# Patient Record
Sex: FEMALE | Race: WHITE | NOT HISPANIC OR LATINO | Employment: OTHER | URBAN - METROPOLITAN AREA
[De-identification: names, ages, dates, MRNs, and addresses within clinical notes are randomized per-mention and may not be internally consistent; named-entity substitution may affect disease eponyms.]

---

## 2017-03-09 ENCOUNTER — HOSPITAL ENCOUNTER (OUTPATIENT)
Dept: MAMMOGRAPHY | Facility: HOSPITAL | Age: 72
Discharge: HOME | End: 2017-03-09

## 2017-03-09 DIAGNOSIS — Z12.31 VISIT FOR SCREENING MAMMOGRAM: ICD-10-CM

## 2021-05-24 ENCOUNTER — RECORDS - HEALTHEAST (OUTPATIENT)
Dept: ADMINISTRATIVE | Facility: CLINIC | Age: 76
End: 2021-05-24

## 2021-05-25 ENCOUNTER — RECORDS - HEALTHEAST (OUTPATIENT)
Dept: ADMINISTRATIVE | Facility: CLINIC | Age: 76
End: 2021-05-25

## 2021-05-26 ENCOUNTER — RECORDS - HEALTHEAST (OUTPATIENT)
Dept: ADMINISTRATIVE | Facility: CLINIC | Age: 76
End: 2021-05-26

## 2021-05-27 ENCOUNTER — RECORDS - HEALTHEAST (OUTPATIENT)
Dept: ADMINISTRATIVE | Facility: CLINIC | Age: 76
End: 2021-05-27

## 2021-05-28 ENCOUNTER — RECORDS - HEALTHEAST (OUTPATIENT)
Dept: ADMINISTRATIVE | Facility: CLINIC | Age: 76
End: 2021-05-28

## 2021-07-13 ENCOUNTER — RECORDS - HEALTHEAST (OUTPATIENT)
Dept: ADMINISTRATIVE | Facility: CLINIC | Age: 76
End: 2021-07-13

## 2021-07-21 ENCOUNTER — RECORDS - HEALTHEAST (OUTPATIENT)
Dept: ADMINISTRATIVE | Facility: CLINIC | Age: 76
End: 2021-07-21

## 2022-06-24 ENCOUNTER — TRANSCRIBE ORDERS (OUTPATIENT)
Dept: OTHER | Age: 77
End: 2022-06-24

## 2022-06-24 DIAGNOSIS — R41.3 MEMORY LOSS: Primary | ICD-10-CM

## 2022-11-28 ENCOUNTER — OFFICE VISIT (OUTPATIENT)
Dept: NEUROLOGY | Facility: CLINIC | Age: 77
End: 2022-11-28
Attending: FAMILY MEDICINE
Payer: COMMERCIAL

## 2022-11-28 VITALS — DIASTOLIC BLOOD PRESSURE: 64 MMHG | SYSTOLIC BLOOD PRESSURE: 127 MMHG | HEART RATE: 77 BPM

## 2022-11-28 DIAGNOSIS — R41.3 MEMORY LOSS: ICD-10-CM

## 2022-11-28 DIAGNOSIS — F02.80 MAJOR NEUROCOGNITIVE DISORDER DUE TO ALZHEIMER'S DISEASE (H): Primary | ICD-10-CM

## 2022-11-28 DIAGNOSIS — G30.9 MAJOR NEUROCOGNITIVE DISORDER DUE TO ALZHEIMER'S DISEASE (H): Primary | ICD-10-CM

## 2022-11-28 DIAGNOSIS — G20.A1 PARKINSON'S DISEASE (H): ICD-10-CM

## 2022-11-28 PROCEDURE — 99205 OFFICE O/P NEW HI 60 MIN: CPT | Performed by: PSYCHIATRY & NEUROLOGY

## 2022-11-28 RX ORDER — DONEPEZIL HYDROCHLORIDE 10 MG/1
10 TABLET, FILM COATED ORAL AT BEDTIME
Qty: 90 TABLET | Refills: 3 | Status: SHIPPED | OUTPATIENT
Start: 2022-12-28 | End: 2023-01-20

## 2022-11-28 RX ORDER — DONEPEZIL HYDROCHLORIDE 5 MG/1
5 TABLET, FILM COATED ORAL AT BEDTIME
Qty: 30 TABLET | Refills: 0 | Status: SHIPPED | OUTPATIENT
Start: 2022-11-28 | End: 2022-12-28

## 2022-11-28 ASSESSMENT — MONTREAL COGNITIVE ASSESSMENT (MOCA)
9. REPEAT EACH SENTENCE: 2
10. [FLUENCY] NAME WORDS STARTING WITH DESIGNATED LETTER: 0
13. ORIENTATION SUBSCORE: 2
4. NAME EACH OF THE THREE ANIMALS SHOWN: 3
7. [VIGILENCE] TAP WHEN HEARING DESIGNATED LETTER: 1
VISUOSPATIAL/EXECUTIVE SUBSCORE: 1
6. READ LIST OF DIGITS [FORWARD/BACKWARD]: 2
8. SERIAL SUBTRACTION OF 7S: 1
11. FOR EACH PAIR OF WORDS, WHAT CATEGORY DO THEY BELONG TO (OUT OF 2): 0
WHAT LEVEL OF EDUCATION WAS ATTAINED: 1
WHAT IS THE TOTAL SCORE (OUT OF 30): 13
12. MEMORY INDEX SCORE: 0

## 2022-11-28 NOTE — NURSING NOTE
ABEBA COGNITIVE ASSESSMENT (MOCA)  Version 7.1 Original Version  VISUOSPATIAL/EXECUTIVE               COPY CUBE      [   0 ]                                [   0 ] DRAW CLOCK (Ten past eleven)  (3 points)    [ 1   ]                    [   0 ]               [ 01   ]       Contour            Numbers     Hands POINTS                   / 5   NAMING    [  1 ]                                                                        [  1  ]                                             [   1 ]  Limartha Koroma                                Camel                 3    / 3   MEMORY Read list of words, subject must repeat them. Do 2 trials, even if 1st trial is successful. Do a recall after 5 minutes  FACE VELVET Gnosticism ELIZABETH RED No Points    1st          2nd         ATTENTION Read list of digits (1 digit/sec) Subject has to repeat in the forward order       [  1  ]   2  1  8  5  4                                [ 1   ] 7 4 2                     2     /2   Read list of letters. The subject must tap with his hand at each letter A. No points if > 2 errors.  [  1  ] F B A C M N A A J K L B A F A K D E A A A J A M O F A A B           1   /1   Serial 7 subtraction starting at 100          [ 1   ] 93         [ 0   ] 86          [  0  ] 79          [   0 ] 72         [0    ] 65   4 or 5 correct subtractions: 3 points,  2 or 3 correct: 2 points,  1correct: 1 point,   0 correct: 0 points         1   /3   LANGUAGE Repeat: I only know that Jace is the one to help today. [ 1    ]                                      The cat always hid under the couch when dogs were in the room. 1  ]           2    /2   Fluency: Name maximum number of words in one minute that begin with the letter F                                                                                                                    [ 0   ] __9_ (N > 11 words)          0     /1   ABSTRACTION Similarity  between e.g. banana-orange=fruit                                                                   [ 1   ] train-bicycle                      [   1 ] watch-ruler           2   /2   DELAYED  RECALL Has to recall words  WITH NO CUE FACE  [    ] VELVET  [    ] Orthodoxy  [    ]  ELIZABETH  [    ] RED  [    ] Points for UNCUED recall only            /5           OPTIONAL Category cue           Multiple choice cue          ORIENTATION  [  0  ] Date     [   0 ] Month       [ 0   ] Year      [ 00   ] Day      [   1 ] Place        [   1] City        2 /6   TOTAL  Normal > 26/30 1Add 1 point if < 12 years education   13 /30

## 2022-11-28 NOTE — LETTER
2022         RE: Sharon De La Vega  [address] [place] [address]        Dear Colleague,    Thank you for referring your patient, Sharon De La Vega, to the [facility] [place]. Please see a copy of my visit note below.    NEUROLOGY CONSULTATION NOTE       [facility] [place]  [address] [place] [address]  Tel: [phone]  Fax: [phone]  [email or web address]     Sharon De La Vega,  1945, MRN [id number]  PCP: No primary care provider on file.  Date: 2022     ASSESSMENT & PLAN     Visit Diagnosis  1. Major neurocognitive disorder due to Alzheimer's disease (H)  2. Tremor     Major neurocognitive disorder due to Alzheimer's disease  77 years old female with HTN, HLD, prediabetes, anxiety, hypothyroidism with 3 to 4 years history of progressive cognitive decline.  She scored 13/30 on MoCA and likely has major neurocognitive disorder due to Alzheimer's disease.  Family does not recall any work-up for this condition in the past and I have recommended:    1.  MRI brain with and without contrast  2.  Check folate, MMA, RPR, TSH, vitamin B1 and B12  3.  Start Aricept 5 mg daily, after 1 month increase it to 10 mg daily  4.  She had hepatic profile checked in  and I have recommended repeat hepatic profile after 3 months  5.  Follow-up after 3 months    R/O Parkinson disease  Family has noticed left-sided resting tremor and on exam she has minimal left-sided cogwheel rigidity.  Although not convincing the soft findings do raise the possibility of Parkinson disease.  I have recommended a DaTscan.  Follow-up after above tests    Thank you again for this referral, please feel free to contact me if you have any questions.    Manuel Osuna MD  [facility]  (Formerly, [facility]     REASON FOR CONSULTATION Memory Loss        HISTORY OF PRESENT ILLNESS     We have  been requested by Dr. Young to evaluate Sharon De La Vega who is a 77 year old  female for dementia & tremor    Patient is a 77 years old female with history of HTN, HLD, prediabetes, anxiety, hypothyroidism and neurocognitive disorder due to Alzheimer's dementia who was referred for evaluation and management of cognitive decline and tremors. According to family member about 3 to 4 years ago they started noticing patient was struggling with short-term memory.  She would repeat herself and ask questions repeatedly.  She had no difficulty with long-term memory.  There is no history of any bladder incontinence, hallucinations or any myoclonic twitches.  She is living with her son who is managing the finances.  She has not done anything that puts her or other people at risk.  Son or patient does not recall if any work-up was done and she is also not on any medication.    In the recent past they have also noticed tremors more so on the left side that is more pronounced when she is resting.  She does have balance issues and uses a cane but do not think there has been change in her gait or any speech difficulty.  She does report some issues with constipation but denies any excessive drooling, hypophonia or any change in her penmanship     PROBLEM LIST   Patient Active Problem List   Diagnosis Code     Hypothyroidism E03.9     Essential hypertension I10     Hypercholesterolemia E78.00     DJD (degenerative joint disease)- right knee M19.90     Arthritis with psoriasis (H) L40.50     S/P knee replacement- right Z96.659     Intertrigo L30.4     Anxiety F41.9     Major neurocognitive disorder due to Alzheimer's disease (H) G30.9, F02.80     Major depressive disorder, single episode, moderate (H) F32.1         PAST MEDICAL & SURGICAL HISTORY     Past Medical History:   Patient  has no past medical history on file.    Surgical History:  She  has no past surgical history on file.     SOCIAL HISTORY     Reviewed, and she        FAMILY HISTORY     Reviewed, and family history includes Lung Cancer in her father; Multiple myeloma in her brother.     ALLERGIES     Allergies   Allergen Reactions     Celecoxib      Codeine Camsylate      Estrogens, Conjugated Synthetic A      Nsaids      Sulfa Drugs          REVIEW OF SYSTEMS     A 12 point review of system was performed and was negative except as outlined in the history of present illness.     HOME MEDICATIONS     Current Outpatient Rx   Medication Sig Dispense Refill     calcium carbonate-vitamin D 600-200 MG-UNIT TABS Take 1 tablet by mouth 2 times daily.       citalopram (CELEXA) 40 MG tablet Take 40 mg by mouth daily.       [START ON 12/28/2022] donepezil (ARICEPT) 10 MG tablet Take 1 tablet (10 mg) by mouth At Bedtime 90 tablet 3     donepezil (ARICEPT) 5 MG tablet Take 1 tablet (5 mg) by mouth At Bedtime for 30 days 30 tablet 0     folic acid (FOLVITE) 1 MG tablet Take 1 mg by mouth daily.       levothyroxine (SYNTHROID/LEVOTHROID) 125 MCG tablet Take 125 mcg by mouth daily.       lisinopril (PRINIVIL,ZESTRIL) 20 MG tablet Take 20 mg by mouth daily.       Magnesium Hydroxide (MILK OF MAGNESIA PO) Take 30 mLs by mouth every 6 hours as needed.       methotrexate 2.5 MG tablet Take 25 mg by mouth once a week. On Thursday        oxyCODONE-acetaminophen (PERCOCET) 5-325 MG tablet Take 2 tablets by mouth 2 times daily. And 1 tab po q6 hr prnGive scheduled dose 30 min before therapy .        senna-docusate (SENOKOT-S/PERICOLACE) 8.6-50 MG tablet Take 1 tablet by mouth 2 times daily.       VITAMIN A 8,000 Units daily.       warfarin ANTICOAGULANT (COUMADIN) 1 MG tablet Take  by mouth daily. Take as directed             PHYSICAL EXAM     Vital signs  /64   Pulse 77     Weight:   0 lbs 0 oz    Patient is alert and oriented vital signs were reviewed and are documented in electronic medical record.  Neck supple.  Neurologically speech normal.  Cranial nerves are intact.  She has minimal  cogwheel rigidity on the left side with resting tremor.  She also has bilateral intention tremor.  Strength is 5/5 reflexes 1+ toes equivocal she has a wide-based gait and uses a cane     PERTINENT DIAGNOSTIC STUDIES     Following studies were reviewed:     No recent imaging studies to review     PERTINENT LABS  Following labs were reviewed:  No visits with results within 3 Month(s) from this visit.   Latest known visit with results is:   No results found for any previous visit.        Total time spent for face to face visit, reviewing labs/imaging studies, counseling and coordination of care was: 1 Hour spent on the date of the encounter doing chart review, review of outside records, review of test results, interpretation of tests, patient visit and documentation       This note was dictated using voice recognition software.  Any grammatical or context distortions are unintentional and inherent to the software.    Orders Placed This Encounter   Procedures     MR Brain w/o & w Contrast     NM Brain Imaging Tomographic (Spect) Datscan     Folate     Methylmalonic Acid     Treponema Abs w Reflex to RPR and Titer     TSH with free T4 reflex     Vitamin B1 whole blood     Vitamin B12     Hepatic function panel      New Prescriptions    DONEPEZIL (ARICEPT) 10 MG TABLET    Take 1 tablet (10 mg) by mouth At Bedtime    DONEPEZIL (ARICEPT) 5 MG TABLET    Take 1 tablet (5 mg) by mouth At Bedtime for 30 days      Modified Medications    No medications on file              Again, thank you for allowing me to participate in the care of your patient.        Sincerely,        Manuel Osuna MD

## 2022-11-28 NOTE — PROGRESS NOTES
NEUROLOGY CONSULTATION NOTE       [facility]UROLOG[facility] [place]  [address] [place] [address]  Tel: [phone]  Fax: [phone]  [email or web address]     Sharon De La Vega,  1945, MRN [id number]  PCP: No primary care provider on file.  Date: 2022     ASSESSMENT & PLAN     Visit Diagnosis  1. Major neurocognitive disorder due to Alzheimer's disease (H)  2. Tremor     Major neurocognitive disorder due to Alzheimer's disease  77 years old female with HTN, HLD, prediabetes, anxiety, hypothyroidism with 3 to 4 years history of progressive cognitive decline.  She scored 13/30 on MoCA and likely has major neurocognitive disorder due to Alzheimer's disease.  Family does not recall any work-up for this condition in the past and I have recommended:    1.  MRI brain with and without contrast  2.  Check folate, MMA, RPR, TSH, vitamin B1 and B12  3.  Start Aricept 5 mg daily, after 1 month increase it to 10 mg daily  4.  She had hepatic profile checked in  and I have recommended repeat hepatic profile after 3 months  5.  Follow-up after 3 months    R/O Parkinson disease  Family has noticed left-sided resting tremor and on exam she has minimal left-sided cogwheel rigidity.  Although not convincing the soft findings do raise the possibility of Parkinson disease.  I have recommended a DaTscan.  Follow-up after above tests    Thank you again for this referral, please feel free to contact me if you have any questions.    Manuel Osuna MD  [facility]  (Former[facility], [facility]     REASON FOR CONSULTATION Memory Loss        HISTORY OF PRESENT ILLNESS     We have been requested by Dr. Young to evaluate Sharon De La Vega who is a 77 year old  female for dementia & tremor    Patient is a 77 years old female with history of HTN, HLD, prediabetes, anxiety, hypothyroidism and neurocognitive disorder due to Alzheimer's dementia who  was referred for evaluation and management of cognitive decline and tremors. According to family member about 3 to 4 years ago they started noticing patient was struggling with short-term memory.  She would repeat herself and ask questions repeatedly.  She had no difficulty with long-term memory.  There is no history of any bladder incontinence, hallucinations or any myoclonic twitches.  She is living with her son who is managing the finances.  She has not done anything that puts her or other people at risk.  Son or patient does not recall if any work-up was done and she is also not on any medication.    In the recent past they have also noticed tremors more so on the left side that is more pronounced when she is resting.  She does have balance issues and uses a cane but do not think there has been change in her gait or any speech difficulty.  She does report some issues with constipation but denies any excessive drooling, hypophonia or any change in her penmanship     PROBLEM LIST   Patient Active Problem List   Diagnosis Code     Hypothyroidism E03.9     Essential hypertension I10     Hypercholesterolemia E78.00     DJD (degenerative joint disease)- right knee M19.90     Arthritis with psoriasis (H) L40.50     S/P knee replacement- right Z96.659     Intertrigo L30.4     Anxiety F41.9     Major neurocognitive disorder due to Alzheimer's disease (H) G30.9, F02.80     Major depressive disorder, single episode, moderate (H) F32.1         PAST MEDICAL & SURGICAL HISTORY     Past Medical History:   Patient  has no past medical history on file.    Surgical History:  She  has no past surgical history on file.     SOCIAL HISTORY     Reviewed, and she       FAMILY HISTORY     Reviewed, and family history includes Lung Cancer in her father; Multiple myeloma in her brother.     ALLERGIES     Allergies   Allergen Reactions     Celecoxib      Codeine Camsylate      Estrogens, Conjugated Synthetic A      Nsaids      Sulfa Drugs           REVIEW OF SYSTEMS     A 12 point review of system was performed and was negative except as outlined in the history of present illness.     HOME MEDICATIONS     Current Outpatient Rx   Medication Sig Dispense Refill     calcium carbonate-vitamin D 600-200 MG-UNIT TABS Take 1 tablet by mouth 2 times daily.       citalopram (CELEXA) 40 MG tablet Take 40 mg by mouth daily.       [START ON 12/28/2022] donepezil (ARICEPT) 10 MG tablet Take 1 tablet (10 mg) by mouth At Bedtime 90 tablet 3     donepezil (ARICEPT) 5 MG tablet Take 1 tablet (5 mg) by mouth At Bedtime for 30 days 30 tablet 0     folic acid (FOLVITE) 1 MG tablet Take 1 mg by mouth daily.       levothyroxine (SYNTHROID/LEVOTHROID) 125 MCG tablet Take 125 mcg by mouth daily.       lisinopril (PRINIVIL,ZESTRIL) 20 MG tablet Take 20 mg by mouth daily.       Magnesium Hydroxide (MILK OF MAGNESIA PO) Take 30 mLs by mouth every 6 hours as needed.       methotrexate 2.5 MG tablet Take 25 mg by mouth once a week. On Thursday        oxyCODONE-acetaminophen (PERCOCET) 5-325 MG tablet Take 2 tablets by mouth 2 times daily. And 1 tab po q6 hr prnGive scheduled dose 30 min before therapy .        senna-docusate (SENOKOT-S/PERICOLACE) 8.6-50 MG tablet Take 1 tablet by mouth 2 times daily.       VITAMIN A 8,000 Units daily.       warfarin ANTICOAGULANT (COUMADIN) 1 MG tablet Take  by mouth daily. Take as directed             PHYSICAL EXAM     Vital signs  /64   Pulse 77     Weight:   0 lbs 0 oz    Patient is alert and oriented vital signs were reviewed and are documented in electronic medical record.  Neck supple.  Neurologically speech normal.  Cranial nerves are intact.  She has minimal cogwheel rigidity on the left side with resting tremor.  She also has bilateral intention tremor.  Strength is 5/5 reflexes 1+ toes equivocal she has a wide-based gait and uses a cane     PERTINENT DIAGNOSTIC STUDIES     Following studies were reviewed:     No recent imaging  studies to review     PERTINENT LABS  Following labs were reviewed:  No visits with results within 3 Month(s) from this visit.   Latest known visit with results is:   No results found for any previous visit.        Total time spent for face to face visit, reviewing labs/imaging studies, counseling and coordination of care was: 1 Hour spent on the date of the encounter doing chart review, review of outside records, review of test results, interpretation of tests, patient visit and documentation       This note was dictated using voice recognition software.  Any grammatical or context distortions are unintentional and inherent to the software.    Orders Placed This Encounter   Procedures     MR Brain w/o & w Contrast     NM Brain Imaging Tomographic (Spect) Datscan     Folate     Methylmalonic Acid     Treponema Abs w Reflex to RPR and Titer     TSH with free T4 reflex     Vitamin B1 whole blood     Vitamin B12     Hepatic function panel      New Prescriptions    DONEPEZIL (ARICEPT) 10 MG TABLET    Take 1 tablet (10 mg) by mouth At Bedtime    DONEPEZIL (ARICEPT) 5 MG TABLET    Take 1 tablet (5 mg) by mouth At Bedtime for 30 days      Modified Medications    No medications on file

## 2022-12-12 ENCOUNTER — HOSPITAL ENCOUNTER (OUTPATIENT)
Dept: MRI IMAGING | Facility: HOSPITAL | Age: 77
Discharge: HOME | End: 2022-12-12
Attending: PSYCHIATRY & NEUROLOGY | Admitting: PSYCHIATRY & NEUROLOGY
Payer: COMMERCIAL

## 2022-12-12 DIAGNOSIS — F02.80 MAJOR NEUROCOGNITIVE DISORDER DUE TO ALZHEIMER'S DISEASE (H): ICD-10-CM

## 2022-12-12 DIAGNOSIS — G30.9 MAJOR NEUROCOGNITIVE DISORDER DUE TO ALZHEIMER'S DISEASE (H): ICD-10-CM

## 2022-12-12 PROCEDURE — 255N000002 HC RX 255 OP 636: Performed by: PSYCHIATRY & NEUROLOGY

## 2022-12-12 PROCEDURE — 70553 MRI BRAIN STEM W/O & W/DYE: CPT

## 2022-12-12 PROCEDURE — A9585 GADOBUTROL INJECTION: HCPCS | Performed by: PSYCHIATRY & NEUROLOGY

## 2022-12-12 RX ORDER — GADOBUTROL 604.72 MG/ML
0.1 INJECTION INTRAVENOUS ONCE
Status: COMPLETED | OUTPATIENT
Start: 2022-12-12 | End: 2022-12-12

## 2022-12-12 RX ADMIN — GADOBUTROL 11 ML: 604.72 INJECTION INTRAVENOUS at 17:55

## 2022-12-13 NOTE — RESULT ENCOUNTER NOTE
Dacia Herrera,   MRI brain shows atrophy that is more pronounced on the right temporal head region and can be seen in patients with Alzheimer's dementia.  Continue current medication.  Follow-up as scheduled.    Manuel Osuna MD

## 2022-12-25 ENCOUNTER — HEALTH MAINTENANCE LETTER (OUTPATIENT)
Age: 77
End: 2022-12-25

## 2023-01-10 ENCOUNTER — HOSPITAL ENCOUNTER (EMERGENCY)
Facility: HOSPITAL | Age: 78
Discharge: HOME | End: 2023-01-10
Attending: EMERGENCY MEDICINE | Admitting: EMERGENCY MEDICINE
Payer: COMMERCIAL

## 2023-01-10 ENCOUNTER — APPOINTMENT (OUTPATIENT)
Dept: RADIOLOGY | Facility: HOSPITAL | Age: 78
End: 2023-01-10
Attending: EMERGENCY MEDICINE
Payer: COMMERCIAL

## 2023-01-10 VITALS
RESPIRATION RATE: 20 BRPM | WEIGHT: 230 LBS | SYSTOLIC BLOOD PRESSURE: 142 MMHG | OXYGEN SATURATION: 97 % | HEART RATE: 82 BPM | DIASTOLIC BLOOD PRESSURE: 65 MMHG | TEMPERATURE: 97.9 F

## 2023-01-10 DIAGNOSIS — M25.552 HIP PAIN, LEFT: ICD-10-CM

## 2023-01-10 LAB
ANION GAP SERPL CALCULATED.3IONS-SCNC: 7 MMOL/L (ref 7–15)
BASOPHILS # BLD AUTO: 0.1 10E3/UL (ref 0–0.2)
BASOPHILS NFR BLD AUTO: 1 %
BUN SERPL-MCNC: 25.6 MG/DL (ref 8–23)
CALCIUM SERPL-MCNC: 9.7 MG/DL (ref 8.8–10.2)
CHLORIDE SERPL-SCNC: 103 MMOL/L (ref 98–107)
CREAT SERPL-MCNC: 1.04 MG/DL (ref 0.51–0.95)
DEPRECATED HCO3 PLAS-SCNC: 30 MMOL/L (ref 22–29)
EOSINOPHIL # BLD AUTO: 0.2 10E3/UL (ref 0–0.7)
EOSINOPHIL NFR BLD AUTO: 2 %
ERYTHROCYTE [DISTWIDTH] IN BLOOD BY AUTOMATED COUNT: 14.9 % (ref 10–15)
GFR SERPL CREATININE-BSD FRML MDRD: 55 ML/MIN/1.73M2
GLUCOSE SERPL-MCNC: 137 MG/DL (ref 70–99)
HCT VFR BLD AUTO: 37.3 % (ref 35–47)
HGB BLD-MCNC: 11.8 G/DL (ref 11.7–15.7)
IMM GRANULOCYTES # BLD: 0 10E3/UL
IMM GRANULOCYTES NFR BLD: 0 %
INR PPP: 1.15 (ref 0.85–1.15)
LYMPHOCYTES # BLD AUTO: 1 10E3/UL (ref 0.8–5.3)
LYMPHOCYTES NFR BLD AUTO: 12 %
MAGNESIUM SERPL-MCNC: 2.4 MG/DL (ref 1.7–2.3)
MCH RBC QN AUTO: 32.3 PG (ref 26.5–33)
MCHC RBC AUTO-ENTMCNC: 31.6 G/DL (ref 31.5–36.5)
MCV RBC AUTO: 102 FL (ref 78–100)
MONOCYTES # BLD AUTO: 0.7 10E3/UL (ref 0–1.3)
MONOCYTES NFR BLD AUTO: 9 %
NEUTROPHILS # BLD AUTO: 6.5 10E3/UL (ref 1.6–8.3)
NEUTROPHILS NFR BLD AUTO: 76 %
NRBC # BLD AUTO: 0 10E3/UL
NRBC BLD AUTO-RTO: 0 /100
PLATELET # BLD AUTO: 205 10E3/UL (ref 150–450)
POTASSIUM SERPL-SCNC: 5.3 MMOL/L (ref 3.4–5.3)
RBC # BLD AUTO: 3.65 10E6/UL (ref 3.8–5.2)
SODIUM SERPL-SCNC: 140 MMOL/L (ref 136–145)
WBC # BLD AUTO: 8.5 10E3/UL (ref 4–11)

## 2023-01-10 PROCEDURE — 80048 BASIC METABOLIC PNL TOTAL CA: CPT | Performed by: EMERGENCY MEDICINE

## 2023-01-10 PROCEDURE — 73502 X-RAY EXAM HIP UNI 2-3 VIEWS: CPT

## 2023-01-10 PROCEDURE — 250N000013 HC RX MED GY IP 250 OP 250 PS 637: Performed by: EMERGENCY MEDICINE

## 2023-01-10 PROCEDURE — 83735 ASSAY OF MAGNESIUM: CPT | Performed by: EMERGENCY MEDICINE

## 2023-01-10 PROCEDURE — 85610 PROTHROMBIN TIME: CPT | Performed by: EMERGENCY MEDICINE

## 2023-01-10 PROCEDURE — 99284 EMERGENCY DEPT VISIT MOD MDM: CPT

## 2023-01-10 PROCEDURE — 36415 COLL VENOUS BLD VENIPUNCTURE: CPT | Performed by: EMERGENCY MEDICINE

## 2023-01-10 PROCEDURE — 85004 AUTOMATED DIFF WBC COUNT: CPT | Performed by: EMERGENCY MEDICINE

## 2023-01-10 RX ORDER — LIDOCAINE 4 G/G
1 PATCH TOPICAL ONCE
Status: COMPLETED | OUTPATIENT
Start: 2023-01-10 | End: 2023-01-11

## 2023-01-10 RX ORDER — ACETAMINOPHEN 325 MG/1
975 TABLET ORAL ONCE
Status: COMPLETED | OUTPATIENT
Start: 2023-01-10 | End: 2023-01-10

## 2023-01-10 RX ADMIN — LIDOCAINE PATCH 4% 1 PATCH: 40 PATCH TOPICAL at 12:56

## 2023-01-10 RX ADMIN — ACETAMINOPHEN 325 MG: 325 TABLET ORAL at 12:55

## 2023-01-10 ASSESSMENT — ACTIVITIES OF DAILY LIVING (ADL)
ADLS_ACUITY_SCORE: 35

## 2023-01-10 NOTE — ED TRIAGE NOTES
Arrives via [facility] from home. Left hip pain for two days. No recent falls or trauma. Assist of 2 from stretcher to wheelchair. Walks with cane at home. No pain while sitting; pain with trying to stand.

## 2023-01-10 NOTE — ED PROVIDER NOTES
EMERGENCY DEPARTMENT ENCOUNTER      NAME: Sharon De La Vega  AGE: 77 year old female  YOB: 1945  MRN: [id number]  EVALUATION DATE & TIME: No admission date for patient encounter.    PCP: Jaylen Young    ED PROVIDER: Anitra Montes M.D.      Chief Complaint   Patient presents with     Hip Pain         FINAL IMPRESSION:  1. Hip pain, left          ED COURSE & MEDICAL DECISION MAKING:    ED Course as of 01/10/23 1427   Tue Andres 10, 2023   1243 Pt with 4ish days Left hip discomfort no longer present per patient and worse last night, slightly achy today and now improved with aleve, she took 2x aleve 11am and can ambulate but family called EMS for ED evaluation. Exam reassuring, no signs of trauma, no left hip pain with axial loading to suggest infection and no fever or redness/swelling, neurovascularly intact. Patient and family amenable to screening labs, XR and reassessment.   1403 CBC without elevated WBC at 8.5 and chemistry WNL. Patient in XR now.   1424 XR without fracture, patient feeling improved and ambulatory, family and patient amenable with possible arthritis [facility]. Patient discharged after being provided with extensive anticipatory guidance and given return precautions, importance of PMD follow-up emphasized.      12:35 PM Met with patient for initial interview and exam. Discussed initial plan for care for their stay in the emergency department.  2:27 PM  Rechecked patient. Updated them on results. Discussed plans for discharge and return precautions. Patient is in agreement.     Pertinent Labs & Imaging studies reviewed. (See chart for details)    N95 worn  A face shield was worn also  COVID PPE    Medical Decision Making    History:    Supplemental history from: Other: Daughter-in-law, son    External Record(s) reviewed: Documented in HPI, if applicable.    Work Up:    Chart documentation includes differential considered and any EKGs or imaging  independently interpreted by provider.    In additional to work up documented, I considered the following work up: See chart documentation, if applicable.    External consultation:    Discussion of management with another provider: See chart documentation, if applicable    Complicating factors:    Care impacted by chronic illness: Dementia, Hypertension and Mental Health    Care affected by social determinants of health: N/A    Disposition considerations: Discharge. No recommendations on prescription strength medication(s). I considered admission, but discharged the patient after share decision making conversation.        At the conclusion of the encounter I discussed the results of all of the tests and the disposition. The questions were answered. The patient or family acknowledged understanding and was agreeable with the care plan.     MEDICATIONS GIVEN IN THE EMERGENCY:  Medications   Lidocaine (LIDOCARE) 4 % Patch 1 patch (1 patch Transdermal Patch/Med Applied 1/10/23 1256)   lidocaine patch in PLACE (has no administration in time range)   acetaminophen (TYLENOL) tablet 975 mg (325 mg Oral Given 1/10/23 1255)       NEW PRESCRIPTIONS STARTED AT TODAY'S ER VISIT  New Prescriptions    No medications on file          =================================================================    HPI      Sharon De La Vega is a 77 year old female with PMHx of Alzheimer's disease, anxiety, degenerative joint disease, HTN, and HLD, who presents to the ED today via EMS with left hip pain.     Per patient she presents endorsing left hip pain that has been acutely worse for the past 4-5 days. She reports chronically having left hip pain but notes it is normally not this severe. She states the pain is aching in nature and worse with walking. She reports tylenol helps with the pain and last had alleve at 11:00 AM. She notes this helped slightly with the pain and notes it is currently not very severe. Patient's son and daughter in law  note the patient has difficulty moving do to the pain. Patient denies any recent falls, twisting, heavy lifting or injuries. She denies any fever, abdominal pain, nausea, vomiting, new knee or leg pain as well as any other complaints at the time.     REVIEW OF SYSTEMS   All other systems reviewed and are negative except as noted above in HPI.    PAST MEDICAL HISTORY:  No past medical history on file.    PAST SURGICAL HISTORY:  No past surgical history on file.    CURRENT MEDICATIONS:    calcium carbonate-vitamin D 600-200 MG-UNIT TABS  citalopram (CELEXA) 40 MG tablet  donepezil (ARICEPT) 10 MG tablet  folic acid (FOLVITE) 1 MG tablet  levothyroxine (SYNTHROID/LEVOTHROID) 125 MCG tablet  lisinopril (PRINIVIL,ZESTRIL) 20 MG tablet  Magnesium Hydroxide (MILK OF MAGNESIA PO)  methotrexate 2.5 MG tablet  oxyCODONE-acetaminophen (PERCOCET) 5-325 MG tablet  senna-docusate (SENOKOT-S/PERICOLACE) 8.6-50 MG tablet  VITAMIN A  warfarin ANTICOAGULANT (COUMADIN) 1 MG tablet        ALLERGIES:  Allergies   Allergen Reactions     Celecoxib      Codeine Camsylate      Estrogens, Conjugated Synthetic A      Nsaids      Sulfa Drugs        FAMILY HISTORY:  Family History   Problem Relation Age of Onset     Lung Cancer Father      Multiple myeloma Brother        SOCIAL HISTORY:   Social History     Socioeconomic History     Marital status:        VITALS:  Patient Vitals for the past 24 hrs:   BP Temp Temp src Pulse Resp SpO2 Weight   01/10/23 1220 (!) 167/59 97.9  F (36.6  C) Temporal 89 18 98 % 104.3 kg (230 lb)       PHYSICAL EXAM    GENERAL: Awake, alert.  In no acute distress.   HEENT: Normocephalic, atraumatic.  Pupils equal, round and reactive.  Conjunctiva normal.  EOMI.  NECK: No stridor or apparent deformity.  PULMONARY: Symmetrical breath sounds without distress.  Lungs clear to auscultation bilaterally without wheezes, rhonchi or rales.  CARDIO: Regular rate and rhythm.  No significant murmur, rub or gallop.  Radial  pulses strong and symmetrical.  ABDOMINAL: Abdomen soft, non-distended and non-tender to palpation.  No CVAT, no palpable hepatosplenomegaly.  EXTREMITIES: No lower extremity swelling or edema.  Left hip tenderness not reproduced on exam.   NEURO: Alert and oriented to person, place and time.  Cranial nerves grossly intact.  No focal motor deficit.  PSYCH: Normal mood and affect  SKIN: No rashes      LAB:  All pertinent labs reviewed and interpreted.  Results for orders placed or performed during the hospital encounter of 01/10/23   XR Pelvis and Hip Left 2 Views    Impression    IMPRESSION: Left hip joint space is maintained, without joint space narrowing. Heterotopic bone formation adjacent to the greater trochanter is chronic in appearance. No acute fracture or dislocation.   Basic metabolic panel   Result Value Ref Range    Sodium 140 136 - 145 mmol/L    Potassium 5.3 3.4 - 5.3 mmol/L    Chloride 103 98 - 107 mmol/L    Carbon Dioxide (CO2) 30 (H) 22 - 29 mmol/L    Anion Gap 7 7 - 15 mmol/L    Urea Nitrogen 25.6 (H) 8.0 - 23.0 mg/dL    Creatinine 1.04 (H) 0.51 - 0.95 mg/dL    Calcium 9.7 8.8 - 10.2 mg/dL    Glucose 137 (H) 70 - 99 mg/dL    GFR Estimate 55 (L) >60 mL/min/1.73m2   Result Value Ref Range    INR 1.15 0.85 - 1.15   Result Value Ref Range    Magnesium 2.4 (H) 1.7 - 2.3 mg/dL   CBC with platelets and differential   Result Value Ref Range    WBC Count 8.5 4.0 - 11.0 10e3/uL    RBC Count 3.65 (L) 3.80 - 5.20 10e6/uL    Hemoglobin 11.8 11.7 - 15.7 g/dL    Hematocrit 37.3 35.0 - 47.0 %     (H) 78 - 100 fL    MCH 32.3 26.5 - 33.0 pg    MCHC 31.6 31.5 - 36.5 g/dL    RDW 14.9 10.0 - 15.0 %    Platelet Count 205 150 - 450 10e3/uL    % Neutrophils 76 %    % Lymphocytes 12 %    % Monocytes 9 %    % Eosinophils 2 %    % Basophils 1 %    % Immature Granulocytes 0 %    NRBCs per 100 WBC 0 <1 /100    Absolute Neutrophils 6.5 1.6 - 8.3 10e3/uL    Absolute Lymphocytes 1.0 0.8 - 5.3 10e3/uL    Absolute Monocytes  0.7 0.0 - 1.3 10e3/uL    Absolute Eosinophils 0.2 0.0 - 0.7 10e3/uL    Absolute Basophils 0.1 0.0 - 0.2 10e3/uL    Absolute Immature Granulocytes 0.0 <=0.4 10e3/uL    Absolute NRBCs 0.0 10e3/uL       RADIOLOGY:  Reviewed all pertinent imaging. Please see official radiology report.  XR Pelvis and Hip Left 2 Views   Preliminary Result   IMPRESSION: Left hip joint space is maintained, without joint space narrowing. Heterotopic bone formation adjacent to the greater trochanter is chronic in appearance. No acute fracture or dislocation.          I, Blanca Swan, am serving as a scribe to document services personally performed by Dr. Anitra Montes based on my observation and the provider's statements to me. I, Anitra Montes MD attest that Blanca Swan is acting in a scribe capacity, has observed my performance of the services and has documented them in accordance with my direction.     Anitra Montes MD  01/10/23 3164

## 2023-01-10 NOTE — ED NOTES
ED Provider In Triage Note  [facility]  Encounter Date: Andres 10, 2023    Chief Complaint   Patient presents with     Hip Pain       Brief HPI:   Sharon De La Vega is a 77 year old female presenting to the Emergency Department with a chief complaint of 2 day of left hip pain.  No trauma. Pt is able to walk, but difficulty getting up initially.  Pt unable to see outpatient primary, so they called EMS to be seen in ED.  Pt does have hx of dementia.    Brief Physical Exam:  BP (!) 167/59   Pulse 89   Temp 97.9  F (36.6  C) (Temporal)   Resp 18   Wt 104.3 kg (230 lb)   SpO2 98%   General: Non-toxic appearing  HEENT: Atraumatic  Resp: No respiratory distress  Abdomen: Non-peritoneal  Neuro: Alert, oriented, answers questions appropriately  Psych: Behavior appropriate  MSK: 2+ pulses, nontender to hip/leg    Plan Initiated in Triage:  Labs, xrays to start.      PIT Dispo:   Return to lobby while awaiting workup and ED bed availability    Conner Dumont MD on 1/10/2023 at 12:17 PM    Patient was evaluated by the Physician in Triage due to a limitation of available rooms in the Emergency Department. A plan of care was discussed based on the information obtained on the initial evaluation and patient was consuled to return back to the Emergency Department lobby after this initial evalutaiton until results were obtained or a room became available in the Emergency Department. Patient was counseled not to leave prior to receiving the results of their workup.     Conner Dumont MD  [facility] EMERGENCY DEPARTMENT  [address] [place] [id number]  [phone]       Conner Dumont MD  01/10/23 1228

## 2023-01-17 ENCOUNTER — TELEPHONE (OUTPATIENT)
Dept: CARDIOLOGY | Facility: CLINIC | Age: 78
End: 2023-01-17
Payer: COMMERCIAL

## 2023-01-17 NOTE — TELEPHONE ENCOUNTER
VM left on mobile number asking for a call back regarding Pt's medication hold for [employer] on 1/31/23 Pt on Bupropion that needs to be held 8 days starting on 1/23/23--ppm

## 2023-01-19 ENCOUNTER — TELEPHONE (OUTPATIENT)
Dept: CARDIOLOGY | Facility: CLINIC | Age: 78
End: 2023-01-19
Payer: COMMERCIAL

## 2023-01-31 ENCOUNTER — ANCILLARY PROCEDURE (OUTPATIENT)
Dept: NUCLEAR MEDICINE | Facility: CLINIC | Age: 78
End: 2023-01-31
Attending: PSYCHIATRY & NEUROLOGY
Payer: COMMERCIAL

## 2023-01-31 ENCOUNTER — LAB (OUTPATIENT)
Dept: LAB | Facility: CLINIC | Age: 78
End: 2023-01-31
Payer: COMMERCIAL

## 2023-01-31 DIAGNOSIS — G20.A1 PARKINSON'S DISEASE (H): ICD-10-CM

## 2023-01-31 DIAGNOSIS — F02.80 MAJOR NEUROCOGNITIVE DISORDER DUE TO ALZHEIMER'S DISEASE (H): ICD-10-CM

## 2023-01-31 DIAGNOSIS — G30.9 MAJOR NEUROCOGNITIVE DISORDER DUE TO ALZHEIMER'S DISEASE (H): ICD-10-CM

## 2023-01-31 LAB
FOLATE SERPL-MCNC: 15.2 NG/ML (ref 4.6–34.8)
T4 FREE SERPL-MCNC: 1.02 NG/DL (ref 0.76–1.46)
TSH SERPL DL<=0.005 MIU/L-ACNC: 8.88 MU/L (ref 0.4–4)
VIT B12 SERPL-MCNC: 1357 PG/ML (ref 232–1245)

## 2023-01-31 PROCEDURE — 86780 TREPONEMA PALLIDUM: CPT

## 2023-01-31 PROCEDURE — 82746 ASSAY OF FOLIC ACID SERUM: CPT

## 2023-01-31 PROCEDURE — 78803 RP LOCLZJ TUM SPECT 1 AREA: CPT | Performed by: RADIOLOGY

## 2023-01-31 PROCEDURE — 84439 ASSAY OF FREE THYROXINE: CPT

## 2023-01-31 PROCEDURE — 99000 SPECIMEN HANDLING OFFICE-LAB: CPT

## 2023-01-31 PROCEDURE — 36415 COLL VENOUS BLD VENIPUNCTURE: CPT

## 2023-01-31 PROCEDURE — 84425 ASSAY OF VITAMIN B-1: CPT | Mod: 90

## 2023-01-31 PROCEDURE — 82607 VITAMIN B-12: CPT

## 2023-01-31 PROCEDURE — 83921 ORGANIC ACID SINGLE QUANT: CPT

## 2023-01-31 PROCEDURE — A9584 IODINE I-123 IOFLUPANE: HCPCS | Performed by: RADIOLOGY

## 2023-01-31 PROCEDURE — 84443 ASSAY THYROID STIM HORMONE: CPT

## 2023-01-31 RX ORDER — IOFLUPANE I-123 2 MCI/ML
4-6 INJECTION, SOLUTION INTRAVENOUS ONCE
Status: DISCONTINUED | OUTPATIENT
Start: 2023-01-31 | End: 2023-01-31

## 2023-01-31 RX ORDER — IOFLUPANE I-123 2 MCI/ML
4-6 INJECTION, SOLUTION INTRAVENOUS ONCE
Status: COMPLETED | OUTPATIENT
Start: 2023-01-31 | End: 2023-01-31

## 2023-01-31 RX ADMIN — IOFLUPANE I-123 4.77 MCI.: 2 INJECTION, SOLUTION INTRAVENOUS at 12:49

## 2023-02-01 LAB — T PALLIDUM AB SER QL: NONREACTIVE

## 2023-02-02 LAB — METHYLMALONATE SERPL-SCNC: 0.18 UMOL/L (ref 0–0.4)

## 2023-02-05 LAB — VIT B1 PYROPHOSHATE BLD-SCNC: 153 NMOL/L

## 2023-02-14 ENCOUNTER — HOSPITAL ENCOUNTER (EMERGENCY)
Facility: HOSPITAL | Age: 78
Discharge: HOME | End: 2023-02-14
Attending: STUDENT IN AN ORGANIZED HEALTH CARE EDUCATION/TRAINING PROGRAM | Admitting: STUDENT IN AN ORGANIZED HEALTH CARE EDUCATION/TRAINING PROGRAM
Payer: COMMERCIAL

## 2023-02-14 VITALS
BODY MASS INDEX: 39.99 KG/M2 | DIASTOLIC BLOOD PRESSURE: 62 MMHG | RESPIRATION RATE: 18 BRPM | TEMPERATURE: 98.4 F | WEIGHT: 240 LBS | HEIGHT: 65 IN | HEART RATE: 81 BPM | OXYGEN SATURATION: 100 % | SYSTOLIC BLOOD PRESSURE: 142 MMHG

## 2023-02-14 DIAGNOSIS — R60.0 LOWER LEG EDEMA: ICD-10-CM

## 2023-02-14 LAB
ANION GAP SERPL CALCULATED.3IONS-SCNC: 8 MMOL/L (ref 7–15)
BUN SERPL-MCNC: 21.1 MG/DL (ref 8–23)
CALCIUM SERPL-MCNC: 9.1 MG/DL (ref 8.8–10.2)
CHLORIDE SERPL-SCNC: 105 MMOL/L (ref 98–107)
CREAT SERPL-MCNC: 0.94 MG/DL (ref 0.51–0.95)
DEPRECATED HCO3 PLAS-SCNC: 28 MMOL/L (ref 22–29)
ERYTHROCYTE [DISTWIDTH] IN BLOOD BY AUTOMATED COUNT: 15.9 % (ref 10–15)
GFR SERPL CREATININE-BSD FRML MDRD: 63 ML/MIN/1.73M2
GLUCOSE SERPL-MCNC: 140 MG/DL (ref 70–99)
HCT VFR BLD AUTO: 37.6 % (ref 35–47)
HGB BLD-MCNC: 11.5 G/DL (ref 11.7–15.7)
HOLD SPECIMEN: NORMAL
HOLD SPECIMEN: NORMAL
MCH RBC QN AUTO: 32.4 PG (ref 26.5–33)
MCHC RBC AUTO-ENTMCNC: 30.6 G/DL (ref 31.5–36.5)
MCV RBC AUTO: 106 FL (ref 78–100)
NT-PROBNP SERPL-MCNC: 171 PG/ML (ref 0–1800)
PLATELET # BLD AUTO: 184 10E3/UL (ref 150–450)
POTASSIUM SERPL-SCNC: 4.7 MMOL/L (ref 3.4–5.3)
RBC # BLD AUTO: 3.55 10E6/UL (ref 3.8–5.2)
SODIUM SERPL-SCNC: 141 MMOL/L (ref 136–145)
WBC # BLD AUTO: 8 10E3/UL (ref 4–11)

## 2023-02-14 PROCEDURE — 83880 ASSAY OF NATRIURETIC PEPTIDE: CPT | Performed by: PHYSICIAN ASSISTANT

## 2023-02-14 PROCEDURE — 80048 BASIC METABOLIC PNL TOTAL CA: CPT | Performed by: EMERGENCY MEDICINE

## 2023-02-14 PROCEDURE — 36415 COLL VENOUS BLD VENIPUNCTURE: CPT | Performed by: EMERGENCY MEDICINE

## 2023-02-14 PROCEDURE — 99283 EMERGENCY DEPT VISIT LOW MDM: CPT

## 2023-02-14 PROCEDURE — 85014 HEMATOCRIT: CPT | Performed by: EMERGENCY MEDICINE

## 2023-02-14 ASSESSMENT — ACTIVITIES OF DAILY LIVING (ADL): ADLS_ACUITY_SCORE: 35

## 2023-02-14 NOTE — ED TRIAGE NOTES
Patient comes to ED for evaluation of bilateral lower leg swelling. Also stated has weeping to posterior right leg. Started one month ago.      Triage Assessment     Row Name 02/14/23 9968       Triage Assessment (Adult)    Airway WDL WDL       Respiratory WDL    Respiratory WDL WDL       Skin Circulation/Temperature WDL    Skin Circulation/Temperature WDL X  bilateral leg swelling & weeping on posterior right leg       Cardiac WDL    Cardiac WDL WDL       Peripheral/Neurovascular WDL    Peripheral Neurovascular WDL WDL       Cognitive/Neuro/Behavioral WDL    Cognitive/Neuro/Behavioral WDL WDL

## 2023-02-14 NOTE — ED PROVIDER NOTES
Emergency Department Encounter         FINAL IMPRESSION:    Leg edema      ED COURSE AND MEDICAL DECISION MAKING       ED Course as of 02/14/23 1926 Tue Feb 14, 2023 1924 Patient is a morbidly obese 77-year-old here with bilateral leg swelling with family.  States that the leg swelling has been around for a long time.  Notes that today the reason with a cane was at the right leg started having oozing clear drainage.  No fevers, chills, nausea vomiting.  No increasing pain.  No difficulty with walking.  No shortness of breath or chest pain.  No orthopnea.  No dyspnea on exertion.  No urination or bowel issues.  On arrival here she looks well.  Vitals are stable.  Heart and lungs normal.  Bilateral legs with large amount of crusting and dry skin.  The right distal medial area does have a small area of oozing with no active infection, redness erythema or purulence.  No pain or proportion.  Legs are otherwise well-perfused.  Pitting edema that is symmetrical.  Soundly patient does not do a good job at home keeping her legs elevated.  Plan for conservative care measures at this point including elevation of the legs, Ace wrap's versus compression stockings, local wound care for the oozing, moisturizer creams, and outpatient follow-up.   1925 With no asymmetry, unlikely PE/DVT.  No signs of cellulitis.  Normal pulses otherwise.  Soft compartments                          Medical Decision Making    History:    Supplemental history from: Documented in chart, if applicable    External Record(s) reviewed: Documented in chart, if applicable.    Work Up:    Chart documentation includes differential considered and any EKGs or imaging independently interpreted by provider, where specified.    In additional to work up documented, I considered the following work up: Documented in chart, if applicable.    External consultation:    Discussion of management with another provider: Documented in chart, if applicable    Complicating  "factors:    Care impacted by chronic illness: N/A    Care affected by social determinants of health: N/A    Disposition considerations: Discharge. No recommendations on prescription strength medication(s). See documentation for any additional details.                  EKG      At the conclusion of the encounter I discussed the results of all the tests and the disposition. The questions were answered. The patient or family acknowledged understanding and was agreeable with the care plan.                  MEDICATIONS GIVEN IN THE EMERGENCY DEPARTMENT:  Medications - No data to display    NEW PRESCRIPTIONS STARTED AT TODAY'S ED VISIT:  New Prescriptions    No medications on file       HPI     Patient information obtained from: Patient    Use of Interpretor: N/A    Sharon De La Vega is a 77 year old female with a pertinent history of hypertension, Alzheimer's disease, degenerative joint disease (right knee), s/p right knee replacement, anxiety who presents to this ED via private car for evaluation of leg swelling.  States leg swelling has been around for \"a while.\"  Reason why they came today is that patient started having right lower distal medial area of oozing.  No fevers, chills, nausea vomiting.  No LOZANO.  No orthopnea.  No chest pain.        REVIEW OF SYSTEMS:  Review of Systems   Constitutional: Negative for fever, malaise  HEENT: Negative runny nose, sore throat, ear pain, neck pain  Respiratory: Negative for shortness of breath, cough, congestion  Cardiovascular: Negative for chest pain, leg edema  Gastrointestinal: Negative for abdominal distention, abdominal pain, constipation, vomiting, nausea, diarrhea  Genitourinary: Negative for dysuria and hematuria.   Integument: Negative for rash, skin breakdown  Neurological: Negative for paresthesias, weakness, headache.  Musculoskeletal: Negative for joint pain, joint swelling      All other systems reviewed and are negative.          MEDICAL HISTORY     No past " "medical history on file.    No past surgical history on file.         calcium carbonate-vitamin D 600-200 MG-UNIT TABS  citalopram (CELEXA) 40 MG tablet  folic acid (FOLVITE) 1 MG tablet  levothyroxine (SYNTHROID/LEVOTHROID) 125 MCG tablet  lisinopril (PRINIVIL,ZESTRIL) 20 MG tablet  Magnesium Hydroxide (MILK OF MAGNESIA PO)  methotrexate 2.5 MG tablet  oxyCODONE-acetaminophen (PERCOCET) 5-325 MG tablet  senna-docusate (SENOKOT-S/PERICOLACE) 8.6-50 MG tablet  VITAMIN A  warfarin ANTICOAGULANT (COUMADIN) 1 MG tablet            PHYSICAL EXAM     BP (!) 172/73   Pulse 85   Temp 98.4  F (36.9  C) (Temporal)   Resp 18   Ht 1.651 m (5' 5\")   Wt 108.9 kg (240 lb)   SpO2 99%   BMI 39.94 kg/m        PHYSICAL EXAM:     General: Patient appears well, nontoxic, comfortable  HEENT: Moist mucous membranes,  No head trauma.  No midline neck pain.  Cardiovascular: Normal rate, normal rhythm, no extremity edema.  No appreciable murmur.  Respiratory: No signs of respiratory distress, lungs are clear to auscultation bilaterally with no wheezes rhonchi or rales.  Abdominal: Soft, nontender, nondistended, no palpable masses, no guarding, no rebound  Musculoskeletal: Bilateral lower extremity edema that symmetrical.  Scaly dry skin.  Right distal medial aspect shows some small amount of weeping.  No cellulitis.  No purulence.  No pain or proportion.  Warm and well-perfused legs bilaterally otherwise.  Neurological: Alert and oriented, grossly neurologically intact.  Psychological: Normal affect and mood.  Integument: No rashes appreciated          RESULTS       Labs Ordered and Resulted from Time of ED Arrival to Time of ED Departure   CBC WITH PLATELETS - Abnormal       Result Value    WBC Count 8.0      RBC Count 3.55 (*)     Hemoglobin 11.5 (*)     Hematocrit 37.6       (*)     MCH 32.4      MCHC 30.6 (*)     RDW 15.9 (*)     Platelet Count 184     BASIC METABOLIC PANEL   NT PROBNP INPATIENT       No orders to display "                     PROCEDURES:  Procedures:  Procedures       I, Dalia Montgomery am serving as a scribe to document services personally performed by Abhijit Dave DO, based on my observations and the provider's statements to me.  I, Abhijit Dave DO, attest that Dalia Montgomery is acting in a scribe capacity, has observed my performance of the services and has documented them in accordance with my direction.    Abhijit Dave DO  Emergency Medicine  [facility] EMERGENCY DEPARTMENT     Abhijit Dave DO  02/14/23 1940

## 2023-02-15 NOTE — DISCHARGE INSTRUCTIONS
Please keep your legs elevated is much as you can throughout the day.    I would recommend subtle compression as well in your legs including Ace wrap's versus compression stockings.    Please keep the area that is losing clean and dry.  You will have to change bandages consistently to prevent infection.  Return for any fevers, increasing redness or pain.    Please call your primary care doctor for follow-up.    I would also recommend a heavy moisturizing cream such as Aquaphor or Eucerin cream   [de-identified] : 5/17/22 sinus tachycardia

## 2023-02-21 ENCOUNTER — OFFICE VISIT (OUTPATIENT)
Dept: NEUROLOGY | Facility: CLINIC | Age: 78
End: 2023-02-21
Payer: COMMERCIAL

## 2023-02-21 VITALS
WEIGHT: 240 LBS | DIASTOLIC BLOOD PRESSURE: 57 MMHG | HEIGHT: 65 IN | SYSTOLIC BLOOD PRESSURE: 121 MMHG | HEART RATE: 83 BPM | BODY MASS INDEX: 39.99 KG/M2

## 2023-02-21 DIAGNOSIS — G30.9 MAJOR NEUROCOGNITIVE DISORDER DUE TO ALZHEIMER'S DISEASE (H): Primary | ICD-10-CM

## 2023-02-21 DIAGNOSIS — F02.80 MAJOR NEUROCOGNITIVE DISORDER DUE TO ALZHEIMER'S DISEASE (H): Primary | ICD-10-CM

## 2023-02-21 PROCEDURE — 99214 OFFICE O/P EST MOD 30 MIN: CPT | Performed by: PSYCHIATRY & NEUROLOGY

## 2023-02-21 RX ORDER — DONEPEZIL HYDROCHLORIDE 5 MG/1
5 TABLET, FILM COATED ORAL AT BEDTIME
Qty: 30 TABLET | Refills: 0 | Status: SHIPPED | OUTPATIENT
Start: 2023-02-21 | End: 2023-03-23

## 2023-02-21 RX ORDER — SIMVASTATIN 20 MG
20 TABLET ORAL EVERY EVENING
COMMUNITY
Start: 2023-01-14

## 2023-02-21 RX ORDER — AMLODIPINE AND BENAZEPRIL HYDROCHLORIDE 5; 10 MG/1; MG/1
1 CAPSULE ORAL DAILY
COMMUNITY
Start: 2023-01-02 | End: 2023-09-06

## 2023-02-21 RX ORDER — DONEPEZIL HYDROCHLORIDE 10 MG/1
10 TABLET, FILM COATED ORAL AT BEDTIME
Qty: 90 TABLET | Refills: 3 | Status: SHIPPED | OUTPATIENT
Start: 2023-03-24 | End: 2024-02-12

## 2023-02-21 RX ORDER — CITALOPRAM HYDROBROMIDE 20 MG/1
1 TABLET ORAL EVERY MORNING
COMMUNITY
Start: 2022-12-15

## 2023-02-21 RX ORDER — LANOLIN ALCOHOL/MO/W.PET/CERES
1000 CREAM (GRAM) TOPICAL EVERY MORNING
COMMUNITY
Start: 2022-12-26

## 2023-02-21 RX ORDER — LEFLUNOMIDE 10 MG/1
1 TABLET ORAL EVERY MORNING
COMMUNITY
Start: 2023-02-08

## 2023-02-21 RX ORDER — BUPROPION HYDROCHLORIDE 150 MG/1
1 TABLET ORAL
COMMUNITY
Start: 2023-01-14 | End: 2023-09-06

## 2023-02-21 NOTE — PROGRESS NOTES
NEUROLOGY FOLLOW UP VISIT  NOTE       [facility] NEUROLOGY [place]  [address] [place] [address]  Tel: [phone]  Fax: [phone]  [email or web address]     Sharon De La Vega,  1945, MRN [id number]  PCP: Jaylen Young  Date: 2023      ASSESSMENT & PLAN     Visit Diagnosis  1. Major neurocognitive disorder due to Alzheimer's disease (H)     Major neurocognitive disorder due to Alzheimer's dementia  77 years old female with history of HTN, HLD, prediabetes, anxiety, hypothyroidism and Alzheimer's dementia who returns for follow-up.  Previously work-up included MRI that showed atrophy more pronounced on the right temporal head region confirming the diagnosis of Alzheimer's dementia.  Lab work for common causes of cognitive decline were normal except for an elevated TSH but T4 was normal.  She had left-sided resting tremor and a DaTscan was done that was negative.  I started her on Aricept that was discontinued as there was concern about interaction with Celexa but I feel it should be safe.  I have recommended:    1.  Start Aricept 5 mg daily and after 1 month increase it to 10 mg daily  2.  Check hepatic profile after 3 months  3.  Patient was reassured she does not have Parkinson disease as her DaTscan was normal  4.  Follow-up will be in 1 year    Thank you again for this referral, please feel free to contact me if you have any questions.    Manuel Osuna MD  [facility]  (Former[facility], [facility]     HISTORY OF PRESENT ILLNESS     Patient is a 77 years old female with history of HTN, HLD, prediabetes, anxiety, hypothyroidism and Alzheimer's dementia last seen on 2022 who returns for follow-up.  During that visit MRI brain was done that showed atrophy that was more pronounced in the right temporal head region reinforcing the diagnosis of Alzheimer's dementia.  Lab work included normal , folate,  methylmalonic acid level, RPR, vitamin B1 and B12.  Although TSH was elevated at 8.8 Free T4 was normal.  Patient also had left-sided resting tremor with minimal cogwheel rigidity.  A DaTscan was also done that was unremarkable.  I had started her on normal Aricept and had told her to increase the dose after 1 month, however this was discontinued as there was a concern about interaction with Celexa    Briefly patient is a female with history of HTN, HLD, prediabetes, anxiety, hypothyroidism who is been followed in our clinic for Alzheimer's dementia.  Her symptoms started in 2019 when family noticed that patient was struggling with short-term memory. She would repeat herself and ask questions repeatedly.  She had no difficulty with long-term memory.  There is no history of any bladder incontinence, hallucinations or any myoclonic twitches.  Patient lives with her son who manages the finances.  She also had left-sided tremors more pronounced when she was resting.  Although it raise the possibility of Parkinson disease a DaTscan was normal     PROBLEM LIST   Patient Active Problem List   Diagnosis Code     Hypothyroidism E03.9     Essential hypertension I10     Hypercholesterolemia E78.00     DJD (degenerative joint disease)- right knee M19.90     Arthritis with psoriasis (H) L40.50     S/P knee replacement- right Z96.659     Intertrigo L30.4     Anxiety F41.9     Major neurocognitive disorder due to Alzheimer's disease (H) G30.9, F02.80     Major depressive disorder, single episode, moderate (H) F32.1         PAST MEDICAL & SURGICAL HISTORY     Past Medical History:   Patient  has no past medical history on file.    Surgical History:  She  has no past surgical history on file.     SOCIAL HISTORY     Reviewed, and she  reports that she quit smoking about 36 years ago. Her smoking use included cigarettes. She has never used smokeless tobacco. She reports that she does not currently use alcohol.     FAMILY HISTORY  "    Reviewed, and family history includes Lung Cancer in her father; Multiple myeloma in her brother.     ALLERGIES     Allergies   Allergen Reactions     Acetaminophen-Codeine      Other reaction(s): GI Upset  Intolerant - indigestion.     Celecoxib      Codeine Camsylate      Conj Estrog-Medroxyprogest Ace Other (See Comments)     Intolerant - bleeding     Estrogens, Conjugated Synthetic A      Nsaids      Sulfa Drugs          REVIEW OF SYSTEMS     A 12 point review of system was performed and was negative except as outlined in the history of present illness.     HOME MEDICATIONS     Current Outpatient Rx   Medication Sig Dispense Refill     amLODIPine-benazepril (LOTREL) 5-10 MG capsule Take 1 capsule by mouth daily       buPROPion (WELLBUTRIN XL) 150 MG 24 hr tablet Take 1 tablet by mouth daily at 2 pm       calcium carbonate-vitamin D 600-200 MG-UNIT TABS Take 1 tablet by mouth 2 times daily.       citalopram (CELEXA) 20 MG tablet Take 1 tablet by mouth daily at 2 pm       cyanocobalamin (VITAMIN B-12) 1000 MCG tablet TAKE 1 TABLET (1,000 MCG) BY MOUTH ONCE DAILY.       [START ON 3/24/2023] donepezil (ARICEPT) 10 MG tablet Take 1 tablet (10 mg) by mouth At Bedtime 90 tablet 3     donepezil (ARICEPT) 5 MG tablet Take 1 tablet (5 mg) by mouth At Bedtime for 30 days 30 tablet 0     folic acid (FOLVITE) 1 MG tablet Take 1 mg by mouth daily.       leflunomide (ARAVA) 10 MG tablet Take 1 tablet by mouth daily at 2 pm       levothyroxine (TIROSINT) 100 MCG capsule Take 100 mcg by mouth daily       methotrexate 2.5 MG tablet Take 25 mg by mouth once a week. On Thursday        simvastatin (ZOCOR) 20 MG tablet TAKE 1 TABLET (20 MG) BY MOUTH ONCE DAILY WITH EVENING MEAL.       VITAMIN A 8,000 Units daily.           PHYSICAL EXAM     Vital signs  /57 (BP Location: Right arm, Patient Position: Sitting)   Pulse 83   Ht 1.651 m (5' 5\")   Wt 108.9 kg (240 lb)   BMI 39.94 kg/m      Weight:   240 lbs 0 oz    Patient is " alert and oriented vital signs were reviewed and are documented in electronic medical record.  Neck supple.  Neurologically speech normal.  Cranial nerves are intact.  She has minimal cogwheel rigidity on the left side with resting tremor.  She also has bilateral intention tremor.  Strength is 5/5 reflexes 1+ toes equivocal she has a wide-based gait and uses a cane     PERTINENT DIAGNOSTIC STUDIES     Following studies were reviewed:     MRI BRAIN 12/12/2022  1.  Brain atrophy and presumed chronic ischemic and microvascular ischemic changes as detailed above. Mild disproportionate volume loss affecting the right mesial temporal lobe structures and anterior hippocampal complex, which is nonspecific, though can   be seen in the setting of neurocognitive disorder such as Alzheimer's dementia. Clinical correlation recommended.  2.  No superimposed acute intracranial abnormality.    DATSCAN 1/31/2023  1. A presynaptic dopaminergic deficit is not present.     PERTINENT LABS  Following labs were reviewed:  Admission on 02/14/2023, Discharged on 02/14/2023   Component Date Value     Hold Specimen 02/14/2023 [facility]      WBC Count 02/14/2023 8.0      RBC Count 02/14/2023 3.55 (L)      Hemoglobin 02/14/2023 11.5 (L)      Hematocrit 02/14/2023 37.6      MCV 02/14/2023 106 (H)      MCH 02/14/2023 32.4      MCHC 02/14/2023 30.6 (L)      RDW 02/14/2023 15.9 (H)      Platelet Count 02/14/2023 184      Sodium 02/14/2023 141      Potassium 02/14/2023 4.7      Chloride 02/14/2023 105      Carbon Dioxide (CO2) 02/14/2023 28      Anion Gap 02/14/2023 8      Urea Nitrogen 02/14/2023 21.1      Creatinine 02/14/2023 0.94      Calcium 02/14/2023 9.1      Glucose 02/14/2023 140 (H)      GFR Estimate 02/14/2023 63      N terminal Pro BNP Inpat* 02/14/2023 171      Hold Specimen 02/14/2023 [facility]    Lab on 01/31/2023   Component Date Value     Vitamin B12 01/31/2023 1,357 (H)      Vitamin B1 Whole Blood L* 01/31/2023 153      TSH 01/31/2023 8.88 (H)       Treponema Antibody Total 01/31/2023 Nonreactive      Methylmalonic Acid 01/31/2023 0.18      Folic Acid 01/31/2023 15.2      Free T4 01/31/2023 1.02    Admission on 01/10/2023, Discharged on 01/10/2023   Component Date Value     Sodium 01/10/2023 140      Potassium 01/10/2023 5.3      Chloride 01/10/2023 103      Carbon Dioxide (CO2) 01/10/2023 30 (H)      Anion Gap 01/10/2023 7      Urea Nitrogen 01/10/2023 25.6 (H)      Creatinine 01/10/2023 1.04 (H)      Calcium 01/10/2023 9.7      Glucose 01/10/2023 137 (H)      GFR Estimate 01/10/2023 55 (L)      INR 01/10/2023 1.15      Magnesium 01/10/2023 2.4 (H)      WBC Count 01/10/2023 8.5      RBC Count 01/10/2023 3.65 (L)      Hemoglobin 01/10/2023 11.8      Hematocrit 01/10/2023 37.3      MCV 01/10/2023 102 (H)      MCH 01/10/2023 32.3      MCHC 01/10/2023 31.6      RDW 01/10/2023 14.9      Platelet Count 01/10/2023 205      % Neutrophils 01/10/2023 76      % Lymphocytes 01/10/2023 12      % Monocytes 01/10/2023 9      % Eosinophils 01/10/2023 2      % Basophils 01/10/2023 1      % Immature Granulocytes 01/10/2023 0      NRBCs per 100 WBC 01/10/2023 0      Absolute Neutrophils 01/10/2023 6.5      Absolute Lymphocytes 01/10/2023 1.0      Absolute Monocytes 01/10/2023 0.7      Absolute Eosinophils 01/10/2023 0.2      Absolute Basophils 01/10/2023 0.1      Absolute Immature Granul* 01/10/2023 0.0      Absolute NRBCs 01/10/2023 0.0          Total time spent for face to face visit, reviewing labs/imaging studies, counseling and coordination of care was: 30 Minutes spent on the date of the encounter doing chart review, review of outside records, review of test results, interpretation of tests, patient visit and documentation       This note was dictated using voice recognition software.  Any grammatical or context distortions are unintentional and inherent to the software.    Orders Placed This Encounter   Procedures     Hepatic function panel      New Prescriptions     DONEPEZIL (ARICEPT) 10 MG TABLET    Take 1 tablet (10 mg) by mouth At Bedtime    DONEPEZIL (ARICEPT) 5 MG TABLET    Take 1 tablet (5 mg) by mouth At Bedtime for 30 days     Modified Medications    No medications on file

## 2023-02-21 NOTE — NURSING NOTE
Chief Complaint   Patient presents with     Major neurocognitive disorder due to Alzheimer's disease     3 month follow up- Started on Aricept 5mg but did not get 10mg due to an interaction with another medication (Celexa)     Skye Christopher CMA on 2/21/2023 at 1:58 PM

## 2023-02-21 NOTE — LETTER
2023         RE: Sharon De La Vega  [address] [place] [address]        Dear Colleague,    Thank you for referring your patient, Sharon De La Vega, to the [facility]UROLOG[facility] CL[facility] [place]. Please see a copy of my visit note below.    NEUROLOGY FOLLOW UP VISIT  NOTE       [facility] NEUROLOGY [place]  [address] [place] [address]  Tel: [phone]  Fax: [phone]  [email or web address]     Sharon De La Vega,  1945, MRN [id number]  PCP: Jaylen Young  Date: 2023      ASSESSMENT & PLAN     Visit Diagnosis  1. Major neurocognitive disorder due to Alzheimer's disease (H)     Major neurocognitive disorder due to Alzheimer's dementia  77 years old female with history of HTN, HLD, prediabetes, anxiety, hypothyroidism and Alzheimer's dementia who returns for follow-up.  Previously work-up included MRI that showed atrophy more pronounced on the right temporal head region confirming the diagnosis of Alzheimer's dementia.  Lab work for common causes of cognitive decline were normal except for an elevated TSH but T4 was normal.  She had left-sided resting tremor and a DaTscan was done that was negative.  I started her on Aricept that was discontinued as there was concern about interaction with Celexa but I feel it should be safe.  I have recommended:    1.  Start Aricept 5 mg daily and after 1 month increase it to 10 mg daily  2.  Check hepatic profile after 3 months  3.  Patient was reassured she does not have Parkinson disease as her DaTscan was normal  4.  Follow-up will be in 1 year    Thank you again for this referral, please feel free to contact me if you have any questions.    Manuel Osuna MD  [facility]  (Former[facility], [facility]     HISTORY OF PRESENT ILLNESS     Patient is a 77 years old female with history of HTN, HLD, prediabetes, anxiety, hypothyroidism and Alzheimer's  dementia last seen on 11/28/2022 who returns for follow-up.  During that visit MRI brain was done that showed atrophy that was more pronounced in the right temporal head region reinforcing the diagnosis of Alzheimer's dementia.  Lab work included normal , folate, methylmalonic acid level, RPR, vitamin B1 and B12.  Although TSH was elevated at 8.8 Free T4 was normal.  Patient also had left-sided resting tremor with minimal cogwheel rigidity.  A DaTscan was also done that was unremarkable.  I had started her on normal Aricept and had told her to increase the dose after 1 month, however this was discontinued as there was a concern about interaction with Celexa    Briefly patient is a female with history of HTN, HLD, prediabetes, anxiety, hypothyroidism who is been followed in our clinic for Alzheimer's dementia.  Her symptoms started in 2019 when family noticed that patient was struggling with short-term memory. She would repeat herself and ask questions repeatedly.  She had no difficulty with long-term memory.  There is no history of any bladder incontinence, hallucinations or any myoclonic twitches.  Patient lives with her son who manages the finances.  She also had left-sided tremors more pronounced when she was resting.  Although it raise the possibility of Parkinson disease a DaTscan was normal     PROBLEM LIST   Patient Active Problem List   Diagnosis Code     Hypothyroidism E03.9     Essential hypertension I10     Hypercholesterolemia E78.00     DJD (degenerative joint disease)- right knee M19.90     Arthritis with psoriasis (H) L40.50     S/P knee replacement- right Z96.659     Intertrigo L30.4     Anxiety F41.9     Major neurocognitive disorder due to Alzheimer's disease (H) G30.9, F02.80     Major depressive disorder, single episode, moderate (H) F32.1         PAST MEDICAL & SURGICAL HISTORY     Past Medical History:   Patient  has no past medical history on file.    Surgical History:  She  has no past  surgical history on file.     SOCIAL HISTORY     Reviewed, and she  reports that she quit smoking about 36 years ago. Her smoking use included cigarettes. She has never used smokeless tobacco. She reports that she does not currently use alcohol.     FAMILY HISTORY     Reviewed, and family history includes Lung Cancer in her father; Multiple myeloma in her brother.     ALLERGIES     Allergies   Allergen Reactions     Acetaminophen-Codeine      Other reaction(s): GI Upset  Intolerant - indigestion.     Celecoxib      Codeine Camsylate      Conj Estrog-Medroxyprogest Ace Other (See Comments)     Intolerant - bleeding     Estrogens, Conjugated Synthetic A      Nsaids      Sulfa Drugs          REVIEW OF SYSTEMS     A 12 point review of system was performed and was negative except as outlined in the history of present illness.     HOME MEDICATIONS     Current Outpatient Rx   Medication Sig Dispense Refill     amLODIPine-benazepril (LOTREL) 5-10 MG capsule Take 1 capsule by mouth daily       buPROPion (WELLBUTRIN XL) 150 MG 24 hr tablet Take 1 tablet by mouth daily at 2 pm       calcium carbonate-vitamin D 600-200 MG-UNIT TABS Take 1 tablet by mouth 2 times daily.       citalopram (CELEXA) 20 MG tablet Take 1 tablet by mouth daily at 2 pm       cyanocobalamin (VITAMIN B-12) 1000 MCG tablet TAKE 1 TABLET (1,000 MCG) BY MOUTH ONCE DAILY.       [START ON 3/24/2023] donepezil (ARICEPT) 10 MG tablet Take 1 tablet (10 mg) by mouth At Bedtime 90 tablet 3     donepezil (ARICEPT) 5 MG tablet Take 1 tablet (5 mg) by mouth At Bedtime for 30 days 30 tablet 0     folic acid (FOLVITE) 1 MG tablet Take 1 mg by mouth daily.       leflunomide (ARAVA) 10 MG tablet Take 1 tablet by mouth daily at 2 pm       levothyroxine (TIROSINT) 100 MCG capsule Take 100 mcg by mouth daily       methotrexate 2.5 MG tablet Take 25 mg by mouth once a week. On Thursday        simvastatin (ZOCOR) 20 MG tablet TAKE 1 TABLET (20 MG) BY MOUTH ONCE DAILY WITH  "EVENING MEAL.       VITAMIN A 8,000 Units daily.           PHYSICAL EXAM     Vital signs  /57 (BP Location: Right arm, Patient Position: Sitting)   Pulse 83   Ht 1.651 m (5' 5\")   Wt 108.9 kg (240 lb)   BMI 39.94 kg/m      Weight:   240 lbs 0 oz    Patient is alert and oriented vital signs were reviewed and are documented in electronic medical record.  Neck supple.  Neurologically speech normal.  Cranial nerves are intact.  She has minimal cogwheel rigidity on the left side with resting tremor.  She also has bilateral intention tremor.  Strength is 5/5 reflexes 1+ toes equivocal she has a wide-based gait and uses a cane     PERTINENT DIAGNOSTIC STUDIES     Following studies were reviewed:     MRI BRAIN 12/12/2022  1.  Brain atrophy and presumed chronic ischemic and microvascular ischemic changes as detailed above. Mild disproportionate volume loss affecting the right mesial temporal lobe structures and anterior hippocampal complex, which is nonspecific, though can   be seen in the setting of neurocognitive disorder such as Alzheimer's dementia. Clinical correlation recommended.  2.  No superimposed acute intracranial abnormality.    DATSCAN 1/31/2023  1. A presynaptic dopaminergic deficit is not present.     PERTINENT LABS  Following labs were reviewed:  Admission on 02/14/2023, Discharged on 02/14/2023   Component Date Value     Hold Specimen 02/14/2023 [facility]      WBC Count 02/14/2023 8.0      RBC Count 02/14/2023 3.55 (L)      Hemoglobin 02/14/2023 11.5 (L)      Hematocrit 02/14/2023 37.6      MCV 02/14/2023 106 (H)      MCH 02/14/2023 32.4      MCHC 02/14/2023 30.6 (L)      RDW 02/14/2023 15.9 (H)      Platelet Count 02/14/2023 184      Sodium 02/14/2023 141      Potassium 02/14/2023 4.7      Chloride 02/14/2023 105      Carbon Dioxide (CO2) 02/14/2023 28      Anion Gap 02/14/2023 8      Urea Nitrogen 02/14/2023 21.1      Creatinine 02/14/2023 0.94      Calcium 02/14/2023 9.1      Glucose 02/14/2023 140 " (H)      GFR Estimate 02/14/2023 63      N terminal Pro BNP Inpat* 02/14/2023 171      Hold Specimen 02/14/2023 [facility]    Lab on 01/31/2023   Component Date Value     Vitamin B12 01/31/2023 1,357 (H)      Vitamin B1 Whole Blood L* 01/31/2023 153      TSH 01/31/2023 8.88 (H)      Treponema Antibody Total 01/31/2023 Nonreactive      Methylmalonic Acid 01/31/2023 0.18      Folic Acid 01/31/2023 15.2      Free T4 01/31/2023 1.02    Admission on 01/10/2023, Discharged on 01/10/2023   Component Date Value     Sodium 01/10/2023 140      Potassium 01/10/2023 5.3      Chloride 01/10/2023 103      Carbon Dioxide (CO2) 01/10/2023 30 (H)      Anion Gap 01/10/2023 7      Urea Nitrogen 01/10/2023 25.6 (H)      Creatinine 01/10/2023 1.04 (H)      Calcium 01/10/2023 9.7      Glucose 01/10/2023 137 (H)      GFR Estimate 01/10/2023 55 (L)      INR 01/10/2023 1.15      Magnesium 01/10/2023 2.4 (H)      WBC Count 01/10/2023 8.5      RBC Count 01/10/2023 3.65 (L)      Hemoglobin 01/10/2023 11.8      Hematocrit 01/10/2023 37.3      MCV 01/10/2023 102 (H)      MCH 01/10/2023 32.3      MCHC 01/10/2023 31.6      RDW 01/10/2023 14.9      Platelet Count 01/10/2023 205      % Neutrophils 01/10/2023 76      % Lymphocytes 01/10/2023 12      % Monocytes 01/10/2023 9      % Eosinophils 01/10/2023 2      % Basophils 01/10/2023 1      % Immature Granulocytes 01/10/2023 0      NRBCs per 100 WBC 01/10/2023 0      Absolute Neutrophils 01/10/2023 6.5      Absolute Lymphocytes 01/10/2023 1.0      Absolute Monocytes 01/10/2023 0.7      Absolute Eosinophils 01/10/2023 0.2      Absolute Basophils 01/10/2023 0.1      Absolute Immature Granul* 01/10/2023 0.0      Absolute NRBCs 01/10/2023 0.0          Total time spent for face to face visit, reviewing labs/imaging studies, counseling and coordination of care was: 30 Minutes spent on the date of the encounter doing chart review, review of outside records, review of test results, interpretation of tests, patient  visit and documentation       This note was dictated using voice recognition software.  Any grammatical or context distortions are unintentional and inherent to the software.    Orders Placed This Encounter   Procedures     Hepatic function panel      New Prescriptions    DONEPEZIL (ARICEPT) 10 MG TABLET    Take 1 tablet (10 mg) by mouth At Bedtime    DONEPEZIL (ARICEPT) 5 MG TABLET    Take 1 tablet (5 mg) by mouth At Bedtime for 30 days     Modified Medications    No medications on file                     Again, thank you for allowing me to participate in the care of your patient.        Sincerely,        Manuel Osuna MD

## 2023-09-06 ENCOUNTER — APPOINTMENT (OUTPATIENT)
Dept: CT IMAGING | Facility: HOSPITAL | Age: 78
DRG: 177 | End: 2023-09-06
Attending: FAMILY MEDICINE
Payer: COMMERCIAL

## 2023-09-06 ENCOUNTER — APPOINTMENT (OUTPATIENT)
Dept: RADIOLOGY | Facility: HOSPITAL | Age: 78
DRG: 177 | End: 2023-09-06
Attending: FAMILY MEDICINE
Payer: COMMERCIAL

## 2023-09-06 ENCOUNTER — HOSPITAL ENCOUNTER (INPATIENT)
Facility: HOSPITAL | Age: 78
LOS: 7 days | Discharge: NURSING, REHAB OR LONG-TERM CARE | DRG: 177 | End: 2023-09-14
Attending: FAMILY MEDICINE | Admitting: FAMILY MEDICINE
Payer: COMMERCIAL

## 2023-09-06 DIAGNOSIS — R79.89 ELEVATED TROPONIN: ICD-10-CM

## 2023-09-06 DIAGNOSIS — U07.1 COVID-19: ICD-10-CM

## 2023-09-06 DIAGNOSIS — E78.00 HYPERCHOLESTEROLEMIA: Primary | ICD-10-CM

## 2023-09-06 LAB
ALBUMIN SERPL BCG-MCNC: 3.9 G/DL (ref 3.5–5.2)
ALBUMIN UR-MCNC: 10 MG/DL
ALP SERPL-CCNC: 80 U/L (ref 35–104)
ALT SERPL W P-5'-P-CCNC: 10 U/L (ref 0–50)
AMMONIA PLAS-SCNC: 13 UMOL/L (ref 11–51)
ANION GAP SERPL CALCULATED.3IONS-SCNC: 11 MMOL/L (ref 7–15)
APPEARANCE UR: CLEAR
AST SERPL W P-5'-P-CCNC: 25 U/L (ref 0–45)
BASOPHILS # BLD AUTO: 0.1 10E3/UL (ref 0–0.2)
BASOPHILS NFR BLD AUTO: 1 %
BILIRUB DIRECT SERPL-MCNC: <0.2 MG/DL (ref 0–0.3)
BILIRUB SERPL-MCNC: 0.7 MG/DL
BILIRUB UR QL STRIP: NEGATIVE
BUN SERPL-MCNC: 19.3 MG/DL (ref 8–23)
CALCIUM SERPL-MCNC: 9.4 MG/DL (ref 8.8–10.2)
CHLORIDE SERPL-SCNC: 100 MMOL/L (ref 98–107)
COLOR UR AUTO: ABNORMAL
CREAT SERPL-MCNC: 0.94 MG/DL (ref 0.51–0.95)
DEPRECATED HCO3 PLAS-SCNC: 27 MMOL/L (ref 22–29)
EGFRCR SERPLBLD CKD-EPI 2021: 63 ML/MIN/1.73M2
EOSINOPHIL # BLD AUTO: 0.1 10E3/UL (ref 0–0.7)
EOSINOPHIL NFR BLD AUTO: 1 %
ERYTHROCYTE [DISTWIDTH] IN BLOOD BY AUTOMATED COUNT: 14.8 % (ref 10–15)
FLUAV RNA SPEC QL NAA+PROBE: NEGATIVE
FLUBV RNA RESP QL NAA+PROBE: NEGATIVE
GLUCOSE SERPL-MCNC: 114 MG/DL (ref 70–99)
GLUCOSE UR STRIP-MCNC: NEGATIVE MG/DL
HCT VFR BLD AUTO: 41.7 % (ref 35–47)
HGB BLD-MCNC: 13.4 G/DL (ref 11.7–15.7)
HGB UR QL STRIP: NEGATIVE
IMM GRANULOCYTES # BLD: 0 10E3/UL
IMM GRANULOCYTES NFR BLD: 0 %
KETONES UR STRIP-MCNC: NEGATIVE MG/DL
LACTATE SERPL-SCNC: 1.9 MMOL/L (ref 0.7–2)
LEUKOCYTE ESTERASE UR QL STRIP: NEGATIVE
LYMPHOCYTES # BLD AUTO: 0.4 10E3/UL (ref 0.8–5.3)
LYMPHOCYTES NFR BLD AUTO: 5 %
MAGNESIUM SERPL-MCNC: 2 MG/DL (ref 1.7–2.3)
MCH RBC QN AUTO: 32.3 PG (ref 26.5–33)
MCHC RBC AUTO-ENTMCNC: 32.1 G/DL (ref 31.5–36.5)
MCV RBC AUTO: 101 FL (ref 78–100)
MONOCYTES # BLD AUTO: 0.6 10E3/UL (ref 0–1.3)
MONOCYTES NFR BLD AUTO: 7 %
MUCOUS THREADS #/AREA URNS LPF: PRESENT /LPF
NEUTROPHILS # BLD AUTO: 7.1 10E3/UL (ref 1.6–8.3)
NEUTROPHILS NFR BLD AUTO: 86 %
NITRATE UR QL: NEGATIVE
NRBC # BLD AUTO: 0 10E3/UL
NRBC BLD AUTO-RTO: 0 /100
NT-PROBNP SERPL-MCNC: 2868 PG/ML (ref 0–1800)
PH UR STRIP: 8 [PH] (ref 5–7)
PLATELET # BLD AUTO: 142 10E3/UL (ref 150–450)
POTASSIUM SERPL-SCNC: 4 MMOL/L (ref 3.4–5.3)
PROCALCITONIN SERPL IA-MCNC: 0.05 NG/ML
PROT SERPL-MCNC: 7 G/DL (ref 6.4–8.3)
RBC # BLD AUTO: 4.15 10E6/UL (ref 3.8–5.2)
RBC URINE: 2 /HPF
RSV RNA SPEC NAA+PROBE: NEGATIVE
SARS-COV-2 RNA RESP QL NAA+PROBE: POSITIVE
SODIUM SERPL-SCNC: 138 MMOL/L (ref 136–145)
SP GR UR STRIP: 1.02 (ref 1–1.03)
TROPONIN T SERPL HS-MCNC: 116 NG/L
TROPONIN T SERPL HS-MCNC: 55 NG/L
TROPONIN T SERPL HS-MCNC: 91 NG/L
UROBILINOGEN UR STRIP-MCNC: <2 MG/DL
WBC # BLD AUTO: 8.3 10E3/UL (ref 4–11)
WBC URINE: <1 /HPF

## 2023-09-06 PROCEDURE — 84145 PROCALCITONIN (PCT): CPT | Performed by: FAMILY MEDICINE

## 2023-09-06 PROCEDURE — 70450 CT HEAD/BRAIN W/O DYE: CPT

## 2023-09-06 PROCEDURE — 250N000011 HC RX IP 250 OP 636: Mod: JZ | Performed by: FAMILY MEDICINE

## 2023-09-06 PROCEDURE — C9803 HOPD COVID-19 SPEC COLLECT: HCPCS

## 2023-09-06 PROCEDURE — 99285 EMERGENCY DEPT VISIT HI MDM: CPT | Mod: 25

## 2023-09-06 PROCEDURE — 87040 BLOOD CULTURE FOR BACTERIA: CPT | Performed by: FAMILY MEDICINE

## 2023-09-06 PROCEDURE — 250N000013 HC RX MED GY IP 250 OP 250 PS 637: Performed by: FAMILY MEDICINE

## 2023-09-06 PROCEDURE — 82140 ASSAY OF AMMONIA: CPT | Performed by: FAMILY MEDICINE

## 2023-09-06 PROCEDURE — 82248 BILIRUBIN DIRECT: CPT | Performed by: FAMILY MEDICINE

## 2023-09-06 PROCEDURE — 83605 ASSAY OF LACTIC ACID: CPT | Performed by: FAMILY MEDICINE

## 2023-09-06 PROCEDURE — 80053 COMPREHEN METABOLIC PANEL: CPT | Performed by: FAMILY MEDICINE

## 2023-09-06 PROCEDURE — G0378 HOSPITAL OBSERVATION PER HR: HCPCS

## 2023-09-06 PROCEDURE — 84484 ASSAY OF TROPONIN QUANT: CPT | Performed by: FAMILY MEDICINE

## 2023-09-06 PROCEDURE — 83735 ASSAY OF MAGNESIUM: CPT | Performed by: FAMILY MEDICINE

## 2023-09-06 PROCEDURE — 84484 ASSAY OF TROPONIN QUANT: CPT

## 2023-09-06 PROCEDURE — 81001 URINALYSIS AUTO W/SCOPE: CPT | Performed by: FAMILY MEDICINE

## 2023-09-06 PROCEDURE — 36415 COLL VENOUS BLD VENIPUNCTURE: CPT

## 2023-09-06 PROCEDURE — 93005 ELECTROCARDIOGRAM TRACING: CPT | Mod: 76

## 2023-09-06 PROCEDURE — 71046 X-RAY EXAM CHEST 2 VIEWS: CPT

## 2023-09-06 PROCEDURE — 87637 SARSCOV2&INF A&B&RSV AMP PRB: CPT | Performed by: FAMILY MEDICINE

## 2023-09-06 PROCEDURE — 83880 ASSAY OF NATRIURETIC PEPTIDE: CPT

## 2023-09-06 PROCEDURE — 93005 ELECTROCARDIOGRAM TRACING: CPT | Performed by: FAMILY MEDICINE

## 2023-09-06 PROCEDURE — 71275 CT ANGIOGRAPHY CHEST: CPT

## 2023-09-06 PROCEDURE — 36415 COLL VENOUS BLD VENIPUNCTURE: CPT | Performed by: FAMILY MEDICINE

## 2023-09-06 PROCEDURE — 85025 COMPLETE CBC W/AUTO DIFF WBC: CPT | Performed by: FAMILY MEDICINE

## 2023-09-06 RX ORDER — SIMVASTATIN 10 MG
20 TABLET ORAL AT BEDTIME
Status: DISCONTINUED | OUTPATIENT
Start: 2023-09-07 | End: 2023-09-14 | Stop reason: HOSPADM

## 2023-09-06 RX ORDER — AMOXICILLIN 250 MG
1 CAPSULE ORAL 2 TIMES DAILY PRN
Status: DISCONTINUED | OUTPATIENT
Start: 2023-09-06 | End: 2023-09-14 | Stop reason: HOSPADM

## 2023-09-06 RX ORDER — LEFLUNOMIDE 10 MG/1
10 TABLET ORAL DAILY
Status: DISCONTINUED | OUTPATIENT
Start: 2023-09-07 | End: 2023-09-14 | Stop reason: HOSPADM

## 2023-09-06 RX ORDER — PROCHLORPERAZINE MALEATE 5 MG
5 TABLET ORAL EVERY 6 HOURS PRN
Status: DISCONTINUED | OUTPATIENT
Start: 2023-09-06 | End: 2023-09-14 | Stop reason: HOSPADM

## 2023-09-06 RX ORDER — ACETAMINOPHEN 650 MG/1
650 SUPPOSITORY RECTAL EVERY 6 HOURS PRN
Status: DISCONTINUED | OUTPATIENT
Start: 2023-09-06 | End: 2023-09-14 | Stop reason: HOSPADM

## 2023-09-06 RX ORDER — ACETAMINOPHEN 325 MG/1
975 TABLET ORAL ONCE
Status: COMPLETED | OUTPATIENT
Start: 2023-09-06 | End: 2023-09-06

## 2023-09-06 RX ORDER — LEVOTHYROXINE SODIUM 100 UG/1
100 TABLET ORAL DAILY
Status: DISCONTINUED | OUTPATIENT
Start: 2023-09-07 | End: 2023-09-14 | Stop reason: HOSPADM

## 2023-09-06 RX ORDER — ACETAMINOPHEN 325 MG/1
650 TABLET ORAL EVERY 6 HOURS PRN
Status: DISCONTINUED | OUTPATIENT
Start: 2023-09-06 | End: 2023-09-14 | Stop reason: HOSPADM

## 2023-09-06 RX ORDER — AMOXICILLIN 250 MG
2 CAPSULE ORAL 2 TIMES DAILY PRN
Status: DISCONTINUED | OUTPATIENT
Start: 2023-09-06 | End: 2023-09-14 | Stop reason: HOSPADM

## 2023-09-06 RX ORDER — ONDANSETRON 2 MG/ML
4 INJECTION INTRAMUSCULAR; INTRAVENOUS EVERY 6 HOURS PRN
Status: DISCONTINUED | OUTPATIENT
Start: 2023-09-06 | End: 2023-09-14 | Stop reason: HOSPADM

## 2023-09-06 RX ORDER — BENAZEPRIL HYDROCHLORIDE 10 MG/1
10 TABLET ORAL EVERY MORNING
COMMUNITY
Start: 2023-03-07 | End: 2024-09-24

## 2023-09-06 RX ORDER — DONEPEZIL HYDROCHLORIDE 5 MG/1
10 TABLET, FILM COATED ORAL AT BEDTIME
Status: DISCONTINUED | OUTPATIENT
Start: 2023-09-07 | End: 2023-09-14 | Stop reason: HOSPADM

## 2023-09-06 RX ORDER — IOPAMIDOL 755 MG/ML
75 INJECTION, SOLUTION INTRAVASCULAR ONCE
Status: COMPLETED | OUTPATIENT
Start: 2023-09-06 | End: 2023-09-06

## 2023-09-06 RX ORDER — LISINOPRIL 5 MG/1
10 TABLET ORAL DAILY
Status: DISCONTINUED | OUTPATIENT
Start: 2023-09-07 | End: 2023-09-09

## 2023-09-06 RX ORDER — NYSTATIN 100000 [USP'U]/G
POWDER TOPICAL 2 TIMES DAILY PRN
COMMUNITY
Start: 2023-06-15

## 2023-09-06 RX ORDER — PROCHLORPERAZINE 25 MG
12.5 SUPPOSITORY, RECTAL RECTAL EVERY 12 HOURS PRN
Status: DISCONTINUED | OUTPATIENT
Start: 2023-09-06 | End: 2023-09-14 | Stop reason: HOSPADM

## 2023-09-06 RX ORDER — ONDANSETRON 4 MG/1
4 TABLET, ORALLY DISINTEGRATING ORAL EVERY 6 HOURS PRN
Status: DISCONTINUED | OUTPATIENT
Start: 2023-09-06 | End: 2023-09-14 | Stop reason: HOSPADM

## 2023-09-06 RX ORDER — CITALOPRAM HYDROBROMIDE 20 MG/1
20 TABLET ORAL DAILY
Status: DISCONTINUED | OUTPATIENT
Start: 2023-09-07 | End: 2023-09-14 | Stop reason: HOSPADM

## 2023-09-06 RX ADMIN — IOPAMIDOL 75 ML: 755 INJECTION, SOLUTION INTRAVENOUS at 18:21

## 2023-09-06 RX ADMIN — ACETAMINOPHEN 975 MG: 325 TABLET, FILM COATED ORAL at 15:41

## 2023-09-06 ASSESSMENT — ENCOUNTER SYMPTOMS
SHORTNESS OF BREATH: 0
CONFUSION: 1
WEAKNESS: 1

## 2023-09-06 ASSESSMENT — ACTIVITIES OF DAILY LIVING (ADL)
ADLS_ACUITY_SCORE: 35
ADLS_ACUITY_SCORE: 35
ADLS_ACUITY_SCORE: 37
ADLS_ACUITY_SCORE: 35
ADLS_ACUITY_SCORE: 35

## 2023-09-06 NOTE — MEDICATION SCRIBE - ADMISSION MEDICATION HISTORY
Medication Scribe Admission Medication History    Admission medication history is complete. The information provided in this note is only as accurate as the sources available at the time of the update.    Medication reconciliation/reorder completed by provider prior to medication history? No    Information Source(s): Patient, Family member, and Caregiver via in-person    Pertinent Information: Patient started giving the med rec report, started to misinform scribe about Lotrel drug, her son then corrected and took over the interview.  Reported they also have a nurse visit on Monday's and Thursday's.    Changes made to PTA medication list:  Added: Benazepril, Nystatin Powder  Deleted: Vit A, Lotrel, Wellbutrin XL  Changed: None    Medication Affordability:  Not including over the counter (OTC) medications, was there a time in the past 3 months when you did not take your medications as prescribed because of cost?: No    Allergies reviewed with patient and updates made in EHR: yes    Medication History Completed By: Yarelis Hargrove 9/6/2023 4:45 PM    Prior to Admission medications    Medication Sig Last Dose Taking? Auth Provider Long Term End Date   benazepril (LOTENSIN) 10 MG tablet Take 10 mg by mouth daily 9/6/2023 at am Yes Reported, Patient Yes    calcium carbonate-vitamin D 600-200 MG-UNIT TABS Take 1 tablet by mouth 2 times daily. 9/6/2023 at am Yes Reported, Patient     citalopram (CELEXA) 20 MG tablet Take 1 tablet by mouth daily at 2 pm 9/6/2023 at am Yes Reported, Patient     cyanocobalamin (VITAMIN B-12) 1000 MCG tablet TAKE 1 TABLET (1,000 MCG) BY MOUTH ONCE DAILY. 9/6/2023 at am Yes Reported, Patient     donepezil (ARICEPT) 10 MG tablet Take 1 tablet (10 mg) by mouth At Bedtime 9/6/2023 at am Yes Manuel Osuna MD     folic acid (FOLVITE) 1 MG tablet Take 1 mg by mouth daily. 9/6/2023 at am Yes Reported, Patient     leflunomide (ARAVA) 10 MG tablet Take 1 tablet by mouth daily at 2 pm 9/6/2023 at am Yes  Reported, Patient Yes    levothyroxine (TIROSINT) 100 MCG capsule Take 100 mcg by mouth daily 9/6/2023 at am Yes Reported, Patient     methotrexate 2.5 MG tablet Take 25 mg by mouth once a week. On Thursday  Past Week at thurs Yes Reported, Patient     nystatin (MYCOSTATIN) 013100 UNIT/GM external powder Apply topically as needed for dry skin Past Week at prn Yes Reported, Patient     simvastatin (ZOCOR) 20 MG tablet TAKE 1 TABLET (20 MG) BY MOUTH ONCE DAILY WITH EVENING MEAL. 9/5/2023 at pm Yes Reported, Patient Yes

## 2023-09-06 NOTE — ED NOTES
"Pt states she wants to go home, that she is not short of breath and feels \"fine.\" Informed pt that she needs a CT scan first. Dr. Goodson informed.   "

## 2023-09-06 NOTE — ED TRIAGE NOTES
Son told medics pt has been not feeling well today. He notyiced her to be weaker and more confused than normal.  Pt became very weak at home and son helped patient to ground.  After pt had one bout of emesis. Son states he was feeling poorly earlier this week.      Triage Assessment       Row Name 09/06/23 8167       Triage Assessment (Adult)    Airway WDL WDL       Respiratory WDL    Respiratory WDL WDL       Skin Circulation/Temperature WDL    Skin Circulation/Temperature WDL X;temperature    Skin Temperature warm       Cardiac WDL    Cardiac WDL WDL       Peripheral/Neurovascular WDL    Peripheral Neurovascular WDL WDL       Cognitive/Neuro/Behavioral WDL    Cognitive/Neuro/Behavioral WDL X;orientation    Level of Consciousness confused    Orientation disoriented to;time;place       Pupils (CN II)    Pupil PERRLA yes    Pupil Size Left 3 mm    Pupil Size Right 3 mm       Saline Coma Scale    Best Eye Response 4-->(E4) spontaneous    Best Motor Response 6-->(M6) obeys commands    Best Verbal Response 5-->(V5) oriented    Mikayla Coma Scale Score 15

## 2023-09-06 NOTE — ED PROVIDER NOTES
EMERGENCY DEPARTMENT ENCOUNTER      NAME: Sharon De La Vega  AGE: 77 year old female  YOB: 1945  MRN: [id number]  EVALUATION DATE & TIME: 9/6/2023  1:26 PM    PCP: Jaylen Young    ED PROVIDER: Colton Goodson M.D.    Chief Complaint   Patient presents with    Generalized Weakness     Weakness and confusion that increased today.  Pt became weak and was assisted to floor by son.       FINAL IMPRESSION:  1. COVID-19    2. Elevated troponin        ED COURSE & MEDICAL DECISION MAKING:    Pertinent Labs & Imaging studies independently interpreted by me. (See chart for details)  1:30 PM Patient seen and examined, reviewed most recent clinic visit on August 9 which was with primary care for annual Medicare visit, depression memory issues as well as mobility issues were addressed and physical therapy was ordered.  Differential diagnosis includes but not limited to pneumonia, sepsis, urinary tract infection, intra-abdominal infection, medication reaction, electrolyte disturbance, anemia, dysrhythmia, myocardial infarction.  Patient presents with generalized weakness and increased confusion today.  She has no complaints and localizing findings on exam, she does have some bilateral lower extremity edema but no redness or warmth to suggest cellulitis.  Denies chest pain or cough.  She did not fall or hit her head.  Temperature 100, patient does have some tremors which could be essential tremor but may be related to rigors.  Concern for possible infectious etiology, labs ordered.  Head CT is ordered along with chest x-ray.  3:10 PM recheck temperature one 1.4.  Tylenol was ordered.  3:12 PM head CT independently interpreted by me with some motion artifact but no other acute findings.  Labs independently interpreted by me with normal basic panel, normal hepatic panel, lactate 1.9, procalcitonin 0.05, normal white blood cell count, no evidence for sepsis.  Troponin 55, repeat troponin will be ordered  although EKG is reassuring and patient has no chest pain.  Urinalysis and chest x-ray are pending along with COVID test.  If no source of infection is found, consider CT scan chest, abdomen, and pelvis for further evaluation.  Blood cultures have been ordered.  4:49 PM repeat troponin has elevated from 55 to 91 likely selecting secondary strain and not primary acute coronary syndrome.  COVID test is positive which likely is the source of patient's weakness and fever today.  Given rising troponin, CT PE study is ordered and anticipate admission, no beds at [facility] and so will be placed in transfer queue.  5:03 PM I spoke to Dr. Lockwood with cardiology who agrees this likely represents myocardial damage from COVID and comorbidities and not acute coronary syndrome.  6:25 PM CT PE study independently interpreted by me does not demonstrate acute pulmonary embolism, no evidence for acute infiltrate, effusion, hemothorax or pneumothorax, no pleural effusion.  6:51 PM I rechecked on the patient and updated them on lab results and the plan.  7:56 PM  Waiting for callback for transfer bed availability.  8:39 PM No beds available for transfer.  Patient will be admitted at [facility].  9:15 PM care discussed with [facility] service for admission.    At the conclusion of the encounter I discussed the results of all of the tests and the disposition. The questions were answered. The patient or family acknowledged understanding and was agreeable with the care plan.     Medical Decision Making    History:  Supplemental history from: Documented in chart, if applicable and EMS  External Record(s) reviewed: Documented in chart, if applicable.    Work Up:  Chart documentation includes differential considered and any EKGs or imaging independently interpreted by provider, where specified.  In additional to work up documented, I considered the following work up: Documented in chart, if applicable.    External consultation:  Discussion of  management with another provider: Documented in chart, if applicable    Complicating factors:  Care impacted by chronic illness: Hypertension and Mental Health  Care affected by social determinants of health: N/A    Disposition considerations: Admit.      EKG:    Performed at: 1:33 PM  Impression: Nonspecific ST changes, no acute ischemic changes  Rate: 87  Rhythm: Sinus  Axis: Normal  NJ Interval: 156  QRS Interval: 72  QTc Interval: 418  ST Changes: No acute ischemic changes  Comparison: April 2006, no changes    I have independently reviewed and interpreted the EKG(s) documented above.    PROCEDURES:       MEDICATIONS GIVEN IN THE EMERGENCY:  Medications   acetaminophen (TYLENOL) tablet 975 mg (975 mg Oral $Given 9/6/23 1541)   iopamidol (ISOVUE-370) solution 75 mL (75 mLs Intravenous $Given 9/6/23 1821)       NEW PRESCRIPTIONS STARTED AT TODAY'S ER VISIT  New Prescriptions    No medications on file       =================================================================    HPI    Patient information was obtained from: patient, EMS      Sharon De La Vega is a 77 year old female with a pertinent history of hypertension, Alzheimer's disease, degenerative joint disease (right knee), s/p right knee replacement, and anxiety who presents to this ED via ambulance for evaluation of fall. The patient's son told EMS patient has been weaker and more confused. She eventually became very weak and he helped her to the ground. She did not hit her head. The patient had some emesis after. The patient's son was sick earlier this week and the patient's temperature is 100.0. Denies chest pain, shortness of breath, or any other complaints at this time.      REVIEW OF SYSTEMS   Review of Systems   Respiratory:  Negative for shortness of breath.    Cardiovascular:  Negative for chest pain.   Neurological:  Positive for weakness.   Psychiatric/Behavioral:  Positive for confusion.       All other systems reviewed and negative    PAST  MEDICAL HISTORY:  No past medical history on file.    PAST SURGICAL HISTORY:  No past surgical history on file.    CURRENT MEDICATIONS:    No current facility-administered medications for this encounter.     Current Outpatient Medications   Medication    benazepril (LOTENSIN) 10 MG tablet    calcium carbonate-vitamin D 600-200 MG-UNIT TABS    citalopram (CELEXA) 20 MG tablet    cyanocobalamin (VITAMIN B-12) 1000 MCG tablet    donepezil (ARICEPT) 10 MG tablet    folic acid (FOLVITE) 1 MG tablet    leflunomide (ARAVA) 10 MG tablet    levothyroxine (TIROSINT) 100 MCG capsule    methotrexate 2.5 MG tablet    nystatin (MYCOSTATIN) 729655 UNIT/GM external powder    simvastatin (ZOCOR) 20 MG tablet       ALLERGIES:  Allergies   Allergen Reactions    Acetaminophen-Codeine      Other reaction(s): GI Upset  Intolerant - indigestion.    Celecoxib     Codeine Camsylate     Conj Estrog-Medroxyprogest Ace Other (See Comments)     Intolerant - bleeding    Estrogens, Conjugated Synthetic A     Nsaids     Sulfa Antibiotics        FAMILY HISTORY:  Family History   Problem Relation Age of Onset    Lung Cancer Father     Multiple myeloma Brother        SOCIAL HISTORY:   Social History     Socioeconomic History    Marital status:    Tobacco Use    Smoking status: Former     Types: Cigarettes     Quit date:      Years since quittin.7    Smokeless tobacco: Never   Substance and Sexual Activity    Alcohol use: Not Currently       VITALS:  /60   Pulse 80   Temp 100  F (37.8  C) (Oral)   Resp 18   SpO2 92%     PHYSICAL EXAM:  Physical Exam  Vitals and nursing note reviewed.   Constitutional:       Appearance: Normal appearance.   HENT:      Head: Normocephalic and atraumatic.      Right Ear: External ear normal.      Left Ear: External ear normal.      Nose: Nose normal.      Mouth/Throat:      Mouth: Mucous membranes are moist.   Eyes:      Extraocular Movements: Extraocular movements intact.       Conjunctiva/sclera: Conjunctivae normal.      Pupils: Pupils are equal, round, and reactive to light.   Cardiovascular:      Rate and Rhythm: Normal rate and regular rhythm.      Heart sounds: Murmur heard.      Comments: Systolic murmur with occasional extrasystole. Trace bilateral LE edema.  Pulmonary:      Effort: Pulmonary effort is normal.      Breath sounds: Normal breath sounds. No wheezing or rales.   Abdominal:      General: Abdomen is flat. There is no distension.      Palpations: Abdomen is soft.      Tenderness: There is no abdominal tenderness. There is no guarding.   Musculoskeletal:         General: Normal range of motion.      Cervical back: Normal range of motion and neck supple.      Right lower leg: Edema present.      Left lower leg: Edema present.   Lymphadenopathy:      Cervical: No cervical adenopathy.   Skin:     General: Skin is warm and dry.   Neurological:      General: No focal deficit present.      Mental Status: She is alert and oriented to person, place, and time. Mental status is at baseline.      Comments: No gross focal neurologic deficits   Psychiatric:         Mood and Affect: Mood normal.         Behavior: Behavior normal.         Thought Content: Thought content normal.          LAB:  All pertinent labs reviewed and interpreted.  Results for orders placed or performed during the hospital encounter of 09/06/23   Head CT w/o contrast    Impression    IMPRESSION:  1.  No CT evidence for acute intracranial process.  2.  Brain atrophy and presumed chronic microvascular ischemic changes as above.   XR Chest 2 Views    Impression    IMPRESSION:     No focal airspace disease. No pleural effusion or pneumothorax.    The cardiomediastinal silhouette is unremarkable. Aortic calcifications.    Multilevel degenerative changes of the spine.   CT Chest Pulmonary Embolism w Contrast    Impression    IMPRESSION:  1.  No abnormalities are noted to explain symptoms.  2.  Specifically, no evidence  of pulmonary emboli.  3.  No evidence of pneumonia.  4.  There is a small hiatal hernia.  5.  Other noncritical findings as noted above.   Basic metabolic panel   Result Value Ref Range    Sodium 138 136 - 145 mmol/L    Potassium 4.0 3.4 - 5.3 mmol/L    Chloride 100 98 - 107 mmol/L    Carbon Dioxide (CO2) 27 22 - 29 mmol/L    Anion Gap 11 7 - 15 mmol/L    Urea Nitrogen 19.3 8.0 - 23.0 mg/dL    Creatinine 0.94 0.51 - 0.95 mg/dL    Calcium 9.4 8.8 - 10.2 mg/dL    Glucose 114 (H) 70 - 99 mg/dL    GFR Estimate 63 >60 mL/min/1.73m2   Lactic acid whole blood   Result Value Ref Range    Lactic Acid 1.9 0.7 - 2.0 mmol/L   Result Value Ref Range    Procalcitonin 0.05 (H) <0.05 ng/mL   Result Value Ref Range    Magnesium 2.0 1.7 - 2.3 mg/dL   UA with Microscopic reflex to Culture    Specimen: Urine, Catheter   Result Value Ref Range    Color Urine Light Yellow Colorless, Straw, Light Yellow, Yellow    Appearance Urine Clear Clear    Glucose Urine Negative Negative mg/dL    Bilirubin Urine Negative Negative    Ketones Urine Negative Negative mg/dL    Specific Gravity Urine 1.017 1.001 - 1.030    Blood Urine Negative Negative    pH Urine 8.0 (H) 5.0 - 7.0    Protein Albumin Urine 10 (A) Negative mg/dL    Urobilinogen Urine <2.0 <2.0 mg/dL    Nitrite Urine Negative Negative    Leukocyte Esterase Urine Negative Negative    Mucus Urine Present (A) None Seen /LPF    RBC Urine 2 <=2 /HPF    WBC Urine <1 <=5 /HPF   Hepatic function panel   Result Value Ref Range    Protein Total 7.0 6.4 - 8.3 g/dL    Albumin 3.9 3.5 - 5.2 g/dL    Bilirubin Total 0.7 <=1.2 mg/dL    Alkaline Phosphatase 80 35 - 104 U/L    AST 25 0 - 45 U/L    ALT 10 0 - 50 U/L    Bilirubin Direct <0.20 0.00 - 0.30 mg/dL   Result Value Ref Range    Ammonia 13 11 - 51 umol/L   Result Value Ref Range    Troponin T, High Sensitivity 55 (H) <=14 ng/L   CBC with platelets and differential   Result Value Ref Range    WBC Count 8.3 4.0 - 11.0 10e3/uL    RBC Count 4.15 3.80 -  5.20 10e6/uL    Hemoglobin 13.4 11.7 - 15.7 g/dL    Hematocrit 41.7 35.0 - 47.0 %     (H) 78 - 100 fL    MCH 32.3 26.5 - 33.0 pg    MCHC 32.1 31.5 - 36.5 g/dL    RDW 14.8 10.0 - 15.0 %    Platelet Count 142 (L) 150 - 450 10e3/uL    % Neutrophils 86 %    % Lymphocytes 5 %    % Monocytes 7 %    % Eosinophils 1 %    % Basophils 1 %    % Immature Granulocytes 0 %    NRBCs per 100 WBC 0 <1 /100    Absolute Neutrophils 7.1 1.6 - 8.3 10e3/uL    Absolute Lymphocytes 0.4 (L) 0.8 - 5.3 10e3/uL    Absolute Monocytes 0.6 0.0 - 1.3 10e3/uL    Absolute Eosinophils 0.1 0.0 - 0.7 10e3/uL    Absolute Basophils 0.1 0.0 - 0.2 10e3/uL    Absolute Immature Granulocytes 0.0 <=0.4 10e3/uL    Absolute NRBCs 0.0 10e3/uL   Symptomatic Influenza A/B, RSV, & SARS-CoV2 PCR (COVID-19) Nose    Specimen: Nose; Swab   Result Value Ref Range    Influenza A PCR Negative Negative    Influenza B PCR Negative Negative    RSV PCR Negative Negative    SARS CoV2 PCR Positive (A) Negative   Result Value Ref Range    Troponin T, High Sensitivity 91 (H) <=14 ng/L       RADIOLOGY:  Reviewed all pertinent imaging. Please see official radiology report.  CT Chest Pulmonary Embolism w Contrast   Final Result   IMPRESSION:   1.  No abnormalities are noted to explain symptoms.   2.  Specifically, no evidence of pulmonary emboli.   3.  No evidence of pneumonia.   4.  There is a small hiatal hernia.   5.  Other noncritical findings as noted above.      XR Chest 2 Views   Final Result   IMPRESSION:       No focal airspace disease. No pleural effusion or pneumothorax.      The cardiomediastinal silhouette is unremarkable. Aortic calcifications.      Multilevel degenerative changes of the spine.      Head CT w/o contrast   Final Result   IMPRESSION:   1.  No CT evidence for acute intracranial process.   2.  Brain atrophy and presumed chronic microvascular ischemic changes as above.          Lavon DEL TORO, am serving as a scribe to document services personally  performed by Dr. Goodson based on my observation and the provider's statements to me. I, Colton Goodson MD attest that Lavon Marlow is acting in a scribe capacity, has observed my performance of the services and has documented them in accordance with my direction.    Colton Goodson M.D.  Emergency Medicine  [facility] EMERGENCY DEPARTMENT  [address] [place] [address] [id number] [phone]  Dept: [phone]       Colton Goodson MD  09/06/23 3250

## 2023-09-07 ENCOUNTER — APPOINTMENT (OUTPATIENT)
Dept: CARDIOLOGY | Facility: HOSPITAL | Age: 78
DRG: 177 | End: 2023-09-07
Payer: COMMERCIAL

## 2023-09-07 ENCOUNTER — APPOINTMENT (OUTPATIENT)
Dept: CT IMAGING | Facility: HOSPITAL | Age: 78
DRG: 177 | End: 2023-09-07
Payer: COMMERCIAL

## 2023-09-07 LAB
ANION GAP SERPL CALCULATED.3IONS-SCNC: 8 MMOL/L (ref 7–15)
ATRIAL RATE - MUSE: 267 BPM
ATRIAL RATE - MUSE: 68 BPM
ATRIAL RATE - MUSE: 72 BPM
BUN SERPL-MCNC: 15.3 MG/DL (ref 8–23)
CALCIUM SERPL-MCNC: 8.8 MG/DL (ref 8.8–10.2)
CHLORIDE SERPL-SCNC: 97 MMOL/L (ref 98–107)
CREAT SERPL-MCNC: 0.85 MG/DL (ref 0.51–0.95)
CREAT SERPL-MCNC: 0.85 MG/DL (ref 0.51–0.95)
DEPRECATED HCO3 PLAS-SCNC: 27 MMOL/L (ref 22–29)
DIASTOLIC BLOOD PRESSURE - MUSE: 69 MMHG
DIASTOLIC BLOOD PRESSURE - MUSE: 74 MMHG
DIASTOLIC BLOOD PRESSURE - MUSE: NORMAL MMHG
EGFRCR SERPLBLD CKD-EPI 2021: 71 ML/MIN/1.73M2
EGFRCR SERPLBLD CKD-EPI 2021: 71 ML/MIN/1.73M2
ERYTHROCYTE [DISTWIDTH] IN BLOOD BY AUTOMATED COUNT: 14.8 % (ref 10–15)
GLUCOSE BLDC GLUCOMTR-MCNC: 121 MG/DL (ref 70–99)
GLUCOSE SERPL-MCNC: 100 MG/DL (ref 70–99)
HCT VFR BLD AUTO: 39.4 % (ref 35–47)
HGB BLD-MCNC: 13 G/DL (ref 11.7–15.7)
INTERPRETATION ECG - MUSE: NORMAL
LVEF ECHO: NORMAL
MAGNESIUM SERPL-MCNC: 2 MG/DL (ref 1.7–2.3)
MCH RBC QN AUTO: 32.9 PG (ref 26.5–33)
MCHC RBC AUTO-ENTMCNC: 33 G/DL (ref 31.5–36.5)
MCV RBC AUTO: 100 FL (ref 78–100)
P AXIS - MUSE: 71 DEGREES
P AXIS - MUSE: 76 DEGREES
P AXIS - MUSE: NORMAL DEGREES
PLATELET # BLD AUTO: 127 10E3/UL (ref 150–450)
POTASSIUM SERPL-SCNC: 3.6 MMOL/L (ref 3.4–5.3)
POTASSIUM SERPL-SCNC: 4.2 MMOL/L (ref 3.4–5.3)
PR INTERVAL - MUSE: 144 MS
PR INTERVAL - MUSE: 156 MS
PR INTERVAL - MUSE: NORMAL MS
QRS DURATION - MUSE: 74 MS
QRS DURATION - MUSE: 74 MS
QRS DURATION - MUSE: 76 MS
QT - MUSE: 442 MS
QT - MUSE: 444 MS
QT - MUSE: 454 MS
QTC - MUSE: 482 MS
QTC - MUSE: 486 MS
QTC - MUSE: 497 MS
R AXIS - MUSE: 14 DEGREES
R AXIS - MUSE: 19 DEGREES
R AXIS - MUSE: 29 DEGREES
RBC # BLD AUTO: 3.95 10E6/UL (ref 3.8–5.2)
SODIUM SERPL-SCNC: 132 MMOL/L (ref 136–145)
SYSTOLIC BLOOD PRESSURE - MUSE: 139 MMHG
SYSTOLIC BLOOD PRESSURE - MUSE: 179 MMHG
SYSTOLIC BLOOD PRESSURE - MUSE: NORMAL MMHG
T AXIS - MUSE: 40 DEGREES
T AXIS - MUSE: 59 DEGREES
T AXIS - MUSE: 69 DEGREES
TROPONIN T SERPL HS-MCNC: 131 NG/L
VENTRICULAR RATE- MUSE: 68 BPM
VENTRICULAR RATE- MUSE: 72 BPM
VENTRICULAR RATE- MUSE: 76 BPM
WBC # BLD AUTO: 6.3 10E3/UL (ref 4–11)

## 2023-09-07 PROCEDURE — 250N000011 HC RX IP 250 OP 636: Mod: JZ

## 2023-09-07 PROCEDURE — 93005 ELECTROCARDIOGRAM TRACING: CPT | Mod: 76

## 2023-09-07 PROCEDURE — 82962 GLUCOSE BLOOD TEST: CPT

## 2023-09-07 PROCEDURE — 99222 1ST HOSP IP/OBS MODERATE 55: CPT | Mod: GC | Performed by: FAMILY MEDICINE

## 2023-09-07 PROCEDURE — 36415 COLL VENOUS BLD VENIPUNCTURE: CPT

## 2023-09-07 PROCEDURE — 93306 TTE W/DOPPLER COMPLETE: CPT | Mod: 26 | Performed by: INTERNAL MEDICINE

## 2023-09-07 PROCEDURE — 96372 THER/PROPH/DIAG INJ SC/IM: CPT

## 2023-09-07 PROCEDURE — 99222 1ST HOSP IP/OBS MODERATE 55: CPT | Mod: 25 | Performed by: INTERNAL MEDICINE

## 2023-09-07 PROCEDURE — 93005 ELECTROCARDIOGRAM TRACING: CPT

## 2023-09-07 PROCEDURE — XW033E5 INTRODUCTION OF REMDESIVIR ANTI-INFECTIVE INTO PERIPHERAL VEIN, PERCUTANEOUS APPROACH, NEW TECHNOLOGY GROUP 5: ICD-10-PCS | Performed by: FAMILY MEDICINE

## 2023-09-07 PROCEDURE — 250N000013 HC RX MED GY IP 250 OP 250 PS 637: Performed by: INTERNAL MEDICINE

## 2023-09-07 PROCEDURE — 84132 ASSAY OF SERUM POTASSIUM: CPT

## 2023-09-07 PROCEDURE — 96365 THER/PROPH/DIAG IV INF INIT: CPT | Mod: XU

## 2023-09-07 PROCEDURE — 85027 COMPLETE CBC AUTOMATED: CPT

## 2023-09-07 PROCEDURE — 250N000013 HC RX MED GY IP 250 OP 250 PS 637

## 2023-09-07 PROCEDURE — 250N000012 HC RX MED GY IP 250 OP 636 PS 637: Performed by: FAMILY MEDICINE

## 2023-09-07 PROCEDURE — 93306 TTE W/DOPPLER COMPLETE: CPT

## 2023-09-07 PROCEDURE — 80048 BASIC METABOLIC PNL TOTAL CA: CPT

## 2023-09-07 PROCEDURE — 120N000001 HC R&B MED SURG/OB

## 2023-09-07 PROCEDURE — G0378 HOSPITAL OBSERVATION PER HR: HCPCS

## 2023-09-07 PROCEDURE — 83735 ASSAY OF MAGNESIUM: CPT

## 2023-09-07 PROCEDURE — 258N000003 HC RX IP 258 OP 636

## 2023-09-07 PROCEDURE — 84484 ASSAY OF TROPONIN QUANT: CPT

## 2023-09-07 PROCEDURE — 70450 CT HEAD/BRAIN W/O DYE: CPT

## 2023-09-07 RX ORDER — ROSUVASTATIN CALCIUM 10 MG/1
20 TABLET, COATED ORAL DAILY
Status: DISCONTINUED | OUTPATIENT
Start: 2023-09-07 | End: 2023-09-08

## 2023-09-07 RX ORDER — ENOXAPARIN SODIUM 100 MG/ML
1 INJECTION SUBCUTANEOUS EVERY 12 HOURS
Status: DISCONTINUED | OUTPATIENT
Start: 2023-09-07 | End: 2023-09-09

## 2023-09-07 RX ORDER — SPIRONOLACTONE 25 MG
12.5 TABLET ORAL DAILY
Status: DISCONTINUED | OUTPATIENT
Start: 2023-09-07 | End: 2023-09-11

## 2023-09-07 RX ORDER — ASPIRIN 81 MG/1
81 TABLET ORAL DAILY
Status: DISCONTINUED | OUTPATIENT
Start: 2023-09-07 | End: 2023-09-14 | Stop reason: HOSPADM

## 2023-09-07 RX ORDER — HYDROCHLOROTHIAZIDE 25 MG/1
25 TABLET ORAL DAILY
Status: DISCONTINUED | OUTPATIENT
Start: 2023-09-07 | End: 2023-09-14 | Stop reason: HOSPADM

## 2023-09-07 RX ORDER — ENOXAPARIN SODIUM 100 MG/ML
40 INJECTION SUBCUTANEOUS EVERY 24 HOURS
Status: DISCONTINUED | OUTPATIENT
Start: 2023-09-07 | End: 2023-09-07

## 2023-09-07 RX ADMIN — CITALOPRAM HYDROBROMIDE 20 MG: 20 TABLET, FILM COATED ORAL at 08:21

## 2023-09-07 RX ADMIN — REMDESIVIR 200 MG: 100 INJECTION, POWDER, LYOPHILIZED, FOR SOLUTION INTRAVENOUS at 10:54

## 2023-09-07 RX ADMIN — LEVOTHYROXINE SODIUM 100 MCG: 100 TABLET ORAL at 06:44

## 2023-09-07 RX ADMIN — HYDROCHLOROTHIAZIDE 25 MG: 25 TABLET ORAL at 11:03

## 2023-09-07 RX ADMIN — METHOTREXATE 10 MG: 2.5 TABLET ORAL at 11:03

## 2023-09-07 RX ADMIN — SODIUM CHLORIDE 50 ML: 9 INJECTION, SOLUTION INTRAVENOUS at 11:49

## 2023-09-07 RX ADMIN — LEFLUNOMIDE 10 MG: 10 TABLET ORAL at 08:21

## 2023-09-07 RX ADMIN — ENOXAPARIN SODIUM 100 MG: 100 INJECTION SUBCUTANEOUS at 21:20

## 2023-09-07 RX ADMIN — ASPIRIN 81 MG: 81 TABLET ORAL at 11:46

## 2023-09-07 RX ADMIN — ENOXAPARIN SODIUM 100 MG: 100 INJECTION SUBCUTANEOUS at 11:46

## 2023-09-07 RX ADMIN — DONEPEZIL HYDROCHLORIDE 10 MG: 5 TABLET, FILM COATED ORAL at 21:16

## 2023-09-07 RX ADMIN — LISINOPRIL 10 MG: 5 TABLET ORAL at 08:21

## 2023-09-07 RX ADMIN — DONEPEZIL HYDROCHLORIDE 10 MG: 5 TABLET, FILM COATED ORAL at 04:03

## 2023-09-07 RX ADMIN — ROSUVASTATIN CALCIUM 20 MG: 10 TABLET, FILM COATED ORAL at 11:03

## 2023-09-07 RX ADMIN — SPIRONOLACTONE 12.5 MG: 25 TABLET ORAL at 11:03

## 2023-09-07 ASSESSMENT — ACTIVITIES OF DAILY LIVING (ADL)
ADLS_ACUITY_SCORE: 49
TOILETING_ISSUES: NO
WALKING_OR_CLIMBING_STAIRS_DIFFICULTY: NO
ADLS_ACUITY_SCORE: 49
ADLS_ACUITY_SCORE: 37
DIFFICULTY_EATING/SWALLOWING: NO
ADLS_ACUITY_SCORE: 37
WEAR_GLASSES_OR_BLIND: YES
NUMBER_OF_TIMES_PATIENT_HAS_FALLEN_WITHIN_LAST_SIX_MONTHS: 3
ADLS_ACUITY_SCORE: 49
EQUIPMENT_CURRENTLY_USED_AT_HOME: CANE, STRAIGHT;WALKER, HEMI
DOING_ERRANDS_INDEPENDENTLY_DIFFICULTY: YES
FALL_HISTORY_WITHIN_LAST_SIX_MONTHS: YES
ADLS_ACUITY_SCORE: 37
ADLS_ACUITY_SCORE: 49
ADLS_ACUITY_SCORE: 49
DEPENDENT_IADLS:: CLEANING;COOKING;LAUNDRY;SHOPPING;MEAL PREPARATION;MEDICATION MANAGEMENT;TRANSPORTATION;MONEY MANAGEMENT
ADLS_ACUITY_SCORE: 49
ADLS_ACUITY_SCORE: 49
ADLS_ACUITY_SCORE: 45
DRESSING/BATHING_DIFFICULTY: NO
ADLS_ACUITY_SCORE: 37
CONCENTRATING,_REMEMBERING_OR_MAKING_DECISIONS_DIFFICULTY: YES
CHANGE_IN_FUNCTIONAL_STATUS_SINCE_ONSET_OF_CURRENT_ILLNESS/INJURY: YES

## 2023-09-07 NOTE — CODE/RAPID RESPONSE
"Pt was found in bed with leg hanging over the side with stool and urine beneath her. Pt stated \"I fell out of bed onto my butt and climbed back into bed.\" Unable to use bed alarm due to no wall connection in ED room. Bunch alarm was on bed, but not going off. Isaias charge RN and Teddy LIND nurse came and assessed patient as well as [facility] hospital resident. MD assessed and placed orders. Head CT planned. Pt was noted to be back to baseline when primary RN assessed right after fall. Telemetry patches were displaced removed by patient, so rhythm was not captured at the time.   "

## 2023-09-07 NOTE — CONSULTS
"Care Management Initial Consult    General Information  Assessment completed with: ChildrenGuy son via phone  Type of CM/SW Visit: Initial Assessment    Primary Care Provider verified and updated as needed: Yes   Readmission within the last 30 days: no previous admission in last 30 days      Reason for Consult: discharge planning  Advance Care Planning: Advance Care Planning Reviewed: no concerns identified          Communication Assessment  Patient's communication style: spoken language (English or Bilingual)            Cognitive  Cognitive/Neuro/Behavioral: per son \"has a dementia diagnosis\".    Living Environment:   People in home: child(ashly), adult   and son Guy and other extended family  Current living Arrangements: house      Able to return to prior arrangements: yes       Family/Social Support:  Care provided by: self, child(ashly)  Provides care for: no one, unable/limited ability to care for self     Children          Description of Support System: Supportive, Involved    Support Assessment: Adequate family and caregiver support, Adequate social supports    Current Resources:   Patient receiving home care services: Yes  Skilled Home Care Services: Home Health Aid ([facility] has an aide that comes twice a week for bathing help)  Community Resources:    Equipment currently used at home: cane, straight, walker, rolling (\"normally uses a cane but also has the walker she can use if needed\".)  Supplies currently used at home: None    Employment/Financial:  Employment Status:          Financial Concerns:     Referral to Financial Worker: No       Does the patient's insurance plan have a 3 day qualifying hospital stay waiver?  No    Lifestyle & Psychosocial Needs:  Social Determinants of Health     Tobacco Use: Medium Risk (2/21/2023)    Patient History     Smoking Tobacco Use: Former     Smokeless Tobacco Use: Never     Passive Exposure: Not on file   Alcohol Use: Not on file   Financial Resource Strain: " "Not on file   Food Insecurity: Not on file   Transportation Needs: Not on file   Physical Activity: Not on file   Stress: Not on file   Social Connections: Not on file   Intimate Partner Violence: Not on file   Depression: Not on file   Housing Stability: Not on file       Functional Status:  Prior to admission patient needed assistance:   Dependent ADLs:: Ambulation-cane, Bathing  Dependent IADLs:: Cleaning, Cooking, Laundry, Shopping, Meal Preparation, Medication Management, Transportation, Money Management  Assesssment of Functional Status: Not at baseline with ADL Functioning, Not at baseline with mobility, Not at  functional baseline (son states, \"ROSHNI took a lot out of her. She is normally pretty independent and just needs reminders because of her dementia\".)    Mental Health Status:          Chemical Dependency Status:                Values/Beliefs:  Spiritual, Cultural Beliefs, Mormonism Practices, Values that affect care:                 Additional Information:   lives in a house with her son Guy and other extended family. Per son, \"COVID took a lot out of her. She is normally pretty independent and just needs reminders because of her dementia\". Family helps with IADLs. She gets bathing assistance from [employer] who has an aide that comes twice a week for bathing help.    She uses a cane for mobility, but also has a walker that she can use if needed.    Unknown discharge needs at this time. Per son, \"I am willing to have her come home, but she will need to be strong enough. I know that she wants to come home.\"    Son to transport.    CM to follow for medical progression of care, discharge recommendations, and final discharge plan.      Katelyn Gee RN    "

## 2023-09-07 NOTE — CONSULTS
"  Thank you,  No ref. provider found, for asking the [facility] team to see Ms. Sharon De La Vega to evaluate       Assessment/Recommendations   Assessment/Plan:  Dyspnea in setting of COVID with mod to severe aortic stenosis on exam, with elevated BNP and troponin, no sig changes on ECG going back to 2006, possible myocarditis from COVID combined with valve disease, possible CAD but less likely (noted last  8/23), lung min rales bases, await echo results, will start spironolactone/hydrochlorothiazide, cont lisinopril, add asp and rosuvastatin, and would raise lovenox to 1mg/kg bid dosing  Aortic stenosis - at least mod to severe - await echo, will have to discuss with family re TAVR and survival if severe  Contacted her son, Guy, [phone] no answer and left a message.     Addendum: EF normal with no change in Aortic stenosis iwht mean gradient 29mmHg, after recovery from covid consider stress imaging.  Abd trop could be related to myocarditis given no WMA   Clinically Significant Risk Factors Present on Admission                # Thrombocytopenia: Lowest platelets = 127 in last 2 days, will monitor for bleeding   # Hypertension: Noted on problem list   # Dementia: noted on problem list    # Obesity: Estimated body mass index is 39.22 kg/m  as calculated from the following:    Height as of this encounter: 1.626 m (5' 4.02\").    Weight as of this encounter: 103.7 kg (228 lb 9.9 oz).                 History of Present Illness/Subjective    Ms. Sharon De La Vega is a 77 year old female with alzheimers dementia, mod aortic stneosis (echo 3/23 [facility] with mean gradient 30 and nl EF), adm with ccovid with abn trop, elevated BNP, Cr 0.85, ECG NSR (second with artifact) Twave inv 3 only, CT PE neg no calcification noted cors/aorta, trop 131 this AM, Hgb 13.4, plat 140.  She denies chest pain/pressure, dyspnea, edema, denies DM as a child, noted  with Aortic valve problems.      " "    Physical Examination Review of Systems   /89 (BP Location: Right arm)   Pulse 78   Temp 98.9  F (37.2  C) (Oral)   Resp 18   Ht 1.626 m (5' 4.02\")   Wt 103.7 kg (228 lb 9.9 oz)   SpO2 97%   BMI 39.22 kg/m    Body mass index is 39.22 kg/m .  Wt Readings from Last 3 Encounters:   23 103.7 kg (228 lb 9.9 oz)   23 108.9 kg (240 lb)   23 108.9 kg (240 lb)     No intake or output data in the 24 hours ending 23 0906  General Appearance:   no distress, normal body habitus   ENT/Mouth: membranes moist, no oral lesions or bleeding gums.      EYES:  no scleral icterus, normal conjunctivae   Neck: no carotid bruits or thyromegaly   Chest/Lungs:   lungs are clear to auscultation, no rales or wheezing,  sternal scar, equal chest wall expansion    Cardiovascular:   Regular. Normal first and second heart sounds with no murmurs, rubs, or gallops; the carotid, radial and posterior tibial pulses are intact, Jugular venous pressure , edema bilaterally    Abdomen:  no organomegaly, masses, bruits, or tenderness; bowel sounds are present   Extremities: no cyanosis or clubbing   Skin: no xanthelasma, warm.    Neurologic: normal  bilateral, no tremors     Psychiatric: alert and oriented x3, calm     Review of Systems - 12 points nega other than above      Medical History  Surgical History Family History Social History   No past medical history on file. No past surgical history on file. Family History   Problem Relation Age of Onset    Lung Cancer Father     Multiple myeloma Brother     Social History     Socioeconomic History    Marital status:      Spouse name: Not on file    Number of children: Not on file    Years of education: Not on file    Highest education level: Not on file   Occupational History    Not on file   Tobacco Use    Smoking status: Former     Types: Cigarettes     Quit date:      Years since quittin.7    Smokeless tobacco: Never   Substance and Sexual Activity "    Alcohol use: Not Currently    Drug use: Not on file    Sexual activity: Not on file   Other Topics Concern    Not on file   Social History Narrative    Not on file     Social Determinants of Health     Financial Resource Strain: Not on file   Food Insecurity: Not on file   Transportation Needs: Not on file   Physical Activity: Not on file   Stress: Not on file   Social Connections: Not on file   Intimate Partner Violence: Not on file   Housing Stability: Not on file          Medications  Allergies   Scheduled Meds:   citalopram  20 mg Oral Daily    donepezil  10 mg Oral At Bedtime    enoxaparin ANTICOAGULANT  40 mg Subcutaneous Q24H    leflunomide  10 mg Oral Daily    levothyroxine  100 mcg Oral Daily    lisinopril  10 mg Oral Daily    [Held by provider] methotrexate  25 mg Oral Weekly    nirmatrelvir and ritonavir  2 tablet Oral Q12H    [Held by provider] simvastatin  20 mg Oral At Bedtime     Continuous Infusions:  PRN Meds:.acetaminophen **OR** acetaminophen, melatonin, ondansetron **OR** ondansetron, prochlorperazine **OR** prochlorperazine **OR** prochlorperazine, senna-docusate **OR** senna-docusate Allergies   Allergen Reactions    Acetaminophen-Codeine      Other reaction(s): GI Upset  Intolerant - indigestion.    Celecoxib     Codeine Camsylate     Conj Estrog-Medroxyprogest Ace Other (See Comments)     Intolerant - bleeding    Estrogens, Conjugated Synthetic A     Nsaids     Sulfa Antibiotics          Lab Results    Chemistry/lipid CBC Cardiac Enzymes/BNP/TSH/INR   Lab Results   Component Value Date    BUN 15.3 09/07/2023     (L) 09/07/2023    CO2 27 09/07/2023    Lab Results   Component Value Date    WBC 6.3 09/07/2023    HGB 13.0 09/07/2023    HCT 39.4 09/07/2023     09/07/2023     (L) 09/07/2023    Lab Results   Component Value Date    TSH 8.88 (H) 01/31/2023    INR 1.15 01/10/2023              Andrade Walker MD  Interventional Cardiology  [facility]  Care

## 2023-09-07 NOTE — UTILIZATION REVIEW
Admission Status; Secondary Review Determination   Under the authority of the Utilization Management Committee, the utilization review process indicated a secondary review on Sharon De La Vega. The review outcome is based on review of the medical records, discussions with staff, and applying clinical experience noted on the date of the review.   (x) Inpatient Status Appropriate - This patient's medical care is consistent with medical management for inpatient care and reasonable inpatient medical practice.     RATIONALE FOR DETERMINATION   Sharon De La Vega is a 77 yr old female with AORTIC STENOSIS, PVCs. Dementia, depression, CKD, HTN, RA and morbid obesity who presented with weakness and syncope.  Ongoing syncope event today in ED again.  Has COVID19 infection and being treated with Remdesivir and high risk decompensation wth underlying chronic disease.  Is on methotrexate and arava for RA in addition.  Also with troponin mild elevated on presentation but has continued to climb during stay.  Cardiology consultation and echo pending.  Concern that the AORTIC STENOSIS may also be contributing and possible myocarditis in setting of COVID.  Not medically stable for discharge with ongoing evaluation in progress.    At the time of admission with the information available to the attending physician more than 2 nights Hospital complex care was anticipated, based on patient risk of adverse outcome if treated as outpatient and complex care required. Inpatient admission is appropriate based on the Medicare guidelines.   The information on this document is developed by the utilization review team in order for the business office to ensure compliance. This only denotes the appropriateness of proper admission status and does not reflect the quality of care rendered.   The definitions of Inpatient Status and Observation Status used in making the determination above are those provided in the CMS Coverage Manual, Chapter 1 and  Chapter 6, section 70.4.   Sincerely,   Nichol Angel MD  Utilization Review  Physician Advisor  [facility]

## 2023-09-07 NOTE — SIGNIFICANT EVENT
Significant Event Note    Time of event: 1000 this am    Description of event:  When rounding, was attempting to help patient back to bed off commode with assist of ED tech. Significant generalized weakness and was having trouble ambulating so decided to have patient take a seat. She sat down and commode and eyes rolled in back of head and patient become unresponsive to voice and sternal rub. Faint pulse felt. I called rapid response team who arrived immediately and we lifted patient to bed. She then awoke more and became more responsive. Vital signs normal at that time. She denied chest pain or shortness of breath. Likely vasovagal after have BM vs arrhythmia. EKG normal after event. Came in with syncope and generalized weakness in setting of covid.    Plan:  -continue tele  -echo pending  -continue therapeutic lovenox in setting of covid and elevated trop per cards recs    Discussed with: bedside nurse and supervising physician, Dr. Dequan Mancia MD

## 2023-09-07 NOTE — PLAN OF CARE
Goal Outcome Evaluation:         Pt oriented to self and place, off to time. She has hand tremors at baseline which affect her telemetry readings. SR on monitor when measured but at times looks like a flutter and at times v tach. MD updated and stated that it is too difficult to differentiate due to her tremors. Had a syncopal episode today when getting up from commode, returned to baseline shortly afterward. See previous notes for details. She also had an unwitnessed fall this afternoon, which is also detailed in a note. Currently maintaining SpO2 on room air. Had echo in AM. Cards following. CT planned for this evening.

## 2023-09-07 NOTE — SIGNIFICANT EVENT
"Significant Event Note    Time of event: 4:15 PM September 7, 2023    Description of event:  Rapid response called unwitnessed fall, nurse reports patient was found on the her bed with leg hanging from the side of the bed. She was unable to get back into bed.    Patient states \"she tried to get up from bed and felt her right leg slipped and she fell on the floor\" she states she didn't hit her head. On exam she is alert, vitally stable, no notable bruising or tenderness on palpation of her scalp. no tenderness on hips or shoulder. She has hx of dementia at baseline. She denies chest pain, sob, or dizziness. Denies LOC, but hard to rely on history given poor memory issues at baseline.      Briefly patient is  admitted for syncope and generalized weakness and found to have covid-19 infection as well as elevated troponin in the setting of aortic stenosis, recent troponin trending down. Cardiology with plan to discuss TAVR with family in AM.    Plan:  -Unwitnessed fall, hx of dementia, recently started on therapeutic Lovenox. - CT head    -Hx of aortic stenosis, recent echo showed moderate to severe aortic stenosis. Earlier today a syncopal episode (thought to be likely vasovagal) Will get a repeat EKG, recent Mag, K normal two hours ago.    Will follow results    Discussed with: bedside nurse    Nguyễn Velazquez MD    "

## 2023-09-07 NOTE — H&P
[facility]    History and Physical - Hospitalist Service       Date of Admission:  9/6/2023    Assessment & Plan      Sharon De La Vega is a 77 year old female admitted on 9/6/2023. She has a history of aortic stenosis, PVCs, Alzheimer's dementia, depression, HTN, HLD, CKD, hypothyroidism, RA, and morbid obesity and is admitted for workup of syncope and generalized weakness in setting of COVID-19 as well as elevated troponins.    Syncope  Generalized weakness  Confirmed COVID-19 infection    Patient admitted with weakness after a syncopal fall. Unclear cause of syncope, though patient noted to be around sick contacts (son) who has been sick for 2-3 days. Developed cough night before admission, aggravated on morning of fall. Found to be COVID-19 positive. Mildly elevated temperatures, WBC wnl. Procal 0.05, CMP otherwise unremarkable. Found to be COVID-19 positive. On room air, no respiratory distress. Will start Paxlovid x 5 days, low threshold to start remdesivir if decompensating from O2 standpoint.    Symptom Onset 9/5/2023   Date of 1st Positive Test 9/6/2023   Vaccination Status Fully Vaccinated, l.ast vaccine 6/20/2023       - COVID-19 special precautions  - continuous pulse-ox, titrate to keep SpO2 between 90-96%  - COVID-Focused Medications: Paxlovid x 5 days  - PCD, hold off on Lovenox for now  - PT/OT  - AM BMP, CBC    NSTEMI  Hx aortic stenosis  Known history of aortic stenosis. Follows with [facility] in [place], Dr. Kev Gutiérrez. Last echo on 3/30/2023 showed normal EF 55-60% though technically difficult study noted. Found to have aortic stenosis, was scheduled for coronary calcification score and stress test but does not appear to have scheduled this. Upon admission, trops elevated to 55 -> 91 -> 116 with nt-pro BNP at 2868. EKG showed NSR x 2, though second EKG with questionable Afib (has baseline tremor that makes it hard to determine). Question  elevation in setting of known COVID-19 and myocarditis. Cardiology consult in AM for further evaluation.  - cardiology consult, appreciate recs  - troponin trend  - echo in AM  - tele  - AM BMP, CBC    Hypothyroidism  - PTA synthroid, scheduled for 4 hours prior to Paxlovid    HTN  - lisinopril 10mg to replace PTA benazepril 10mg daily    Alzheimer's dementia  - PTA donepezil    Rheumatoid arthritis  - PTA Arava  - hold PTA weekly methotrexate secondary to Paxlovid use and active infection    MDD/mood disorder  - PTA wellbutrin, celexa    HLD  - hold PTA simvastatin secondary to Paxlovid use       Diet: Combination Diet Low Saturated Fat Na <2400mg Diet, No Caffeine Diet    DVT Prophylaxis: Pneumatic Compression Devices  Escobar Catheter: Not present  Fluids: PO  Lines: None     Cardiac Monitoring: ACTIVE order. Indication: Syncope- low cardiac risk (24 hours)  Code Status: Full Code      Clinically Significant Risk Factors Present on Admission               # Hypertension: Noted on problem list   # Dementia: noted on problem list               Disposition Plan  observation, likely to inpatient pending workup     Expected Discharge Date: 09/07/2023                  This patient will be formally staffed at AM rounds.      Serena Kwok MD  Hospitalist Service  [facility]  Securely message with [employer] (more info)  Text page via [facility] Paging/Directory   ______________________________________________________________________    Chief Complaint   Fall, generalized weakness    History is obtained from the patient and patient's son    History of Present Illness   Sharon De La Vega is a 77 year old female who has a history of aortic stenosis, PVCs, Alzheimer's dementia, depression, HTN, HLD, CKD, hypothyroidism, RA, and morbid obesity presenting after a fall with subsequent generalized weakness. Patient lives at home with her son and daughter-in-law and is able to mostly get around and take care of most  of her ADLs at baseline. Per son, he has been feeling unwell for the last 2-3 days, only recently tested positive today. However, the patient herself only started having a mild cough the night before admission. On morning of admission, patient reportedly had a worsening cough and shortness of breath. Patient walked over to the door of the bathroom when she suddenly felt weak and collapsed. Son was able to catch her before she hit the ground, no reported head trauma. Did have one episode of NBNB vomiting, at which time the son called EMS. Patient required assist of two people to get up into the EMS truck. Has been afebrile and without chest pain or respiratory symptoms so far, does not complain about chills, abdominal pain, or muscle aches. No dysuria or problems with stools. No palpitations, no chest heaviness that patient can remember, though she does have limited memory at baseline.    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   Diverticulosis K57.30   Psoriatic arthritis (HC) L40.50   Vitamin D deficiency E55.9   Gastric ulcer 2009 H pylori neg K25.9   Family history of coronary arteriosclerosis Z82.49   Family history of diabetes mellitus Z83.3   Hematuria 10-12 R31.9   Anemia, blood loss (post op 10-12) D50.0   ACP (advance care planning) Z71.89   Anxiety F41.9   Fatigue 2013 R53.83   Allergic rhinitis + conjunctivitis (spring 2014) J30.9   Chronic renal insufficiency, stage III (moderate) (HC) N18.30    Macrocytosis 2014 D75.89   Memory deficit 2013-- R41.3   Breast mass right 3-16 N63.0   DJD left knee (onset 8-17) M25.562   Dementia in Alzheimer's disease (HC) G30.9, F02.80   Obesity, morbid (HC) E66.01   Aortic valve stenosis I35.0     Past Surgical History   No past surgical history on file.    Prior to Admission Medications   Prior to Admission Medications   Prescriptions Last Dose Informant Patient Reported? Taking?   benazepril (LOTENSIN) 10 MG tablet 9/6/2023 at am Self Yes Yes   Sig: Take 10 mg by mouth daily   calcium carbonate-vitamin D 600-200 MG-UNIT TABS 9/6/2023 at am Self Yes Yes   Sig: Take 1 tablet by mouth 2 times daily.   citalopram (CELEXA) 20 MG tablet 9/6/2023 at am Self Yes Yes   Sig: Take 1 tablet by mouth daily at 2 pm   cyanocobalamin (VITAMIN B-12) 1000 MCG tablet 9/6/2023 at am Self Yes Yes   Sig: TAKE 1 TABLET (1,000 MCG) BY MOUTH ONCE DAILY.   donepezil (ARICEPT) 10 MG tablet 9/6/2023 at am Self No Yes   Sig: Take 1 tablet (10 mg) by mouth At Bedtime   folic acid (FOLVITE) 1 MG tablet 9/6/2023 at am Self Yes Yes   Sig: Take 1 mg by mouth daily.   leflunomide (ARAVA) 10 MG tablet 9/6/2023 at am Self Yes Yes   Sig: Take 1 tablet by mouth daily at 2 pm   levothyroxine (TIROSINT) 100 MCG capsule 9/6/2023 at am Self Yes Yes   Sig: Take 100 mcg by mouth daily   methotrexate 2.5 MG tablet Past Week at thurs Self Yes Yes   Sig: Take 25 mg by mouth once a week. On Thursday    nystatin (MYCOSTATIN) 724569 UNIT/GM external powder Past Week at prn Self Yes Yes   Sig: Apply topically as needed for dry skin   simvastatin (ZOCOR) 20 MG tablet 9/5/2023 at pm Self Yes Yes   Sig: TAKE 1 TABLET (20 MG) BY MOUTH ONCE DAILY WITH EVENING MEAL.      Facility-Administered Medications: None        Review of Systems    The 10 point Review of Systems is negative other than noted in the HPI or here.      Physical Exam   Vital Signs: Temp: 100  F (37.8  C) Temp src: Oral BP: 126/60 Pulse: 80   Resp: 18 SpO2: 92  % O2 Device: None (Room air)    Weight: 0 lbs 0 oz    General: Alert and oriented x 1, pleasantly demented, no acute distress. Mildly anxious.  Skin: clean, dry, and intact  Head: normocephalic, atraumatic  Eyes: PERRL, EOMI, no conjunctival injection or icterus  Ears: TM appear full, canals mildly erythematous without exudate, no discharge  Nose: midline, nares with erythematous nasal turbinates  Throat: moist mucous membranes, erythematous posterior columns. Mild anterior LAD nontender to palpation, no notable masses.  Neck: no masses or lymphadenopathy  Resp: clear to ausculation bilaterally, no rales or wheezes  Cardio: RRR, S1 and S2 present, systolic murmur present at right upper sternal border with radiation to carotids  Abdomen: soft, nontender, nondistended, bowel sounds present x 4, no masses, no hepatosplenomegaly  Extremities: no clubbing or cyanosis. Venous stasis edema present bilateral lower extremities, pulses 2+ bilaterally.  MSK: muscle strength 5/5 all extremities, sensation intact  Neuro: CN II-XII grossly intact, no notable deficits      Medical Decision Making   Please see A&P for additional details of medical decision making.      Data   ------------------------- PAST 24 HR DATA REVIEWED -----------------------------------------------    I have personally reviewed the following data over the past 24 hrs:    8.3  \   13.4   / 142 (L)     138 100 19.3 /  114 (H)   4.0 27 0.94 \     ALT: 10 AST: 25 AP: 80 TBILI: 0.7   ALB: 3.9 TOT PROTEIN: 7.0 LIPASE: N/A     Trop: 116 (HH) BNP: 2,868 (H)     Procal: 0.05 (H) CRP: N/A Lactic Acid: 1.9         Imaging results reviewed over the past 24 hrs:   Recent Results (from the past 24 hour(s))   Head CT w/o contrast    Narrative    EXAM: CT HEAD W/O CONTRAST  LOCATION: [facility]  DATE: 9/6/2023    INDICATION: Weaknes, confusion  COMPARISON: 12/12/2022  TECHNIQUE: Routine CT Head without IV contrast. Multiplanar reformats. Dose  reduction techniques were used.    FINDINGS:  INTRACRANIAL CONTENTS: No intracranial hemorrhage, extraaxial collection, or mass effect.  No CT evidence of acute infarct. 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 Mild presumed chronic small vessel ischemic changes. Mild generalized   volume loss. No hydrocephalus.     VISUALIZED ORBITS/SINUSES/MASTOIDS: No intraorbital abnormality. No paranasal sinus mucosal disease. No middle ear or mastoid effusion.    BONES/SOFT TISSUES: No scalp hematoma. No skull fracture.      Impression    IMPRESSION:  1.  No CT evidence for acute intracranial process.  2.  Brain atrophy and presumed chronic microvascular ischemic changes as above.   XR Chest 2 Views    Narrative    EXAM: XR CHEST 2 VIEWS  LOCATION: [facility]  DATE: 9/6/2023    INDICATION: Weakness and fever.  COMPARISON: None.      Impression    IMPRESSION:     No focal airspace disease. No pleural effusion or pneumothorax.    The cardiomediastinal silhouette is unremarkable. Aortic calcifications.    Multilevel degenerative changes of the spine.   CT Chest Pulmonary Embolism w Contrast    Narrative    EXAM: CT CHEST PULMONARY EMBOLISM W CONTRAST  LOCATION: [facility]  DATE: 9/6/2023    INDICATION: COVID, elevated troponin, hypoxia  COMPARISON: Chest x-ray earlier today.  TECHNIQUE: CT chest pulmonary angiogram during arterial phase injection of IV contrast. Multiplanar reformats and MIP reconstructions were performed. Dose reduction techniques were used.   CONTRAST: 75ml isovue 370    FINDINGS:  ANGIOGRAM CHEST: Excellent opacification of pulmonary arteries and branches. No evidence of pulmonary emboli. Aorta and branches are unremarkable.      LUNGS AND PLEURA: Minimal dependent density. No acute parenchymal disease.    MEDIASTINUM/AXILLAE: No adenopathy. There is a very small hiatal hernia.    CORONARY ARTERY CALCIFICATION: Cannot evaluate.    UPPER ABDOMEN:  Aorta and branches are heavily calcified. No aneurysm. Visualized vessels patent. Few colonic diverticuli.Postcholecystectomy distention common bile duct. No intraductal stones.    MUSCULOSKELETAL: No suspicious bone lesions. No fractures.      Impression    IMPRESSION:  1.  No abnormalities are noted to explain symptoms.  2.  Specifically, no evidence of pulmonary emboli.  3.  No evidence of pneumonia.  4.  There is a small hiatal hernia.  5.  Other noncritical findings as noted above.

## 2023-09-07 NOTE — SIGNIFICANT EVENT
RRT called for pt after she became unresponsive while getting up from commode. See provider note for details. Isaias, charge nurse and DIOGO Hartmann nurse responded. Provider was at bedside as well as primary RN.

## 2023-09-07 NOTE — PROGRESS NOTES
[facility]    Medicine Progress Note - Hospitalist Service    Date of Admission:  9/6/2023    Assessment & Plan      Sharon De La Vega is a 77 year old female admitted on 9/6/2023. She has a history of aortic stenosis, PVCs, Alzheimer's dementia, depression, HTN, HLD, CKD, hypothyroidism, RA, and morbid obesity and is admitted for workup of syncope and generalized weakness in setting of COVID-19 as well as elevated troponins.    Syncope  Generalized weakness  Confirmed COVID-19 infection    Patient admitted with weakness after a syncopal fall. Unclear cause of syncope, though patient noted to be around sick contacts (son) who has been sick for 2-3 days. Developed cough night before admission, aggravated on morning of fall. Found to be COVID-19 positive. Mildly elevated temperatures, WBC wnl. Procal 0.05, CMP otherwise unremarkable. Found to be COVID-19 positive. On room air, no respiratory distress. Start remdesivir for 3 days. Had another episode of syncope in ED boarding. Doing well now.   Symptom Onset 9/5/2023   Date of 1st Positive Test 9/6/2023   Vaccination Status Fully Vaccinated, l.ast vaccine 6/20/2023       - COVID-19 special precautions  - continuous pulse-ox, titrate to keep SpO2 between 90-96%  - COVID-Focused Medications: Remdesivir  x 3days  - PCD, hold off on Lovenox for now  - PT/OT  - AM BMP, CBC    NSTEMI  Hx aortic stenosis  Known history of aortic stenosis. Follows with [facility] in [place], Dr. Kev Gutiérrez. Last echo on 3/30/2023 showed normal EF 55-60% though technically difficult study noted. Found to have aortic stenosis, was scheduled for coronary calcification score and stress test but does not appear to have scheduled this. Upon admission, trops elevated to 55 -> 91 -> 116 with nt-pro BNP at 2868. EKG showed NSR x 2, though second EKG with questionable Afib (has baseline tremor that makes it hard to determine). Question elevation in  "setting of known COVID-19 and myocarditis. Cardiology consult in AM for further evaluation.  - cardiology consult, appreciate recs  - troponin trend  - echo pending  - tele  - AM BMP, CBC    Hyponatremia  Na of 132 this morning. Possible secondary to decreased PO intake from illness.  -BMP in am    Hypothyroidism  - PTA synthroid,  HTN  - lisinopril 10mg to replace PTA benazepril 10mg daily    Alzheimer's dementia  - PTA donepezil    Rheumatoid arthritis  - PTA Arava  - continue PTA weekly methotrexate     MDD/mood disorder  - PTA wellbutrin, celexa    HLD  - hold PTA simvastatin secondary to Paxlovid use       Diet: NPO for Medical/Clinical Reasons Except for: Meds    DVT Prophylaxis: Enoxaparin (Lovenox) SQ  Escobar Catheter: Not present  Lines: None     Cardiac Monitoring: ACTIVE order. Indication: Syncope- low cardiac risk (24 hours)  Code Status: Full Code      Clinically Significant Risk Factors Present on Admission                # Thrombocytopenia: Lowest platelets = 127 in last 2 days, will monitor for bleeding   # Hypertension: Noted on problem list   # Dementia: noted on problem list    # Obesity: Estimated body mass index is 39.22 kg/m  as calculated from the following:    Height as of this encounter: 1.626 m (5' 4.02\").    Weight as of this encounter: 103.7 kg (228 lb 9.9 oz).              Disposition Plan      Expected Discharge Date: 09/08/2023    Discharge Delays: PT Disposition recs needed  OT Disposition recs needed            The patient's care was discussed with the Attending Physician, Dr. Baires .    Radha Mancia MD  Hospitalist Service  [facility]  Securely message with [employer] (more info)  Text page via [facility] Paging/Directory   ______________________________________________________________________    Interval History   Patient was seen while trying to ambulate to the bed from bedpan. Ended up vasovagally once placed back on bed pan. She came to quickly and was feeling " well. Denies chest pain, shortness of breath, or headache.     Physical Exam   Vital Signs: Temp: 98.9  F (37.2  C) Temp src: Oral BP: 132/89 Pulse: 78   Resp: 18 SpO2: 97 % O2 Device: None (Room air)    Weight: 228 lbs 9.87 oz  GENERAL: Healthy, alert and no distress  RESP:   Lungs clear throughout. No wheeze or crackles.   CV: Heart RRR. No murmur  Abdomen: Soft, nontender, nondistended.  MSK: No gross deformity. Normal tone.  SKIN: Visible skin clear. No significant rash, abnormal pigmentation or lesions.  NEURO: Cranial nerves grossly intact.  Mentation and speech appropriate for age.  PSYCH: Mentation appears normal        Data     I have personally reviewed the following data over the past 24 hrs:    6.3  \   13.0   / 127 (L)     132 (L) 97 (L) 15.3 /  121 (H)   3.6 27 0.85; 0.85 \     ALT: 10 AST: 25 AP: 80 TBILI: 0.7   ALB: 3.9 TOT PROTEIN: 7.0 LIPASE: N/A     Trop: 131 (HH) BNP: 2,868 (H)     Procal: 0.05 (H) CRP: N/A Lactic Acid: 1.9         Imaging results reviewed over the past 24 hrs:   Recent Results (from the past 24 hour(s))   Head CT w/o contrast    Narrative    EXAM: CT HEAD W/O CONTRAST  LOCATION: [facility]  DATE: 9/6/2023    INDICATION: Weaknes, confusion  COMPARISON: 12/12/2022  TECHNIQUE: Routine CT Head without IV contrast. Multiplanar reformats. Dose reduction techniques were used.    FINDINGS:  INTRACRANIAL CONTENTS: No intracranial hemorrhage, extraaxial collection, or mass effect.  No CT evidence of acute infarct. 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 Mild presumed chronic small vessel ischemic changes. Mild generalized   volume loss. No hydrocephalus.     VISUALIZED ORBITS/SINUSES/MASTOIDS: No intraorbital abnormality. No paranasal sinus mucosal disease. No middle ear or mastoid effusion.    BONES/SOFT TISSUES: No scalp hematoma. No skull fracture.      Impression    IMPRESSION:  1.  No CT evidence for acute intracranial process.  2.  Brain  atrophy and presumed chronic microvascular ischemic changes as above.   XR Chest 2 Views    Narrative    EXAM: XR CHEST 2 VIEWS  LOCATION: [facility]  DATE: 9/6/2023    INDICATION: Weakness and fever.  COMPARISON: None.      Impression    IMPRESSION:     No focal airspace disease. No pleural effusion or pneumothorax.    The cardiomediastinal silhouette is unremarkable. Aortic calcifications.    Multilevel degenerative changes of the spine.   CT Chest Pulmonary Embolism w Contrast    Narrative    EXAM: CT CHEST PULMONARY EMBOLISM W CONTRAST  LOCATION: [facility]  DATE: 9/6/2023    INDICATION: COVID, elevated troponin, hypoxia  COMPARISON: Chest x-ray earlier today.  TECHNIQUE: CT chest pulmonary angiogram during arterial phase injection of IV contrast. Multiplanar reformats and MIP reconstructions were performed. Dose reduction techniques were used.   CONTRAST: 75ml isovue 370    FINDINGS:  ANGIOGRAM CHEST: Excellent opacification of pulmonary arteries and branches. No evidence of pulmonary emboli. Aorta and branches are unremarkable.      LUNGS AND PLEURA: Minimal dependent density. No acute parenchymal disease.    MEDIASTINUM/AXILLAE: No adenopathy. There is a very small hiatal hernia.    CORONARY ARTERY CALCIFICATION: Cannot evaluate.    UPPER ABDOMEN: Aorta and branches are heavily calcified. No aneurysm. Visualized vessels patent. Few colonic diverticuli.Postcholecystectomy distention common bile duct. No intraductal stones.    MUSCULOSKELETAL: No suspicious bone lesions. No fractures.      Impression    IMPRESSION:  1.  No abnormalities are noted to explain symptoms.  2.  Specifically, no evidence of pulmonary emboli.  3.  No evidence of pneumonia.  4.  There is a small hiatal hernia.  5.  Other noncritical findings as noted above.   Echocardiogram Complete   Result Value    LVEF  60-65%    Narrative    [id number]  [id number]  [id number]  [id number]^KAY^[place] [address]     Name: ROCCO DEGROOT  MRN: [id number]  : 1945  Study Date: 2023 10:59 AM  Age: 77 yrs  Gender: Female  Patient Location: [facility]  Reason For Study: Aortic Valve Disorder  Ordering Physician: SHERI VILLANUEVA  Performed By: WR     BSA: 2.1 m2  Height: 64 in  Weight: 228 lb  HR: 73  BP: 138/61 mmHg  ______________________________________________________________________________  Procedure  Complete Portable Echo Adult.  ______________________________________________________________________________  Interpretation Summary     Left ventricular size, wall motion and function are normal. The ejection  fraction is 60-65%.  Normal right ventricle size and systolic function.  Moderate to severe valvular aortic stenosis.  There is mild (1+) aortic regurgitation.  The left atrium is mildly dilated.  ______________________________________________________________________________  Left Ventricle  Left ventricular size, wall motion and function are normal. The ejection  fraction is 60-65%. There is normal left ventricular wall thickness. Left  ventricular diastolic function is abnormal. No regional wall motion  abnormalities noted.     Right Ventricle  Normal right ventricle size and systolic function.     Atria  The left atrium is mildly dilated. Right atrial size is normal. There is no  color Doppler evidence of an atrial shunt.     Mitral Valve  There is mild to moderate mitral annular calcification. There is mild (1+)  mitral regurgitation.     Tricuspid Valve  Right ventricle systolic pressure estimate normal.     Aortic Valve  Moderate aortic valve calcification is present. There is mild (1+) aortic  regurgitation. Moderate to severe valvular aortic stenosis.     Pulmonic Valve  The pulmonic valve is not well seen, but is grossly normal. This degree of  valvular regurgitation is within normal limits.     Vessels  The aorta root is normal. Normal size  ascending aorta. IVC diameter and  respiratory changes fall into an intermediate range suggesting an RA pressure  of 8 mmHg.     Pericardium  There is no pericardial effusion.     ______________________________________________________________________________  MMode/2D Measurements & Calculations  RVDd: 3.1 cm  IVSd: 0.83 cm  LVIDd: 5.0 cm  LVIDs: 3.5 cm  LVPWd: 0.90 cm  FS: 30.3 %     LV mass(C)d: 150.0 grams  LV mass(C)dI: 72.6 grams/m2  Ao root diam: 3.3 cm  LA dimension: 4.2 cm  asc Aorta Diam: 3.0 cm  LA/Ao: 1.3  LVOT diam: 2.2 cm  LVOT area: 3.7 cm2  Ao root diam Index (cm/m2): 1.6  asc Aorta Diam Index (cm/m2): 1.5  LA Volume (BP): 58.6 ml  LA Volume Index (BP): 28.3 ml/m2     LA Volume Indexed (AL/bp): 29.6 ml/m2  RWT: 0.36  TAPSE: 2.9 cm     Time Measurements  Aortic HR: 71.0 BPM  MM HR: 73.0 BPM     Doppler Measurements & Calculations  MV E max deonte: 96.6 cm/sec  MV A max deonte: 128.0 cm/sec  MV E/A: 0.75  MV dec slope: 345.0 cm/sec2  MV dec time: 0.28 sec     Ao V2 max: 348.3 cm/sec  Ao max P.0 mmHg  Ao V2 mean: 257.0 cm/sec  Ao mean P.0 mmHg  Ao V2 VTI: 91.7 cm  BETH(I,D): 0.99 cm2  BETH(V,D): 1.1 cm2  LV V1 max P.2 mmHg  LV V1 max: 102.0 cm/sec  LV V1 VTI: 24.8 cm  CO(LVOT): 6.4 l/min  CI(LVOT): 3.1 l/min/m2  SV(LVOT): 90.5 ml  SI(LVOT): 43.8 ml/m2  PA acc time: 0.13 sec  AV Deonte Ratio (DI): 0.29  BETH Index (cm2/m2): 0.48  E/E': 13.6  E/E' av.8  Lateral E/e': 13.5  Medial E/e': 14.0  Peak E' Deonte: 7.1 cm/sec  RV S Deonte: 15.7 cm/sec     ______________________________________________________________________________  Report approved by: Beba Dumont 2023 12:54 PM

## 2023-09-07 NOTE — ED NOTES
Spoke with son, he does not feel safe taking care of pt as she is very weak and may fall on him so he wants her to stay. Dr. Goodson informed.

## 2023-09-08 ENCOUNTER — APPOINTMENT (OUTPATIENT)
Dept: OCCUPATIONAL THERAPY | Facility: HOSPITAL | Age: 78
DRG: 177 | End: 2023-09-08
Payer: COMMERCIAL

## 2023-09-08 ENCOUNTER — APPOINTMENT (OUTPATIENT)
Dept: PHYSICAL THERAPY | Facility: HOSPITAL | Age: 78
DRG: 177 | End: 2023-09-08
Payer: COMMERCIAL

## 2023-09-08 LAB
ANION GAP SERPL CALCULATED.3IONS-SCNC: 11 MMOL/L (ref 7–15)
BUN SERPL-MCNC: 16.8 MG/DL (ref 8–23)
CALCIUM SERPL-MCNC: 8.7 MG/DL (ref 8.8–10.2)
CHLORIDE SERPL-SCNC: 100 MMOL/L (ref 98–107)
CREAT SERPL-MCNC: 1.01 MG/DL (ref 0.51–0.95)
DEPRECATED HCO3 PLAS-SCNC: 27 MMOL/L (ref 22–29)
EGFRCR SERPLBLD CKD-EPI 2021: 57 ML/MIN/1.73M2
ERYTHROCYTE [DISTWIDTH] IN BLOOD BY AUTOMATED COUNT: 14.7 % (ref 10–15)
GLUCOSE SERPL-MCNC: 85 MG/DL (ref 70–99)
HCT VFR BLD AUTO: 41.1 % (ref 35–47)
HGB BLD-MCNC: 13.1 G/DL (ref 11.7–15.7)
HOLD SPECIMEN: NORMAL
MCH RBC QN AUTO: 32 PG (ref 26.5–33)
MCHC RBC AUTO-ENTMCNC: 31.9 G/DL (ref 31.5–36.5)
MCV RBC AUTO: 100 FL (ref 78–100)
PLATELET # BLD AUTO: 138 10E3/UL (ref 150–450)
POTASSIUM SERPL-SCNC: 3.3 MMOL/L (ref 3.4–5.3)
POTASSIUM SERPL-SCNC: 3.9 MMOL/L (ref 3.4–5.3)
RBC # BLD AUTO: 4.1 10E6/UL (ref 3.8–5.2)
SODIUM SERPL-SCNC: 138 MMOL/L (ref 136–145)
WBC # BLD AUTO: 4.9 10E3/UL (ref 4–11)

## 2023-09-08 PROCEDURE — 36415 COLL VENOUS BLD VENIPUNCTURE: CPT | Performed by: STUDENT IN AN ORGANIZED HEALTH CARE EDUCATION/TRAINING PROGRAM

## 2023-09-08 PROCEDURE — 97162 PT EVAL MOD COMPLEX 30 MIN: CPT | Mod: GP

## 2023-09-08 PROCEDURE — 250N000013 HC RX MED GY IP 250 OP 250 PS 637

## 2023-09-08 PROCEDURE — 97535 SELF CARE MNGMENT TRAINING: CPT | Mod: GO

## 2023-09-08 PROCEDURE — 80048 BASIC METABOLIC PNL TOTAL CA: CPT

## 2023-09-08 PROCEDURE — 99232 SBSQ HOSP IP/OBS MODERATE 35: CPT | Mod: GC | Performed by: STUDENT IN AN ORGANIZED HEALTH CARE EDUCATION/TRAINING PROGRAM

## 2023-09-08 PROCEDURE — 97530 THERAPEUTIC ACTIVITIES: CPT | Mod: GP

## 2023-09-08 PROCEDURE — 84132 ASSAY OF SERUM POTASSIUM: CPT | Performed by: STUDENT IN AN ORGANIZED HEALTH CARE EDUCATION/TRAINING PROGRAM

## 2023-09-08 PROCEDURE — 250N000011 HC RX IP 250 OP 636

## 2023-09-08 PROCEDURE — 36415 COLL VENOUS BLD VENIPUNCTURE: CPT

## 2023-09-08 PROCEDURE — 258N000003 HC RX IP 258 OP 636

## 2023-09-08 PROCEDURE — 97166 OT EVAL MOD COMPLEX 45 MIN: CPT | Mod: GO

## 2023-09-08 PROCEDURE — 97110 THERAPEUTIC EXERCISES: CPT | Mod: GP

## 2023-09-08 PROCEDURE — 120N000001 HC R&B MED SURG/OB

## 2023-09-08 PROCEDURE — 250N000013 HC RX MED GY IP 250 OP 250 PS 637: Performed by: STUDENT IN AN ORGANIZED HEALTH CARE EDUCATION/TRAINING PROGRAM

## 2023-09-08 PROCEDURE — 85027 COMPLETE CBC AUTOMATED: CPT

## 2023-09-08 PROCEDURE — 250N000013 HC RX MED GY IP 250 OP 250 PS 637: Performed by: INTERNAL MEDICINE

## 2023-09-08 RX ORDER — POTASSIUM CHLORIDE 1500 MG/1
40 TABLET, EXTENDED RELEASE ORAL ONCE
Status: COMPLETED | OUTPATIENT
Start: 2023-09-08 | End: 2023-09-08

## 2023-09-08 RX ADMIN — ENOXAPARIN SODIUM 100 MG: 100 INJECTION SUBCUTANEOUS at 10:20

## 2023-09-08 RX ADMIN — DONEPEZIL HYDROCHLORIDE 10 MG: 5 TABLET, FILM COATED ORAL at 22:00

## 2023-09-08 RX ADMIN — LEFLUNOMIDE 10 MG: 10 TABLET ORAL at 10:20

## 2023-09-08 RX ADMIN — POTASSIUM CHLORIDE 40 MEQ: 1500 TABLET, EXTENDED RELEASE ORAL at 10:19

## 2023-09-08 RX ADMIN — REMDESIVIR 100 MG: 100 INJECTION, POWDER, LYOPHILIZED, FOR SOLUTION INTRAVENOUS at 16:59

## 2023-09-08 RX ADMIN — ACETAMINOPHEN 650 MG: 325 TABLET ORAL at 17:15

## 2023-09-08 RX ADMIN — LISINOPRIL 10 MG: 5 TABLET ORAL at 10:19

## 2023-09-08 RX ADMIN — ROSUVASTATIN CALCIUM 20 MG: 10 TABLET, FILM COATED ORAL at 10:19

## 2023-09-08 RX ADMIN — ENOXAPARIN SODIUM 100 MG: 100 INJECTION SUBCUTANEOUS at 22:01

## 2023-09-08 RX ADMIN — LEVOTHYROXINE SODIUM 100 MCG: 100 TABLET ORAL at 06:55

## 2023-09-08 RX ADMIN — HYDROCHLOROTHIAZIDE 25 MG: 25 TABLET ORAL at 10:19

## 2023-09-08 RX ADMIN — ASPIRIN 81 MG: 81 TABLET ORAL at 10:20

## 2023-09-08 RX ADMIN — CITALOPRAM HYDROBROMIDE 20 MG: 20 TABLET, FILM COATED ORAL at 10:20

## 2023-09-08 RX ADMIN — SPIRONOLACTONE 12.5 MG: 25 TABLET ORAL at 10:20

## 2023-09-08 RX ADMIN — SODIUM CHLORIDE 50 ML: 9 INJECTION, SOLUTION INTRAVENOUS at 17:06

## 2023-09-08 RX ADMIN — SIMVASTATIN 20 MG: 10 TABLET, FILM COATED ORAL at 22:00

## 2023-09-08 RX ADMIN — ACETAMINOPHEN 650 MG: 325 TABLET ORAL at 10:21

## 2023-09-08 ASSESSMENT — ACTIVITIES OF DAILY LIVING (ADL)
ADLS_ACUITY_SCORE: 37
ADLS_ACUITY_SCORE: 35
ADLS_ACUITY_SCORE: 37
ADLS_ACUITY_SCORE: 35
ADLS_ACUITY_SCORE: 37
ADLS_ACUITY_SCORE: 37

## 2023-09-08 NOTE — PROGRESS NOTES
[facility]    Medicine Progress Note - Hospitalist Service    Date of Admission:  9/6/2023    Assessment & Plan      Sharon De La Vega is a 77 year old female admitted on 9/6/2023. She has a history of aortic stenosis, PVCs, Alzheimer's dementia, depression, HTN, HLD, CKD, hypothyroidism, RA, and morbid obesity and is admitted for workup of syncope and generalized weakness in setting of COVID-19 and elevated troponin.    Syncope  Generalized weakness  Patient presented with progressive weakness and syncope on admission. Work-up notable for history of moderate-severe aortic stenosis, elevated troponin, elevated BNP, COVID positive. Syncope ultimately likely multifactorial: COVID infection and related deconditioning, cardiac related (known moderate to severe AORTIC STENOSIS). She did have two rapids called thus far during admission for syncope and fall. The first rapid on 9/7 appeared to be orthostatic vs. vasovagal vs. cardiac. Second fall seems to be related to her confusion and weakness. She does have an EKG w/ atrial flutter which could also explain presentation if she has paroxysmal arrhythmia.  - PT/OT  - fall risk precautions  - 1:1  - treat covid as below  - cards consult as below    Confirmed COVID-19 infection    Patient admitted with weakness after a syncopal fall. Patient noted to be around sick contacts (son) who has been sick for 2-3 days. Developed cough night before admission, aggravated on morning of fall. Found to be COVID-19 positive. Mildly elevated temperatures, WBC wnl. Procal 0.05, CMP otherwise unremarkable. Found to be COVID-19 positive. On room air, no respiratory distress. Start remdesivir for 3 days.   Symptom Onset 9/5/2023   Date of 1st Positive Test 9/6/2023   Vaccination Status Fully Vaccinated, l.ast vaccine 6/20/2023       - COVID-19 special precautions  - continuous pulse-ox, titrate to keep SpO2 between 90-96%  - COVID-Focused Medications: Remdesivir  x 3  days, 9/7-9/9  - 1mg/kg lovenox BID  - PT/OT  - AM BMP, CBC    NSTEMI  Hx aortic stenosis  Known history of aortic stenosis. Follows with [facility] in [place], Dr. Kev Gutiérrez. Last echo on 3/30/2023 showed normal EF 55-60% though technically difficult study noted. Found to have aortic stenosis, was scheduled for coronary calcification score and stress test but does not appear to have scheduled this. Upon admission, trops elevated to 55 -> 91 -> 116 with nt-pro BNP at 2868. EKG showed NSR x 2, though second EKG with questionable Afib (has baseline tremor that makes it hard to determine). ECHO this admission with normal EF and no change in aortic stenosis. Question elevation in setting of known COVID-19 and myocarditis. Cardiology consulted with differential of myocarditis, valve disease, possible CAD but less likely.  - cardiology consult   - start spironolactone/hydrochlorothiazide   - continue lisinopril    - add ASA   - restart PTA simvastatin  - telemetry  - daily BMP, CBC    Hyponatremia, resolved  Na of 132 9/8. Possible secondary to decreased PO intake from illness.  - BMP in am    Thrombocytopenia  Mild, 120-140's. Stable.   - CBC daily    Hypothyroidism  Recent TSH wnl.  - PTA synthroid    HTN  - lisinopril 10mg to replace PTA benazepril 10mg daily    Alzheimer's dementia  - PTA donepezil    Rheumatoid arthritis  - PTA Arava  - continue PTA weekly methotrexate     MDD/mood disorder  - PTA wellbutrin, celexa    HLD  - hold PTA simvastatin secondary to Paxlovid use       Diet: Combination Diet Regular Diet; Low Saturated Fat Na <2400mg Diet    DVT Prophylaxis: Enoxaparin (Lovenox) SQ  Escobar Catheter: Not present  Lines: None     Cardiac Monitoring: ACTIVE order. Indication: Syncope- low cardiac risk (24 hours)  Code Status: Full Code      Clinically Significant Risk Factors Present on Admission        # Hypokalemia: Lowest K = 3.3 mmol/L in last 2 days, will replace as needed        "    # Thrombocytopenia: Lowest platelets = 127 in last 2 days, will monitor for bleeding     # Hypertension: Noted on problem list   # Dementia: noted on problem list      # Obesity: Estimated body mass index is 39.22 kg/m  as calculated from the following:    Height as of this encounter: 1.626 m (5' 4.02\").    Weight as of this encounter: 103.7 kg (228 lb 9.9 oz).                Disposition Plan      Expected Discharge Date: 09/09/2023    Discharge Delays: PT Disposition recs needed  OT Disposition recs needed            The patient's care was discussed with the Attending Physician, Dr. Rice .    Ifrah Hernandez MD  Hospitalist Service  [facility]  Securely message with [employer] (more info)  Text page via [facility] Paging/Directory   ______________________________________________________________________    Interval History   Patient states she wants to leave to be home with family. Discussed the reasons she is here and she seemed to calm down after. Lying in bed comfortable. Asked questions after repeating answers a few times. Denies shortness of breath or chest pain.    Physical Exam   Vital Signs: Temp: 99.4  F (37.4  C) Temp src: Oral BP: 136/74 Pulse: 72   Resp: 18 SpO2: 95 % O2 Device: None (Room air)    Weight: 228 lbs 9.87 oz  GENERAL: alert and no distress  RESP:   Lungs clear throughout. No wheeze or crackles.   CV: Heart RRR. No murmur  Abdomen: Soft, nontender, nondistended.  MSK: No gross deformity. Normal tone.  SKIN: Visible skin clear. No significant rash, abnormal pigmentation or lesions.  NEURO: Cranial nerves grossly intact.  Mentation and speech appropriate for age. Does repeat questions after answering. Does not seem to understand why she is in the hospital.  PSYCH: Mentation appears normal        Data     I have personally reviewed the following data over the past 24 hrs:    4.9  \   13.1   / 138 (L)     138 100 16.8 /  85   3.3 (L) 27 1.01 (H) \       Imaging results " reviewed over the past 24 hrs:   Recent Results (from the past 24 hour(s))   Echocardiogram Complete   Result Value    LVEF  60-65%    Narrative    [id number]  [id number]  [id number]  [id number]^KAY^[place] [address]     Name: ROCCO DEGROOT  MRN: [id number]  : 1945  Study Date: 2023 10:59 AM  Age: 77 yrs  Gender: Female  Patient Location: [facility]  Reason For Study: Aortic Valve Disorder  Ordering Physician: SHERI VILLANUEVA  Performed By: WR     BSA: 2.1 m2  Height: 64 in  Weight: 228 lb  HR: 73  BP: 138/61 mmHg  ______________________________________________________________________________  Procedure  Complete Portable Echo Adult.  ______________________________________________________________________________  Interpretation Summary     Left ventricular size, wall motion and function are normal. The ejection  fraction is 60-65%.  Normal right ventricle size and systolic function.  Moderate to severe valvular aortic stenosis.  There is mild (1+) aortic regurgitation.  The left atrium is mildly dilated.  ______________________________________________________________________________  Left Ventricle  Left ventricular size, wall motion and function are normal. The ejection  fraction is 60-65%. There is normal left ventricular wall thickness. Left  ventricular diastolic function is abnormal. No regional wall motion  abnormalities noted.     Right Ventricle  Normal right ventricle size and systolic function.     Atria  The left atrium is mildly dilated. Right atrial size is normal. There is no  color Doppler evidence of an atrial shunt.     Mitral Valve  There is mild to moderate mitral annular calcification. There is mild (1+)  mitral regurgitation.     Tricuspid Valve  Right ventricle systolic pressure estimate normal.     Aortic Valve  Moderate aortic valve calcification is present. There is mild (1+) aortic  regurgitation. Moderate to severe valvular aortic  stenosis.     Pulmonic Valve  The pulmonic valve is not well seen, but is grossly normal. This degree of  valvular regurgitation is within normal limits.     Vessels  The aorta root is normal. Normal size ascending aorta. IVC diameter and  respiratory changes fall into an intermediate range suggesting an RA pressure  of 8 mmHg.     Pericardium  There is no pericardial effusion.     ______________________________________________________________________________  MMode/2D Measurements & Calculations  RVDd: 3.1 cm  IVSd: 0.83 cm  LVIDd: 5.0 cm  LVIDs: 3.5 cm  LVPWd: 0.90 cm  FS: 30.3 %     LV mass(C)d: 150.0 grams  LV mass(C)dI: 72.6 grams/m2  Ao root diam: 3.3 cm  LA dimension: 4.2 cm  asc Aorta Diam: 3.0 cm  LA/Ao: 1.3  LVOT diam: 2.2 cm  LVOT area: 3.7 cm2  Ao root diam Index (cm/m2): 1.6  asc Aorta Diam Index (cm/m2): 1.5  LA Volume (BP): 58.6 ml  LA Volume Index (BP): 28.3 ml/m2     LA Volume Indexed (AL/bp): 29.6 ml/m2  RWT: 0.36  TAPSE: 2.9 cm     Time Measurements  Aortic HR: 71.0 BPM  MM HR: 73.0 BPM     Doppler Measurements & Calculations  MV E max deonte: 96.6 cm/sec  MV A max deonte: 128.0 cm/sec  MV E/A: 0.75  MV dec slope: 345.0 cm/sec2  MV dec time: 0.28 sec     Ao V2 max: 348.3 cm/sec  Ao max P.0 mmHg  Ao V2 mean: 257.0 cm/sec  Ao mean P.0 mmHg  Ao V2 VTI: 91.7 cm  BETH(I,D): 0.99 cm2  BETH(V,D): 1.1 cm2  LV V1 max P.2 mmHg  LV V1 max: 102.0 cm/sec  LV V1 VTI: 24.8 cm  CO(LVOT): 6.4 l/min  CI(LVOT): 3.1 l/min/m2  SV(LVOT): 90.5 ml  SI(LVOT): 43.8 ml/m2  PA acc time: 0.13 sec  AV Deonte Ratio (DI): 0.29  BETH Index (cm2/m2): 0.48  E/E': 13.6  E/E' av.8  Lateral E/e': 13.5  Medial E/e': 14.0  Peak E' Deonte: 7.1 cm/sec  RV S Deonte: 15.7 cm/sec     ______________________________________________________________________________  Report approved by: Beba Dumont 2023 12:54 PM         CT Head w/o Contrast    Narrative    EXAM: CT HEAD W/O CONTRAST  LOCATION: [facility]  DATE: 9/7/2023    INDICATION: Head injury, unwitnessed fall, hx of dementia recently started on therapeutic lovenox  COMPARISON: 09/06/2023  TECHNIQUE: Routine CT Head without IV contrast. Multiplanar reformats. Dose reduction techniques were used.    FINDINGS:  INTRACRANIAL CONTENTS: No intracranial hemorrhage, extraaxial collection, or mass effect.  No CT evidence of acute infarct. Moderate presumed chronic small vessel ischemic changes. Moderate generalized volume loss. No hydrocephalus. Atrophy is slightly   more prominent in the region of right hippocampus. This is a nonspecific finding; similar finding could be observed in dementia of Alzheimer's type.     VISUALIZED ORBITS/SINUSES/MASTOIDS: No intraorbital abnormality. No paranasal sinus mucosal disease. No middle ear or mastoid effusion.    BONES/SOFT TISSUES: No acute abnormality.      Impression    IMPRESSION:  1.  No acute intracranial process.

## 2023-09-08 NOTE — PROGRESS NOTES
09/08/23 1015   Appointment Info   Signing Clinician's Name / Credentials (PT) Katie Baires, PT   Living Environment   People in Home child(ashly), adult  (Son and daughter in law)   Current Living Arrangements house  (2 level home and the pt lives on the upper level and her son and daughter in law lives in the lower level.)   Home Accessibility stairs to enter home   Number of Stairs, Main Entrance   (Some steps per the pt but she is not sure how many.)   Stair Railings, Main Entrance railings safe and in good condition   Transportation Anticipated family or friend will provide   Self-Care   Current Activity Tolerance fair   Equipment Currently Used at Home walker, rolling;cane, straight   Fall history within last six months yes   Activity/Exercise/Self-Care Comment Pt is indep with mobility and uses a cane when reminded to use it.  Pt is indep with transfers and bed mobility..  Pt said she is indep with dressing and does get assist with bathing.   General Information   Onset of Illness/Injury or Date of Surgery 09/06/23   Referring Physician Gavino   Patient/Family Therapy Goals Statement (PT) none stated.   Pertinent History of Current Problem (include personal factors and/or comorbidities that impact the POC) Per the chart, Patient admitted with weakness after a syncopal fall. Unclear cause of syncope, though patient noted to be around sick contacts (son) who has been sick for 2-3 days. Developed cough night before admission, aggravated on morning of fall. Found to be COVID-19 positive. Mildly elevated temperatures, WBC wnl. Procal 0.05, CMP otherwise unremarkable. Found to be COVID-19 positive. On room air, no respiratory distress.   Existing Precautions/Restrictions   (COVID and the pt is a1:1)   Weight-Bearing Status - LLE weight-bearing as tolerated   Weight-Bearing Status - RLE weight-bearing as tolerated   Cognition   Orientation Status (Cognition) person   Follows Commands (Cognition) follows two-step  commands;75-90% accuracy   Pain Assessment   Patient Currently in Pain No   Range of Motion (ROM)   Range of Motion ROM is WNL   ROM Comment Bilat LEs   Strength (Manual Muscle Testing)   Strength (Manual Muscle Testing) strength is WNL;No deficits observed during functional mobility   Strength Comments bilat LEs WFL   Bed Mobility   Comment, (Bed Mobility) Supine>sit with min A x 1   Transfers   Comment, (Transfers) Sit<>stand x 2 reps with FWW   Gait/Stairs (Locomotion)   [place]ovell Level (Gait) minimum assist (75% patient effort)   Assistive Device (Gait) walker, front-wheeled   Distance in Feet 3' x 4'   Pattern (Gait) swing-through   Deviations/Abnormal Patterns (Gait) joann decreased   Balance   Balance Comments Min A x 1 with FWW   Sensory Examination   Sensory Perception WNL   Sensory Perception Comments Bilat LEs   Clinical Impression   Criteria for Skilled Therapeutic Intervention Yes, treatment indicated   PT Diagnosis (PT) impaired functioal mobility.   Influenced by the following impairments dec bal, weakness, dec strength, Pt is not at her PLOF.   Functional limitations due to impairments bed mobility, transfers, gait   Clinical Presentation (PT Evaluation Complexity) Stable/Uncomplicated   Clinical Presentation Rationale Pt presents medically diagnosed.   Clinical Decision Making (Complexity) moderate complexity   Planned Therapy Interventions (PT) balance training;gait training;home exercise program;stair training;strengthening;transfer training   Anticipated Equipment Needs at Discharge (PT) walker, rolling  (FWW)   Risk & Benefits of therapy have been explained evaluation/treatment results reviewed;care plan/treatment goals reviewed;risks/benefits reviewed;patient;participants voiced agreement with care plan   PT Total Evaluation Time   PT Eval, Moderate Complexity Minutes (95534) 15   Physical Therapy Goals   PT Frequency Daily   PT Predicted Duration/Target Date for Goal Attainment 09/15/23    PT Goals Bed Mobility;Transfers;Gait;Stairs;PT Goal 1   PT: Bed Mobility Supervision/stand-by assist;Supine to/from sit;Rolling   PT: Transfers Supervision/stand-by assist;Sit to/from stand;Bed to/from chair;Assistive device   PT: Gait Minimal assist;Rolling walker;100 feet   PT: Stairs 3 stairs;Rail on both sides;Minimal assist   PT: Goal 1 Pt to lincoln bilat LE ex x 20 reps to increase strength for mobility.   Interventions   Interventions Quick Adds Gait Training;Therapeutic Activity;Therapeutic Procedure   Therapeutic Procedure/Exercise   Ther. Procedure: strength, endurance, ROM, flexibillity Minutes (17104) 8   Treatment Detail/Skilled Intervention Seated bilat LE ex x 10 reps with cues for technique. and better quality.   Therapeutic Activity   Therapeutic Activities: dynamic activities to improve functional performance Minutes (74173) 15   Treatment Detail/Skilled Intervention Supine>sit with mod A x1 with cues for technique.  Pt did use the rail. Sit<>stand with FWW with min A x 1.  Bed >commode>chair>commode>chair with min A x 1 and assist with cares.   PT Discharge Planning   PT Plan Bed mobility, transfers with fWW, gait  and LE ex. steps when able.   PT Discharge Recommendation (DC Rec) Transitional Care Facility   PT Rationale for DC Rec Pt does need assist with transfers and bed mobility. TCU is recommended.  Pt used the FWW with mobility and she did not lincoln steps yet.   PT Brief overview of current status PT eval, bed mobility with min A x 1 and commode rransfers x 2 reps and LE ex.   Total Session Time   Timed Code Treatment Minutes 23   Total Session Time (sum of timed and untimed services) 38

## 2023-09-08 NOTE — PROGRESS NOTES
Care Management Follow Up    Length of Stay (days): 1    Expected Discharge Date: 09/09/2023     Concerns to be Addressed:     discharge planning  Patient plan of care discussed at interdisciplinary rounds: Yes    Anticipated Discharge Disposition:  TCU vs home with home care   Anticipated Discharge Services:  n/a  Anticipated Discharge DME:  n/a    Patient/family educated on Medicare website which has current facility and service quality ratings:  yes  Education Provided on the Discharge Plan: yes    Patient/Family in Agreement with the Plan:  yes    Referrals Placed by CM/SW:  yes - TCU   Private pay costs discussed: Not applicable    Additional Information:  SW attempted to call into room due to being COVID+; no answer. SW placed call and spoke with son who pt lives with to inform of TCU recommendations. Son agreeable and stated that pt ahs been to one in the past but knows that pt would prefer to return home with home care. Son agreeable to referrals being sent and prefers [facility]; referrals sent and pending at this time. Care management following for safe discharge plan. Transport tbd.  2:25 PM    EDDIE Richardson  9/8/2023

## 2023-09-08 NOTE — PROGRESS NOTES
09/08/23 1440   Appointment Info   Signing Clinician's Name / Credentials (OT) Krysten Remy/L   Living Environment   People in Home child(ashly), adult   Current Living Arrangements house   Home Accessibility stairs to enter home   Transportation Anticipated family or friend will provide   Living Environment Comments Pt is limited historian   Self-Care   Current Activity Tolerance fair   Equipment Currently Used at Home cane, straight;walker, rolling;grab bar, tub/shower   Fall history within last six months yes   Activity/Exercise/Self-Care Comment Pt is independent with mobility, dressing, toileting, and bathing   General Information   Onset of Illness/Injury or Date of Surgery 09/06/23   Referring Physician Gavino   Patient/Family Therapy Goal Statement (OT) home with son   Additional Occupational Profile Info/Pertinent History of Current Problem Pt admitted on 9/6/2023 with confusion, tested positive for COVID-19.  Pt has history of dementia and RA.   Existing Precautions/Restrictions fall;other (see comments)  (Special)   Cognitive Status Examination   Orientation Status place;person   Cognitive Status Comments Pt does not know year.  Pt is limited histoian.  Pt repeats things over and over during session.  Pt is able to follow most simple directions.   Visual Perception   Visual Impairment/Limitations corrective lenses for reading   Sensory   Sensory Quick Adds sensation intact   Range of Motion Comprehensive   General Range of Motion no range of motion deficits identified   Strength Comprehensive (MMT)   Comment, General Manual Muscle Testing (MMT) Assessment Generalized weakness B UE's   Bed Mobility   Comment (Bed Mobility) NT   Transfers   Transfer Comments Min A   Balance   Balance Comments CGA/Min A   Activities of Daily Living   BADL Assessment/Intervention   (Max A with emil care)   Clinical Impression   Criteria for Skilled Therapeutic Interventions Met (OT) Yes, treatment indicated    OT Diagnosis Impaired ADL independence   OT Problem List-Impairments impacting ADL activity tolerance impaired;balance;cognition;strength;mobility   Assessment of Occupational Performance 3-5 Performance Deficits   Planned Therapy Interventions (OT) ADL retraining;balance training;bed mobility training;cognition;strengthening;transfer training   Clinical Decision Making Complexity (OT) moderate complexity   Risk & Benefits of therapy have been explained evaluation/treatment results reviewed;participants included;patient   Clinical Impression Comments Pt seen bedside for OT eval and treatment.  Pt demonstrates decreased independence with mobility and ADLs as well as impaired cognition.  OT to continue to address.  Recommend TCU at discharge as pt is below baseline.   OT Total Evaluation Time   OT Eval, Moderate Complexity Minutes (26116) 10   OT Goals   Therapy Frequency (OT) 5 times/wk   OT Predicted Duration/Target Date for Goal Attainment 09/14/23   OT Goals Lower Body Dressing;Hygiene/Grooming;Transfers;Toilet Transfer/Toileting   OT: Hygiene/Grooming supervision/stand-by assist   OT: Lower Body Dressing Supervision/stand-by assist   OT: Transfer Modified independent   OT: Toilet Transfer/Toileting Modified independent   OT Discharge Planning   OT Plan close transfers, dressing, toileting   OT Discharge Recommendation (DC Rec) Transitional Care Facility   OT Rationale for DC Rec Recommend TCU as pt is below baseline for ADLs and mobility   OT Brief overview of current status CGA/Min A with STS, Max A with toileting.

## 2023-09-08 NOTE — PLAN OF CARE
Problem: Plan of Care - These are the overarching goals to be used throughout the patient stay.    Goal: Readiness for Transition of Care  Outcome: Progressing  Intervention: Mutually Develop Transition Plan  Recent Flowsheet Documentation  Taken 9/7/2023 2059 by Demarco Rg RN  Equipment Currently Used at Home:   cane, straight   walker, jeanie   Pt comfortable, no pain reported.   Confused to time, place, situation.   Fine crackles in bilateral lower lungs .  Bedrest for syncope precautions.   Confused and trying to climb out of bed. 1:1 in place.

## 2023-09-09 DIAGNOSIS — R06.09 DYSPNEA ON EXERTION: Primary | ICD-10-CM

## 2023-09-09 LAB
ANION GAP SERPL CALCULATED.3IONS-SCNC: 9 MMOL/L (ref 7–15)
BUN SERPL-MCNC: 38.3 MG/DL (ref 8–23)
CALCIUM SERPL-MCNC: 8 MG/DL (ref 8.8–10.2)
CHLORIDE SERPL-SCNC: 102 MMOL/L (ref 98–107)
CREAT SERPL-MCNC: 1.59 MG/DL (ref 0.51–0.95)
DEPRECATED HCO3 PLAS-SCNC: 28 MMOL/L (ref 22–29)
EGFRCR SERPLBLD CKD-EPI 2021: 33 ML/MIN/1.73M2
ERYTHROCYTE [DISTWIDTH] IN BLOOD BY AUTOMATED COUNT: 15.2 % (ref 10–15)
GLUCOSE SERPL-MCNC: 89 MG/DL (ref 70–99)
HCT VFR BLD AUTO: 40.4 % (ref 35–47)
HGB BLD-MCNC: 12.9 G/DL (ref 11.7–15.7)
MCH RBC QN AUTO: 32 PG (ref 26.5–33)
MCHC RBC AUTO-ENTMCNC: 31.9 G/DL (ref 31.5–36.5)
MCV RBC AUTO: 100 FL (ref 78–100)
PLATELET # BLD AUTO: 158 10E3/UL (ref 150–450)
POTASSIUM SERPL-SCNC: 3.6 MMOL/L (ref 3.4–5.3)
RBC # BLD AUTO: 4.03 10E6/UL (ref 3.8–5.2)
SODIUM SERPL-SCNC: 139 MMOL/L (ref 136–145)
WBC # BLD AUTO: 6 10E3/UL (ref 4–11)

## 2023-09-09 PROCEDURE — 99232 SBSQ HOSP IP/OBS MODERATE 35: CPT | Mod: GC | Performed by: STUDENT IN AN ORGANIZED HEALTH CARE EDUCATION/TRAINING PROGRAM

## 2023-09-09 PROCEDURE — 99232 SBSQ HOSP IP/OBS MODERATE 35: CPT | Mod: 25 | Performed by: INTERNAL MEDICINE

## 2023-09-09 PROCEDURE — 85027 COMPLETE CBC AUTOMATED: CPT

## 2023-09-09 PROCEDURE — 250N000013 HC RX MED GY IP 250 OP 250 PS 637

## 2023-09-09 PROCEDURE — 120N000001 HC R&B MED SURG/OB

## 2023-09-09 PROCEDURE — 258N000003 HC RX IP 258 OP 636: Performed by: STUDENT IN AN ORGANIZED HEALTH CARE EDUCATION/TRAINING PROGRAM

## 2023-09-09 PROCEDURE — 250N000013 HC RX MED GY IP 250 OP 250 PS 637: Performed by: INTERNAL MEDICINE

## 2023-09-09 PROCEDURE — 36415 COLL VENOUS BLD VENIPUNCTURE: CPT

## 2023-09-09 PROCEDURE — 82310 ASSAY OF CALCIUM: CPT

## 2023-09-09 PROCEDURE — 250N000011 HC RX IP 250 OP 636

## 2023-09-09 RX ORDER — ENOXAPARIN SODIUM 100 MG/ML
40 INJECTION SUBCUTANEOUS DAILY
Status: DISCONTINUED | OUTPATIENT
Start: 2023-09-10 | End: 2023-09-14 | Stop reason: HOSPADM

## 2023-09-09 RX ADMIN — SODIUM CHLORIDE 500 ML: 9 INJECTION, SOLUTION INTRAVENOUS at 14:36

## 2023-09-09 RX ADMIN — ASPIRIN 81 MG: 81 TABLET ORAL at 09:36

## 2023-09-09 RX ADMIN — CITALOPRAM HYDROBROMIDE 20 MG: 20 TABLET, FILM COATED ORAL at 09:35

## 2023-09-09 RX ADMIN — ENOXAPARIN SODIUM 100 MG: 100 INJECTION SUBCUTANEOUS at 09:36

## 2023-09-09 RX ADMIN — DONEPEZIL HYDROCHLORIDE 10 MG: 5 TABLET, FILM COATED ORAL at 21:23

## 2023-09-09 RX ADMIN — LEFLUNOMIDE 10 MG: 10 TABLET ORAL at 09:36

## 2023-09-09 RX ADMIN — HYDROCHLOROTHIAZIDE 25 MG: 25 TABLET ORAL at 09:36

## 2023-09-09 RX ADMIN — SPIRONOLACTONE 12.5 MG: 25 TABLET ORAL at 09:36

## 2023-09-09 RX ADMIN — LEVOTHYROXINE SODIUM 100 MCG: 100 TABLET ORAL at 06:09

## 2023-09-09 RX ADMIN — LISINOPRIL 10 MG: 5 TABLET ORAL at 09:36

## 2023-09-09 RX ADMIN — SIMVASTATIN 20 MG: 10 TABLET, FILM COATED ORAL at 21:23

## 2023-09-09 ASSESSMENT — ACTIVITIES OF DAILY LIVING (ADL)
ADLS_ACUITY_SCORE: 35
ADLS_ACUITY_SCORE: 35
ADLS_ACUITY_SCORE: 36
ADLS_ACUITY_SCORE: 35
ADLS_ACUITY_SCORE: 35
ADLS_ACUITY_SCORE: 36
ADLS_ACUITY_SCORE: 35
ADLS_ACUITY_SCORE: 36
ADLS_ACUITY_SCORE: 36
ADLS_ACUITY_SCORE: 35
ADLS_ACUITY_SCORE: 36
ADLS_ACUITY_SCORE: 35

## 2023-09-09 NOTE — PLAN OF CARE
Goal Outcome Evaluation:      Pt is A&Ox3, more forgetful today, answers questions appropriately. Pt is assistx1 with walker for ambulation, up in chair most of the day. Pt denied pain, SOB and nausea. Encouraged pt to drink fluids today. Used commode x2, no urine output but small, loose, brown stool. Pt only drank 120ml OJ today, bladder scanned for 588. Escobar placed at 230pm with clear straw urine out put of 600ml. Fluid bolus per MD. VSS, tele: NSR  Problem: Plan of Care - These are the overarching goals to be used throughout the patient stay.    Goal: Optimal Comfort and Wellbeing  Outcome: Progressing     Problem: Risk for Delirium  Goal: Improved Attention and Thought Clarity  Outcome: Progressing     Problem: Plan of Care - These are the overarching goals to be used throughout the patient stay.    Goal: Absence of Hospital-Acquired Illness or Injury  Intervention: Identify and Manage Fall Risk  Recent Flowsheet Documentation  Taken 9/9/2023 7305 by Felicita Trinidad, RN  Safety Promotion/Fall Prevention:   activity supervised   assistive device/personal items within reach   nonskid shoes/slippers when out of bed   patient and family education   room near nurse's station   supervised activity

## 2023-09-09 NOTE — PLAN OF CARE
"  Problem: Plan of Care - These are the overarching goals to be used throughout the patient stay.    Goal: Plan of Care Review  Description: The Plan of Care Review/Shift note should be completed every shift.  The Outcome Evaluation is a brief statement about your assessment that the patient is improving, declining, or no change.  This information will be displayed automatically on your shift note.  Outcome: Progressing  Goal: Patient-Specific Goal (Individualized)  Description: You can add care plan individualizations to a care plan. Examples of Individualization might be:  \"Parent requests to be called daily at 9am for status\", \"I have a hard time hearing out of my right ear\", or \"Do not touch me to wake me up as it startles me\".  Outcome: Progressing  Goal: Absence of Hospital-Acquired Illness or Injury  Outcome: Progressing  Goal: Optimal Comfort and Wellbeing  Outcome: Progressing  Intervention: Monitor Pain and Promote Comfort  Recent Flowsheet Documentation  Taken 9/8/2023 1646 by Sabrina Simon, RN  Pain Management Interventions:   medication (see MAR)   ambulation/increased activity   breathing exercises   distraction   emotional support   repositioned   relaxation techniques promoted  Goal: Readiness for Transition of Care  Outcome: Progressing   Goal Outcome Evaluation:       Pt is alert oriented with some confusion, still on isolation precaution for Covid-19, on room air at 95%, no SOB noted, coughing minimally. Pt had 75% of dinner,  was up in chair for a while and later help to bed. K 3.9, AM recheck.                 "

## 2023-09-09 NOTE — PLAN OF CARE
Problem: Plan of Care - These are the overarching goals to be used throughout the patient stay.    Goal: Optimal Comfort and Wellbeing  Outcome: Progressing     Problem: Plan of Care - These are the overarching goals to be used throughout the patient stay.    Goal: Optimal Comfort and Wellbeing  Outcome: Progressing   Goal Outcome Evaluation:       Patient is alert/oriented. Mentation varies. Denied any shortness of breath or difficulty breathing. Slept well between cares.Makes her needs known. Call light within reach.

## 2023-09-09 NOTE — PROGRESS NOTES
"Covid relate myocariditis - recovering well, can lower LMWH to prophylactic doses for DVT, advise stress nuclear imaging when covid clears   Aortic stenosis - mod to severe, suggest follow up echo in 6 months    Will sign off - I scheduled follow up stress nuclear and clinic visit in Cardiology    Current Facility-Administered Medications   Medication    remdesivir 100 mg in sodium chloride 0.9 % 250 mL intermittent infusion    And    0.9% sodium chloride BOLUS    acetaminophen (TYLENOL) tablet 650 mg    Or    acetaminophen (TYLENOL) Suppository 650 mg    aspirin EC tablet 81 mg    citalopram (celeXA) tablet 20 mg    donepezil (ARICEPT) tablet 10 mg    enoxaparin ANTICOAGULANT (LOVENOX) injection 100 mg    hydrochlorothiazide (HYDRODIURIL) tablet 25 mg    leflunomide (ARAVA) tablet 10 mg    levothyroxine (SYNTHROID/LEVOTHROID) tablet 100 mcg    lisinopril (ZESTRIL) tablet 10 mg    melatonin tablet 1 mg    methotrexate tablet 10 mg    ondansetron (ZOFRAN ODT) ODT tab 4 mg    Or    ondansetron (ZOFRAN) injection 4 mg    prochlorperazine (COMPAZINE) injection 5 mg    Or    prochlorperazine (COMPAZINE) tablet 5 mg    Or    prochlorperazine (COMPAZINE) suppository 12.5 mg    senna-docusate (SENOKOT-S/PERICOLACE) 8.6-50 MG per tablet 1 tablet    Or    senna-docusate (SENOKOT-S/PERICOLACE) 8.6-50 MG per tablet 2 tablet    simvastatin (ZOCOR) tablet 20 mg    spironolactone (ALDACTONE) half-tab 12.5 mg     No past medical history on file.     Review of Systems: 12 points negative other than above    /53 (BP Location: Right arm)   Pulse 76   Temp 98  F (36.7  C) (Oral)   Resp 18   Ht 1.626 m (5' 4.02\")   Wt 103.7 kg (228 lb 9.9 oz)   SpO2 95%   BMI 39.22 kg/m    JVP<7cm, carotids normal  Lungs clear  Cor RRR no c,r,m  Abs soft +BS, no mass  Ext no c,c,e    Lab Results   Component Value Date    HGB 12.9 09/09/2023     Lab Results   Component Value Date     09/09/2023     No results found for: CREATININE  No " components found for: NIGHAT Walker MD  Interventional Cardiology   [facility]  [phone]

## 2023-09-10 ENCOUNTER — APPOINTMENT (OUTPATIENT)
Dept: PHYSICAL THERAPY | Facility: HOSPITAL | Age: 78
DRG: 177 | End: 2023-09-10
Payer: COMMERCIAL

## 2023-09-10 LAB
ANION GAP SERPL CALCULATED.3IONS-SCNC: 10 MMOL/L (ref 7–15)
ATRIAL RATE - MUSE: 87 BPM
BUN SERPL-MCNC: 44.7 MG/DL (ref 8–23)
CALCIUM SERPL-MCNC: 8.2 MG/DL (ref 8.8–10.2)
CHLORIDE SERPL-SCNC: 104 MMOL/L (ref 98–107)
CREAT SERPL-MCNC: 1.36 MG/DL (ref 0.51–0.95)
DEPRECATED HCO3 PLAS-SCNC: 27 MMOL/L (ref 22–29)
DIASTOLIC BLOOD PRESSURE - MUSE: 72 MMHG
EGFRCR SERPLBLD CKD-EPI 2021: 40 ML/MIN/1.73M2
ERYTHROCYTE [DISTWIDTH] IN BLOOD BY AUTOMATED COUNT: 15.1 % (ref 10–15)
GLUCOSE SERPL-MCNC: 92 MG/DL (ref 70–99)
HCT VFR BLD AUTO: 38.2 % (ref 35–47)
HGB BLD-MCNC: 12.2 G/DL (ref 11.7–15.7)
INTERPRETATION ECG - MUSE: NORMAL
MCH RBC QN AUTO: 32 PG (ref 26.5–33)
MCHC RBC AUTO-ENTMCNC: 31.9 G/DL (ref 31.5–36.5)
MCV RBC AUTO: 100 FL (ref 78–100)
P AXIS - MUSE: 71 DEGREES
PLATELET # BLD AUTO: 143 10E3/UL (ref 150–450)
POTASSIUM SERPL-SCNC: 3.3 MMOL/L (ref 3.4–5.3)
POTASSIUM SERPL-SCNC: 4.4 MMOL/L (ref 3.4–5.3)
PR INTERVAL - MUSE: 156 MS
QRS DURATION - MUSE: 72 MS
QT - MUSE: 348 MS
QTC - MUSE: 418 MS
R AXIS - MUSE: 25 DEGREES
RBC # BLD AUTO: 3.81 10E6/UL (ref 3.8–5.2)
SODIUM SERPL-SCNC: 141 MMOL/L (ref 136–145)
SYSTOLIC BLOOD PRESSURE - MUSE: 167 MMHG
T AXIS - MUSE: 66 DEGREES
VENTRICULAR RATE- MUSE: 87 BPM
WBC # BLD AUTO: 5 10E3/UL (ref 4–11)

## 2023-09-10 PROCEDURE — 258N000003 HC RX IP 258 OP 636: Performed by: STUDENT IN AN ORGANIZED HEALTH CARE EDUCATION/TRAINING PROGRAM

## 2023-09-10 PROCEDURE — 82310 ASSAY OF CALCIUM: CPT | Performed by: STUDENT IN AN ORGANIZED HEALTH CARE EDUCATION/TRAINING PROGRAM

## 2023-09-10 PROCEDURE — 36415 COLL VENOUS BLD VENIPUNCTURE: CPT | Performed by: STUDENT IN AN ORGANIZED HEALTH CARE EDUCATION/TRAINING PROGRAM

## 2023-09-10 PROCEDURE — 250N000013 HC RX MED GY IP 250 OP 250 PS 637: Performed by: STUDENT IN AN ORGANIZED HEALTH CARE EDUCATION/TRAINING PROGRAM

## 2023-09-10 PROCEDURE — 97110 THERAPEUTIC EXERCISES: CPT | Mod: GP

## 2023-09-10 PROCEDURE — 250N000013 HC RX MED GY IP 250 OP 250 PS 637

## 2023-09-10 PROCEDURE — 120N000001 HC R&B MED SURG/OB

## 2023-09-10 PROCEDURE — 99232 SBSQ HOSP IP/OBS MODERATE 35: CPT | Mod: GC | Performed by: STUDENT IN AN ORGANIZED HEALTH CARE EDUCATION/TRAINING PROGRAM

## 2023-09-10 PROCEDURE — 84132 ASSAY OF SERUM POTASSIUM: CPT | Performed by: STUDENT IN AN ORGANIZED HEALTH CARE EDUCATION/TRAINING PROGRAM

## 2023-09-10 PROCEDURE — 250N000013 HC RX MED GY IP 250 OP 250 PS 637: Performed by: INTERNAL MEDICINE

## 2023-09-10 PROCEDURE — 36415 COLL VENOUS BLD VENIPUNCTURE: CPT

## 2023-09-10 PROCEDURE — 97116 GAIT TRAINING THERAPY: CPT | Mod: GP

## 2023-09-10 PROCEDURE — 250N000011 HC RX IP 250 OP 636: Mod: JZ | Performed by: INTERNAL MEDICINE

## 2023-09-10 PROCEDURE — 85027 COMPLETE CBC AUTOMATED: CPT

## 2023-09-10 RX ORDER — SODIUM CHLORIDE 9 MG/ML
INJECTION, SOLUTION INTRAVENOUS CONTINUOUS
Status: DISCONTINUED | OUTPATIENT
Start: 2023-09-10 | End: 2023-09-11

## 2023-09-10 RX ORDER — POTASSIUM CHLORIDE 1500 MG/1
40 TABLET, EXTENDED RELEASE ORAL ONCE
Status: COMPLETED | OUTPATIENT
Start: 2023-09-10 | End: 2023-09-10

## 2023-09-10 RX ADMIN — DONEPEZIL HYDROCHLORIDE 10 MG: 5 TABLET, FILM COATED ORAL at 20:18

## 2023-09-10 RX ADMIN — ASPIRIN 81 MG: 81 TABLET ORAL at 10:34

## 2023-09-10 RX ADMIN — LEVOTHYROXINE SODIUM 100 MCG: 100 TABLET ORAL at 06:20

## 2023-09-10 RX ADMIN — ENOXAPARIN SODIUM 40 MG: 40 INJECTION SUBCUTANEOUS at 10:34

## 2023-09-10 RX ADMIN — LEFLUNOMIDE 10 MG: 10 TABLET ORAL at 10:34

## 2023-09-10 RX ADMIN — SIMVASTATIN 20 MG: 10 TABLET, FILM COATED ORAL at 20:18

## 2023-09-10 RX ADMIN — SODIUM CHLORIDE: 9 INJECTION, SOLUTION INTRAVENOUS at 10:34

## 2023-09-10 RX ADMIN — HYDROCHLOROTHIAZIDE 25 MG: 25 TABLET ORAL at 10:34

## 2023-09-10 RX ADMIN — POTASSIUM CHLORIDE 40 MEQ: 1500 TABLET, EXTENDED RELEASE ORAL at 10:35

## 2023-09-10 RX ADMIN — CITALOPRAM HYDROBROMIDE 20 MG: 20 TABLET, FILM COATED ORAL at 10:35

## 2023-09-10 ASSESSMENT — ACTIVITIES OF DAILY LIVING (ADL)
ADLS_ACUITY_SCORE: 32
ADLS_ACUITY_SCORE: 36
ADLS_ACUITY_SCORE: 32
ADLS_ACUITY_SCORE: 36
ADLS_ACUITY_SCORE: 32
ADLS_ACUITY_SCORE: 32
ADLS_ACUITY_SCORE: 36
ADLS_ACUITY_SCORE: 32

## 2023-09-10 NOTE — PLAN OF CARE
Problem: Plan of Care - These are the overarching goals to be used throughout the patient stay.    Goal: Optimal Comfort and Wellbeing  Intervention: Monitor Pain and Promote Comfort  Recent Flowsheet Documentation  Taken 9/9/2023 1800 by Sabrina Simon, RN  Pain Management Interventions:   medication (see MAR)   ambulation/increased activity   breathing exercises   distraction   emotional support   repositioned   relaxation techniques promoted     Problem: Risk for Delirium  Goal: Improved Attention and Thought Clarity  Intervention: Maximize Cognitive Function  Recent Flowsheet Documentation  Taken 9/9/2023 1800 by Sabrina Simon RN Sensory Stimulation Regulation: television on  Reorientation Measures: clock in view   Goal Outcome Evaluation:       Pt is alert and oriented, good oral intake, no S/S of covid-19 noted, Pt still on isolation precaution. K 3.6 AM draw. Escobar catheter in place with adequate output.

## 2023-09-10 NOTE — PROGRESS NOTES
[facility]    Medicine Progress Note - Hospitalist Service    Date of Admission:  9/6/2023    Assessment & Plan   Sharon De La Vega is a 77 year old female admitted on 9/6/2023. She has a history of aortic stenosis, PVCs, Alzheimer's dementia, depression, HTN, HLD, CKD, hypothyroidism, RA, and morbid obesity and is admitted for workup of syncope and generalized weakness in setting of COVID-19 and elevated troponin. Recovering nicely, plan for TCU placement.     Syncope  Generalized weakness  Patient presented with progressive weakness and syncope on admission. Work-up notable for history of moderate-severe aortic stenosis, elevated troponin, elevated BNP, COVID positive. Syncope ultimately likely multifactorial: COVID infection and related deconditioning, cardiac related (known moderate to severe AORTIC STENOSIS). She did have two rapids called thus far during admission for syncope and fall. The first rapid on 9/7 appeared to be orthostatic vs. vasovagal vs. cardiac. Second fall seems to be related to her confusion and weakness. She does have an EKG w/ atrial flutter which could also explain presentation if she has paroxysmal arrhythmia. Has been stable when assisted, plan to continue PT/OT while finding TCU placement.  - PT/OT  - fall risk precautions  - 1:1  - treat covid as below  - cards consult as below     Confirmed COVID-19 infection    Patient admitted with weakness after a syncopal fall. Patient noted to be around sick contacts (son) who has been sick for 2-3 days. Developed cough night before admission, aggravated on morning of fall. Found to be COVID-19 positive. Mildly elevated temperatures, WBC wnl. Procal 0.05, CMP otherwise unremarkable. Found to be COVID-19 positive. On room air, no respiratory distress. Completed 3 days of remdesivir, will hold now due to HUMA below.  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 Follows with [facility] in [place], Dr. eKv Gutiérrez. Last echo on 3/30/2023 showed normal EF 55-60% though technically difficult study noted. Found to have aortic stenosis, was scheduled for coronary calcification score and stress test but does not appear to have scheduled this. Upon admission, trops elevated to 55 -> 91 -> 116 with nt-pro BNP at 2868. EKG showed NSR x 2, though second EKG with questionable Afib (has baseline tremor that makes it hard to determine). She does have an EKG w/ atrial flutter which could also explain presentation if she has paroxysmal arrhythmia. ECHO this admission with normal EF and no change in aortic stenosis. Question elevation in setting of known COVID-19 and myocarditis. Cardiology consulted with differential of myocarditis, valve disease, possible CAD but less likely.  - cardiology consult   - nuc med stress test in outpatient setting (cannot currently due to elevated trop 2/2 COVID myocarditis)              - start spironolactone/hydrochlorothiazide, will hold due to HUMA today              - continue lisinopril, will hold due to HUMA today              - add ASA              - restart PTA simvastatin  - telemetry  - daily BMP, CBC     HUMA on CKD3  Known CKD3, though renal function has been within normal limits since admission. Creat today elevated to 1.50, likely secondary to multiple nephrotoxic medications (remdesivir, spironolactone/hydrochlorothiazide, lovenox, lisinopril). Has also had poor PO intake since admission, likely contributing. Will encourage PO intake, low threshold to  "bolus if needed. Escobar placed to monitor output given bladder scan for 588ml earlier today.  - 500ml NS bolus  - follow I/O  - hold nephrotoxic agents  - AM BMP    Hyponatremia, resolved  Na of 132 9/8. Possible secondary to decreased PO intake from illness.  - BMP in am     Thrombocytopenia  Mild, 120-140's. Stable.   - CBC daily     Hypothyroidism  Recent TSH wnl.  - PTA synthroid     HTN  - lisinopril 10mg to replace PTA benazepril 10mg daily, will hold due to HUMA today     Alzheimer's dementia  - PTA donepezil     Rheumatoid arthritis  - PTA Arava  - continue PTA weekly methotrexate, will hold due to HUMA today     MDD/mood disorder  - PTA wellbutrin, celexa     HLD  - restarted PTA simvastatin       Diet: Combination Diet Regular Diet; Low Saturated Fat Na <2400mg Diet    DVT Prophylaxis: Enoxaparin (Lovenox) SQ  Escobar Catheter: PRESENT, indication: Retention  Lines: None     Cardiac Monitoring: None  Code Status: Full Code      Clinically Significant Risk Factors        # Hypokalemia: Lowest K = 3.3 mmol/L in last 2 days, will replace as needed           # Hypertension: Noted on problem list     # Dementia: noted on problem list    # Obesity: Estimated body mass index is 39.22 kg/m  as calculated from the following:    Height as of this encounter: 1.626 m (5' 4.02\").    Weight as of this encounter: 103.7 kg (228 lb 9.9 oz)., PRESENT ON ADMISSION            Disposition Plan      Expected Discharge Date: 09/11/2023        Discharge Comments: TCU        The patient's care was discussed with the Attending Physician, Dr. Rice .    Eric Hammond MD  Hospitalist Service  [facility]  Securely message with [employer] (more info)  Text page via [facility] Paging/Directory   ______________________________________________________________________    Interval History   No acute events overnight. Pt breathing comfortably on RA this AM. No questions or concerns.    Physical Exam   Vital Signs: Temp: 98.5 "  F (36.9  C) Temp src: Oral BP: 139/64 Pulse: 67   Resp: 18 SpO2: 97 % O2 Device: None (Room air)    Weight: 228 lbs 9.87 oz    General: Alert, no acute distress, sitting in bed comfortably watching TV.  Skin: clean, dry, and intact  HEENT: normocephalic, atraumatic, PERRL, EOMI, no conjunctival injection or icterus, ears and nose normal, no LAD or masses  Resp: clear to ausculation bilaterally, no rales or wheezes  Cardio: RRR, S1 and S2 present today  Abdomen: soft, nontender, nondistended  Extremities: no erythema. Warm and well perfused. Moving all extremities.  Psych: Alert and oriented x1. Pleasantly demented. Affect appropriate.     Data     I have personally reviewed the following data over the past 24 hrs:    5.0  \   12.2   / 143 (L)     141 104 44.7 (H) /  92   3.3 (L) 27 1.36 (H) \       Imaging results reviewed over the past 24 hrs:   No results found for this or any previous visit (from the past 24 hour(s)).

## 2023-09-10 NOTE — PLAN OF CARE
"  Problem: Plan of Care - These are the overarching goals to be used throughout the patient stay.    Goal: Plan of Care Review  Description: The Plan of Care Review/Shift note should be completed every shift.  The Outcome Evaluation is a brief statement about your assessment that the patient is improving, declining, or no change.  This information will be displayed automatically on your shift note.  Outcome: Progressing  Goal: Patient-Specific Goal (Individualized)  Description: You can add care plan individualizations to a care plan. Examples of Individualization might be:  \"Parent requests to be called daily at 9am for status\", \"I have a hard time hearing out of my right ear\", or \"Do not touch me to wake me up as it startles me\".  Outcome: Progressing  Goal: Absence of Hospital-Acquired Illness or Injury  Outcome: Progressing  Intervention: Identify and Manage Fall Risk  Recent Flowsheet Documentation  Taken 9/10/2023 0430 by Radha Guerrier RN  Safety Promotion/Fall Prevention:   activity supervised   assistive device/personal items within reach   nonskid shoes/slippers when out of bed   patient and family education   room near nurse's station   supervised activity  Taken 9/10/2023 0000 by Radha Guerrier, RN  Safety Promotion/Fall Prevention:   activity supervised   assistive device/personal items within reach   nonskid shoes/slippers when out of bed   patient and family education   room near nurse's station   supervised activity  Intervention: Prevent Skin Injury  Recent Flowsheet Documentation  Taken 9/10/2023 0430 by Radha Guerrier, RN  Body Position:   position changed independently   turned  Taken 9/10/2023 0000 by Radha Guerrier RN  Body Position:   position changed independently   turned  Goal: Optimal Comfort and Wellbeing  Outcome: Progressing  Goal: Readiness for Transition of Care  Outcome: Progressing     Problem: Risk for Delirium  Goal: Optimal Coping  Outcome: Progressing  Goal: Improved Behavioral " Control  Outcome: Progressing  Goal: Improved Attention and Thought Clarity  Outcome: Progressing  Intervention: Maximize Cognitive Function  Recent Flowsheet Documentation  Taken 9/10/2023 0430 by Radha Guerrier RN  Sensory Stimulation Regulation: television on  Reorientation Measures: clock in view  Taken 9/10/2023 0000 by Radha Guerrier, RN  Sensory Stimulation Regulation: television on  Reorientation Measures: clock in view  Goal: Improved Sleep  Outcome: Progressing   Goal Outcome Evaluation:  Pt has been sleeping well overnight.   Pt denied pain. Escobar catheter remains in place with adequate output.

## 2023-09-10 NOTE — PROGRESS NOTES
[facility]    Progress Note - Hospitalist Service       Date of Admission:  9/6/2023    Assessment & Plan   Sharon De La Vega is a 77 year old female admitted on 9/6/2023. She has a history of aortic stenosis, PVCs, Alzheimer's dementia, depression, HTN, HLD, CKD, hypothyroidism, RA, and morbid obesity and is admitted for workup of syncope and generalized weakness in setting of COVID-19 and elevated troponin. Recovering nicely, plan for TCU placement.     Syncope  Generalized weakness  Patient presented with progressive weakness and syncope on admission. Work-up notable for history of moderate-severe aortic stenosis, elevated troponin, elevated BNP, COVID positive. Syncope ultimately likely multifactorial: COVID infection and related deconditioning, cardiac related (known moderate to severe AORTIC STENOSIS). She did have two rapids called thus far during admission for syncope and fall. The first rapid on 9/7 appeared to be orthostatic vs. vasovagal vs. cardiac. Second fall seems to be related to her confusion and weakness. She does have an EKG w/ atrial flutter which could also explain presentation if she has paroxysmal arrhythmia. Has been stable when assisted, plan to continue PT/OT while finding TCU placement.  - PT/OT  - fall risk precautions  - 1:1  - treat covid as below  - cards consult as below     Confirmed COVID-19 infection    Patient admitted with weakness after a syncopal fall. Patient noted to be around sick contacts (son) who has been sick for 2-3 days. Developed cough night before admission, aggravated on morning of fall. Found to be COVID-19 positive. Mildly elevated temperatures, WBC wnl. Procal 0.05, CMP otherwise unremarkable. Found to be COVID-19 positive. On room air, no respiratory distress. Completed 3 days of remdesivir, will hold now due to HUMA below.  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 Follows with [facility] in [place], Dr. Kev Gutiérrez. Last echo on 3/30/2023 showed normal EF 55-60% though technically difficult study noted. Found to have aortic stenosis, was scheduled for coronary calcification score and stress test but does not appear to have scheduled this. Upon admission, trops elevated to 55 -> 91 -> 116 with nt-pro BNP at 2868. EKG showed NSR x 2, though second EKG with questionable Afib (has baseline tremor that makes it hard to determine). She does have an EKG w/ atrial flutter which could also explain presentation if she has paroxysmal arrhythmia. ECHO this admission with normal EF and no change in aortic stenosis. Question elevation in setting of known COVID-19 and myocarditis. Cardiology consulted with differential of myocarditis, valve disease, possible CAD but less likely.  - cardiology consult   - nuc med stress test in outpatient setting (cannot currently due to elevated trop 2/2 COVID myocarditis)              - start spironolactone/hydrochlorothiazide, will hold due to HUMA today              - continue lisinopril, will hold due to HUMA today              - add ASA              - restart PTA simvastatin  - telemetry  - daily BMP, CBC     HUMA on CKD3  Known CKD3, though renal function has been within normal limits since admission. Creat today elevated to 1.50, likely secondary to multiple nephrotoxic medications (remdesivir, spironolactone/hydrochlorothiazide, lovenox, lisinopril). Has also had poor PO intake since admission, likely contributing. Will encourage PO intake, low threshold to  "bolus if needed. Escobar placed to monitor output given bladder scan for 588ml earlier today.  - 500ml NS bolus  - follow I/O  - hold nephrotoxic agents  - AM BMP    Hyponatremia, resolved  Na of 132 9/8. Possible secondary to decreased PO intake from illness.  - BMP in am     Thrombocytopenia  Mild, 120-140's. Stable.   - CBC daily     Hypothyroidism  Recent TSH wnl.  - PTA synthroid     HTN  - lisinopril 10mg to replace PTA benazepril 10mg daily, will hold due to HUMA today     Alzheimer's dementia  - PTA donepezil     Rheumatoid arthritis  - PTA Arava  - continue PTA weekly methotrexate, will hold due to HUMA today     MDD/mood disorder  - PTA wellbutrin, celexa     HLD  - restarted PTA simvastatin     Diet: Combination Diet Regular Diet; Low Saturated Fat Na <2400mg Diet    DVT Prophylaxis: Enoxaparin (Lovenox) SQ  Escobar Catheter: PRESENT, indication:    Fluids: PO  Lines: None     Cardiac Monitoring: ACTIVE order. Indication: Tachyarrhythmias, acute (48 hours)  Code Status: Full Code      Clinically Significant Risk Factors     # Hypokalemia: Lowest K = 3.3 mmol/L in last 2 days, will replace as needed          # Acute Kidney Injury, unspecified: based on a >150% or 0.3 mg/dL increase in last creatinine compared to past 90 day average, will monitor renal function  # Hypertension: Noted on problem list     # Dementia: noted on problem list    # Obesity: Estimated body mass index is 39.22 kg/m  as calculated from the following:    Height as of this encounter: 1.626 m (5' 4.02\").    Weight as of this encounter: 103.7 kg (228 lb 9.9 oz)., PRESENT ON ADMISSION            Disposition Plan  TCU     Expected Discharge Date: 09/10/2023                The patient's care was discussed with the Attending Physician, Dr. Rice .    Serena Kwok MD  Hospitalist Service  [facility]  Securely message with [facility] (more info)  Text page via [employer] Paging/Directory "   ______________________________________________________________________    Interval History   No acute events overnight. Noted elevation in creatinine, likely secondary to multiple nephrotoxic medications and poor PO intake. Patient with low volume urinations per nursing, but stable from vitals standpoint. Patient alert and pleasant, no syncopal symptoms today. Wants to go home, but ok with TCU after explaining need to strengthen prior to going home.    Physical Exam   Vital Signs: Temp: 97.7  F (36.5  C) Temp src: Oral BP: 129/61 Pulse: 72   Resp: 20 SpO2: 97 % O2 Device: None (Room air)    Weight: 228 lbs 9.87 oz    General: Alert, no acute distress  Skin: clean, dry, and intact  HEENT: normocephalic, atraumatic, PERRL, EOMI, no conjunctival injection or icterus, ears and nose normal, no LAD or masses  Resp: clear to ausculation bilaterally, no rales or wheezes  Cardio: RRR, S1 and S2 present today  Abdomen: soft, nontender, nondistended  Extremities: no erythema. 1+ to 2+ pitting edema noted.  Psych: demented, but calm.      Medical Decision Making   Please see A&P for additional details of medical decision making.      Data   ------------------------- PAST 24 HR DATA REVIEWED -----------------------------------------------    I have personally reviewed the following data over the past 24 hrs:    6.0  \   12.9   / 158     139 102 38.3 (H) /  89   3.6 28 1.59 (H) \       Imaging results reviewed over the past 24 hrs:   No results found for this or any previous visit (from the past 24 hour(s)).

## 2023-09-11 ENCOUNTER — APPOINTMENT (OUTPATIENT)
Dept: PHYSICAL THERAPY | Facility: HOSPITAL | Age: 78
DRG: 177 | End: 2023-09-11
Payer: COMMERCIAL

## 2023-09-11 ENCOUNTER — APPOINTMENT (OUTPATIENT)
Dept: OCCUPATIONAL THERAPY | Facility: HOSPITAL | Age: 78
DRG: 177 | End: 2023-09-11
Payer: COMMERCIAL

## 2023-09-11 LAB
ANION GAP SERPL CALCULATED.3IONS-SCNC: 11 MMOL/L (ref 7–15)
BACTERIA BLD CULT: NO GROWTH
BACTERIA BLD CULT: NO GROWTH
BUN SERPL-MCNC: 27.5 MG/DL (ref 8–23)
CALCIUM SERPL-MCNC: 8.2 MG/DL (ref 8.8–10.2)
CHLORIDE SERPL-SCNC: 108 MMOL/L (ref 98–107)
CREAT SERPL-MCNC: 0.93 MG/DL (ref 0.51–0.95)
DEPRECATED HCO3 PLAS-SCNC: 19 MMOL/L (ref 22–29)
EGFRCR SERPLBLD CKD-EPI 2021: 63 ML/MIN/1.73M2
ERYTHROCYTE [DISTWIDTH] IN BLOOD BY AUTOMATED COUNT: 15 % (ref 10–15)
GLUCOSE SERPL-MCNC: 84 MG/DL (ref 70–99)
HCT VFR BLD AUTO: 41.4 % (ref 35–47)
HGB BLD-MCNC: 12.6 G/DL (ref 11.7–15.7)
MCH RBC QN AUTO: 31.9 PG (ref 26.5–33)
MCHC RBC AUTO-ENTMCNC: 30.4 G/DL (ref 31.5–36.5)
MCV RBC AUTO: 105 FL (ref 78–100)
PLATELET # BLD AUTO: 135 10E3/UL (ref 150–450)
POTASSIUM SERPL-SCNC: 4 MMOL/L (ref 3.4–5.3)
RBC # BLD AUTO: 3.95 10E6/UL (ref 3.8–5.2)
SODIUM SERPL-SCNC: 138 MMOL/L (ref 136–145)
WBC # BLD AUTO: 6.9 10E3/UL (ref 4–11)

## 2023-09-11 PROCEDURE — 99232 SBSQ HOSP IP/OBS MODERATE 35: CPT | Mod: GC

## 2023-09-11 PROCEDURE — 80048 BASIC METABOLIC PNL TOTAL CA: CPT | Performed by: STUDENT IN AN ORGANIZED HEALTH CARE EDUCATION/TRAINING PROGRAM

## 2023-09-11 PROCEDURE — 97535 SELF CARE MNGMENT TRAINING: CPT | Mod: GO

## 2023-09-11 PROCEDURE — 85014 HEMATOCRIT: CPT

## 2023-09-11 PROCEDURE — 97110 THERAPEUTIC EXERCISES: CPT | Mod: GP

## 2023-09-11 PROCEDURE — 250N000013 HC RX MED GY IP 250 OP 250 PS 637

## 2023-09-11 PROCEDURE — 120N000001 HC R&B MED SURG/OB

## 2023-09-11 PROCEDURE — 258N000003 HC RX IP 258 OP 636: Performed by: STUDENT IN AN ORGANIZED HEALTH CARE EDUCATION/TRAINING PROGRAM

## 2023-09-11 PROCEDURE — 36415 COLL VENOUS BLD VENIPUNCTURE: CPT | Performed by: STUDENT IN AN ORGANIZED HEALTH CARE EDUCATION/TRAINING PROGRAM

## 2023-09-11 PROCEDURE — 97116 GAIT TRAINING THERAPY: CPT | Mod: GP

## 2023-09-11 PROCEDURE — 250N000013 HC RX MED GY IP 250 OP 250 PS 637: Performed by: INTERNAL MEDICINE

## 2023-09-11 PROCEDURE — 250N000011 HC RX IP 250 OP 636: Mod: JZ | Performed by: INTERNAL MEDICINE

## 2023-09-11 RX ADMIN — DONEPEZIL HYDROCHLORIDE 10 MG: 5 TABLET, FILM COATED ORAL at 20:45

## 2023-09-11 RX ADMIN — HYDROCHLOROTHIAZIDE 25 MG: 25 TABLET ORAL at 10:02

## 2023-09-11 RX ADMIN — ENOXAPARIN SODIUM 40 MG: 40 INJECTION SUBCUTANEOUS at 10:03

## 2023-09-11 RX ADMIN — SIMVASTATIN 20 MG: 10 TABLET, FILM COATED ORAL at 20:45

## 2023-09-11 RX ADMIN — CITALOPRAM HYDROBROMIDE 20 MG: 20 TABLET, FILM COATED ORAL at 10:02

## 2023-09-11 RX ADMIN — LEVOTHYROXINE SODIUM 100 MCG: 100 TABLET ORAL at 06:28

## 2023-09-11 RX ADMIN — ASPIRIN 81 MG: 81 TABLET ORAL at 10:02

## 2023-09-11 RX ADMIN — LEFLUNOMIDE 10 MG: 10 TABLET ORAL at 10:01

## 2023-09-11 RX ADMIN — SODIUM CHLORIDE: 9 INJECTION, SOLUTION INTRAVENOUS at 06:28

## 2023-09-11 ASSESSMENT — ACTIVITIES OF DAILY LIVING (ADL)
ADLS_ACUITY_SCORE: 32

## 2023-09-11 NOTE — PROGRESS NOTES
Care Management Follow Up    Length of Stay (days): 4    Expected Discharge Date: 09/12/2023     Concerns to be Addressed: discharge planning      Patient plan of care discussed at interdisciplinary rounds: Yes    Anticipated Discharge Disposition: transitional care      Anticipated Discharge Services: TCU   Anticipated Discharge DME: per team     Patient/family educated on Medicare website which has current facility and service quality ratings: yes   Education Provided on the Discharge Plan: TBD   Patient/Family in Agreement with the Plan: TBD     Referrals Placed by CM/YOGESH: post acute facilities   Private pay costs discussed: Not applicable    Additional Information:  3:55 PM  SW sent TCU referral to the [facility]. [facility] liaison Karol reported she will review for TCUs near the [facility]. CM to follow up.    LUDWIN [place]

## 2023-09-11 NOTE — PLAN OF CARE
"  Problem: Plan of Care - These are the overarching goals to be used throughout the patient stay.    Goal: Plan of Care Review  Description: The Plan of Care Review/Shift note should be completed every shift.  The Outcome Evaluation is a brief statement about your assessment that the patient is improving, declining, or no change.  This information will be displayed automatically on your shift note.  Outcome: Progressing  Goal: Patient-Specific Goal (Individualized)  Description: You can add care plan individualizations to a care plan. Examples of Individualization might be:  \"Parent requests to be called daily at 9am for status\", \"I have a hard time hearing out of my right ear\", or \"Do not touch me to wake me up as it startles me\".  Outcome: Progressing  Goal: Absence of Hospital-Acquired Illness or Injury  Outcome: Progressing  Intervention: Identify and Manage Fall Risk  Recent Flowsheet Documentation  Taken 9/11/2023 0129 by Radha Guerrier RN  Safety Promotion/Fall Prevention:   activity supervised   assistive device/personal items within reach   patient and family education   nonskid shoes/slippers when out of bed  Intervention: Prevent Skin Injury  Recent Flowsheet Documentation  Taken 9/11/2023 0129 by Radha Guerrier RN  Body Position: position changed independently  Goal: Optimal Comfort and Wellbeing  Outcome: Progressing  Goal: Readiness for Transition of Care  Outcome: Progressing     Problem: Risk for Delirium  Goal: Optimal Coping  Outcome: Progressing  Goal: Improved Behavioral Control  Outcome: Progressing  Intervention: Minimize Safety Risk  Recent Flowsheet Documentation  Taken 9/11/2023 0129 by Radha Guerrier RN  Enhanced Safety Measures:   room near unit station    at bedside  Goal: Improved Attention and Thought Clarity  Outcome: Progressing  Intervention: Maximize Cognitive Function  Recent Flowsheet Documentation  Taken 9/11/2023 0129 by Radha Guerrier RN  Sensory Stimulation " Regulation: television on  Reorientation Measures: clock in view  Goal: Improved Sleep  Outcome: Progressing   Goal Outcome Evaluation:  Pt slept well this shift, awake with cares. Pt denied pain.

## 2023-09-11 NOTE — PROGRESS NOTES
[facility]    Progress Note - Hospitalist Service       Date of Admission:  9/6/2023    Assessment & Plan   Sharon De La Vega is a 77 year old female admitted on 9/6/2023. She has a history of aortic stenosis, PVCs, Alzheimer's dementia, depression, HTN, HLD, CKD, hypothyroidism, RA, and morbid obesity and is admitted for workup of syncope and generalized weakness in setting of COVID-19 and elevated troponin. Medically ready for discharge, awaiting TCU placement.     Syncope  Generalized weakness  Patient presented with progressive weakness and syncope on admission. Work-up notable for history of moderate-severe aortic stenosis, elevated troponin, elevated BNP, COVID positive. Syncope ultimately likely multifactorial: COVID infection and related deconditioning, cardiac related (known moderate to severe AORTIC STENOSIS). She did have two rapids called thus far during admission for syncope and fall. The first rapid on 9/7 appeared to be orthostatic vs. vasovagal vs. cardiac. Second fall seems to be related to her confusion and weakness. She does have an EKG w/ atrial flutter which could also explain presentation if she has paroxysmal arrhythmia. Has been stable when assisted, plan to continue PT/OT while finding TCU placement.  - PT/OT  - fall risk precautions  - treat covid as below  - cards consult as below     Confirmed COVID-19 infection    Patient admitted with weakness after a syncopal fall. Patient noted to be around sick contacts (son) who has been sick for 2-3 days. Developed cough night before admission, aggravated on morning of fall. Found to be COVID-19 positive. Mildly elevated temperatures, WBC wnl. Procal 0.05, CMP otherwise unremarkable. Found to be COVID-19 positive. On room air, no respiratory distress. Completed 3 days of remdesivir, will hold now due to HUMA below.  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 Follows with [facility] in [place], Dr. Kev Gutiérrez. Last echo on 3/30/2023 showed normal EF 55-60% though technically difficult study noted. Found to have aortic stenosis, was scheduled for coronary calcification score and stress test but does not appear to have scheduled this. Upon admission, trops elevated to 55 -> 91 -> 116 with nt-pro BNP at 2868. EKG showed NSR x 2, other with artifact. ECHO this admission with normal EF and no change in aortic stenosis. Question elevation in setting of known COVID-19 and myocarditis. Cardiology consulted with differential of myocarditis, valve disease, possible CAD but less likely.  - cardiology consult   - nuc med stress test in outpatient setting (cannot currently due to elevated trop 2/2 COVID myocarditis)              - continue lisinopril, restart 9/11              - add ASA              - restart PTA simvastatin  - discontinue hydrochlorothiazide/spironolactone  - telemetry  - daily BMP, CBC     CKD3  Known CKD3, though renal function has been within normal limits since admission. Creatinine up on 9/10, in setting of starting spironolactone/hydrochlorothiazide and poor po intake. Will encourage PO intake. Restart home lisinopril 9/11.  - follow I/O  - hold nephrotoxic agents  - daily BMP    Hyponatremia, resolved  Na of 132 9/8. Possible secondary to decreased PO intake from illness.  - BMP in am     Thrombocytopenia  Mild, 120-140's. Stable.   - CBC daily     Hypothyroidism  Recent TSH wnl.  - PTA synthroid     HTN  - lisinopril 10mg to replace PTA benazepril 10mg daily     Alzheimer's dementia  -  "PTA donepezil     Rheumatoid arthritis  - PTA Arava  - continue PTA weekly methotrexate     MDD/mood disorder  - PTA wellbutrin, celexa     HLD  - restarted PTA simvastatin     Diet: Combination Diet Regular Diet; Low Saturated Fat Na <2400mg Diet    DVT Prophylaxis: Enoxaparin (Lovenox) SQ  Escobar Catheter: PRESENT, indication: Retention  Fluids: PO  Lines: None     Cardiac Monitoring: None  Code Status: Full Code      Clinically Significant Risk Factors     # Hypokalemia: Lowest K = 3.3 mmol/L in last 2 days, will replace as needed               # Hypertension: Noted on problem list     # Dementia: noted on problem list      # Obesity: Estimated body mass index is 39.22 kg/m  as calculated from the following:    Height as of this encounter: 1.626 m (5' 4.02\").    Weight as of this encounter: 103.7 kg (228 lb 9.9 oz).  , PRESENT ON ADMISSION            Disposition Plan  TCU     Expected Discharge Date: 09/11/2023    Discharge Delays: Placement - TCU    Discharge Comments: TCU        The patient's care was discussed with the Attending Physician, Dr. Qiu .    Ifrah Hernandez MD  Hospitalist Service  [facility]  Securely message with [employer] (more info)  Text page via [facility] Paging/Directory   ______________________________________________________________________    Interval History   No acute events overnight. Patient pleasant this morning. Explained plans for TCU for which patient understood. No specific concerns- denies chest pain or shortness of breath. Appetite normal.     Physical Exam   Vital Signs: Temp: (P) 97.8  F (36.6  C) Temp src: (P) Oral BP: (!) (P) 145/63 Pulse: (P) 74   Resp: (P) 18 SpO2: (P) 96 % O2 Device: (P) None (Room air)    Weight: 228 lbs 9.87 oz    General: Alert, no acute distress  Skin: clean, dry, and intact  HEENT: normocephalic, atraumatic, EOMI  Resp: clear to ausculation bilaterally, no rales or wheezes  Cardio: RRR, S1 and S2 present today  Abdomen: soft, " nontender, nondistended  Extremities: no erythema. 1+ pitting edema noted.  Psych: demented, but calm.      Medical Decision Making   Please see A&P for additional details of medical decision making.      Data   ------------------------- PAST 24 HR DATA REVIEWED -----------------------------------------------    I have personally reviewed the following data over the past 24 hrs:    6.9  \   12.6   / 135 (L)     138 108 (H) 27.5 (H) /  84   4.0 19 (L) 0.93 \       Imaging results reviewed over the past 24 hrs:   No results found for this or any previous visit (from the past 24 hour(s)).

## 2023-09-11 NOTE — PLAN OF CARE
Goal Outcome Evaluation:     Pt up in chair for dinner until about 8pm. Eating well, encouraged fluids this shift. Escboar intact with good urine output, no bm today. Continues on IV fluids. Denied pain, SOB and cough. Sats WNL, LS clear diminished  Problem: Plan of Care - These are the overarching goals to be used throughout the patient stay.    Goal: Optimal Comfort and Wellbeing  9/10/2023 2156 by Felicita Trinidad, RN  Outcome: Progressing  9/10/2023 1500 by Felicita Trinidad RN  Outcome: Progressing     Problem: Risk for Delirium  Goal: Improved Behavioral Control  Outcome: Progressing  Goal: Improved Attention and Thought Clarity  Outcome: Progressing     Problem: Plan of Care - These are the overarching goals to be used throughout the patient stay.    Goal: Absence of Hospital-Acquired Illness or Injury  Intervention: Identify and Manage Fall Risk  Recent Flowsheet Documentation  Taken 9/10/2023 1556 by Felicita Trinidad, RN  Safety Promotion/Fall Prevention:   activity supervised   assistive device/personal items within reach   patient and family education   nonskid shoes/slippers when out of bed  Taken 9/10/2023 1034 by Felicita Trinidad, RN  Safety Promotion/Fall Prevention:   activity supervised   assistive device/personal items within reach   patient and family education   nonskid shoes/slippers when out of bed

## 2023-09-11 NOTE — PLAN OF CARE
Problem: Plan of Care - These are the overarching goals to be used throughout the patient stay.    Goal: Optimal Comfort and Wellbeing  Outcome: Progressing   Goal Outcome Evaluation:         Denies pain. Appears comfortable.      Problem: Plan of Care - These are the overarching goals to be used throughout the patient stay.    Goal: Readiness for Transition of Care  Outcome: Progressing         Pt awaiting TCU placement. Ax1 to transfer with gait belt/walker. Writer updated pts son Guy on pts status.

## 2023-09-12 ENCOUNTER — APPOINTMENT (OUTPATIENT)
Dept: OCCUPATIONAL THERAPY | Facility: HOSPITAL | Age: 78
DRG: 177 | End: 2023-09-12
Payer: COMMERCIAL

## 2023-09-12 LAB
ANION GAP SERPL CALCULATED.3IONS-SCNC: 9 MMOL/L (ref 7–15)
BUN SERPL-MCNC: 21.7 MG/DL (ref 8–23)
CALCIUM SERPL-MCNC: 9.1 MG/DL (ref 8.8–10.2)
CHLORIDE SERPL-SCNC: 102 MMOL/L (ref 98–107)
CREAT SERPL-MCNC: 0.94 MG/DL (ref 0.51–0.95)
DEPRECATED HCO3 PLAS-SCNC: 30 MMOL/L (ref 22–29)
EGFRCR SERPLBLD CKD-EPI 2021: 63 ML/MIN/1.73M2
ERYTHROCYTE [DISTWIDTH] IN BLOOD BY AUTOMATED COUNT: 14.7 % (ref 10–15)
GLUCOSE SERPL-MCNC: 99 MG/DL (ref 70–99)
HCT VFR BLD AUTO: 39.6 % (ref 35–47)
HGB BLD-MCNC: 12.8 G/DL (ref 11.7–15.7)
MCH RBC QN AUTO: 32 PG (ref 26.5–33)
MCHC RBC AUTO-ENTMCNC: 32.3 G/DL (ref 31.5–36.5)
MCV RBC AUTO: 99 FL (ref 78–100)
PLATELET # BLD AUTO: 151 10E3/UL (ref 150–450)
POTASSIUM SERPL-SCNC: 4.4 MMOL/L (ref 3.4–5.3)
RBC # BLD AUTO: 4 10E6/UL (ref 3.8–5.2)
SODIUM SERPL-SCNC: 141 MMOL/L (ref 136–145)
WBC # BLD AUTO: 5.8 10E3/UL (ref 4–11)

## 2023-09-12 PROCEDURE — 99232 SBSQ HOSP IP/OBS MODERATE 35: CPT | Mod: GC

## 2023-09-12 PROCEDURE — 80048 BASIC METABOLIC PNL TOTAL CA: CPT | Performed by: STUDENT IN AN ORGANIZED HEALTH CARE EDUCATION/TRAINING PROGRAM

## 2023-09-12 PROCEDURE — 36415 COLL VENOUS BLD VENIPUNCTURE: CPT | Performed by: STUDENT IN AN ORGANIZED HEALTH CARE EDUCATION/TRAINING PROGRAM

## 2023-09-12 PROCEDURE — 85027 COMPLETE CBC AUTOMATED: CPT

## 2023-09-12 PROCEDURE — 250N000011 HC RX IP 250 OP 636: Mod: JZ | Performed by: INTERNAL MEDICINE

## 2023-09-12 PROCEDURE — 250N000013 HC RX MED GY IP 250 OP 250 PS 637

## 2023-09-12 PROCEDURE — 250N000013 HC RX MED GY IP 250 OP 250 PS 637: Performed by: INTERNAL MEDICINE

## 2023-09-12 PROCEDURE — 120N000001 HC R&B MED SURG/OB

## 2023-09-12 PROCEDURE — 97535 SELF CARE MNGMENT TRAINING: CPT | Mod: GO

## 2023-09-12 RX ORDER — FOLIC ACID 1 MG/1
1 TABLET ORAL DAILY
Status: DISCONTINUED | OUTPATIENT
Start: 2023-09-12 | End: 2023-09-14 | Stop reason: HOSPADM

## 2023-09-12 RX ADMIN — FOLIC ACID 1 MG: 1 TABLET ORAL at 12:27

## 2023-09-12 RX ADMIN — ASPIRIN 81 MG: 81 TABLET ORAL at 09:13

## 2023-09-12 RX ADMIN — LEFLUNOMIDE 10 MG: 10 TABLET ORAL at 09:13

## 2023-09-12 RX ADMIN — ENOXAPARIN SODIUM 40 MG: 40 INJECTION SUBCUTANEOUS at 09:13

## 2023-09-12 RX ADMIN — SIMVASTATIN 20 MG: 10 TABLET, FILM COATED ORAL at 20:55

## 2023-09-12 RX ADMIN — HYDROCHLOROTHIAZIDE 25 MG: 25 TABLET ORAL at 09:13

## 2023-09-12 RX ADMIN — CITALOPRAM HYDROBROMIDE 20 MG: 20 TABLET, FILM COATED ORAL at 09:13

## 2023-09-12 RX ADMIN — DONEPEZIL HYDROCHLORIDE 10 MG: 5 TABLET, FILM COATED ORAL at 20:55

## 2023-09-12 RX ADMIN — LEVOTHYROXINE SODIUM 100 MCG: 100 TABLET ORAL at 06:41

## 2023-09-12 ASSESSMENT — ACTIVITIES OF DAILY LIVING (ADL)
ADLS_ACUITY_SCORE: 32

## 2023-09-12 NOTE — PLAN OF CARE
"  Problem: Risk for Delirium  Goal: Improved Behavioral Control  Outcome: Progressing  Intervention: Minimize Safety Risk  Recent Flowsheet Documentation  Taken 9/12/2023 0100 by Michelle Ribeiro RN  Enhanced Safety Measures: room near unit station     Problem: Risk for Delirium  Goal: Improved Sleep  Outcome: Progressing     Problem: Gas Exchange Impaired  Goal: Optimal Gas Exchange  Outcome: Progressing  Intervention: Optimize Oxygenation and Ventilation  Recent Flowsheet Documentation  Taken 9/12/2023 0100 by Michelle Ribeiro RN  Head of Bed (HOB) Positioning: HOB lowered   Goal Outcome Evaluation:  Pt has had an uneventful shift.  Pt slept off/on with covers over her head.  Irritable this am.  Remains disoriented to place and date.  Escobar with large urine output.  Denies pain.  Noted nonproductive cough that pt says is pretty much \"just a tickle.\"                      "

## 2023-09-12 NOTE — PLAN OF CARE
Problem: Plan of Care - These are the overarching goals to be used throughout the patient stay.    Goal: Absence of Hospital-Acquired Illness or Injury  Intervention: Identify and Manage Fall Risk  Recent Flowsheet Documentation  Taken 9/12/2023 0915 by Nan Johnston, RN  Safety Promotion/Fall Prevention:   activity supervised   assistive device/personal items within reach  Intervention: Prevent Skin Injury  Recent Flowsheet Documentation  Taken 9/12/2023 0915 by Nan Johnston, RN  Body Position: position changed independently-plan to go to transitional care  Problem: Plan of Care - These are the overarching goals to be used throughout the patient stay.    Goal: Optimal Comfort and Wellbeing  Outcome: Progressing     Problem: Risk for Delirium  Goal: Optimal Coping  Outcome: Progressing-patient calm and able to let needs known  Problem: Gas Exchange Impaired  Goal: Optimal Gas Exchange  Outcome: Progressing  Intervention: Optimize Oxygenation and Ventilation  Recent Flowsheet Documentation  Taken 9/12/2023 0915 by Nan Johnston, RN  Head of Bed (HOB) Positioning: HOB lowered-on room air and continue to monitor  Patient alert to self - can make needs known -          Goal Outcome Evaluation:

## 2023-09-12 NOTE — PROGRESS NOTES
Care Management Follow Up    Length of Stay (days): 5    Expected Discharge Date: 09/14/2023     Concerns to be Addressed: discharge planning      Patient plan of care discussed at interdisciplinary rounds: Yes     Anticipated Discharge Disposition: transitional care      Anticipated Discharge Services: TCU   Anticipated Discharge DME: per team      Patient/family educated on Medicare website which has current facility and service quality ratings: yes   Education Provided on the Discharge Plan: TBD   Patient/Family in Agreement with the Plan: TBD      Referrals Placed by CM/YOGESH: post acute facilities   Private pay costs discussed: Not applicable    Additional Information:  YOGESH spoke to Karol Holly who reported she is still working on finding a facility with an open private room for Pt. CM to follow up again tomorrow.    LUDWIN Lilly

## 2023-09-12 NOTE — PROGRESS NOTES
[facility]    Progress Note - Hospitalist Service       Date of Admission:  9/6/2023    Assessment & Plan   Sharon De La Vega is a 77 year old female admitted on 9/6/2023. She has a history of aortic stenosis, PVCs, Alzheimer's dementia, depression, HTN, HLD, CKD, hypothyroidism, RA, and morbid obesity and is admitted for workup of syncope and generalized weakness in setting of COVID-19 and elevated troponin. Medically ready for discharge, awaiting TCU placement.     Syncope  Generalized weakness  Patient presented with progressive weakness and syncope on admission. Work-up notable for history of moderate-severe aortic stenosis, elevated troponin, elevated BNP, COVID positive. Syncope ultimately likely multifactorial: COVID infection and related deconditioning, cardiac related (known moderate to severe aortic stenosis). She did have two rapids called earlier in admission for syncope and fall on 9/7. The first rapid on 9/7 appeared to be orthostatic vs. vasovagal vs. cardiac. Second fall seems to be related to her confusion and weakness. Has been stable when assisted, plan to continue PT/OT while finding TCU placement.  - PT/OT  - fall risk precautions  - treat covid as below  - cards consult as below     Confirmed COVID-19 infection    Patient admitted with weakness after a syncopal fall. Patient noted to be around sick contacts (son) who has been sick for 2-3 days. Developed cough night before admission, aggravated on morning of fall. Found to be COVID-19 positive. Mildly elevated temperatures, WBC wnl. Procal 0.05, CMP otherwise unremarkable. Found to be COVID-19 positive. On room air, no respiratory distress. Completed 3 days of remdesivir, will hold now due to HUMA below.  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 Follows with [facility] in [place], Dr. Kev Gutiérrez. Last echo on 3/30/2023 showed normal EF 55-60% though technically difficult study noted. Found to have aortic stenosis, was scheduled for coronary calcification score and stress test but does not appear to have scheduled this. Upon admission, trops elevated to 55 -> 91 -> 116 with nt-pro BNP at 2868. EKG showed NSR x 2, other with artifact. ECHO this admission with normal EF and no change in aortic stenosis. Question elevation in setting of known COVID-19 and myocarditis. Cardiology consulted with differential of myocarditis, valve disease, possible CAD but less likely.  - cardiology consult   - nuc med stress test in outpatient setting (cannot currently due to elevated trop 2/2 COVID myocarditis)              - continue lisinopril, restart 9/11              - add ASA              - restart PTA simvastatin  - discontinue hydrochlorothiazide/spironolactone  - telemetry     CKD3  Known CKD3, though renal function has been within normal limits since admission. Creatinine up on 9/10, in setting of starting spironolactone/hydrochlorothiazide and poor po intake. Will encourage PO intake. Restart home lisinopril 9/11.  - follow I/O  - daily BMP    Hyponatremia, resolved  Na of 132 9/8. Possible secondary to decreased PO intake from illness.     Thrombocytopenia, stable  Mild, 120-140's. Stable.      Hypothyroidism  Recent TSH wnl.  - PTA synthroid     HTN  - lisinopril 10mg to replace PTA benazepril 10mg daily     Alzheimer's dementia  - PTA donepezil     Rheumatoid arthritis  - PTA Arava  - continue PTA weekly methotrexate, daily folate     MDD/mood disorder  - PTA wellbutrin, celexa     HLD  - restarted PTA simvastatin     Diet: Combination  "Diet Regular Diet; Low Saturated Fat Na <2400mg Diet    DVT Prophylaxis: Enoxaparin (Lovenox) SQ  Escobar Catheter: PRESENT, indication: Retention  Fluids: PO  Lines: None     Cardiac Monitoring: None  Code Status: Full Code      Clinically Significant Risk Factors                   # Hypertension: Noted on problem list     # Dementia: noted on problem list      # Obesity: Estimated body mass index is 39.22 kg/m  as calculated from the following:    Height as of this encounter: 1.626 m (5' 4.02\").    Weight as of this encounter: 103.7 kg (228 lb 9.9 oz).               Disposition Plan  TCU     Expected Discharge Date: 09/12/2023    Discharge Delays: Placement - TCU  *Medically Ready for Discharge    Discharge Comments: TCU        The patient's care was discussed with the Attending Physician, Dr. Qiu .    Ifrah Hernandez MD  Hospitalist Service  [facility]  Securely message with [employer] (more info)  Text page via [employer] Paging/Directory   ______________________________________________________________________    Interval History   No acute events overnight. Patient sleeping in bed, when awake engages in conversation. States she has no concerns. Denies chest pain, shortness of breath, abdominal pain. Going to the bathroom normally. No concerns.    Physical Exam   Vital Signs: Temp: 98.3  F (36.8  C) Temp src: Oral BP: (!) 154/68 Pulse: 73   Resp: 19 SpO2: 95 % O2 Device: None (Room air)    Weight: 228 lbs 9.87 oz    General: Alert, no acute distress  Skin: clean, dry, and intact  HEENT: normocephalic, atraumatic, EOMI  Resp: clear to ausculation bilaterally, no rales or wheezes  Cardio: RRR, S1 and S2 present today  Abdomen: soft, nontender, nondistended  Extremities: no erythema. Trace edema in b/l LE's.  Psych: Calm, intermittently confused      Medical Decision Making   Please see A&P for additional details of medical decision making.      Data   ------------------------- PAST 24 HR DATA " REVIEWED -----------------------------------------------    I have personally reviewed the following data over the past 24 hrs:    5.8  \   12.8   / 151     141 102 21.7 /  99   4.4 30 (H) 0.94 \       Imaging results reviewed over the past 24 hrs:   No results found for this or any previous visit (from the past 24 hour(s)).

## 2023-09-13 ENCOUNTER — APPOINTMENT (OUTPATIENT)
Dept: OCCUPATIONAL THERAPY | Facility: HOSPITAL | Age: 78
DRG: 177 | End: 2023-09-13
Payer: COMMERCIAL

## 2023-09-13 LAB
ANION GAP SERPL CALCULATED.3IONS-SCNC: 11 MMOL/L (ref 7–15)
BUN SERPL-MCNC: 22.5 MG/DL (ref 8–23)
CALCIUM SERPL-MCNC: 9 MG/DL (ref 8.8–10.2)
CHLORIDE SERPL-SCNC: 98 MMOL/L (ref 98–107)
CREAT SERPL-MCNC: 0.94 MG/DL (ref 0.51–0.95)
DEPRECATED HCO3 PLAS-SCNC: 28 MMOL/L (ref 22–29)
EGFRCR SERPLBLD CKD-EPI 2021: 63 ML/MIN/1.73M2
GLUCOSE BLDC GLUCOMTR-MCNC: 94 MG/DL (ref 70–99)
GLUCOSE SERPL-MCNC: 109 MG/DL (ref 70–99)
PLATELET # BLD AUTO: 145 10E3/UL (ref 150–450)
POTASSIUM SERPL-SCNC: 4.1 MMOL/L (ref 3.4–5.3)
SODIUM SERPL-SCNC: 137 MMOL/L (ref 136–145)

## 2023-09-13 PROCEDURE — 250N000013 HC RX MED GY IP 250 OP 250 PS 637: Performed by: INTERNAL MEDICINE

## 2023-09-13 PROCEDURE — 36415 COLL VENOUS BLD VENIPUNCTURE: CPT | Performed by: STUDENT IN AN ORGANIZED HEALTH CARE EDUCATION/TRAINING PROGRAM

## 2023-09-13 PROCEDURE — 120N000001 HC R&B MED SURG/OB

## 2023-09-13 PROCEDURE — 250N000013 HC RX MED GY IP 250 OP 250 PS 637

## 2023-09-13 PROCEDURE — 80048 BASIC METABOLIC PNL TOTAL CA: CPT | Performed by: STUDENT IN AN ORGANIZED HEALTH CARE EDUCATION/TRAINING PROGRAM

## 2023-09-13 PROCEDURE — 250N000011 HC RX IP 250 OP 636: Mod: JZ | Performed by: INTERNAL MEDICINE

## 2023-09-13 PROCEDURE — 99232 SBSQ HOSP IP/OBS MODERATE 35: CPT | Mod: GC

## 2023-09-13 PROCEDURE — 85049 AUTOMATED PLATELET COUNT: CPT

## 2023-09-13 PROCEDURE — 97535 SELF CARE MNGMENT TRAINING: CPT | Mod: GO

## 2023-09-13 RX ADMIN — CITALOPRAM HYDROBROMIDE 20 MG: 20 TABLET, FILM COATED ORAL at 10:05

## 2023-09-13 RX ADMIN — ASPIRIN 81 MG: 81 TABLET ORAL at 10:05

## 2023-09-13 RX ADMIN — SIMVASTATIN 20 MG: 10 TABLET, FILM COATED ORAL at 19:38

## 2023-09-13 RX ADMIN — HYDROCHLOROTHIAZIDE 25 MG: 25 TABLET ORAL at 10:05

## 2023-09-13 RX ADMIN — DONEPEZIL HYDROCHLORIDE 10 MG: 5 TABLET, FILM COATED ORAL at 19:36

## 2023-09-13 RX ADMIN — FOLIC ACID 1 MG: 1 TABLET ORAL at 10:05

## 2023-09-13 RX ADMIN — LEVOTHYROXINE SODIUM 100 MCG: 100 TABLET ORAL at 05:47

## 2023-09-13 RX ADMIN — ENOXAPARIN SODIUM 40 MG: 40 INJECTION SUBCUTANEOUS at 10:05

## 2023-09-13 RX ADMIN — LEFLUNOMIDE 10 MG: 10 TABLET ORAL at 10:05

## 2023-09-13 ASSESSMENT — ACTIVITIES OF DAILY LIVING (ADL)
ADLS_ACUITY_SCORE: 37
ADLS_ACUITY_SCORE: 38
ADLS_ACUITY_SCORE: 32
ADLS_ACUITY_SCORE: 32
ADLS_ACUITY_SCORE: 37
ADLS_ACUITY_SCORE: 38
ADLS_ACUITY_SCORE: 38
ADLS_ACUITY_SCORE: 33
ADLS_ACUITY_SCORE: 32

## 2023-09-13 NOTE — PLAN OF CARE
"  Problem: Plan of Care - These are the overarching goals to be used throughout the patient stay.    Goal: Optimal Comfort and Wellbeing  Outcome: Progressing     Problem: Risk for Delirium  Goal: Improved Sleep  Outcome: Progressing   Goal Outcome Evaluation:  Pt more conversive this shift.  Pleasant and cooperative.  Reports \"I have gotten good care here but I really want to go home.\"  Shares that her son also has Covid.  Pt incontinent of stool.  Denied pain.                        "

## 2023-09-13 NOTE — PLAN OF CARE
"  Problem: Plan of Care - These are the overarching goals to be used throughout the patient stay.    Goal: Plan of Care Review  Description: The Plan of Care Review/Shift note should be completed every shift.  The Outcome Evaluation is a brief statement about your assessment that the patient is improving, declining, or no change.  This information will be displayed automatically on your shift note.  Outcome: Progressing  Goal: Patient-Specific Goal (Individualized)  Description: You can add care plan individualizations to a care plan. Examples of Individualization might be:  \"Parent requests to be called daily at 9am for status\", \"I have a hard time hearing out of my right ear\", or \"Do not touch me to wake me up as it startles me\".  Outcome: Progressing  Goal: Absence of Hospital-Acquired Illness or Injury  Outcome: Progressing  Intervention: Identify and Manage Fall Risk  Recent Flowsheet Documentation  Taken 9/12/2023 2000 by Amber Gonzales RN  Safety Promotion/Fall Prevention: activity supervised  Goal: Optimal Comfort and Wellbeing  Outcome: Progressing  Goal: Readiness for Transition of Care  Outcome: Progressing     Problem: Risk for Delirium  Goal: Optimal Coping  Outcome: Progressing  Goal: Improved Behavioral Control  Outcome: Progressing  Intervention: Minimize Safety Risk  Recent Flowsheet Documentation  Taken 9/12/2023 2000 by Amber Gonzales RN  Enhanced Safety Measures: room near unit station  Goal: Improved Attention and Thought Clarity  Outcome: Progressing  Intervention: Maximize Cognitive Function  Recent Flowsheet Documentation  Taken 9/12/2023 2000 by Amber Gonzales RN  Sensory Stimulation Regulation: care clustered  Reorientation Measures: calendar in view  Taken 9/12/2023 1700 by Amber Gonzales RN  Sensory Stimulation Regulation: care clustered  Reorientation Measures: calendar in view  Goal: Improved Sleep  Outcome: Progressing     Problem: Gas Exchange Impaired  Goal: " Optimal Gas Exchange  Outcome: Progressing   Goal Outcome Evaluation:       Pt is alert and oriented x2, and forgetful. Pt vitals are stable and denies pain. Waiting for TCU placement for pt.

## 2023-09-13 NOTE — PROGRESS NOTES
Care Management Follow Up    Length of Stay (days): 6    Expected Discharge Date: 09/14/2023     Concerns to be Addressed: discharge planning      Patient plan of care discussed at interdisciplinary rounds: Yes    Anticipated Discharge Disposition: transitional care      Anticipated Discharge Services: TCU   Anticipated Discharge DME: none     Patient/family educated on Medicare website which has current facility and service quality ratings: yes   Education Provided on the Discharge Plan: yes   Patient/Family in Agreement with the Plan: yes     Referrals Placed by CM/SW: transitional care   Private pay costs discussed: transportation costs    Additional Information:  8:49 AM  CM called and left VM with [facility] and [facility] to follow up on referral.     [facility], and [facility] do not accept Covid positive patients.     10:04 AM  YOGESH called and spoke to Daily at [facility] who reported that she would review Pt's referral. Referral sent.    12:29 PM  SW received a voicemail from Daily who reported that she would be able to accept Pt to TCU. YOGESH called Daily back and left a voicemail with a request to call back to discuss discharge planning. Pt will need [employer] authorization before discharging. CM will attempt to plan for discharge for tomorrow.     YOGESH called Pt's son Guy with updates regarding plans. Guy reported understanding and agreement with Pt's discharge plans and potential out-of-pocket costs for hospital transport.     3:25 PM   Health stretcher transport scheduled to  Pt tomorrow afternoon between [id number]. PCS needed. YOGESH called and updated Guy with Pt's discharge timing.    [employer] authorization submitted. Request ID: [id number].    CM colleague assisting with completing PAS.       LUDWIN Lilly

## 2023-09-13 NOTE — PROGRESS NOTES
[facility]    Progress Note - Hospitalist Service       Date of Admission:  9/6/2023    Assessment & Plan   Sharon De La Vega is a 77 year old female admitted on 9/6/2023. She has a history of aortic stenosis, PVCs, Alzheimer's dementia, depression, HTN, HLD, CKD, hypothyroidism, RA, and morbid obesity and is admitted for workup of syncope and generalized weakness in setting of COVID-19 and elevated troponin. Medically ready for discharge, awaiting TCU placement.     Syncope  Generalized weakness  Patient presented with progressive weakness and syncope on admission. Work-up notable for history of moderate-severe aortic stenosis, elevated troponin, elevated BNP, COVID positive. Syncope ultimately likely multifactorial: COVID infection and related deconditioning, cardiac related (known moderate to severe aortic stenosis). She did have two rapids called earlier in admission for syncope and fall on 9/7. The first rapid on 9/7 appeared to be orthostatic vs. vasovagal vs. cardiac. Second fall seems to be related to her confusion and weakness. Has been stable when assisted, plan to continue PT/OT while finding TCU placement.  - PT/OT  - fall risk precautions  - treat covid as below  - cards consult as below     Confirmed COVID-19 infection    Patient admitted with weakness after a syncopal fall. Patient noted to be around sick contacts (son) who has been sick for 2-3 days. Developed cough night before admission, aggravated on morning of fall. Found to be COVID-19 positive. Mildly elevated temperatures, WBC wnl. Procal 0.05, CMP otherwise unremarkable. Found to be COVID-19 positive. On room air, no respiratory distress. Completed 3 days of remdesivir.  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 Follows with [facility] in [place], Dr. Kev Gutiérrez. Last echo on 3/30/2023 showed normal EF 55-60% though technically difficult study noted. Found to have aortic stenosis, was scheduled for coronary calcification score and stress test but does not appear to have scheduled this. Upon admission, trops elevated to 55 -> 91 -> 116 with nt-pro BNP at 2868. EKG showed NSR x 2, other with artifact. ECHO this admission with normal EF and no change in aortic stenosis. Question elevation in setting of known COVID-19 and myocarditis. Cardiology consulted with differential of myocarditis, valve disease, possible CAD but less likely.  - cardiology consult   - nuc med stress test in outpatient setting (cannot currently due to elevated trop 2/2 COVID myocarditis)              - continue lisinopril, restart 9/11              - add ASA              - restart PTA simvastatin  - discontinue hydrochlorothiazide/spironolactone  - telemetry     CKD3  Known CKD3, though renal function has been within normal limits since admission. Creatinine up on 9/10, in setting of starting spironolactone/hydrochlorothiazide and poor po intake. Will encourage PO intake. Restart home lisinopril 9/11.  - follow I/O  - daily BMP    Hyponatremia, resolved  Na of 132 9/8. Possible secondary to decreased PO intake from illness.     Thrombocytopenia, stable  Mild, 120-140's. Stable.      Hypothyroidism  Recent TSH wnl.  - PTA synthroid     HTN  - lisinopril 10mg to replace PTA benazepril 10mg daily     Alzheimer's dementia  - PTA donepezil     Rheumatoid arthritis  - PTA Arava  - continue PTA weekly methotrexate, daily folate     MDD/mood disorder  - PTA wellbutrin, celexa     HLD  - restarted PTA simvastatin     Diet: Combination Diet Regular Diet; Low  "Saturated Fat Na <2400mg Diet    DVT Prophylaxis: Enoxaparin (Lovenox) SQ  Escobar Catheter: PRESENT, indication: Retention  Fluids: PO  Lines: None     Cardiac Monitoring: None  Code Status: Full Code      Clinically Significant Risk Factors                   # Hypertension: Noted on problem list     # Dementia: noted on problem list      # Obesity: Estimated body mass index is 39.22 kg/m  as calculated from the following:    Height as of this encounter: 1.626 m (5' 4.02\").    Weight as of this encounter: 103.7 kg (228 lb 9.9 oz).               Disposition Plan  TCU     Expected Discharge Date: 09/13/2023    Discharge Delays: Placement - TCU  *Medically Ready for Discharge    Discharge Comments: TCU        The patient's care was discussed with the Attending Physician, Dr. Qiu .    Ifrah Hernandez MD  Hospitalist Service  [facility]  Securely message with [employer] (more info)  Text page via [employer] Paging/Directory   ______________________________________________________________________    Interval History   No acute events overnight. Patient sitting in bed, calm. States she is doing well. No specific concerns or questions. Denies pain. Reminded of TCU placement, patient shows understanding.    Physical Exam   Vital Signs: Temp: 98  F (36.7  C) Temp src: Oral BP: (!) 149/65 Pulse: 73   Resp: 20 SpO2: 98 % O2 Device: None (Room air)    Weight: 228 lbs 9.87 oz    General: Alert, no acute distress, calm  Skin: clean, dry, and intact  HEENT: normocephalic, atraumatic, EOMI  Resp: clear to ausculation bilaterally, no rales or wheezes  Cardio: RRR, S1 and S2 present today  Abdomen: soft, nontender, nondistended  Extremities: no erythema.   Psych: Calm, intermittently confused      Medical Decision Making   Please see A&P for additional details of medical decision making.      Data   ------------------------- PAST 24 HR DATA REVIEWED -----------------------------------------------    I have " personally reviewed the following data over the past 24 hrs:    N/A  \   N/A   / 145 (L)     137 98 22.5 /  94   4.1 28 0.94 \       Imaging results reviewed over the past 24 hrs:   No results found for this or any previous visit (from the past 24 hour(s)).

## 2023-09-13 NOTE — PLAN OF CARE
Problem: Plan of Care - These are the overarching goals to be used throughout the patient stay.    Goal: Optimal Comfort and Wellbeing  Outcome: Progressing   Goal Outcome Evaluation:         Denies pain.      Problem: Gas Exchange Impaired  Goal: Optimal Gas Exchange  Outcome: Progressing       0 s/s resp distress noted. Remains on RA. Dry intermittent cough, LS Diminished bilat. Remains on special precautions for covid dx. Spoke with son Guy & gave pt status update. Assisted pt with calling son from room. Escobar intact & patent with adequate output. Appetite good, pt eating well.

## 2023-09-14 VITALS
HEIGHT: 64 IN | TEMPERATURE: 98.7 F | OXYGEN SATURATION: 94 % | SYSTOLIC BLOOD PRESSURE: 149 MMHG | RESPIRATION RATE: 18 BRPM | BODY MASS INDEX: 39.03 KG/M2 | WEIGHT: 228.62 LBS | HEART RATE: 73 BPM | DIASTOLIC BLOOD PRESSURE: 70 MMHG

## 2023-09-14 PROBLEM — I35.0 AORTIC VALVE STENOSIS: Status: ACTIVE | Noted: 2023-04-07

## 2023-09-14 LAB
ANION GAP SERPL CALCULATED.3IONS-SCNC: 11 MMOL/L (ref 7–15)
BUN SERPL-MCNC: 25.4 MG/DL (ref 8–23)
CALCIUM SERPL-MCNC: 9 MG/DL (ref 8.8–10.2)
CHLORIDE SERPL-SCNC: 97 MMOL/L (ref 98–107)
CREAT SERPL-MCNC: 0.98 MG/DL (ref 0.51–0.95)
DEPRECATED HCO3 PLAS-SCNC: 28 MMOL/L (ref 22–29)
EGFRCR SERPLBLD CKD-EPI 2021: 60 ML/MIN/1.73M2
GLUCOSE SERPL-MCNC: 102 MG/DL (ref 70–99)
POTASSIUM SERPL-SCNC: 3.8 MMOL/L (ref 3.4–5.3)
SODIUM SERPL-SCNC: 136 MMOL/L (ref 136–145)

## 2023-09-14 PROCEDURE — 250N000013 HC RX MED GY IP 250 OP 250 PS 637

## 2023-09-14 PROCEDURE — 250N000013 HC RX MED GY IP 250 OP 250 PS 637: Performed by: INTERNAL MEDICINE

## 2023-09-14 PROCEDURE — 82310 ASSAY OF CALCIUM: CPT | Performed by: STUDENT IN AN ORGANIZED HEALTH CARE EDUCATION/TRAINING PROGRAM

## 2023-09-14 PROCEDURE — 250N000012 HC RX MED GY IP 250 OP 636 PS 637: Performed by: FAMILY MEDICINE

## 2023-09-14 PROCEDURE — 99238 HOSP IP/OBS DSCHRG MGMT 30/<: CPT | Mod: GC

## 2023-09-14 PROCEDURE — 36415 COLL VENOUS BLD VENIPUNCTURE: CPT | Performed by: STUDENT IN AN ORGANIZED HEALTH CARE EDUCATION/TRAINING PROGRAM

## 2023-09-14 PROCEDURE — 250N000011 HC RX IP 250 OP 636: Mod: JZ | Performed by: INTERNAL MEDICINE

## 2023-09-14 RX ORDER — ASPIRIN 81 MG/1
81 TABLET ORAL DAILY
Qty: 30 TABLET | Refills: 0 | Status: SHIPPED | OUTPATIENT
Start: 2023-09-14 | End: 2023-09-14

## 2023-09-14 RX ADMIN — LEVOTHYROXINE SODIUM 100 MCG: 100 TABLET ORAL at 07:04

## 2023-09-14 RX ADMIN — FOLIC ACID 1 MG: 1 TABLET ORAL at 12:30

## 2023-09-14 RX ADMIN — HYDROCHLOROTHIAZIDE 25 MG: 25 TABLET ORAL at 12:30

## 2023-09-14 RX ADMIN — CITALOPRAM HYDROBROMIDE 20 MG: 20 TABLET, FILM COATED ORAL at 12:30

## 2023-09-14 RX ADMIN — METHOTREXATE 10 MG: 2.5 TABLET ORAL at 12:25

## 2023-09-14 ASSESSMENT — ACTIVITIES OF DAILY LIVING (ADL)
ADLS_ACUITY_SCORE: 37
ADLS_ACUITY_SCORE: 33

## 2023-09-14 NOTE — PLAN OF CARE
"  Problem: Plan of Care - These are the overarching goals to be used throughout the patient stay.    Goal: Patient-Specific Goal (Individualized)  Description: You can add care plan individualizations to a care plan. Examples of Individualization might be:  \"Parent requests to be called daily at 9am for status\", \"I have a hard time hearing out of my right ear\", or \"Do not touch me to wake me up as it startles me\".  Outcome: Progressing     Problem: Gas Exchange Impaired  Goal: Optimal Gas Exchange  Intervention: Optimize Oxygenation and Ventilation  Recent Flowsheet Documentation  Taken 9/14/2023 0100 by Padmaja Johnson, RN  Head of Bed (HOB) Positioning: HOB at 20-30 degrees   Goal Outcome Evaluation:       Pt is A/O, can be forgetful, room air sating 97/98%, frederick in place, lungs are clear and diminished, no complains of pain, vitals are stable, K protocol, will continue to monitor.                  "

## 2023-09-14 NOTE — PROGRESS NOTES
Report given to transport staff. [facility] gave staff paperwork. Pt transferred via stretcher, transport staff assisted pt to stretcher. Personal belongings sent with pt. Writer [facility] & updated Marcia on pts status & frederick pulled at approx. 1235 w/no void thus far. Pt en route to facility.

## 2023-09-14 NOTE — PROGRESS NOTES
Care Management Discharge Note    Discharge Date: 09/14/2023       Discharge Disposition: Transitional Care    Discharge Services: None    Discharge DME: None    Discharge Transportation: family or friend will provide    Private pay costs discussed: transportation costs    Does the patient's insurance plan have a 3 day qualifying hospital stay waiver?  No    PAS Confirmation Code: [id number]  Patient/family educated on Medicare website which has current facility and service quality ratings: yes    Education Provided on the Discharge Plan: Yes  Persons Notified of Discharge Plans: marcelle Tovar  Patient/Family in Agreement with the Plan: yes    Handoff Referral Completed: Yes    Additional Information:    Plan is for Pt to discharge this [facility] for [facility]. Due to Covid-19 precautions, Pt will be required to discharge via stretcher transport. [facility] stretcher transport scheduled to arrive between [id number].     7:43 AM  [facility] additional [facility] documentation for authorization. YOGESH submitted the addition therapy documents through the [employer] portal. Authorization now Pending Clinical Review.    PCS completed, faxed, and placed in Pt's hard chart.     8:28 AM  [employer] Authorization approved.  Authorization Number: [id number]  Date of Admission: 09/14/2023  Stay Approved Through: 09/20/2023  Total Days Approved: 7    YOGESH spoke to Daily at [facility] with confirmation of insurance approval and discharge timing. Care Team updated of final discharge plans for Pt today.     9:41 AM  Discharge orders completed. Send orders to [facility] via [employer] In-Basket.    LUDWIN Lilly

## 2023-09-14 NOTE — PLAN OF CARE
Occupational Therapy Discharge Summary    Reason for therapy discharge:    Discharged to transitional care facility.    Progress towards therapy goal(s). See goals on Care Plan in [facility] electronic health record for goal details.  Goals partially met.  Barriers to achieving goals:   discharge from facility.    Therapy recommendation(s):    Continued therapy is recommended.  Rationale/Recommendations:  to improve ADL independence.

## 2023-09-14 NOTE — DISCHARGE SUMMARY
"[facility]  Discharge Summary - Medicine & Pediatrics       Date of Admission:  9/6/2023  Date of Discharge:  9/14/2023  Discharging Provider: Dr. Hernandez, Dr. Qiu  Discharge Service: Hospitalist Service    Discharge Diagnoses   Generalized weakness  Syncope  COVID-19  Elevated troponin secondary to COVID-19 related myocarditis  Moderate to severe aortic stenosis  Chronic kidney disease III  Hyponatremia, resolved  Thrombocytopenia, stable  Dementia    Clinically Significant Risk Factors     # Obesity: Estimated body mass index is 39.22 kg/m  as calculated from the following:    Height as of this encounter: 1.626 m (5' 4.02\").    Weight as of this encounter: 103.7 kg (228 lb 9.9 oz).       Follow-ups Needed After Discharge   Follow-up Appointments     Follow Up and recommended labs and tests      Follow up with Nursing home physician.  No follow up labs or test are   needed.            Unresulted Labs Ordered in the Past 30 Days of this Admission       No orders found from 8/7/2023 to 9/7/2023.        These results will be followed up by N/A    Discharge Disposition   Discharged to short-term care facility  Condition at discharge: Stable    Hospital Course   Sharon De La Vega is a 77 year old female admitted on 9/6/2023. She has a history of aortic stenosis, PVCs, Alzheimer's dementia, depression, HTN, HLD, CKD, hypothyroidism, RA, and morbid obesity and was admitted for workup of syncope and generalized weakness in setting of COVID-19 and elevated troponin. Ultimately, it was suspected that her presentation was secondary to COVID-19 and known moderate-severe aortic stenosis. Her elevated troponin was thought to be secondary to COVID-19 related myocarditis. See below for more details:     Syncope  Generalized weakness  The patient presented with progressive weakness and syncope on admission. Her work-up was notable for a history of moderate-severe aortic stenosis, elevated troponin, " elevated BNP, and COVID positive. Her syncope was ultimately multifactorial: COVID infection and related deconditioning and cardiac related (known moderate to severe aortic stenosis).      Confirmed COVID-19 infection    Patient noted symptoms 2-3 days prior to admission. She was given three days of Remdesivir. She did not require oxygen support.     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 She follows with [facility] in [place], Dr. Kev Gutiérrez. Her last echo on 3/30/2023 showed normal EF 55-60% though technically difficult study noted. She was found to have aortic stenosis and was recommended for a coronary calcification score and stress test but does not appear to have scheduled this. Upon admission, her troponin was elevated to 55 -> 91 -> 116 with nt-pro BNP at 2868. Her EKG showed sinus rhythm. Her ECHO during this admission revealed a normal EF and no change in aortic stenosis. Cardiology was consulted and noted a differential of myocarditis, valve disease, possible CAD but less likely. They recommended a nuclear stress test as an outpatient, continuing blood pressure medications, statin, and added daily aspirin. Aspirin was not continued on discharge due to a history of GI bleed and interaction with methotrexate.     CKD3  Patient has known CKD3 and her renal function was within normal limits during admission.      Hyponatremia, resolved     Thrombocytopenia, stable    Consultations This Hospital Stay   CARE MANAGEMENT / SOCIAL WORK IP CONSULT  PHYSICAL THERAPY ADULT IP CONSULT  OCCUPATIONAL THERAPY ADULT IP CONSULT  CARDIOLOGY IP CONSULT  PHYSICAL THERAPY ADULT IP CONSULT  OCCUPATIONAL THERAPY ADULT IP CONSULT    Code Status   Full Code       The patient was discussed with Dr. Uyen Hernandez MD  [place] [facility] [address] [place]  [address]  Phone: [phone]  Fax: [phone]  ______________________________________________________________________    Physical Exam   Vital Signs: Temp: 98.7  F (37.1  C) Temp src: Oral BP: (!) 149/70 Pulse: 73   Resp: 18 SpO2: 94 % O2 Device: None (Room air)    Weight: 228 lbs 9.87 oz  General: Alert, no acute distress, calm  Skin: clean, dry, and intact  HEENT: normocephalic, atraumatic, EOMI  Resp: clear to ausculation bilaterally, no rales or wheezes  Cardio: RRR, S1 and S2 present today  Abdomen: soft, nontender, nondistended  Extremities: no erythema.   Psych: Calm, intermittently confused      Primary Care Physician   Jaylen Young    Discharge Orders      General info for SNF    Length of Stay Estimate: Short Term Care: Estimated # of Days <30  Condition at Discharge: Stable  Level of care:skilled   Rehabilitation Potential: Fair  Admission H&P remains valid and up-to-date: Yes  Recent Chemotherapy: N/A  Use Nursing Home Standing Orders: Yes     Mantoux instructions    Give two-step Mantoux (PPD) Per Facility Policy Yes     Follow Up and recommended labs and tests    Follow up with Nursing home physician.  No follow up labs or test are needed.     Reason for your hospital stay    Generalized weakness, falls, passing out.     Activity - Up with nursing assistance     Full Code     Physical Therapy Adult Consult    Evaluate and treat as clinically indicated.    Reason:  Weakness, falls     Occupational Therapy Adult Consult    Evaluate and treat as clinically indicated.    Reason:  weakness, falls     Airborne Isolation     Fall precautions     Diet    Follow this diet upon discharge: Orders Placed This Encounter      Combination Diet Regular Diet; Low Saturated Fat Na <2400mg Diet       Significant Results and Procedures   Results for orders placed or performed during the hospital encounter of 09/06/23   Head CT w/o contrast    Narrative    EXAM: CT HEAD W/O CONTRAST  LOCATION: [facility]  DATE: 9/6/2023    INDICATION: Weaknes, confusion  COMPARISON: 12/12/2022  TECHNIQUE: Routine CT Head without IV contrast. Multiplanar reformats. Dose reduction techniques were used.    FINDINGS:  INTRACRANIAL CONTENTS: No intracranial hemorrhage, extraaxial collection, or mass effect.  No CT evidence of acute infarct. 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 Mild presumed chronic small vessel ischemic changes. Mild generalized   volume loss. No hydrocephalus.     VISUALIZED ORBITS/SINUSES/MASTOIDS: No intraorbital abnormality. No paranasal sinus mucosal disease. No middle ear or mastoid effusion.    BONES/SOFT TISSUES: No scalp hematoma. No skull fracture.      Impression    IMPRESSION:  1.  No CT evidence for acute intracranial process.  2.  Brain atrophy and presumed chronic microvascular ischemic changes as above.   XR Chest 2 Views    Narrative    EXAM: XR CHEST 2 VIEWS  LOCATION: [facility]  DATE: 9/6/2023    INDICATION: Weakness and fever.  COMPARISON: None.      Impression    IMPRESSION:     No focal airspace disease. No pleural effusion or pneumothorax.    The cardiomediastinal silhouette is unremarkable. Aortic calcifications.    Multilevel degenerative changes of the spine.   CT Chest Pulmonary Embolism w Contrast    Narrative    EXAM: CT CHEST PULMONARY EMBOLISM W CONTRAST  LOCATION: [facility]  DATE: 9/6/2023    INDICATION: COVID, elevated troponin, hypoxia  COMPARISON: Chest x-ray earlier today.  TECHNIQUE: CT chest pulmonary angiogram during arterial phase injection of IV contrast. Multiplanar reformats and MIP reconstructions were performed. Dose reduction techniques were used.   CONTRAST: 75ml isovue 370    FINDINGS:  ANGIOGRAM CHEST: Excellent opacification of pulmonary arteries and branches. No evidence of pulmonary emboli. Aorta and branches are unremarkable.      LUNGS AND PLEURA: Minimal dependent density. No  acute parenchymal disease.    MEDIASTINUM/AXILLAE: No adenopathy. There is a very small hiatal hernia.    CORONARY ARTERY CALCIFICATION: Cannot evaluate.    UPPER ABDOMEN: Aorta and branches are heavily calcified. No aneurysm. Visualized vessels patent. Few colonic diverticuli.Postcholecystectomy distention common bile duct. No intraductal stones.    MUSCULOSKELETAL: No suspicious bone lesions. No fractures.      Impression    IMPRESSION:  1.  No abnormalities are noted to explain symptoms.  2.  Specifically, no evidence of pulmonary emboli.  3.  No evidence of pneumonia.  4.  There is a small hiatal hernia.  5.  Other noncritical findings as noted above.   CT Head w/o Contrast    Narrative    EXAM: CT HEAD W/O CONTRAST  LOCATION: [facility]  DATE: 2023    INDICATION: Head injury, unwitnessed fall, hx of dementia recently started on therapeutic lovenox  COMPARISON: 2023  TECHNIQUE: Routine CT Head without IV contrast. Multiplanar reformats. Dose reduction techniques were used.    FINDINGS:  INTRACRANIAL CONTENTS: No intracranial hemorrhage, extraaxial collection, or mass effect.  No CT evidence of acute infarct. Moderate presumed chronic small vessel ischemic changes. Moderate generalized volume loss. No hydrocephalus. Atrophy is slightly   more prominent in the region of right hippocampus. This is a nonspecific finding; similar finding could be observed in dementia of Alzheimer's type.     VISUALIZED ORBITS/SINUSES/MASTOIDS: No intraorbital abnormality. No paranasal sinus mucosal disease. No middle ear or mastoid effusion.    BONES/SOFT TISSUES: No acute abnormality.      Impression    IMPRESSION:  1.  No acute intracranial process.     Echocardiogram Complete     Value    LVEF  60-65%    Narrative    [id number]  [id number]  [id number]  [id number]^[place] [address]     Name: ROCCO DEGROOT  MRN: [id number]  : 1945  Study  Date: 09/07/2023 10:59 AM  Age: 77 yrs  Gender: Female  Patient [facility]: [facility]  Reason For Study: Aortic Valve Disorder  Ordering Physician: SHERI VILLANUEVA  Performed By: WR     BSA: 2.1 m2  Height: 64 in  Weight: 228 lb  HR: 73  BP: 138/61 mmHg  ______________________________________________________________________________  Procedure  Complete Portable Echo Adult.  ______________________________________________________________________________  Interpretation Summary     Left ventricular size, wall motion and function are normal. The ejection  fraction is 60-65%.  Normal right ventricle size and systolic function.  Moderate to severe valvular aortic stenosis.  There is mild (1+) aortic regurgitation.  The left atrium is mildly dilated.  ______________________________________________________________________________  Left Ventricle  Left ventricular size, wall motion and function are normal. The ejection  fraction is 60-65%. There is normal left ventricular wall thickness. Left  ventricular diastolic function is abnormal. No regional wall motion  abnormalities noted.     Right Ventricle  Normal right ventricle size and systolic function.     Atria  The left atrium is mildly dilated. Right atrial size is normal. There is no  color Doppler evidence of an atrial shunt.     Mitral Valve  There is mild to moderate mitral annular calcification. There is mild (1+)  mitral regurgitation.     Tricuspid Valve  Right ventricle systolic pressure estimate normal.     Aortic Valve  Moderate aortic valve calcification is present. There is mild (1+) aortic  regurgitation. Moderate to severe valvular aortic stenosis.     Pulmonic Valve  The pulmonic valve is not well seen, but is grossly normal. This degree of  valvular regurgitation is within normal limits.     Vessels  The aorta root is normal. Normal size ascending aorta. IVC diameter and  respiratory changes fall into an intermediate range suggesting an RA pressure  of 8 mmHg.      Pericardium  There is no pericardial effusion.     ______________________________________________________________________________  MMode/2D Measurements & Calculations  RVDd: 3.1 cm  IVSd: 0.83 cm  LVIDd: 5.0 cm  LVIDs: 3.5 cm  LVPWd: 0.90 cm  FS: 30.3 %     LV mass(C)d: 150.0 grams  LV mass(C)dI: 72.6 grams/m2  Ao root diam: 3.3 cm  LA dimension: 4.2 cm  asc Aorta Diam: 3.0 cm  LA/Ao: 1.3  LVOT diam: 2.2 cm  LVOT area: 3.7 cm2  Ao root diam Index (cm/m2): 1.6  asc Aorta Diam Index (cm/m2): 1.5  LA Volume (BP): 58.6 ml  LA Volume Index (BP): 28.3 ml/m2     LA Volume Indexed (AL/bp): 29.6 ml/m2  RWT: 0.36  TAPSE: 2.9 cm     Time Measurements  Aortic HR: 71.0 BPM  MM HR: 73.0 BPM     Doppler Measurements & Calculations  MV E max deonte: 96.6 cm/sec  MV A max deonte: 128.0 cm/sec  MV E/A: 0.75  MV dec slope: 345.0 cm/sec2  MV dec time: 0.28 sec     Ao V2 max: 348.3 cm/sec  Ao max P.0 mmHg  Ao V2 mean: 257.0 cm/sec  Ao mean P.0 mmHg  Ao V2 VTI: 91.7 cm  BETH(I,D): 0.99 cm2  BETH(V,D): 1.1 cm2  LV V1 max P.2 mmHg  LV V1 max: 102.0 cm/sec  LV V1 VTI: 24.8 cm  CO(LVOT): 6.4 l/min  CI(LVOT): 3.1 l/min/m2  SV(LVOT): 90.5 ml  SI(LVOT): 43.8 ml/m2  PA acc time: 0.13 sec  AV Deonte Ratio (DI): 0.29  BETH Index (cm2/m2): 0.48  E/E': 13.6  E/E' av.8  Lateral E/e': 13.5  Medial E/e': 14.0  Peak E' Deonte: 7.1 cm/sec  RV S Deonte: 15.7 cm/sec     ______________________________________________________________________________  Report approved by: Beba Dumont 2023 12:54 PM             Discharge Medications   Current Discharge Medication List        CONTINUE these medications which have NOT CHANGED    Details   benazepril (LOTENSIN) 10 MG tablet Take 10 mg by mouth daily      calcium carbonate-vitamin D 600-200 MG-UNIT TABS Take 1 tablet by mouth 2 times daily.      citalopram (CELEXA) 20 MG tablet Take 1 tablet by mouth daily at 2 pm      cyanocobalamin (VITAMIN B-12) 1000 MCG tablet TAKE 1 TABLET (1,000 MCG) BY  MOUTH ONCE DAILY.      donepezil (ARICEPT) 10 MG tablet Take 1 tablet (10 mg) by mouth At Bedtime  Qty: 90 tablet, Refills: 3    Associated Diagnoses: Major neurocognitive disorder due to Alzheimer's disease (H)      folic acid (FOLVITE) 1 MG tablet Take 1 mg by mouth daily.      leflunomide (ARAVA) 10 MG tablet Take 1 tablet by mouth daily at 2 pm      levothyroxine (TIROSINT) 100 MCG capsule Take 100 mcg by mouth daily      methotrexate 2.5 MG tablet Take 10 mg by mouth once a week On Thursday      nystatin (MYCOSTATIN) 406510 UNIT/GM external powder Apply topically as needed for dry skin      simvastatin (ZOCOR) 20 MG tablet TAKE 1 TABLET (20 MG) BY MOUTH ONCE DAILY WITH EVENING MEAL.           Allergies   Allergies   Allergen Reactions    Acetaminophen-Codeine      Other reaction(s): GI Upset  Intolerant - indigestion.    Celecoxib     Codeine Camsylate     Conj Estrog-Medroxyprogest Ace Other (See Comments)     Intolerant - bleeding    Estrogens, Conjugated Synthetic A     Nsaids     Sulfa Antibiotics

## 2023-09-14 NOTE — PLAN OF CARE
"  Problem: Plan of Care - These are the overarching goals to be used throughout the patient stay.    Goal: Plan of Care Review  Description: The Plan of Care Review/Shift note should be completed every shift.  The Outcome Evaluation is a brief statement about your assessment that the patient is improving, declining, or no change.  This information will be displayed automatically on your shift note.  Outcome: Progressing  Flowsheets (Taken 9/13/2023 2011)  Plan of Care Reviewed With: patient  Goal: Patient-Specific Goal (Individualized)  Description: You can add care plan individualizations to a care plan. Examples of Individualization might be:  \"Parent requests to be called daily at 9am for status\", \"I have a hard time hearing out of my right ear\", or \"Do not touch me to wake me up as it startles me\".  Outcome: Progressing  Goal: Absence of Hospital-Acquired Illness or Injury  Outcome: Progressing  Intervention: Identify and Manage Fall Risk  Recent Flowsheet Documentation  Taken 9/13/2023 1630 by Shimon Mathis, RN  Safety Promotion/Fall Prevention: clutter free environment maintained  Intervention: Prevent Skin Injury  Recent Flowsheet Documentation  Taken 9/13/2023 1630 by Shimon Mathis, RN  Body Position: supine  Goal: Optimal Comfort and Wellbeing  Outcome: Progressing  Goal: Readiness for Transition of Care  Outcome: Progressing     Problem: Risk for Delirium  Goal: Improved Sleep  Outcome: Progressing   Goal Outcome Evaluation:      Plan of Care Reviewed With: patient                 "

## 2023-09-14 NOTE — PROGRESS NOTES
Order to discontinue frederick. Writer went in to discontinue frederick & noted pink tinged urine in catheter bag with a few blood clots. Urine in catheter tubing appears clear/yellow w/o clots/redness. Insertion site WNL. Held lovenox, ASA & Arava. Writer text paged Dr. Hernandez. MD updated & gave okay to continue to hold meds, okay to remove frederick. Frederick removed at approx. 1235. Pt denies pain. VSS.

## 2023-09-15 NOTE — PROGRESS NOTES
[facility] GERIATRIC SERVICES    Code Status:  FULL CODE   Visit Type:   Chief Complaint   Patient presents with    TCU Admission     [facility] 9/6/2023 - 9/14/2023     Facility:  [facility] ([facility]) [[id number]]         HPI: Sharon De La Vega is a 77 year old female who I am seeing today for admit to the TCU. Pt recently hospitalized on 9/6/23. Pt admitted with generalized weakness and COVID 19 infection. Past medical history includes Moderate to severe aortic valve stenosis, HL, hypothyroidism, CKD stage III, hyponatremia, dementia and thrombocytopenia. Pt presented with syncope and generalized weakness. She was found to have COVID 19 infection. Syncope multi factorial due to COVID infection and cardiac disease with moderate to severe aortic stenosis. Pt noted symptoms 2-3 days prior to admission. She was given 3 days of Remdesivir. She did not require oxygen. CKD stage III with normal limits. BNP 2868 during hospitalization. Pt seen by cardiology. Pt continues on antihypertensives, statin and was started on ASA. However this was discontinued upon discharge due to history of GI bleed and currently on methotrexate. Hyponatremia resolved.     Transitional Care Course:Today pt sitting up in bedside chair. Pt niece present on exam. Pt denies any current symptoms. No SOB or CP. Pt eating well. She reports having regular bowel movements.       Assessment/Plan:     COVID-19  -Pt completed course of Remdesivir.   -Currently asymptomatic.   -Pt now off isolation.   -Follow up CBC and BMP.     Major neurocognitive disorder due to Alzheimer's disease (H)  -Aricept 10 mg every day.   -Ok for ST to eval and treat     Aortic valve stenosis, etiology of cardiac valve disease unspecified  -Echo on 3/30/23 with EF of 55-60%.  -Pro BNP 2868.  -EKG with NSR.  -Recommend follow up with cardiology for nuclear stress test.   -Continue statin.   -No ASA due to hx of GI bleed and being on methotrexate.     Essential  hypertension  -benazepril 10 mg every day.  -Arava 10 mg every day.       Other secondary osteoarthritis of multiple sites  -Methotrexate 2.5 mg weekly on Thursdays.     Subclinical iodine-deficiency hypothyroidism  -levothyroxine 100 mcg every day.     CKD, Stage III  -Follow up BMP.     -Follow up CBC and BMP.   -Ok for PT, OT and ST.     Active Ambulatory Problems     Diagnosis Date Noted    Hypothyroidism 10/29/2012    Essential hypertension 10/29/2012    Hypercholesterolemia 10/29/2012    DJD (degenerative joint disease)- right knee 10/29/2012    Arthritis with psoriasis (H) 10/29/2012    S/P knee replacement- right 10/29/2012    Intertrigo 11/01/2012    Anxiety 02/14/2013    Major neurocognitive disorder due to Alzheimer's disease (H) 06/20/2022    Major depressive disorder, single episode, moderate (H) 11/28/2022    Elevated troponin 09/06/2023    COVID-19 09/06/2023    Aortic valve stenosis 04/07/2023     Resolved Ambulatory Problems     Diagnosis Date Noted    Cellulitis- right leg after TKA 10/29/2012    Acute renal failure (H) 10/29/2012    Constipation 11/01/2012     No Additional Past Medical History     Allergies   Allergen Reactions    Acetaminophen-Codeine GI Disturbance     Indigestion    Celecoxib Unknown    Codeine Camsylate Unknown    Conj Estrog-Medroxyprogest Ace Other (See Comments)     Bleeding    Estrogens, Conjugated Synthetic A Unknown    Nsaids Unknown    Sulfa Antibiotics Unknown       All Meds and Allergies reviewed in the record at the facility and is the most up-to-date.    Post Discharge Medication Reconciliation Status: discharge medications reconciled and changed, per note/orders  Current Outpatient Medications   Medication Sig    benazepril (LOTENSIN) 10 MG tablet Take 10 mg by mouth daily    calcium carbonate-vitamin D 600-200 MG-UNIT TABS Take 1 tablet by mouth 2 times daily.    citalopram (CELEXA) 20 MG tablet Take 1 tablet by mouth daily at 2 pm    cyanocobalamin (VITAMIN  "B-12) 1000 MCG tablet TAKE 1 TABLET (1,000 MCG) BY MOUTH ONCE DAILY.    donepezil (ARICEPT) 10 MG tablet Take 1 tablet (10 mg) by mouth At Bedtime    folic acid (FOLVITE) 1 MG tablet Take 1 mg by mouth daily.    leflunomide (ARAVA) 10 MG tablet Take 1 tablet by mouth daily at 2 pm    levothyroxine (TIROSINT) 100 MCG capsule Take 100 mcg by mouth daily    methotrexate 2.5 MG tablet Take 10 mg by mouth once a week On Thursday    nystatin (MYCOSTATIN) 479022 UNIT/GM external powder Apply topically as needed for dry skin    simvastatin (ZOCOR) 20 MG tablet TAKE 1 TABLET (20 MG) BY MOUTH ONCE DAILY WITH EVENING MEAL.     No current facility-administered medications for this visit.       REVIEW OF SYSTEMS:   10 point review of systems reviewed and pertinent positives in the HPI.     PHYSICAL EXAMINATION:  Physical Exam     Vital signs: /78   Pulse 68   Temp 97.8  F (36.6  C)   Resp 16   Ht 1.626 m (5' 4\")   SpO2 99%   BMI 39.24 kg/m    General: Awake, Alert, oriented x3, appropriately, sitting up in bedside chair, follows simple commands, conversant  HEENT: Pink conjunctiva, moist oral mucosa  NECK: Supple  CVS:  S1  S2, without murmur or gallop.   LUNG: Clear to auscultation, No wheezes, rales or rhonci.  BACK: No kyphosis of the thoracic spine  ABDOMEN: Soft, nontender to palpation, with positive bowel sounds  EXTREMITIES: Good range of motion on both upper and lower extremities, no pedal edema, no calf tenderness  SKIN: Warm and dry, no rashes or erythema noted  NEUROLOGIC: Intact, pulses palpable  PSYCHIATRIC: Cognitive impairment noted.       Labs:  All labs reviewed in the nursing home record and Epic   @  Lab Results   Component Value Date    WBC 5.8 09/12/2023     Lab Results   Component Value Date    RBC 4.00 09/12/2023     Lab Results   Component Value Date    HGB 12.8 09/12/2023     Lab Results   Component Value Date    HCT 39.6 09/12/2023     Lab Results   Component Value Date    MCV 99 09/12/2023 "     Lab Results   Component Value Date    MCH 32.0 09/12/2023     Lab Results   Component Value Date    MCHC 32.3 09/12/2023     Lab Results   Component Value Date    RDW 14.7 09/12/2023     Lab Results   Component Value Date     09/13/2023        @Last Comprehensive Metabolic Panel:  Sodium   Date Value Ref Range Status   09/14/2023 136 136 - 145 mmol/L Final     Potassium   Date Value Ref Range Status   09/14/2023 3.8 3.4 - 5.3 mmol/L Final     Chloride   Date Value Ref Range Status   09/14/2023 97 (L) 98 - 107 mmol/L Final     Carbon Dioxide (CO2)   Date Value Ref Range Status   09/14/2023 28 22 - 29 mmol/L Final     Anion Gap   Date Value Ref Range Status   09/14/2023 11 7 - 15 mmol/L Final     Glucose   Date Value Ref Range Status   09/14/2023 102 (H) 70 - 99 mg/dL Final     GLUCOSE BY METER POCT   Date Value Ref Range Status   09/13/2023 94 70 - 99 mg/dL Final     Urea Nitrogen   Date Value Ref Range Status   09/14/2023 25.4 (H) 8.0 - 23.0 mg/dL Final     Creatinine   Date Value Ref Range Status   09/14/2023 0.98 (H) 0.51 - 0.95 mg/dL Final     GFR Estimate   Date Value Ref Range Status   09/14/2023 60 (L) >60 mL/min/1.73m2 Final     Calcium   Date Value Ref Range Status   09/14/2023 9.0 8.8 - 10.2 mg/dL Final     45 minutes spent for this visit which included reviewing records, medications, labs, cardiac consults as well face to face with pt and her family.      At the conclusion of the encounter I discussed  the results of all of the tests and the disposition with patient.   All questions were answered.  The patient acknowledged understanding and was involved in the decision making regarding the overall care plan.        This note has been dictated using voice recognition software. Any grammatical or context distortions are unintentional and inherent to the software    Electronically signed by: Janett Wiseman CNP

## 2023-09-18 ENCOUNTER — TRANSITIONAL CARE UNIT VISIT (OUTPATIENT)
Dept: GERIATRICS | Facility: CLINIC | Age: 78
End: 2023-09-18
Payer: COMMERCIAL

## 2023-09-18 ENCOUNTER — LAB REQUISITION (OUTPATIENT)
Dept: LAB | Facility: CLINIC | Age: 78
End: 2023-09-18
Payer: COMMERCIAL

## 2023-09-18 VITALS
OXYGEN SATURATION: 99 % | RESPIRATION RATE: 16 BRPM | HEIGHT: 64 IN | TEMPERATURE: 97.8 F | DIASTOLIC BLOOD PRESSURE: 78 MMHG | SYSTOLIC BLOOD PRESSURE: 136 MMHG | HEART RATE: 68 BPM | BODY MASS INDEX: 39.24 KG/M2

## 2023-09-18 DIAGNOSIS — F02.80 MAJOR NEUROCOGNITIVE DISORDER DUE TO ALZHEIMER'S DISEASE (H): ICD-10-CM

## 2023-09-18 DIAGNOSIS — G30.9 MAJOR NEUROCOGNITIVE DISORDER DUE TO ALZHEIMER'S DISEASE (H): ICD-10-CM

## 2023-09-18 DIAGNOSIS — M15.3 OTHER SECONDARY OSTEOARTHRITIS OF MULTIPLE SITES: ICD-10-CM

## 2023-09-18 DIAGNOSIS — E02 SUBCLINICAL IODINE-DEFICIENCY HYPOTHYROIDISM: ICD-10-CM

## 2023-09-18 DIAGNOSIS — U07.1 COVID-19: ICD-10-CM

## 2023-09-18 DIAGNOSIS — I35.0 AORTIC VALVE STENOSIS, ETIOLOGY OF CARDIAC VALVE DISEASE UNSPECIFIED: ICD-10-CM

## 2023-09-18 DIAGNOSIS — U07.1 COVID-19: Primary | ICD-10-CM

## 2023-09-18 DIAGNOSIS — I10 ESSENTIAL HYPERTENSION: ICD-10-CM

## 2023-09-18 PROCEDURE — 99310 SBSQ NF CARE HIGH MDM 45: CPT | Performed by: NURSE PRACTITIONER

## 2023-09-18 NOTE — LETTER
9/18/2023        RE: Sharon De La Vega  [address] [place] [address]        [facility]    Code Status:  FULL CODE   Visit Type:   Chief Complaint   Patient presents with     TCU Admission     [facility] 9/6/2023 - 9/14/2023     Facility:  [facility] ([facility]) [[id number]]         HPI: Sharon De La Vega is a 77 year old female who I am seeing today for admit to the TCU. Pt recently hospitalized on 9/6/23. Pt admitted with generalized weakness and COVID 19 infection. Past medical history includes Moderate to severe aortic valve stenosis, HL, hypothyroidism, CKD stage III, hyponatremia, dementia and thrombocytopenia. Pt presented with syncope and generalized weakness. She was found to have COVID 19 infection. Syncope multi factorial due to COVID infection and cardiac disease with moderate to severe aortic stenosis. Pt noted symptoms 2-3 days prior to admission. She was given 3 days of Remdesivir. She did not require oxygen. CKD stage III with normal limits. BNP 2868 during hospitalization. Pt seen by cardiology. Pt continues on antihypertensives, statin and was started on ASA. However this was discontinued upon discharge due to history of GI bleed and currently on methotrexate. Hyponatremia resolved.     Transitional Care Course:Today pt sitting up in bedside chair. Pt niece present on exam. Pt denies any current symptoms. No SOB or CP. Pt eating well. She reports having regular bowel movements.       Assessment/Plan:     COVID-19  -Pt completed course of Remdesivir.   -Currently asymptomatic.   -Pt now off isolation.   -Follow up CBC and BMP.     Major neurocognitive disorder due to Alzheimer's disease (H)  -Aricept 10 mg every day.   -Ok for ST to eval and treat     Aortic valve stenosis, etiology of cardiac valve disease unspecified  -Echo on 3/30/23 with EF of 55-60%.  -Pro BNP 2868.  -EKG with NSR.  -Recommend follow up with cardiology for nuclear stress test.   -Continue  statin.   -No ASA due to hx of GI bleed and being on methotrexate.     Essential hypertension  -benazepril 10 mg every day.  -Arava 10 mg every day.       Other secondary osteoarthritis of multiple sites  -Methotrexate 2.5 mg weekly on Thursdays.     Subclinical iodine-deficiency hypothyroidism  -levothyroxine 100 mcg every day.     CKD, Stage III  -Follow up BMP.     -Follow up CBC and BMP.   -Ok for PT, OT and ST.     Active Ambulatory Problems     Diagnosis Date Noted     Hypothyroidism 10/29/2012     Essential hypertension 10/29/2012     Hypercholesterolemia 10/29/2012     DJD (degenerative joint disease)- right knee 10/29/2012     Arthritis with psoriasis (H) 10/29/2012     S/P knee replacement- right 10/29/2012     Intertrigo 11/01/2012     Anxiety 02/14/2013     Major neurocognitive disorder due to Alzheimer's disease (H) 06/20/2022     Major depressive disorder, single episode, moderate (H) 11/28/2022     Elevated troponin 09/06/2023     COVID-19 09/06/2023     Aortic valve stenosis 04/07/2023     Resolved Ambulatory Problems     Diagnosis Date Noted     Cellulitis- right leg after TKA 10/29/2012     Acute renal failure (H) 10/29/2012     Constipation 11/01/2012     No Additional Past Medical History     Allergies   Allergen Reactions     Acetaminophen-Codeine GI Disturbance     Indigestion     Celecoxib Unknown     Codeine Camsylate Unknown     Conj Estrog-Medroxyprogest Ace Other (See Comments)     Bleeding     Estrogens, Conjugated Synthetic A Unknown     Nsaids Unknown     Sulfa Antibiotics Unknown       All Meds and Allergies reviewed in the record at the facility and is the most up-to-date.    Post Discharge Medication Reconciliation Status: discharge medications reconciled and changed, per note/orders  Current Outpatient Medications   Medication Sig     benazepril (LOTENSIN) 10 MG tablet Take 10 mg by mouth daily     calcium carbonate-vitamin D 600-200 MG-UNIT TABS Take 1 tablet by mouth 2 times  "daily.     citalopram (CELEXA) 20 MG tablet Take 1 tablet by mouth daily at 2 pm     cyanocobalamin (VITAMIN B-12) 1000 MCG tablet TAKE 1 TABLET (1,000 MCG) BY MOUTH ONCE DAILY.     donepezil (ARICEPT) 10 MG tablet Take 1 tablet (10 mg) by mouth At Bedtime     folic acid (FOLVITE) 1 MG tablet Take 1 mg by mouth daily.     leflunomide (ARAVA) 10 MG tablet Take 1 tablet by mouth daily at 2 pm     levothyroxine (TIROSINT) 100 MCG capsule Take 100 mcg by mouth daily     methotrexate 2.5 MG tablet Take 10 mg by mouth once a week On Thursday     nystatin (MYCOSTATIN) 009915 UNIT/GM external powder Apply topically as needed for dry skin     simvastatin (ZOCOR) 20 MG tablet TAKE 1 TABLET (20 MG) BY MOUTH ONCE DAILY WITH EVENING MEAL.     No current facility-administered medications for this visit.       REVIEW OF SYSTEMS:   10 point review of systems reviewed and pertinent positives in the HPI.     PHYSICAL EXAMINATION:  Physical Exam     Vital signs: /78   Pulse 68   Temp 97.8  F (36.6  C)   Resp 16   Ht 1.626 m (5' 4\")   SpO2 99%   BMI 39.24 kg/m    General: Awake, Alert, oriented x3, appropriately, sitting up in bedside chair, follows simple commands, conversant  HEENT: Pink conjunctiva, moist oral mucosa  NECK: Supple  CVS:  S1  S2, without murmur or gallop.   LUNG: Clear to auscultation, No wheezes, rales or rhonci.  BACK: No kyphosis of the thoracic spine  ABDOMEN: Soft, nontender to palpation, with positive bowel sounds  EXTREMITIES: Good range of motion on both upper and lower extremities, no pedal edema, no calf tenderness  SKIN: Warm and dry, no rashes or erythema noted  NEUROLOGIC: Intact, pulses palpable  PSYCHIATRIC: Cognitive impairment noted.       Labs:  All labs reviewed in the nursing home record and [facility]   @  Lab Results   Component Value Date    WBC 5.8 09/12/2023     Lab Results   Component Value Date    RBC 4.00 09/12/2023     Lab Results   Component Value Date    HGB 12.8 09/12/2023 "     Lab Results   Component Value Date    HCT 39.6 09/12/2023     Lab Results   Component Value Date    MCV 99 09/12/2023     Lab Results   Component Value Date    MCH 32.0 09/12/2023     Lab Results   Component Value Date    MCHC 32.3 09/12/2023     Lab Results   Component Value Date    RDW 14.7 09/12/2023     Lab Results   Component Value Date     09/13/2023        @Last Comprehensive Metabolic Panel:  Sodium   Date Value Ref Range Status   09/14/2023 136 136 - 145 mmol/L Final     Potassium   Date Value Ref Range Status   09/14/2023 3.8 3.4 - 5.3 mmol/L Final     Chloride   Date Value Ref Range Status   09/14/2023 97 (L) 98 - 107 mmol/L Final     Carbon Dioxide (CO2)   Date Value Ref Range Status   09/14/2023 28 22 - 29 mmol/L Final     Anion Gap   Date Value Ref Range Status   09/14/2023 11 7 - 15 mmol/L Final     Glucose   Date Value Ref Range Status   09/14/2023 102 (H) 70 - 99 mg/dL Final     GLUCOSE BY METER POCT   Date Value Ref Range Status   09/13/2023 94 70 - 99 mg/dL Final     Urea Nitrogen   Date Value Ref Range Status   09/14/2023 25.4 (H) 8.0 - 23.0 mg/dL Final     Creatinine   Date Value Ref Range Status   09/14/2023 0.98 (H) 0.51 - 0.95 mg/dL Final     GFR Estimate   Date Value Ref Range Status   09/14/2023 60 (L) >60 mL/min/1.73m2 Final     Calcium   Date Value Ref Range Status   09/14/2023 9.0 8.8 - 10.2 mg/dL Final     45 minutes spent for this visit which included reviewing records, medications, labs, cardiac consults as well face to face with pt and her family.      At the conclusion of the encounter I discussed  the results of all of the tests and the disposition with patient.   All questions were answered.  The patient acknowledged understanding and was involved in the decision making regarding the overall care plan.        This note has been dictated using voice recognition software. Any grammatical or context distortions are unintentional and inherent to the  software    Electronically signed by: Janett Wiseman CNP       Sincerely,        Janett Wiseman, NP

## 2023-09-19 LAB
ANION GAP SERPL CALCULATED.3IONS-SCNC: 7 MMOL/L (ref 7–15)
BUN SERPL-MCNC: 27.2 MG/DL (ref 8–23)
CALCIUM SERPL-MCNC: 10.1 MG/DL (ref 8.8–10.2)
CHLORIDE SERPL-SCNC: 103 MMOL/L (ref 98–107)
CREAT SERPL-MCNC: 1.11 MG/DL (ref 0.51–0.95)
DEPRECATED HCO3 PLAS-SCNC: 30 MMOL/L (ref 22–29)
EGFRCR SERPLBLD CKD-EPI 2021: 51 ML/MIN/1.73M2
ERYTHROCYTE [DISTWIDTH] IN BLOOD BY AUTOMATED COUNT: 14.4 % (ref 10–15)
GLUCOSE SERPL-MCNC: 94 MG/DL (ref 70–99)
HCT VFR BLD AUTO: 39.5 % (ref 35–47)
HGB BLD-MCNC: 12.5 G/DL (ref 11.7–15.7)
MCH RBC QN AUTO: 32 PG (ref 26.5–33)
MCHC RBC AUTO-ENTMCNC: 31.6 G/DL (ref 31.5–36.5)
MCV RBC AUTO: 101 FL (ref 78–100)
PLATELET # BLD AUTO: 200 10E3/UL (ref 150–450)
POTASSIUM SERPL-SCNC: 4.9 MMOL/L (ref 3.4–5.3)
RBC # BLD AUTO: 3.91 10E6/UL (ref 3.8–5.2)
SODIUM SERPL-SCNC: 140 MMOL/L (ref 136–145)
WBC # BLD AUTO: 6.2 10E3/UL (ref 4–11)

## 2023-09-19 PROCEDURE — P9604 ONE-WAY ALLOW PRORATED TRIP: HCPCS | Mod: ORL | Performed by: NURSE PRACTITIONER

## 2023-09-19 PROCEDURE — 36415 COLL VENOUS BLD VENIPUNCTURE: CPT | Mod: ORL | Performed by: NURSE PRACTITIONER

## 2023-09-19 PROCEDURE — 80048 BASIC METABOLIC PNL TOTAL CA: CPT | Mod: ORL | Performed by: NURSE PRACTITIONER

## 2023-09-19 PROCEDURE — 85027 COMPLETE CBC AUTOMATED: CPT | Mod: ORL | Performed by: NURSE PRACTITIONER

## 2023-09-20 ENCOUNTER — LAB REQUISITION (OUTPATIENT)
Dept: LAB | Facility: CLINIC | Age: 78
End: 2023-09-20
Payer: COMMERCIAL

## 2023-09-20 ENCOUNTER — TRANSITIONAL CARE UNIT VISIT (OUTPATIENT)
Dept: GERIATRICS | Facility: CLINIC | Age: 78
End: 2023-09-20
Payer: COMMERCIAL

## 2023-09-20 VITALS
OXYGEN SATURATION: 96 % | BODY MASS INDEX: 37.56 KG/M2 | HEIGHT: 64 IN | DIASTOLIC BLOOD PRESSURE: 65 MMHG | SYSTOLIC BLOOD PRESSURE: 115 MMHG | WEIGHT: 220 LBS | RESPIRATION RATE: 20 BRPM | TEMPERATURE: 97.4 F | HEART RATE: 77 BPM

## 2023-09-20 DIAGNOSIS — I35.0 AORTIC VALVE STENOSIS, ETIOLOGY OF CARDIAC VALVE DISEASE UNSPECIFIED: ICD-10-CM

## 2023-09-20 DIAGNOSIS — F02.80 MAJOR NEUROCOGNITIVE DISORDER DUE TO ALZHEIMER'S DISEASE (H): ICD-10-CM

## 2023-09-20 DIAGNOSIS — E86.0 DEHYDRATION: ICD-10-CM

## 2023-09-20 DIAGNOSIS — U07.1 COVID-19: Primary | ICD-10-CM

## 2023-09-20 DIAGNOSIS — G30.9 MAJOR NEUROCOGNITIVE DISORDER DUE TO ALZHEIMER'S DISEASE (H): ICD-10-CM

## 2023-09-20 DIAGNOSIS — I10 ESSENTIAL HYPERTENSION: ICD-10-CM

## 2023-09-20 PROCEDURE — 99309 SBSQ NF CARE MODERATE MDM 30: CPT | Performed by: NURSE PRACTITIONER

## 2023-09-20 NOTE — LETTER
9/20/2023        RE: Sharon De La Vega  [address] [place] [address]        [facility]    Code Status:  FULL CODE   Visit Type:   Chief Complaint   Patient presents with     TCU Follow Up     Facility:  [facility] ([facility]) [[id number]]         HPI: Sharon De La Vega is a 77 year old female who I am seeing today for follow up on  the TCU. Pt recently hospitalized on 9/6/23. Pt admitted with generalized weakness and COVID 19 infection. Past medical history includes Moderate to severe aortic valve stenosis, HL, hypothyroidism, CKD stage III, hyponatremia, dementia and thrombocytopenia. Pt presented with syncope and generalized weakness. She was found to have COVID 19 infection. Syncope multi factorial due to COVID infection and cardiac disease with moderate to severe aortic stenosis. Pt noted symptoms 2-3 days prior to admission. She was given 3 days of Remdesivir. She did not require oxygen. CKD stage III with normal limits. BNP 2868 during hospitalization. Pt seen by cardiology. Pt continues on antihypertensives, statin and was started on ASA. However this was discontinued upon discharge due to history of GI bleed and currently on methotrexate. Hyponatremia resolved.     Transitional Care Course:Today pt sitting up in bedside chair. Underlying cognitive impairment. Recent COVID Infection. Pt denies any SOB or CP. Pt eating well. She reports having regular bowel movements. Pt eager to discharge home.       Assessment/Plan:     COVID-19  -Pt completed course of Remdesivir.   -Currently asymptomatic.   -Pt now off isolation.   -BMP with Creatine of 1.11  -Encourage fluids.   -Repeat BMP in am.     Major neurocognitive disorder due to Alzheimer's disease (H)  -Aricept 10 mg every day.   -ST to eval and treat     Aortic valve stenosis, etiology of cardiac valve disease unspecified  -Echo on 3/30/23 with EF of 55-60%.  -Pro BNP 2868.  -EKG with NSR.  -Recommend follow up with  cardiology for nuclear stress test.   -Continue statin.   -No ASA due to hx of GI bleed and being on methotrexate.     Essential hypertension  -benazepril 10 mg every day.  -Arava 10 mg every day.     Other secondary osteoarthritis of multiple sites  -Methotrexate 2.5 mg weekly on Thursdays.     CKD, Stage III  -Follow up BMP with creatine of 1.11.       Active Ambulatory Problems     Diagnosis Date Noted     Hypothyroidism 10/29/2012     Essential hypertension 10/29/2012     Hypercholesterolemia 10/29/2012     DJD (degenerative joint disease)- right knee 10/29/2012     Arthritis with psoriasis (H) 10/29/2012     S/P knee replacement- right 10/29/2012     Intertrigo 11/01/2012     Anxiety 02/14/2013     Major neurocognitive disorder due to Alzheimer's disease (H) 06/20/2022     Major depressive disorder, single episode, moderate (H) 11/28/2022     Elevated troponin 09/06/2023     COVID-19 09/06/2023     Aortic valve stenosis 04/07/2023     Resolved Ambulatory Problems     Diagnosis Date Noted     Cellulitis- right leg after TKA 10/29/2012     Acute renal failure (H) 10/29/2012     Constipation 11/01/2012     No Additional Past Medical History     Allergies   Allergen Reactions     Acetaminophen-Codeine GI Disturbance     Indigestion     Celecoxib Unknown     Codeine Camsylate Unknown     Conj Estrog-Medroxyprogest Ace Other (See Comments)     Bleeding     Estrogens, Conjugated Synthetic A Unknown     Nsaids Unknown     Sulfa Antibiotics Unknown       All Meds and Allergies reviewed in the record at the facility and is the most up-to-date.    Post Discharge Medication Reconciliation Status: discharge medications reconciled and changed, per note/orders  Current Outpatient Medications   Medication Sig     benazepril (LOTENSIN) 10 MG tablet Take 10 mg by mouth daily     calcium carbonate-vitamin D 600-200 MG-UNIT TABS Take 1 tablet by mouth 2 times daily.     citalopram (CELEXA) 20 MG tablet Take 1 tablet by mouth daily  "at 2 pm     cyanocobalamin (VITAMIN B-12) 1000 MCG tablet TAKE 1 TABLET (1,000 MCG) BY MOUTH ONCE DAILY.     donepezil (ARICEPT) 10 MG tablet Take 1 tablet (10 mg) by mouth At Bedtime     folic acid (FOLVITE) 1 MG tablet Take 1 mg by mouth daily.     leflunomide (ARAVA) 10 MG tablet Take 1 tablet by mouth daily at 2 pm     levothyroxine (TIROSINT) 100 MCG capsule Take 100 mcg by mouth daily     methotrexate 2.5 MG tablet Take 10 mg by mouth once a week On Thursday     nystatin (MYCOSTATIN) 115215 UNIT/GM external powder Apply topically as needed for dry skin     simvastatin (ZOCOR) 20 MG tablet TAKE 1 TABLET (20 MG) BY MOUTH ONCE DAILY WITH EVENING MEAL.     No current facility-administered medications for this visit.       REVIEW OF SYSTEMS:   10 point review of systems reviewed and pertinent positives in the HPI.     PHYSICAL EXAMINATION:  Physical Exam     Vital signs: /65   Pulse 77   Temp 97.4  F (36.3  C)   Resp 20   Ht 1.626 m (5' 4\")   Wt 99.8 kg (220 lb)   SpO2 96%   BMI 37.76 kg/m    General: Awake, Alert, oriented x3, appropriately, sitting up in bedside chair,  conversant  HEENT: Pink conjunctiva, moist oral mucosa  NECK: Supple  CVS:  S1  S2, without murmur or gallop.   LUNG: Clear to auscultation, No wheezes, rales or rhonci.  BACK: No kyphosis of the thoracic spine  ABDOMEN: Soft, with positive bowel sounds  EXTREMITIES: Good range of motion on both upper and lower extremities, no pedal edema, no calf tenderness  SKIN: Warm and dry, no rashes or erythema noted  NEUROLOGIC: Intact, pulses palpable  PSYCHIATRIC: Cognitive impairment noted.       Labs:  All labs reviewed in the nursing home record and Epic   @  Lab Results   Component Value Date    WBC 5.8 09/12/2023     Lab Results   Component Value Date    RBC 4.00 09/12/2023     Lab Results   Component Value Date    HGB 12.8 09/12/2023     Lab Results   Component Value Date    HCT 39.6 09/12/2023     Lab Results   Component Value Date    " MCV 99 09/12/2023     Lab Results   Component Value Date    MCH 32.0 09/12/2023     Lab Results   Component Value Date    MCHC 32.3 09/12/2023     Lab Results   Component Value Date    RDW 14.7 09/12/2023     Lab Results   Component Value Date     09/13/2023        @Last Comprehensive Metabolic Panel:  Sodium   Date Value Ref Range Status   09/19/2023 140 136 - 145 mmol/L Final     Potassium   Date Value Ref Range Status   09/19/2023 4.9 3.4 - 5.3 mmol/L Final     Chloride   Date Value Ref Range Status   09/19/2023 103 98 - 107 mmol/L Final     Carbon Dioxide (CO2)   Date Value Ref Range Status   09/19/2023 30 (H) 22 - 29 mmol/L Final     Anion Gap   Date Value Ref Range Status   09/19/2023 7 7 - 15 mmol/L Final     Glucose   Date Value Ref Range Status   09/19/2023 94 70 - 99 mg/dL Final     GLUCOSE BY METER POCT   Date Value Ref Range Status   09/13/2023 94 70 - 99 mg/dL Final     Urea Nitrogen   Date Value Ref Range Status   09/19/2023 27.2 (H) 8.0 - 23.0 mg/dL Final     Creatinine   Date Value Ref Range Status   09/19/2023 1.11 (H) 0.51 - 0.95 mg/dL Final     GFR Estimate   Date Value Ref Range Status   09/19/2023 51 (L) >60 mL/min/1.73m2 Final     Calcium   Date Value Ref Range Status   09/19/2023 10.1 8.8 - 10.2 mg/dL Final       This note has been dictated using voice recognition software. Any grammatical or context distortions are unintentional and inherent to the software    Electronically signed by: Janett Wiseman, DEREJE       Sincerely,        Janett Wiseman, NP

## 2023-09-21 LAB
ANION GAP SERPL CALCULATED.3IONS-SCNC: 7 MMOL/L (ref 7–15)
BUN SERPL-MCNC: 28 MG/DL (ref 8–23)
CALCIUM SERPL-MCNC: 9.4 MG/DL (ref 8.8–10.2)
CHLORIDE SERPL-SCNC: 103 MMOL/L (ref 98–107)
CREAT SERPL-MCNC: 1.28 MG/DL (ref 0.51–0.95)
DEPRECATED HCO3 PLAS-SCNC: 30 MMOL/L (ref 22–29)
EGFRCR SERPLBLD CKD-EPI 2021: 43 ML/MIN/1.73M2
GLUCOSE SERPL-MCNC: 92 MG/DL (ref 70–99)
POTASSIUM SERPL-SCNC: 4.6 MMOL/L (ref 3.4–5.3)
SODIUM SERPL-SCNC: 140 MMOL/L (ref 136–145)

## 2023-09-21 PROCEDURE — P9604 ONE-WAY ALLOW PRORATED TRIP: HCPCS | Mod: ORL | Performed by: FAMILY MEDICINE

## 2023-09-21 PROCEDURE — 80048 BASIC METABOLIC PNL TOTAL CA: CPT | Mod: ORL | Performed by: FAMILY MEDICINE

## 2023-09-21 PROCEDURE — 36415 COLL VENOUS BLD VENIPUNCTURE: CPT | Mod: ORL | Performed by: FAMILY MEDICINE

## 2023-09-21 NOTE — PROGRESS NOTES
[facility] GERIATRIC SERVICES    Code Status:  FULL CODE   Visit Type:   Chief Complaint   Patient presents with    TCU Follow Up     Facility:  [facility] ([facility]) [[id number]]         HPI: Sharon De La Vega is a 77 year old female who I am seeing today for follow up on  the TCU. Pt recently hospitalized on 9/6/23. Pt admitted with generalized weakness and COVID 19 infection. Past medical history includes Moderate to severe aortic valve stenosis, HL, hypothyroidism, CKD stage III, hyponatremia, dementia and thrombocytopenia. Pt presented with syncope and generalized weakness. She was found to have COVID 19 infection. Syncope multi factorial due to COVID infection and cardiac disease with moderate to severe aortic stenosis. Pt noted symptoms 2-3 days prior to admission. She was given 3 days of Remdesivir. She did not require oxygen. CKD stage III with normal limits. BNP 2868 during hospitalization. Pt seen by cardiology. Pt continues on antihypertensives, statin and was started on ASA. However this was discontinued upon discharge due to history of GI bleed and currently on methotrexate. Hyponatremia resolved.     Transitional Care Course:Today pt sitting up in bedside chair. Underlying cognitive impairment. Recent COVID Infection. Pt denies any SOB or CP. Pt eating well. She reports having regular bowel movements. Pt eager to discharge home.       Assessment/Plan:     COVID-19  -Pt completed course of Remdesivir.   -Currently asymptomatic.   -Pt now off isolation.   -BMP with Creatine of 1.11  -Encourage fluids.   -Repeat BMP in am.     Major neurocognitive disorder due to Alzheimer's disease (H)  -Aricept 10 mg every day.   -ST to eval and treat     Aortic valve stenosis, etiology of cardiac valve disease unspecified  -Echo on 3/30/23 with EF of 55-60%.  -Pro BNP 2868.  -EKG with NSR.  -Recommend follow up with cardiology for nuclear stress test.   -Continue statin.   -No ASA due to hx of GI bleed and being on  methotrexate.     Essential hypertension  -benazepril 10 mg every day.  -Arava 10 mg every day.     Other secondary osteoarthritis of multiple sites  -Methotrexate 2.5 mg weekly on Thursdays.     CKD, Stage III  -Follow up BMP with creatine of 1.11.       Active Ambulatory Problems     Diagnosis Date Noted    Hypothyroidism 10/29/2012    Essential hypertension 10/29/2012    Hypercholesterolemia 10/29/2012    DJD (degenerative joint disease)- right knee 10/29/2012    Arthritis with psoriasis (H) 10/29/2012    S/P knee replacement- right 10/29/2012    Intertrigo 11/01/2012    Anxiety 02/14/2013    Major neurocognitive disorder due to Alzheimer's disease (H) 06/20/2022    Major depressive disorder, single episode, moderate (H) 11/28/2022    Elevated troponin 09/06/2023    COVID-19 09/06/2023    Aortic valve stenosis 04/07/2023     Resolved Ambulatory Problems     Diagnosis Date Noted    Cellulitis- right leg after TKA 10/29/2012    Acute renal failure (H) 10/29/2012    Constipation 11/01/2012     No Additional Past Medical History     Allergies   Allergen Reactions    Acetaminophen-Codeine GI Disturbance     Indigestion    Celecoxib Unknown    Codeine Camsylate Unknown    Conj Estrog-Medroxyprogest Ace Other (See Comments)     Bleeding    Estrogens, Conjugated Synthetic A Unknown    Nsaids Unknown    Sulfa Antibiotics Unknown       All Meds and Allergies reviewed in the record at the facility and is the most up-to-date.    Post Discharge Medication Reconciliation Status: discharge medications reconciled and changed, per note/orders  Current Outpatient Medications   Medication Sig    benazepril (LOTENSIN) 10 MG tablet Take 10 mg by mouth daily    calcium carbonate-vitamin D 600-200 MG-UNIT TABS Take 1 tablet by mouth 2 times daily.    citalopram (CELEXA) 20 MG tablet Take 1 tablet by mouth daily at 2 pm    cyanocobalamin (VITAMIN B-12) 1000 MCG tablet TAKE 1 TABLET (1,000 MCG) BY MOUTH ONCE DAILY.    donepezil (ARICEPT)  "10 MG tablet Take 1 tablet (10 mg) by mouth At Bedtime    folic acid (FOLVITE) 1 MG tablet Take 1 mg by mouth daily.    leflunomide (ARAVA) 10 MG tablet Take 1 tablet by mouth daily at 2 pm    levothyroxine (TIROSINT) 100 MCG capsule Take 100 mcg by mouth daily    methotrexate 2.5 MG tablet Take 10 mg by mouth once a week On Thursday    nystatin (MYCOSTATIN) 616632 UNIT/GM external powder Apply topically as needed for dry skin    simvastatin (ZOCOR) 20 MG tablet TAKE 1 TABLET (20 MG) BY MOUTH ONCE DAILY WITH EVENING MEAL.     No current facility-administered medications for this visit.       REVIEW OF SYSTEMS:   10 point review of systems reviewed and pertinent positives in the HPI.     PHYSICAL EXAMINATION:  Physical Exam     Vital signs: /65   Pulse 77   Temp 97.4  F (36.3  C)   Resp 20   Ht 1.626 m (5' 4\")   Wt 99.8 kg (220 lb)   SpO2 96%   BMI 37.76 kg/m    General: Awake, Alert, oriented x3, appropriately, sitting up in bedside chair,  conversant  HEENT: Pink conjunctiva, moist oral mucosa  NECK: Supple  CVS:  S1  S2, without murmur or gallop.   LUNG: Clear to auscultation, No wheezes, rales or rhonci.  BACK: No kyphosis of the thoracic spine  ABDOMEN: Soft, with positive bowel sounds  EXTREMITIES: Good range of motion on both upper and lower extremities, no pedal edema, no calf tenderness  SKIN: Warm and dry, no rashes or erythema noted  NEUROLOGIC: Intact, pulses palpable  PSYCHIATRIC: Cognitive impairment noted.       Labs:  All labs reviewed in the nursing home record and [facility]   @  Lab Results   Component Value Date    WBC 5.8 09/12/2023     Lab Results   Component Value Date    RBC 4.00 09/12/2023     Lab Results   Component Value Date    HGB 12.8 09/12/2023     Lab Results   Component Value Date    HCT 39.6 09/12/2023     Lab Results   Component Value Date    MCV 99 09/12/2023     Lab Results   Component Value Date    MCH 32.0 09/12/2023     Lab Results   Component Value Date    MCHC 32.3 " 09/12/2023     Lab Results   Component Value Date    RDW 14.7 09/12/2023     Lab Results   Component Value Date     09/13/2023        @Last Comprehensive Metabolic Panel:  Sodium   Date Value Ref Range Status   09/19/2023 140 136 - 145 mmol/L Final     Potassium   Date Value Ref Range Status   09/19/2023 4.9 3.4 - 5.3 mmol/L Final     Chloride   Date Value Ref Range Status   09/19/2023 103 98 - 107 mmol/L Final     Carbon Dioxide (CO2)   Date Value Ref Range Status   09/19/2023 30 (H) 22 - 29 mmol/L Final     Anion Gap   Date Value Ref Range Status   09/19/2023 7 7 - 15 mmol/L Final     Glucose   Date Value Ref Range Status   09/19/2023 94 70 - 99 mg/dL Final     GLUCOSE BY METER POCT   Date Value Ref Range Status   09/13/2023 94 70 - 99 mg/dL Final     Urea Nitrogen   Date Value Ref Range Status   09/19/2023 27.2 (H) 8.0 - 23.0 mg/dL Final     Creatinine   Date Value Ref Range Status   09/19/2023 1.11 (H) 0.51 - 0.95 mg/dL Final     GFR Estimate   Date Value Ref Range Status   09/19/2023 51 (L) >60 mL/min/1.73m2 Final     Calcium   Date Value Ref Range Status   09/19/2023 10.1 8.8 - 10.2 mg/dL Final       This note has been dictated using voice recognition software. Any grammatical or context distortions are unintentional and inherent to the software    Electronically signed by: Janett Wiseman, CNP

## 2023-10-01 ENCOUNTER — LAB REQUISITION (OUTPATIENT)
Dept: LAB | Facility: CLINIC | Age: 78
End: 2023-10-01
Payer: COMMERCIAL

## 2023-10-01 ENCOUNTER — TELEPHONE (OUTPATIENT)
Dept: GERIATRICS | Facility: CLINIC | Age: 78
End: 2023-10-01
Payer: COMMERCIAL

## 2023-10-01 RX ORDER — ACETAMINOPHEN 325 MG/1
650 TABLET ORAL EVERY 6 HOURS PRN
COMMUNITY
Start: 2023-10-01

## 2023-10-01 NOTE — TELEPHONE ENCOUNTER
[facility]   2023     Name: Sharon De La Vega   : 1945     Background:  Per staff report, temperature 99.9 F. Difficult to obtain history due to dementia. Lungs clear. Pads have been soaked. Has some redness around the groin. Questioning if she can have Tylenol given allergy to Acetaminophen-Codeine.    Per review of Epic, had Acetaminophen during hospitalization. Allergy listed as GI upset.    Orders:  Scheduled Nystatin 100,000 U BID due to groin redness  Ok for Acetaminophen 650 mg q6h PRN. Monitor for GI upset.  CBC, BMP 10/2/23 due to fever    Electronically signed by JAMEL Lakhani CNP

## 2023-10-02 LAB
ANION GAP SERPL CALCULATED.3IONS-SCNC: 9 MMOL/L (ref 7–15)
BUN SERPL-MCNC: 22.5 MG/DL (ref 8–23)
CALCIUM SERPL-MCNC: 8.6 MG/DL (ref 8.8–10.2)
CHLORIDE SERPL-SCNC: 106 MMOL/L (ref 98–107)
CREAT SERPL-MCNC: 1.2 MG/DL (ref 0.51–0.95)
DEPRECATED HCO3 PLAS-SCNC: 29 MMOL/L (ref 22–29)
EGFRCR SERPLBLD CKD-EPI 2021: 47 ML/MIN/1.73M2
ERYTHROCYTE [DISTWIDTH] IN BLOOD BY AUTOMATED COUNT: 14.7 % (ref 10–15)
GLUCOSE SERPL-MCNC: 93 MG/DL (ref 70–99)
HCT VFR BLD AUTO: 34.1 % (ref 35–47)
HGB BLD-MCNC: 10.5 G/DL (ref 11.7–15.7)
MCH RBC QN AUTO: 32.3 PG (ref 26.5–33)
MCHC RBC AUTO-ENTMCNC: 30.8 G/DL (ref 31.5–36.5)
MCV RBC AUTO: 105 FL (ref 78–100)
PLATELET # BLD AUTO: 138 10E3/UL (ref 150–450)
POTASSIUM SERPL-SCNC: 3.8 MMOL/L (ref 3.4–5.3)
RBC # BLD AUTO: 3.25 10E6/UL (ref 3.8–5.2)
SODIUM SERPL-SCNC: 144 MMOL/L (ref 135–145)
WBC # BLD AUTO: 5.9 10E3/UL (ref 4–11)

## 2023-10-02 PROCEDURE — 85027 COMPLETE CBC AUTOMATED: CPT | Mod: ORL | Performed by: NURSE PRACTITIONER

## 2023-10-02 PROCEDURE — P9603 ONE-WAY ALLOW PRORATED MILES: HCPCS | Mod: ORL | Performed by: NURSE PRACTITIONER

## 2023-10-02 PROCEDURE — 36415 COLL VENOUS BLD VENIPUNCTURE: CPT | Mod: ORL | Performed by: NURSE PRACTITIONER

## 2023-10-02 PROCEDURE — 80048 BASIC METABOLIC PNL TOTAL CA: CPT | Mod: ORL | Performed by: NURSE PRACTITIONER

## 2023-10-03 ENCOUNTER — TRANSITIONAL CARE UNIT VISIT (OUTPATIENT)
Dept: GERIATRICS | Facility: CLINIC | Age: 78
End: 2023-10-03
Payer: COMMERCIAL

## 2023-10-03 VITALS
SYSTOLIC BLOOD PRESSURE: 116 MMHG | HEIGHT: 64 IN | OXYGEN SATURATION: 96 % | TEMPERATURE: 97.6 F | DIASTOLIC BLOOD PRESSURE: 70 MMHG | WEIGHT: 229.4 LBS | BODY MASS INDEX: 39.16 KG/M2 | HEART RATE: 69 BPM | RESPIRATION RATE: 18 BRPM

## 2023-10-03 DIAGNOSIS — U07.1 COVID-19: Primary | ICD-10-CM

## 2023-10-03 DIAGNOSIS — I10 ESSENTIAL HYPERTENSION: ICD-10-CM

## 2023-10-03 DIAGNOSIS — I35.0 AORTIC VALVE STENOSIS, ETIOLOGY OF CARDIAC VALVE DISEASE UNSPECIFIED: ICD-10-CM

## 2023-10-03 DIAGNOSIS — G30.9 MAJOR NEUROCOGNITIVE DISORDER DUE TO ALZHEIMER'S DISEASE (H): ICD-10-CM

## 2023-10-03 DIAGNOSIS — F02.80 MAJOR NEUROCOGNITIVE DISORDER DUE TO ALZHEIMER'S DISEASE (H): ICD-10-CM

## 2023-10-03 DIAGNOSIS — M15.3 OTHER SECONDARY OSTEOARTHRITIS OF MULTIPLE SITES: ICD-10-CM

## 2023-10-03 PROCEDURE — 99309 SBSQ NF CARE MODERATE MDM 30: CPT | Performed by: NURSE PRACTITIONER

## 2023-10-03 NOTE — LETTER
10/3/2023        RE: Sharon De La Vega  [address] [place] [address]        [facility]ERI[facility]    Code Status:  FULL CODE   Visit Type:   Chief Complaint   Patient presents with     TCU Follow Up     Facility:  [facility] ([facility]) [[id number]]         HPI: Sharon De La Vega is a 77 year old female who I am seeing today for follow up on the TCU. Pt recently hospitalized on 9/6/23. Pt admitted with generalized weakness and COVID 19 infection. Past medical history includes Moderate to severe aortic valve stenosis, HL, hypothyroidism, CKD stage III, hyponatremia, dementia and thrombocytopenia. Pt presented with syncope and generalized weakness. She was found to have COVID 19 infection. Syncope multi factorial due to COVID infection and cardiac disease with moderate to severe aortic stenosis. Pt noted symptoms 2-3 days prior to admission. She was given 3 days of Remdesivir. She did not require oxygen. CKD stage III with normal limits. BNP 2868 during hospitalization. Pt seen by cardiology. Pt continues on antihypertensives, statin and was started on ASA. However this was discontinued upon discharge due to history of GI bleed and currently on methotrexate. Hyponatremia resolved.     Transitional Care Course:Today pt sitting up in bedside chair. Underlying cognitive impairment. Recent COVID Infection. Considered recovered. Pt denies any SOB or CP. Pt eating well. DJD of mx joints with pain controlled with tylenol.       Assessment/Plan:     COVID-19  -Pt completed course of Remdesivir.   -Considered recovered.     Major neurocognitive disorder due to Alzheimer's disease (H)  -Aricept 10 mg every day.   -ST following.     Aortic valve stenosis, etiology of cardiac valve disease unspecified  -Echo on 3/30/23 with EF of 55-60%.  -Pro BNP 2868.  -EKG with NSR.  -Recommend follow up with cardiology for nuclear stress test.   -Continue statin.   -No ASA due to hx of GI bleed and being on  methotrexate.     Essential hypertension  -benazepril 10 mg every day.  -Arava 10 mg every day.     Other secondary osteoarthritis of multiple sites  -Methotrexate 2.5 mg weekly on Thursdays.     CKD, Stage III  -Follow up BMP with creatine of 1.11.     Kelsea Rash   -Nystatin BID.       Active Ambulatory Problems     Diagnosis Date Noted     Hypothyroidism 10/29/2012     Essential hypertension 10/29/2012     Hypercholesterolemia 10/29/2012     DJD (degenerative joint disease)- right knee 10/29/2012     Arthritis with psoriasis (H) 10/29/2012     S/P knee replacement- right 10/29/2012     Intertrigo 11/01/2012     Anxiety 02/14/2013     Major neurocognitive disorder due to Alzheimer's disease (H) 06/20/2022     Major depressive disorder, single episode, moderate (H) 11/28/2022     Elevated troponin 09/06/2023     COVID-19 09/06/2023     Aortic valve stenosis 04/07/2023     Resolved Ambulatory Problems     Diagnosis Date Noted     Cellulitis- right leg after TKA 10/29/2012     Acute renal failure (H24) 10/29/2012     Constipation 11/01/2012     No Additional Past Medical History     Allergies   Allergen Reactions     Acetaminophen-Codeine GI Disturbance     Indigestion     Celecoxib Unknown     Codeine Camsylate Unknown     Conj Estrog-Medroxyprogest Ace Other (See Comments)     Bleeding     Estrogens, Conjugated Synthetic A Unknown     Nsaids Unknown     Sulfa Antibiotics Unknown       All Meds and Allergies reviewed in the record at the facility and is the most up-to-date.    Current Outpatient Medications   Medication Sig     acetaminophen (TYLENOL) 325 MG tablet Take 2 tablets (650 mg) by mouth every 6 hours as needed for mild pain     benazepril (LOTENSIN) 10 MG tablet Take 10 mg by mouth daily     calcium carbonate-vitamin D 600-200 MG-UNIT TABS Take 1 tablet by mouth 2 times daily.     citalopram (CELEXA) 20 MG tablet Take 1 tablet by mouth daily at 2 pm     cyanocobalamin (VITAMIN B-12) 1000 MCG tablet TAKE 1  "TABLET (1,000 MCG) BY MOUTH ONCE DAILY.     donepezil (ARICEPT) 10 MG tablet Take 1 tablet (10 mg) by mouth At Bedtime     folic acid (FOLVITE) 1 MG tablet Take 1 mg by mouth daily.     leflunomide (ARAVA) 10 MG tablet Take 1 tablet by mouth daily at 2 pm     levothyroxine (TIROSINT) 100 MCG capsule Take 100 mcg by mouth daily     methotrexate 2.5 MG tablet Take 10 mg by mouth once a week On Thursday     nystatin (MYCOSTATIN) 696287 UNIT/GM external powder Apply topically 2 times daily     simvastatin (ZOCOR) 20 MG tablet TAKE 1 TABLET (20 MG) BY MOUTH ONCE DAILY WITH EVENING MEAL.     No current facility-administered medications for this visit.       REVIEW OF SYSTEMS:   10 point review of systems reviewed and pertinent positives in the HPI.     PHYSICAL EXAMINATION:  Physical Exam     Vital signs: /70   Pulse 69   Temp 97.6  F (36.4  C)   Resp 18   Ht 1.626 m (5' 4\")   Wt 104.1 kg (229 lb 6.4 oz)   SpO2 96%   BMI 39.38 kg/m    General: Awake, Alert, oriented x3, appropriately, sitting up in wheelchair,  conversant  HEENT: Pink conjunctiva, moist oral mucosa  NECK: Supple  CVS:  S1  S2, without murmur or gallop.   LUNG: Clear to auscultation, No wheezes, rales or rhonci.  BACK: No kyphosis of the thoracic spine  ABDOMEN: Soft, with positive bowel sounds  EXTREMITIES: Good range of motion on both upper and lower extremities, no pedal edema, no calf tenderness  SKIN: Redness to perineum.   NEUROLOGIC: Intact, pulses palpable  PSYCHIATRIC: Cognitive impairment noted.       Labs:  All labs reviewed in the nursing home record and [facility]   @  Lab Results   Component Value Date    WBC 5.8 09/12/2023     Lab Results   Component Value Date    RBC 4.00 09/12/2023     Lab Results   Component Value Date    HGB 12.8 09/12/2023     Lab Results   Component Value Date    HCT 39.6 09/12/2023     Lab Results   Component Value Date    MCV 99 09/12/2023     Lab Results   Component Value Date    MCH 32.0 09/12/2023     Lab " Results   Component Value Date    MCHC 32.3 09/12/2023     Lab Results   Component Value Date    RDW 14.7 09/12/2023     Lab Results   Component Value Date     09/13/2023        @Last Comprehensive Metabolic Panel:  Sodium   Date Value Ref Range Status   10/02/2023 144 135 - 145 mmol/L Final     Comment:     Reference intervals for this test were updated on 09/26/2023 to more accurately reflect our healthy population. There may be differences in the flagging of prior results with similar values performed with this method. Interpretation of those prior results can be made in the context of the updated reference intervals.      Potassium   Date Value Ref Range Status   10/02/2023 3.8 3.4 - 5.3 mmol/L Final     Chloride   Date Value Ref Range Status   10/02/2023 106 98 - 107 mmol/L Final     Carbon Dioxide (CO2)   Date Value Ref Range Status   10/02/2023 29 22 - 29 mmol/L Final     Anion Gap   Date Value Ref Range Status   10/02/2023 9 7 - 15 mmol/L Final     Glucose   Date Value Ref Range Status   10/02/2023 93 70 - 99 mg/dL Final     GLUCOSE BY METER POCT   Date Value Ref Range Status   09/13/2023 94 70 - 99 mg/dL Final     Urea Nitrogen   Date Value Ref Range Status   10/02/2023 22.5 8.0 - 23.0 mg/dL Final     Creatinine   Date Value Ref Range Status   10/02/2023 1.20 (H) 0.51 - 0.95 mg/dL Final     GFR Estimate   Date Value Ref Range Status   10/02/2023 47 (L) >60 mL/min/1.73m2 Final     Calcium   Date Value Ref Range Status   10/02/2023 8.6 (L) 8.8 - 10.2 mg/dL Final       This note has been dictated using voice recognition software. Any grammatical or context distortions are unintentional and inherent to the software    Electronically signed by: Janett Wiseman CNP         Sincerely,        Janett Wiseman NP

## 2023-10-04 NOTE — PROGRESS NOTES
[facility] GERIATRIC SERVICES    Code Status:  FULL CODE   Visit Type:   Chief Complaint   Patient presents with    TCU Follow Up     Facility:  [facility] ([facility]) [[id number]]         HPI: Sharon De La Vega is a 77 year old female who I am seeing today for follow up on the TCU. Pt recently hospitalized on 9/6/23. Pt admitted with generalized weakness and COVID 19 infection. Past medical history includes Moderate to severe aortic valve stenosis, HL, hypothyroidism, CKD stage III, hyponatremia, dementia and thrombocytopenia. Pt presented with syncope and generalized weakness. She was found to have COVID 19 infection. Syncope multi factorial due to COVID infection and cardiac disease with moderate to severe aortic stenosis. Pt noted symptoms 2-3 days prior to admission. She was given 3 days of Remdesivir. She did not require oxygen. CKD stage III with normal limits. BNP 2868 during hospitalization. Pt seen by cardiology. Pt continues on antihypertensives, statin and was started on ASA. However this was discontinued upon discharge due to history of GI bleed and currently on methotrexate. Hyponatremia resolved.     Transitional Care Course:Today pt sitting up in bedside chair. Underlying cognitive impairment. Recent COVID Infection. Considered recovered. Pt denies any SOB or CP. Pt eating well. DJD of mx joints with pain controlled with tylenol.       Assessment/Plan:     COVID-19  -Pt completed course of Remdesivir.   -Considered recovered.     Major neurocognitive disorder due to Alzheimer's disease (H)  -Aricept 10 mg every day.   -ST following.     Aortic valve stenosis, etiology of cardiac valve disease unspecified  -Echo on 3/30/23 with EF of 55-60%.  -Pro BNP 2868.  -EKG with NSR.  -Recommend follow up with cardiology for nuclear stress test.   -Continue statin.   -No ASA due to hx of GI bleed and being on methotrexate.     Essential hypertension  -benazepril 10 mg every day.  -Arava 10 mg every day.      Other secondary osteoarthritis of multiple sites  -Methotrexate 2.5 mg weekly on Thursdays.     CKD, Stage III  -Follow up BMP with creatine of 1.11.     Kelsea Rash   -Nystatin BID.       Active Ambulatory Problems     Diagnosis Date Noted    Hypothyroidism 10/29/2012    Essential hypertension 10/29/2012    Hypercholesterolemia 10/29/2012    DJD (degenerative joint disease)- right knee 10/29/2012    Arthritis with psoriasis (H) 10/29/2012    S/P knee replacement- right 10/29/2012    Intertrigo 11/01/2012    Anxiety 02/14/2013    Major neurocognitive disorder due to Alzheimer's disease (H) 06/20/2022    Major depressive disorder, single episode, moderate (H) 11/28/2022    Elevated troponin 09/06/2023    COVID-19 09/06/2023    Aortic valve stenosis 04/07/2023     Resolved Ambulatory Problems     Diagnosis Date Noted    Cellulitis- right leg after TKA 10/29/2012    Acute renal failure (H24) 10/29/2012    Constipation 11/01/2012     No Additional Past Medical History     Allergies   Allergen Reactions    Acetaminophen-Codeine GI Disturbance     Indigestion    Celecoxib Unknown    Codeine Camsylate Unknown    Conj Estrog-Medroxyprogest Ace Other (See Comments)     Bleeding    Estrogens, Conjugated Synthetic A Unknown    Nsaids Unknown    Sulfa Antibiotics Unknown       All Meds and Allergies reviewed in the record at the facility and is the most up-to-date.    Current Outpatient Medications   Medication Sig    acetaminophen (TYLENOL) 325 MG tablet Take 2 tablets (650 mg) by mouth every 6 hours as needed for mild pain    benazepril (LOTENSIN) 10 MG tablet Take 10 mg by mouth daily    calcium carbonate-vitamin D 600-200 MG-UNIT TABS Take 1 tablet by mouth 2 times daily.    citalopram (CELEXA) 20 MG tablet Take 1 tablet by mouth daily at 2 pm    cyanocobalamin (VITAMIN B-12) 1000 MCG tablet TAKE 1 TABLET (1,000 MCG) BY MOUTH ONCE DAILY.    donepezil (ARICEPT) 10 MG tablet Take 1 tablet (10 mg) by mouth At Bedtime     "folic acid (FOLVITE) 1 MG tablet Take 1 mg by mouth daily.    leflunomide (ARAVA) 10 MG tablet Take 1 tablet by mouth daily at 2 pm    levothyroxine (TIROSINT) 100 MCG capsule Take 100 mcg by mouth daily    methotrexate 2.5 MG tablet Take 10 mg by mouth once a week On Thursday    nystatin (MYCOSTATIN) 640192 UNIT/GM external powder Apply topically 2 times daily    simvastatin (ZOCOR) 20 MG tablet TAKE 1 TABLET (20 MG) BY MOUTH ONCE DAILY WITH EVENING MEAL.     No current facility-administered medications for this visit.       REVIEW OF SYSTEMS:   10 point review of systems reviewed and pertinent positives in the HPI.     PHYSICAL EXAMINATION:  Physical Exam     Vital signs: /70   Pulse 69   Temp 97.6  F (36.4  C)   Resp 18   Ht 1.626 m (5' 4\")   Wt 104.1 kg (229 lb 6.4 oz)   SpO2 96%   BMI 39.38 kg/m    General: Awake, Alert, oriented x3, appropriately, sitting up in wheelchair,  conversant  HEENT: Pink conjunctiva, moist oral mucosa  NECK: Supple  CVS:  S1  S2, without murmur or gallop.   LUNG: Clear to auscultation, No wheezes, rales or rhonci.  BACK: No kyphosis of the thoracic spine  ABDOMEN: Soft, with positive bowel sounds  EXTREMITIES: Good range of motion on both upper and lower extremities, no pedal edema, no calf tenderness  SKIN: Redness to perineum.   NEUROLOGIC: Intact, pulses palpable  PSYCHIATRIC: Cognitive impairment noted.       Labs:  All labs reviewed in the nursing home record and [facility]   @  Lab Results   Component Value Date    WBC 5.8 09/12/2023     Lab Results   Component Value Date    RBC 4.00 09/12/2023     Lab Results   Component Value Date    HGB 12.8 09/12/2023     Lab Results   Component Value Date    HCT 39.6 09/12/2023     Lab Results   Component Value Date    MCV 99 09/12/2023     Lab Results   Component Value Date    MCH 32.0 09/12/2023     Lab Results   Component Value Date    MCHC 32.3 09/12/2023     Lab Results   Component Value Date    RDW 14.7 09/12/2023     Lab " Results   Component Value Date     09/13/2023        @Last Comprehensive Metabolic Panel:  Sodium   Date Value Ref Range Status   10/02/2023 144 135 - 145 mmol/L Final     Comment:     Reference intervals for this test were updated on 09/26/2023 to more accurately reflect our healthy population. There may be differences in the flagging of prior results with similar values performed with this method. Interpretation of those prior results can be made in the context of the updated reference intervals.      Potassium   Date Value Ref Range Status   10/02/2023 3.8 3.4 - 5.3 mmol/L Final     Chloride   Date Value Ref Range Status   10/02/2023 106 98 - 107 mmol/L Final     Carbon Dioxide (CO2)   Date Value Ref Range Status   10/02/2023 29 22 - 29 mmol/L Final     Anion Gap   Date Value Ref Range Status   10/02/2023 9 7 - 15 mmol/L Final     Glucose   Date Value Ref Range Status   10/02/2023 93 70 - 99 mg/dL Final     GLUCOSE BY METER POCT   Date Value Ref Range Status   09/13/2023 94 70 - 99 mg/dL Final     Urea Nitrogen   Date Value Ref Range Status   10/02/2023 22.5 8.0 - 23.0 mg/dL Final     Creatinine   Date Value Ref Range Status   10/02/2023 1.20 (H) 0.51 - 0.95 mg/dL Final     GFR Estimate   Date Value Ref Range Status   10/02/2023 47 (L) >60 mL/min/1.73m2 Final     Calcium   Date Value Ref Range Status   10/02/2023 8.6 (L) 8.8 - 10.2 mg/dL Final       This note has been dictated using voice recognition software. Any grammatical or context distortions are unintentional and inherent to the software    Electronically signed by: Janett Wiseman, CNP

## 2023-10-05 ENCOUNTER — DISCHARGE SUMMARY NURSING HOME (OUTPATIENT)
Dept: GERIATRICS | Facility: CLINIC | Age: 78
End: 2023-10-05
Payer: COMMERCIAL

## 2023-10-05 VITALS
RESPIRATION RATE: 17 BRPM | SYSTOLIC BLOOD PRESSURE: 137 MMHG | OXYGEN SATURATION: 97 % | HEIGHT: 64 IN | HEART RATE: 75 BPM | DIASTOLIC BLOOD PRESSURE: 80 MMHG | TEMPERATURE: 97.3 F | WEIGHT: 236 LBS | BODY MASS INDEX: 40.29 KG/M2

## 2023-10-05 DIAGNOSIS — I35.0 AORTIC VALVE STENOSIS, ETIOLOGY OF CARDIAC VALVE DISEASE UNSPECIFIED: ICD-10-CM

## 2023-10-05 DIAGNOSIS — E02 SUBCLINICAL IODINE-DEFICIENCY HYPOTHYROIDISM: ICD-10-CM

## 2023-10-05 DIAGNOSIS — U07.1 COVID-19: Primary | ICD-10-CM

## 2023-10-05 DIAGNOSIS — M15.3 OTHER SECONDARY OSTEOARTHRITIS OF MULTIPLE SITES: ICD-10-CM

## 2023-10-05 DIAGNOSIS — F02.80 MAJOR NEUROCOGNITIVE DISORDER DUE TO ALZHEIMER'S DISEASE (H): ICD-10-CM

## 2023-10-05 DIAGNOSIS — G30.9 MAJOR NEUROCOGNITIVE DISORDER DUE TO ALZHEIMER'S DISEASE (H): ICD-10-CM

## 2023-10-05 DIAGNOSIS — I10 ESSENTIAL HYPERTENSION: ICD-10-CM

## 2023-10-05 PROCEDURE — 99316 NF DSCHRG MGMT 30 MIN+: CPT | Performed by: NURSE PRACTITIONER

## 2023-10-05 NOTE — LETTER
10/5/2023        RE: Sharon De La Vega  [address] [place] [address]        [facility]    Code Status:  FULL CODE   Visit Type:   Chief Complaint   Patient presents with     TCU Discharge     Facility:  [facility] ([facility]) [[id number]]         HPI: Sharon De La Vega is a 77 year old female who I am seeing today for discharge from the TCU. Pt recently hospitalized on 9/6/23. Pt admitted with generalized weakness and COVID 19 infection. Past medical history includes Moderate to severe aortic valve stenosis, HL, hypothyroidism, CKD stage III, hyponatremia, dementia and thrombocytopenia. Pt presented with syncope and generalized weakness. She was found to have COVID 19 infection. Syncope multi factorial due to COVID infection and cardiac disease with moderate to severe aortic stenosis. Pt noted symptoms 2-3 days prior to admission. She was given 3 days of Remdesivir. She did not require oxygen. CKD stage III with normal limits. BNP 2868 during hospitalization. Pt seen by cardiology. Pt continues on antihypertensives, statin and was started on ASA. However this was discontinued upon discharge due to history of GI bleed and currently on methotrexate. Hyponatremia resolved.     Transitional Care Course:Today pt sitting up in wheelchair. Recent COVID Infection. Considered recovered. Pt denies any SOB or CP. Pt eating well. DJD of mx joints with pain controlled with tylenol.       Assessment/Plan:     COVID-19  -Pt completed course of Remdesivir.   -Considered recovered.     Major neurocognitive disorder due to Alzheimer's disease (H)  -Aricept 10 mg every day.   -ST following.     Aortic valve stenosis, etiology of cardiac valve disease unspecified  -Echo on 3/30/23 with EF of 55-60%.  -Pro BNP 2868.  -EKG with NSR.  -Recommend follow up with cardiology for nuclear stress test.   -Continue statin.   -No ASA due to hx of GI bleed and being on methotrexate.     Essential  hypertension  -benazepril 10 mg every day.  -Arava 10 mg every day.     Other secondary osteoarthritis of multiple sites  -Methotrexate 2.5 mg weekly on Thursdays.     CKD, Stage III  -Follow up BMP with creatine of 1.11.     Kelsea Rash   -Nystatin BID.     -OK to discharge home with current meds and treatments.   -Home PT, OT, HHA and RN for medication management.   -Follow up with PCP in 1-2 weeks.     Active Ambulatory Problems     Diagnosis Date Noted     Hypothyroidism 10/29/2012     Essential hypertension 10/29/2012     Hypercholesterolemia 10/29/2012     DJD (degenerative joint disease)- right knee 10/29/2012     Arthritis with psoriasis (H) 10/29/2012     S/P knee replacement- right 10/29/2012     Intertrigo 11/01/2012     Anxiety 02/14/2013     Major neurocognitive disorder due to Alzheimer's disease (H) 06/20/2022     Major depressive disorder, single episode, moderate (H) 11/28/2022     Elevated troponin 09/06/2023     COVID-19 09/06/2023     Aortic valve stenosis 04/07/2023     Resolved Ambulatory Problems     Diagnosis Date Noted     Cellulitis- right leg after TKA 10/29/2012     Acute renal failure (H24) 10/29/2012     Constipation 11/01/2012     No Additional Past Medical History     Allergies   Allergen Reactions     Acetaminophen-Codeine GI Disturbance     Indigestion     Celecoxib Unknown     Codeine Camsylate Unknown     Conj Estrog-Medroxyprogest Ace Other (See Comments)     Bleeding     Estrogens, Conjugated Synthetic A Unknown     Nsaids Unknown     Sulfa Antibiotics Unknown       All Meds and Allergies reviewed in the record at the facility and is the most up-to-date.    Current Outpatient Medications   Medication Sig     acetaminophen (TYLENOL) 325 MG tablet Take 2 tablets (650 mg) by mouth every 6 hours as needed for mild pain     benazepril (LOTENSIN) 10 MG tablet Take 10 mg by mouth daily     calcium carbonate-vitamin D 600-200 MG-UNIT TABS Take 1 tablet by mouth 2 times daily.      "citalopram (CELEXA) 20 MG tablet Take 1 tablet by mouth daily at 2 pm     cyanocobalamin (VITAMIN B-12) 1000 MCG tablet TAKE 1 TABLET (1,000 MCG) BY MOUTH ONCE DAILY.     donepezil (ARICEPT) 10 MG tablet Take 1 tablet (10 mg) by mouth At Bedtime     folic acid (FOLVITE) 1 MG tablet Take 1 mg by mouth daily.     leflunomide (ARAVA) 10 MG tablet Take 1 tablet by mouth daily at 2 pm     levothyroxine (TIROSINT) 100 MCG capsule Take 100 mcg by mouth daily     methotrexate 2.5 MG tablet Take 10 mg by mouth once a week On Thursday     nystatin (MYCOSTATIN) 876131 UNIT/GM external powder Apply topically 2 times daily     simvastatin (ZOCOR) 20 MG tablet TAKE 1 TABLET (20 MG) BY MOUTH ONCE DAILY WITH EVENING MEAL.     No current facility-administered medications for this visit.       REVIEW OF SYSTEMS:   10 point review of systems reviewed and pertinent positives in the HPI.     PHYSICAL EXAMINATION:  Physical Exam     Vital signs: /80   Pulse 75   Temp 97.3  F (36.3  C)   Resp 17   Ht 1.626 m (5' 4\")   Wt 107 kg (236 lb)   SpO2 97%   BMI 40.51 kg/m    General: Awake, Alert, oriented x3, appropriately, sitting up in wheelchair,  conversant  HEENT: Pink conjunctiva, moist oral mucosa  NECK: Supple  CVS:  S1  S2, without murmur or gallop.   LUNG: Clear to auscultation, No wheezes, rales or rhonci.  BACK: No kyphosis of the thoracic spine  ABDOMEN: Soft, with positive bowel sounds  EXTREMITIES: Good range of motion on both upper and lower extremities, no pedal edema, no calf tenderness  SKIN: Redness to perineum.   NEUROLOGIC: Intact, pulses palpable  PSYCHIATRIC: Cognitive impairment noted.       Labs:  All labs reviewed in the nursing home record and Epic   @  Lab Results   Component Value Date    WBC 5.8 09/12/2023     Lab Results   Component Value Date    RBC 4.00 09/12/2023     Lab Results   Component Value Date    HGB 12.8 09/12/2023     Lab Results   Component Value Date    HCT 39.6 09/12/2023     Lab " Results   Component Value Date    MCV 99 09/12/2023     Lab Results   Component Value Date    MCH 32.0 09/12/2023     Lab Results   Component Value Date    MCHC 32.3 09/12/2023     Lab Results   Component Value Date    RDW 14.7 09/12/2023     Lab Results   Component Value Date     09/13/2023        @Last Comprehensive Metabolic Panel:  Sodium   Date Value Ref Range Status   10/02/2023 144 135 - 145 mmol/L Final     Comment:     Reference intervals for this test were updated on 09/26/2023 to more accurately reflect our healthy population. There may be differences in the flagging of prior results with similar values performed with this method. Interpretation of those prior results can be made in the context of the updated reference intervals.      Potassium   Date Value Ref Range Status   10/02/2023 3.8 3.4 - 5.3 mmol/L Final     Chloride   Date Value Ref Range Status   10/02/2023 106 98 - 107 mmol/L Final     Carbon Dioxide (CO2)   Date Value Ref Range Status   10/02/2023 29 22 - 29 mmol/L Final     Anion Gap   Date Value Ref Range Status   10/02/2023 9 7 - 15 mmol/L Final     Glucose   Date Value Ref Range Status   10/02/2023 93 70 - 99 mg/dL Final     GLUCOSE BY METER POCT   Date Value Ref Range Status   09/13/2023 94 70 - 99 mg/dL Final     Urea Nitrogen   Date Value Ref Range Status   10/02/2023 22.5 8.0 - 23.0 mg/dL Final     Creatinine   Date Value Ref Range Status   10/02/2023 1.20 (H) 0.51 - 0.95 mg/dL Final     GFR Estimate   Date Value Ref Range Status   10/02/2023 47 (L) >60 mL/min/1.73m2 Final     Calcium   Date Value Ref Range Status   10/02/2023 8.6 (L) 8.8 - 10.2 mg/dL Final     DISCHARGE PLAN/FACE TO FACE:  I certify that this patient is under my care and that I, or a nurse practitioner or physician's assistant working with me, had a face-to-face encounter that meets the physician face-to-face encounter requirements with this patient.       I certify that, based on my findings, the following  services are medically necessary home health services.    My clinical findings support the need for the above skilled services.    This patient is homebound because: Recent hospitalization with COVID-19 infection.     The patient is, or has been, under my care and I have initiated the establishment of the plan of care. This patient will be followed by a physician who will periodically review the plan of care.    35 minutes spent reviewing discharge medications, home care services and follow ups.     This note has been dictated using voice recognition software. Any grammatical or context distortions are unintentional and inherent to the software    Electronically signed by: Janett Wiseman CNP         Sincerely,        Janett Wiseman NP

## 2023-10-06 NOTE — PROGRESS NOTES
[facility]    Code Status:  FULL CODE   Visit Type:   Chief Complaint   Patient presents with    TCU Discharge     Facility:  [facility] ([facility]) [[id number]]         HPI: Sharon De La Vega is a 77 year old female who I am seeing today for discharge from the TCU. Pt recently hospitalized on 9/6/23. Pt admitted with generalized weakness and COVID 19 infection. Past medical history includes Moderate to severe aortic valve stenosis, HL, hypothyroidism, CKD stage III, hyponatremia, dementia and thrombocytopenia. Pt presented with syncope and generalized weakness. She was found to have COVID 19 infection. Syncope multi factorial due to COVID infection and cardiac disease with moderate to severe aortic stenosis. Pt noted symptoms 2-3 days prior to admission. She was given 3 days of Remdesivir. She did not require oxygen. CKD stage III with normal limits. BNP 2868 during hospitalization. Pt seen by cardiology. Pt continues on antihypertensives, statin and was started on ASA. However this was discontinued upon discharge due to history of GI bleed and currently on methotrexate. Hyponatremia resolved.     Transitional Care Course:Today pt sitting up in wheelchair. Recent COVID Infection. Considered recovered. Pt denies any SOB or CP. Pt eating well. DJD of mx joints with pain controlled with tylenol.       Assessment/Plan:     COVID-19  -Pt completed course of Remdesivir.   -Considered recovered.     Major neurocognitive disorder due to Alzheimer's disease (H)  -Aricept 10 mg every day.   -ST following.     Aortic valve stenosis, etiology of cardiac valve disease unspecified  -Echo on 3/30/23 with EF of 55-60%.  -Pro BNP 2868.  -EKG with NSR.  -Recommend follow up with cardiology for nuclear stress test.   -Continue statin.   -No ASA due to hx of GI bleed and being on methotrexate.     Essential hypertension  -benazepril 10 mg every day.  -Arava 10 mg every day.     Other secondary osteoarthritis of  multiple sites  -Methotrexate 2.5 mg weekly on Thursdays.     CKD, Stage III  -Follow up BMP with creatine of 1.11.     Kelsea Rash   -Nystatin BID.     -OK to discharge home with current meds and treatments.   -Home PT, OT, HHA and RN for medication management.   -Follow up with PCP in 1-2 weeks.     Active Ambulatory Problems     Diagnosis Date Noted    Hypothyroidism 10/29/2012    Essential hypertension 10/29/2012    Hypercholesterolemia 10/29/2012    DJD (degenerative joint disease)- right knee 10/29/2012    Arthritis with psoriasis (H) 10/29/2012    S/P knee replacement- right 10/29/2012    Intertrigo 11/01/2012    Anxiety 02/14/2013    Major neurocognitive disorder due to Alzheimer's disease (H) 06/20/2022    Major depressive disorder, single episode, moderate (H) 11/28/2022    Elevated troponin 09/06/2023    COVID-19 09/06/2023    Aortic valve stenosis 04/07/2023     Resolved Ambulatory Problems     Diagnosis Date Noted    Cellulitis- right leg after TKA 10/29/2012    Acute renal failure (H24) 10/29/2012    Constipation 11/01/2012     No Additional Past Medical History     Allergies   Allergen Reactions    Acetaminophen-Codeine GI Disturbance     Indigestion    Celecoxib Unknown    Codeine Camsylate Unknown    Conj Estrog-Medroxyprogest Ace Other (See Comments)     Bleeding    Estrogens, Conjugated Synthetic A Unknown    Nsaids Unknown    Sulfa Antibiotics Unknown       All Meds and Allergies reviewed in the record at the facility and is the most up-to-date.    Current Outpatient Medications   Medication Sig    acetaminophen (TYLENOL) 325 MG tablet Take 2 tablets (650 mg) by mouth every 6 hours as needed for mild pain    benazepril (LOTENSIN) 10 MG tablet Take 10 mg by mouth daily    calcium carbonate-vitamin D 600-200 MG-UNIT TABS Take 1 tablet by mouth 2 times daily.    citalopram (CELEXA) 20 MG tablet Take 1 tablet by mouth daily at 2 pm    cyanocobalamin (VITAMIN B-12) 1000 MCG tablet TAKE 1 TABLET (1,000  "MCG) BY MOUTH ONCE DAILY.    donepezil (ARICEPT) 10 MG tablet Take 1 tablet (10 mg) by mouth At Bedtime    folic acid (FOLVITE) 1 MG tablet Take 1 mg by mouth daily.    leflunomide (ARAVA) 10 MG tablet Take 1 tablet by mouth daily at 2 pm    levothyroxine (TIROSINT) 100 MCG capsule Take 100 mcg by mouth daily    methotrexate 2.5 MG tablet Take 10 mg by mouth once a week On Thursday    nystatin (MYCOSTATIN) 220419 UNIT/GM external powder Apply topically 2 times daily    simvastatin (ZOCOR) 20 MG tablet TAKE 1 TABLET (20 MG) BY MOUTH ONCE DAILY WITH EVENING MEAL.     No current facility-administered medications for this visit.       REVIEW OF SYSTEMS:   10 point review of systems reviewed and pertinent positives in the HPI.     PHYSICAL EXAMINATION:  Physical Exam     Vital signs: /80   Pulse 75   Temp 97.3  F (36.3  C)   Resp 17   Ht 1.626 m (5' 4\")   Wt 107 kg (236 lb)   SpO2 97%   BMI 40.51 kg/m    General: Awake, Alert, oriented x3, appropriately, sitting up in wheelchair,  conversant  HEENT: Pink conjunctiva, moist oral mucosa  NECK: Supple  CVS:  S1  S2, without murmur or gallop.   LUNG: Clear to auscultation, No wheezes, rales or rhonci.  BACK: No kyphosis of the thoracic spine  ABDOMEN: Soft, with positive bowel sounds  EXTREMITIES: Good range of motion on both upper and lower extremities, no pedal edema, no calf tenderness  SKIN: Redness to perineum.   NEUROLOGIC: Intact, pulses palpable  PSYCHIATRIC: Cognitive impairment noted.       Labs:  All labs reviewed in the nursing home record and Epic   @  Lab Results   Component Value Date    WBC 5.8 09/12/2023     Lab Results   Component Value Date    RBC 4.00 09/12/2023     Lab Results   Component Value Date    HGB 12.8 09/12/2023     Lab Results   Component Value Date    HCT 39.6 09/12/2023     Lab Results   Component Value Date    MCV 99 09/12/2023     Lab Results   Component Value Date    MCH 32.0 09/12/2023     Lab Results   Component Value Date "    MCHC 32.3 09/12/2023     Lab Results   Component Value Date    RDW 14.7 09/12/2023     Lab Results   Component Value Date     09/13/2023        @Last Comprehensive Metabolic Panel:  Sodium   Date Value Ref Range Status   10/02/2023 144 135 - 145 mmol/L Final     Comment:     Reference intervals for this test were updated on 09/26/2023 to more accurately reflect our healthy population. There may be differences in the flagging of prior results with similar values performed with this method. Interpretation of those prior results can be made in the context of the updated reference intervals.      Potassium   Date Value Ref Range Status   10/02/2023 3.8 3.4 - 5.3 mmol/L Final     Chloride   Date Value Ref Range Status   10/02/2023 106 98 - 107 mmol/L Final     Carbon Dioxide (CO2)   Date Value Ref Range Status   10/02/2023 29 22 - 29 mmol/L Final     Anion Gap   Date Value Ref Range Status   10/02/2023 9 7 - 15 mmol/L Final     Glucose   Date Value Ref Range Status   10/02/2023 93 70 - 99 mg/dL Final     GLUCOSE BY METER POCT   Date Value Ref Range Status   09/13/2023 94 70 - 99 mg/dL Final     Urea Nitrogen   Date Value Ref Range Status   10/02/2023 22.5 8.0 - 23.0 mg/dL Final     Creatinine   Date Value Ref Range Status   10/02/2023 1.20 (H) 0.51 - 0.95 mg/dL Final     GFR Estimate   Date Value Ref Range Status   10/02/2023 47 (L) >60 mL/min/1.73m2 Final     Calcium   Date Value Ref Range Status   10/02/2023 8.6 (L) 8.8 - 10.2 mg/dL Final     DISCHARGE PLAN/FACE TO FACE:  I certify that this patient is under my care and that I, or a nurse practitioner or physician's assistant working with me, had a face-to-face encounter that meets the physician face-to-face encounter requirements with this patient.       I certify that, based on my findings, the following services are medically necessary home health services.    My clinical findings support the need for the above skilled services.    This patient is  homebound because: Recent hospitalization with COVID-19 infection.     The patient is, or has been, under my care and I have initiated the establishment of the plan of care. This patient will be followed by a physician who will periodically review the plan of care.    35 minutes spent reviewing discharge medications, home care services and follow ups.     This note has been dictated using voice recognition software. Any grammatical or context distortions are unintentional and inherent to the software    Electronically signed by: Janett Wiseman CNP

## 2024-02-12 DIAGNOSIS — G30.9 MAJOR NEUROCOGNITIVE DISORDER DUE TO ALZHEIMER'S DISEASE (H): ICD-10-CM

## 2024-02-12 DIAGNOSIS — F02.80 MAJOR NEUROCOGNITIVE DISORDER DUE TO ALZHEIMER'S DISEASE (H): ICD-10-CM

## 2024-02-12 RX ORDER — DONEPEZIL HYDROCHLORIDE 10 MG/1
10 TABLET, FILM COATED ORAL AT BEDTIME
Qty: 90 TABLET | Refills: 0 | Status: SHIPPED | OUTPATIENT
Start: 2024-02-12 | End: 2024-02-21

## 2024-02-12 NOTE — TELEPHONE ENCOUNTER
Refill request for: donepezil (ARICEPT) 10 MG tablet    Directions: Take 1 tablet (10 mg) by mouth At Bedtime     LOV: 2/21/23  NOV: 2/21/24    90 day supply with 0 refills Medication T'd for review and signature  Skye Christopher CMA on 2/12/2024 at 2:31 PM  [facility]

## 2024-02-19 NOTE — PROGRESS NOTES
NEUROLOGY FOLLOW UP VISIT  NOTE       [facility] NEUROLOGY [place]  [address] [place] [address]  Tel: [phone]  Fax: [phone]  [email or web address]     Sharon De La Vega,  1945, MRN [id number]  PCP: Jaylen Young  Date: 2024      ASSESSMENT & PLAN     Visit Diagnosis  Major neurocognitive disorder due to Alzheimer's disease (H)     Major neurocognitive disorder due to Alzheimer's dementia  78-year-old female with HTN, HLD, prediabetes, anxiety, hypothyroidism who returns for yearly follow-up for Alzheimer's dementia.  Her condition is stable.  Previously her MoCA score was 13/30.  I have recommended:    1.  Continue Aricept 10 mg daily  2.  Add Namenda 5 mg daily gradually increasing to 10 mg twice daily.  Prescriptions were filled for next year  3.  Additionally patient was instructed to take a vitamin EE 1000 unit twice daily  4.  Check hepatic profile  5.  Follow-up in 1 year    Thank you again for this referral, please feel free to contact me if you have any questions.    Manuel Osuna MD  [facility]  ([facility], [facility]     HISTORY OF PRESENT ILLNESS     Patient is 78-year-old female with HTN, HLD, prediabetes, anxiety, hypothyroidism and Alzheimer's dementia who returns for follow-up.  She was last seen on 2023 and was continued on Aricept.  Previously she was concerned about the possibility of Parkinson disease but had a DaTscan that was normal.  Since her last visit no significant change.  She has a company that comes to her home and helps her take a bath couple of times a week.  She has not done anything that puts her other people at risk.  She is able to set up her medication and according to son takes it on a regular basis.  There is no history of any hallucination or any change in her personality.    BRIEFLY patient is a female with history of HTN, HLD, prediabetes, anxiety,  hypothyroidism who is been followed in our clinic for Alzheimer's dementia.  Her symptoms started in 2019 when family noticed that patient was struggling with short-term memory. She would repeat herself and ask questions repeatedly.  She had no difficulty with long-term memory.  There is no history of any bladder incontinence, hallucinations or any myoclonic twitches.  Patient lives with her son who manages the finances.  She also had left-sided tremors more pronounced when she was resting.  Although it raise the possibility of Parkinson disease a DaTscan was normal     PROBLEM LIST   Patient Active Problem List   Diagnosis Code    Hypothyroidism E03.9    Essential hypertension I10    Hypercholesterolemia E78.00    DJD (degenerative joint disease)- right knee M19.90    Arthritis with psoriasis (H) L40.50    S/P knee replacement- right Z96.659    Intertrigo L30.4    Anxiety F41.9    Major neurocognitive disorder due to Alzheimer's disease (H) G30.9, F02.80    Major depressive disorder, single episode, moderate (H) F32.1    Elevated troponin R79.89    COVID-19 U07.1    Aortic valve stenosis I35.0         PAST MEDICAL & SURGICAL HISTORY     Past Medical History:   Patient  has no past medical history on file.    Surgical History:  She  has no past surgical history on file.     SOCIAL HISTORY     Reviewed, and she  reports that she quit smoking about 37 years ago. Her smoking use included cigarettes. She has never used smokeless tobacco. She reports that she does not currently use alcohol.     FAMILY HISTORY     Reviewed, and family history includes Lung Cancer in her father; Multiple myeloma in her brother.     ALLERGIES     Allergies   Allergen Reactions    Acetaminophen-Codeine GI Disturbance     Indigestion    Celecoxib Unknown    Codeine Camsylate Unknown    Conj Estrog-Medroxyprogest Ace Other (See Comments)     Bleeding    Estrogens, Conjugated Synthetic A Unknown    Nsaids Unknown    Sulfa Antibiotics Unknown     "     REVIEW OF SYSTEMS     A 12 point review of system was performed and was negative except as outlined in the history of present illness.     HOME MEDICATIONS     Current Outpatient Rx   Medication Sig Dispense Refill    acetaminophen (TYLENOL) 325 MG tablet Take 2 tablets (650 mg) by mouth every 6 hours as needed for mild pain      benazepril (LOTENSIN) 10 MG tablet Take 10 mg by mouth daily      calcium carbonate-vitamin D 600-200 MG-UNIT TABS Take 1 tablet by mouth 2 times daily.      citalopram (CELEXA) 20 MG tablet Take 1 tablet by mouth daily at 2 pm      cyanocobalamin (VITAMIN B-12) 1000 MCG tablet TAKE 1 TABLET (1,000 MCG) BY MOUTH ONCE DAILY.      donepezil (ARICEPT) 10 MG tablet Take 1 tablet (10 mg) by mouth at bedtime 90 tablet 3    folic acid (FOLVITE) 1 MG tablet Take 1 mg by mouth daily.      leflunomide (ARAVA) 10 MG tablet Take 1 tablet by mouth daily at 2 pm      levothyroxine (TIROSINT) 100 MCG capsule Take 100 mcg by mouth daily      [START ON 3/23/2024] memantine (NAMENDA) 10 MG tablet Take 1 tablet (10 mg) by mouth 2 times daily 180 tablet 3    memantine (NAMENDA) 5 MG tablet 1 PO every day x 7 days, then 1 PO BID x 7 days, then 2 PO Q am & 1 PO at bedtime x 7 days, then 2 PO BID thereafter 70 tablet 0    methotrexate 2.5 MG tablet Take 10 mg by mouth once a week On Thursday      nystatin (MYCOSTATIN) 474341 UNIT/GM external powder Apply topically 2 times daily      simvastatin (ZOCOR) 20 MG tablet TAKE 1 TABLET (20 MG) BY MOUTH ONCE DAILY WITH EVENING MEAL.      vitamin E (TOCOPHEROL) 1000 units (450 mg) CAPS capsule Take 1 capsule (1,000 Units) by mouth 2 times daily 60 capsule 11         PHYSICAL EXAM     Vital signs  BP (!) 148/70   Pulse 68   Ht 1.626 m (5' 4\")   Wt 107 kg (236 lb)   BMI 40.51 kg/m      Weight:   236 lbs 0 oz  MoCA score on 11/28/2022 13/30  Patient is alert and oriented vital signs were reviewed and are documented in electronic medical record.  Neck supple.  " Neurologically speech normal.  Cranial nerves are intact.  She has minimal cogwheel rigidity on the left side with resting tremor.  She also has bilateral intention tremor.  Strength is 5/5 reflexes 1+ toes equivocal she has a wide-based gait and uses a cane     PERTINENT DIAGNOSTIC STUDIES     Following studies were reviewed:     MRI BRAIN 12/12/2022  1.  Brain atrophy and presumed chronic ischemic and microvascular ischemic changes as detailed above. Mild disproportionate volume loss affecting the right mesial temporal lobe structures and anterior hippocampal complex, which is nonspecific, though can   be seen in the setting of neurocognitive disorder such as Alzheimer's dementia. Clinical correlation recommended.  2.  No superimposed acute intracranial abnormality.     DATSCAN 1/31/2023  1. A presynaptic dopaminergic deficit is not present.     PERTINENT LABS  Following labs were reviewed:  No visits with results within 3 Month(s) from this visit.   Latest known visit with results is:   Lab Requisition on 10/02/2023   Component Date Value Ref Range Status    Sodium 10/02/2023 144  135 - 145 mmol/L Final    Potassium 10/02/2023 3.8  3.4 - 5.3 mmol/L Final    Chloride 10/02/2023 106  98 - 107 mmol/L Final    Carbon Dioxide (CO2) 10/02/2023 29  22 - 29 mmol/L Final    Anion Gap 10/02/2023 9  7 - 15 mmol/L Final    Urea Nitrogen 10/02/2023 22.5  8.0 - 23.0 mg/dL Final    Creatinine 10/02/2023 1.20 (H)  0.51 - 0.95 mg/dL Final    GFR Estimate 10/02/2023 47 (L)  >60 mL/min/1.73m2 Final    Calcium 10/02/2023 8.6 (L)  8.8 - 10.2 mg/dL Final    Glucose 10/02/2023 93  70 - 99 mg/dL Final    WBC Count 10/02/2023 5.9  4.0 - 11.0 10e3/uL Final    RBC Count 10/02/2023 3.25 (L)  3.80 - 5.20 10e6/uL Final    Hemoglobin 10/02/2023 10.5 (L)  11.7 - 15.7 g/dL Final    Hematocrit 10/02/2023 34.1 (L)  35.0 - 47.0 % Final    MCV 10/02/2023 105 (H)  78 - 100 fL Final    MCH 10/02/2023 32.3  26.5 - 33.0 pg Final    MCHC 10/02/2023 30.8  (L)  31.5 - 36.5 g/dL Final    RDW 10/02/2023 14.7  10.0 - 15.0 % Final    Platelet Count 10/02/2023 138 (L)  150 - 450 10e3/uL Final         Total time spent for face to face visit, reviewing labs/imaging studies, counseling and coordination of care was: 30 Minutes spent on the date of the encounter doing chart review, review of outside records, review of test results, interpretation of tests, patient visit, documentation, and discussion with family   The longitudinal plan of care for the condition(s) below were addressed during this visit. Due to the added complexity in care, I will continue to support  in the subsequent management of this condition(s) and with the ongoing continuity of care of this condition(s).    Problem List Items Addressed This Visit as of 2/21/2024         Neurology Diagnoses    Major neurocognitive disorder due to Alzheimer's disease (H) - Primary      This note was dictated using voice recognition software.  Any grammatical or context distortions are unintentional and inherent to the software.    Orders Placed This Encounter   Procedures    Hepatic function panel      New Prescriptions    MEMANTINE (NAMENDA) 10 MG TABLET    Take 1 tablet (10 mg) by mouth 2 times daily    MEMANTINE (NAMENDA) 5 MG TABLET    1 PO every day x 7 days, then 1 PO BID x 7 days, then 2 PO Q am & 1 PO at bedtime x 7 days, then 2 PO BID thereafter    VITAMIN E (TOCOPHEROL) 1000 UNITS (450 MG) CAPS CAPSULE    Take 1 capsule (1,000 Units) by mouth 2 times daily     Modified Medications    Modified Medication Previous Medication    DONEPEZIL (ARICEPT) 10 MG TABLET donepezil (ARICEPT) 10 MG tablet       Take 1 tablet (10 mg) by mouth at bedtime    TAKE 1 TABLET BY MOUTH EVERYDAY AT BEDTIME

## 2024-02-21 ENCOUNTER — OFFICE VISIT (OUTPATIENT)
Dept: NEUROLOGY | Facility: CLINIC | Age: 79
End: 2024-02-21
Payer: COMMERCIAL

## 2024-02-21 VITALS
BODY MASS INDEX: 40.29 KG/M2 | WEIGHT: 236 LBS | SYSTOLIC BLOOD PRESSURE: 148 MMHG | HEIGHT: 64 IN | DIASTOLIC BLOOD PRESSURE: 70 MMHG | HEART RATE: 68 BPM

## 2024-02-21 DIAGNOSIS — F02.80 MAJOR NEUROCOGNITIVE DISORDER DUE TO ALZHEIMER'S DISEASE (H): Primary | ICD-10-CM

## 2024-02-21 DIAGNOSIS — G30.9 MAJOR NEUROCOGNITIVE DISORDER DUE TO ALZHEIMER'S DISEASE (H): Primary | ICD-10-CM

## 2024-02-21 PROCEDURE — G2211 COMPLEX E/M VISIT ADD ON: HCPCS | Performed by: PSYCHIATRY & NEUROLOGY

## 2024-02-21 PROCEDURE — 99214 OFFICE O/P EST MOD 30 MIN: CPT | Performed by: PSYCHIATRY & NEUROLOGY

## 2024-02-21 RX ORDER — DONEPEZIL HYDROCHLORIDE 10 MG/1
10 TABLET, FILM COATED ORAL AT BEDTIME
Qty: 90 TABLET | Refills: 3 | Status: SHIPPED | OUTPATIENT
Start: 2024-02-21

## 2024-02-21 RX ORDER — MEMANTINE HYDROCHLORIDE 10 MG/1
10 TABLET ORAL 2 TIMES DAILY
Qty: 180 TABLET | Refills: 3 | Status: SHIPPED | OUTPATIENT
Start: 2024-03-23

## 2024-02-21 RX ORDER — MEMANTINE HYDROCHLORIDE 5 MG/1
TABLET ORAL
Qty: 70 TABLET | Refills: 0 | Status: SHIPPED | OUTPATIENT
Start: 2024-02-21 | End: 2024-03-22

## 2024-02-21 RX ORDER — MULTIVIT WITH MINERALS/LUTEIN
1000 TABLET ORAL 2 TIMES DAILY
Qty: 60 CAPSULE | Refills: 11 | Status: SHIPPED | OUTPATIENT
Start: 2024-02-21

## 2024-02-21 NOTE — NURSING NOTE
Chief Complaint   Patient presents with    Dementia     Follow up     Opal Baker on 2/21/2024 at 1:59 PM

## 2024-02-21 NOTE — LETTER
2024         RE: Sharon De La Vega  [address] [place] [address]        Dear Colleague,    Thank you for referring your patient, Sharon De La Vega, to the [facility]UROLOGY CL[facility]IC [place]. Please see a copy of my visit note below.    NEUROLOGY FOLLOW UP VISIT  NOTE       [facility] NEUROLOGY [place]  [address] [place] [address]  Tel: [phone]  Fax: [phone]  [email or web address]     Sharon De La Vega,  1945, MRN [id number]  PCP: Jaylen Young  Date: 2024      ASSESSMENT & PLAN     Visit Diagnosis  Major neurocognitive disorder due to Alzheimer's disease (H)     Major neurocognitive disorder due to Alzheimer's dementia  78-year-old female with HTN, HLD, prediabetes, anxiety, hypothyroidism who returns for yearly follow-up for Alzheimer's dementia.  Her condition is stable.  Previously her MoCA score was 13/30.  I have recommended:    1.  Continue Aricept 10 mg daily  2.  Add Namenda 5 mg daily gradually increasing to 10 mg twice daily.  Prescriptions were filled for next year  3.  Additionally patient was instructed to take a vitamin EE 1000 unit twice daily  4.  Check hepatic profile  5.  Follow-up in 1 year    Thank you again for this referral, please feel free to contact me if you have any questions.    Manuel Osuna MD  [facility]  ([facility], [facility]     HISTORY OF PRESENT ILLNESS     Patient is 78-year-old female with HTN, HLD, prediabetes, anxiety, hypothyroidism and Alzheimer's dementia who returns for follow-up.  She was last seen on 2023 and was continued on Aricept.  Previously she was concerned about the possibility of Parkinson disease but had a DaTscan that was normal.  Since her last visit no significant change.  She has a company that comes to her home and helps her take a bath couple of times a week.  She has not done anything that puts her  other people at risk.  She is able to set up her medication and according to son takes it on a regular basis.  There is no history of any hallucination or any change in her personality.    BRIEFLY patient is a female with history of HTN, HLD, prediabetes, anxiety, hypothyroidism who is been followed in our clinic for Alzheimer's dementia.  Her symptoms started in 2019 when family noticed that patient was struggling with short-term memory. She would repeat herself and ask questions repeatedly.  She had no difficulty with long-term memory.  There is no history of any bladder incontinence, hallucinations or any myoclonic twitches.  Patient lives with her son who manages the finances.  She also had left-sided tremors more pronounced when she was resting.  Although it raise the possibility of Parkinson disease a DaTscan was normal     PROBLEM LIST   Patient Active Problem List   Diagnosis Code     Hypothyroidism E03.9     Essential hypertension I10     Hypercholesterolemia E78.00     DJD (degenerative joint disease)- right knee M19.90     Arthritis with psoriasis (H) L40.50     S/P knee replacement- right Z96.659     Intertrigo L30.4     Anxiety F41.9     Major neurocognitive disorder due to Alzheimer's disease (H) G30.9, F02.80     Major depressive disorder, single episode, moderate (H) F32.1     Elevated troponin R79.89     COVID-19 U07.1     Aortic valve stenosis I35.0         PAST MEDICAL & SURGICAL HISTORY     Past Medical History:   Patient  has no past medical history on file.    Surgical History:  She  has no past surgical history on file.     SOCIAL HISTORY     Reviewed, and she  reports that she quit smoking about 37 years ago. Her smoking use included cigarettes. She has never used smokeless tobacco. She reports that she does not currently use alcohol.     FAMILY HISTORY     Reviewed, and family history includes Lung Cancer in her father; Multiple myeloma in her brother.     ALLERGIES     Allergies   Allergen  Reactions     Acetaminophen-Codeine GI Disturbance     Indigestion     Celecoxib Unknown     Codeine Camsylate Unknown     Conj Estrog-Medroxyprogest Ace Other (See Comments)     Bleeding     Estrogens, Conjugated Synthetic A Unknown     Nsaids Unknown     Sulfa Antibiotics Unknown         REVIEW OF SYSTEMS     A 12 point review of system was performed and was negative except as outlined in the history of present illness.     HOME MEDICATIONS     Current Outpatient Rx   Medication Sig Dispense Refill     acetaminophen (TYLENOL) 325 MG tablet Take 2 tablets (650 mg) by mouth every 6 hours as needed for mild pain       benazepril (LOTENSIN) 10 MG tablet Take 10 mg by mouth daily       calcium carbonate-vitamin D 600-200 MG-UNIT TABS Take 1 tablet by mouth 2 times daily.       citalopram (CELEXA) 20 MG tablet Take 1 tablet by mouth daily at 2 pm       cyanocobalamin (VITAMIN B-12) 1000 MCG tablet TAKE 1 TABLET (1,000 MCG) BY MOUTH ONCE DAILY.       donepezil (ARICEPT) 10 MG tablet Take 1 tablet (10 mg) by mouth at bedtime 90 tablet 3     folic acid (FOLVITE) 1 MG tablet Take 1 mg by mouth daily.       leflunomide (ARAVA) 10 MG tablet Take 1 tablet by mouth daily at 2 pm       levothyroxine (TIROSINT) 100 MCG capsule Take 100 mcg by mouth daily       [START ON 3/23/2024] memantine (NAMENDA) 10 MG tablet Take 1 tablet (10 mg) by mouth 2 times daily 180 tablet 3     memantine (NAMENDA) 5 MG tablet 1 PO every day x 7 days, then 1 PO BID x 7 days, then 2 PO Q am & 1 PO at bedtime x 7 days, then 2 PO BID thereafter 70 tablet 0     methotrexate 2.5 MG tablet Take 10 mg by mouth once a week On Thursday       nystatin (MYCOSTATIN) 791226 UNIT/GM external powder Apply topically 2 times daily       simvastatin (ZOCOR) 20 MG tablet TAKE 1 TABLET (20 MG) BY MOUTH ONCE DAILY WITH EVENING MEAL.       vitamin E (TOCOPHEROL) 1000 units (450 mg) CAPS capsule Take 1 capsule (1,000 Units) by mouth 2 times daily 60 capsule 11      "    PHYSICAL EXAM     Vital signs  BP (!) 148/70   Pulse 68   Ht 1.626 m (5' 4\")   Wt 107 kg (236 lb)   BMI 40.51 kg/m      Weight:   236 lbs 0 oz  MoCA score on 11/28/2022 13/30  Patient is alert and oriented vital signs were reviewed and are documented in electronic medical record.  Neck supple.  Neurologically speech normal.  Cranial nerves are intact.  She has minimal cogwheel rigidity on the left side with resting tremor.  She also has bilateral intention tremor.  Strength is 5/5 reflexes 1+ toes equivocal she has a wide-based gait and uses a cane     PERTINENT DIAGNOSTIC STUDIES     Following studies were reviewed:     MRI BRAIN 12/12/2022  1.  Brain atrophy and presumed chronic ischemic and microvascular ischemic changes as detailed above. Mild disproportionate volume loss affecting the right mesial temporal lobe structures and anterior hippocampal complex, which is nonspecific, though can   be seen in the setting of neurocognitive disorder such as Alzheimer's dementia. Clinical correlation recommended.  2.  No superimposed acute intracranial abnormality.     DATSCAN 1/31/2023  1. A presynaptic dopaminergic deficit is not present.     PERTINENT LABS  Following labs were reviewed:  No visits with results within 3 Month(s) from this visit.   Latest known visit with results is:   Lab Requisition on 10/02/2023   Component Date Value Ref Range Status     Sodium 10/02/2023 144  135 - 145 mmol/L Final     Potassium 10/02/2023 3.8  3.4 - 5.3 mmol/L Final     Chloride 10/02/2023 106  98 - 107 mmol/L Final     Carbon Dioxide (CO2) 10/02/2023 29  22 - 29 mmol/L Final     Anion Gap 10/02/2023 9  7 - 15 mmol/L Final     Urea Nitrogen 10/02/2023 22.5  8.0 - 23.0 mg/dL Final     Creatinine 10/02/2023 1.20 (H)  0.51 - 0.95 mg/dL Final     GFR Estimate 10/02/2023 47 (L)  >60 mL/min/1.73m2 Final     Calcium 10/02/2023 8.6 (L)  8.8 - 10.2 mg/dL Final     Glucose 10/02/2023 93  70 - 99 mg/dL Final     WBC Count 10/02/2023 " 5.9  4.0 - 11.0 10e3/uL Final     RBC Count 10/02/2023 3.25 (L)  3.80 - 5.20 10e6/uL Final     Hemoglobin 10/02/2023 10.5 (L)  11.7 - 15.7 g/dL Final     Hematocrit 10/02/2023 34.1 (L)  35.0 - 47.0 % Final     MCV 10/02/2023 105 (H)  78 - 100 fL Final     MCH 10/02/2023 32.3  26.5 - 33.0 pg Final     MCHC 10/02/2023 30.8 (L)  31.5 - 36.5 g/dL Final     RDW 10/02/2023 14.7  10.0 - 15.0 % Final     Platelet Count 10/02/2023 138 (L)  150 - 450 10e3/uL Final         Total time spent for face to face visit, reviewing labs/imaging studies, counseling and coordination of care was: 30 Minutes spent on the date of the encounter doing chart review, review of outside records, review of test results, interpretation of tests, patient visit, documentation, and discussion with family   The longitudinal plan of care for the condition(s) below were addressed during this visit. Due to the added complexity in care, I will continue to support  in the subsequent management of this condition(s) and with the ongoing continuity of care of this condition(s).    Problem List Items Addressed This Visit as of 2/21/2024         Neurology Diagnoses    Major neurocognitive disorder due to Alzheimer's disease (H) - Primary      This note was dictated using voice recognition software.  Any grammatical or context distortions are unintentional and inherent to the software.    Orders Placed This Encounter   Procedures     Hepatic function panel      New Prescriptions    MEMANTINE (NAMENDA) 10 MG TABLET    Take 1 tablet (10 mg) by mouth 2 times daily    MEMANTINE (NAMENDA) 5 MG TABLET    1 PO every day x 7 days, then 1 PO BID x 7 days, then 2 PO Q am & 1 PO at bedtime x 7 days, then 2 PO BID thereafter    VITAMIN E (TOCOPHEROL) 1000 UNITS (450 MG) CAPS CAPSULE    Take 1 capsule (1,000 Units) by mouth 2 times daily     Modified Medications    Modified Medication Previous Medication    DONEPEZIL (ARICEPT) 10 MG TABLET donepezil (ARICEPT) 10 MG  tablet       Take 1 tablet (10 mg) by mouth at bedtime    TAKE 1 TABLET BY MOUTH EVERYDAY AT BEDTIME                 Again, thank you for allowing me to participate in the care of your patient.        Sincerely,        Manuel Osuna MD

## 2024-03-15 ENCOUNTER — TELEPHONE (OUTPATIENT)
Dept: NEUROLOGY | Facility: CLINIC | Age: 79
End: 2024-03-15
Payer: COMMERCIAL

## 2024-03-15 DIAGNOSIS — G30.9 MAJOR NEUROCOGNITIVE DISORDER DUE TO ALZHEIMER'S DISEASE (H): ICD-10-CM

## 2024-03-15 DIAGNOSIS — F02.80 MAJOR NEUROCOGNITIVE DISORDER DUE TO ALZHEIMER'S DISEASE (H): ICD-10-CM

## 2024-03-15 RX ORDER — MEMANTINE HYDROCHLORIDE 5 MG/1
TABLET ORAL
Qty: 210 TABLET | Refills: 1 | OUTPATIENT
Start: 2024-03-15

## 2024-03-15 NOTE — TELEPHONE ENCOUNTER
[facility] Call Center    Phone Message    May a detailed message be left on voicemail: no     Reason for Call: Other: Pt marcelle Tovar calling wishing to get Blood Labs ordered on 02/21 sent to [facility].  [facility] Fax: [phone]    Contact Pt marcelle Tovar at [phone] with any questions.    Action Taken: Message routed to:  Other: [facility]    Travel Screening: Not Applicable

## 2024-03-15 NOTE — TELEPHONE ENCOUNTER
[facility] request for 90 day supply of Namenda 5mg titration schedule  Skye Christopher CMA on 3/15/2024 at 1:50 PM  [facility]

## 2024-07-16 NOTE — PLAN OF CARE
Goal Outcome Evaluation:      Pt is A&Ox3, able to follow directions and participating in cares. Up into chair with assist x1 using walker. Purewick in place, up to commode and had loose bm. Pt reported pain in dennis hands related to arthritis, received prn tylenol which has been effective. Pt is cooperative, 1:1 staff removed. Chair alarm and video monitoring in place. VSS, on tele in NSR. Denied SOB and nausea, ate small breakfast and taking sips  Problem: Plan of Care - These are the overarching goals to be used throughout the patient stay.    Goal: Optimal Comfort and Wellbeing  Outcome: Progressing  Intervention: Monitor Pain and Promote Comfort  Recent Flowsheet Documentation  Taken 9/8/2023 1000 by Felicita Trinidad RN  Pain Management Interventions:   medication (see MAR)   ambulation/increased activity   breathing exercises   distraction   emotional support   repositioned   relaxation techniques promoted     Problem: Risk for Delirium  Goal: Improved Behavioral Control  Outcome: Progressing  Goal: Improved Attention and Thought Clarity  Outcome: Progressing     Problem: Plan of Care - These are the overarching goals to be used throughout the patient stay.    Goal: Absence of Hospital-Acquired Illness or Injury  Intervention: Identify and Manage Fall Risk  Recent Flowsheet Documentation  Taken 9/8/2023 1306 by Felicita Trinidad, RN  Safety Promotion/Fall Prevention:   activity supervised   assistive device/personal items within reach   patient video monitoring   nonskid shoes/slippers when out of bed   increase visualization of patient   toileting scheduled   supervised activity  Taken 9/8/2023 1000 by Felicita Trinidad, RN  Safety Promotion/Fall Prevention:   activity supervised   assistive device/personal items within reach   patient video monitoring   nonskid shoes/slippers when out of bed   increase visualization of patient   toileting scheduled   supervised activity                      well developed, well nourished , in no acute distress , ambulating without difficulty , normal communication ability

## 2024-07-19 NOTE — PLAN OF CARE
Physical Therapy Discharge Summary    Reason for therapy discharge:    Discharged to transitional care facility.    Progress towards therapy goal(s). See goals on Care Plan in [facility] electronic health record for goal details.  Goals not met.  Barriers to achieving goals:   discharge from facility.    Therapy recommendation(s):    Continued therapy is recommended.  Rationale/Recommendations:  Continue PT at TCU as pt is progressing towards goals.           [Follow-up Visit ___] : a follow-up visit  for [unfilled]

## 2024-09-08 DIAGNOSIS — F02.80 MAJOR NEUROCOGNITIVE DISORDER DUE TO ALZHEIMER'S DISEASE (H): ICD-10-CM

## 2024-09-08 DIAGNOSIS — G30.9 MAJOR NEUROCOGNITIVE DISORDER DUE TO ALZHEIMER'S DISEASE (H): ICD-10-CM

## 2024-09-10 NOTE — TELEPHONE ENCOUNTER
Refill request for: memantine  5mg  Please DENY for refills on file:    memantine (NAMENDA) 10 MG tablet 180 tablet 3 3/23/2024 -- No   Sig - Route: Take 1 tablet (10 mg) by mouth 2 times daily - Oral   Sent to pharmacy as: Memantine HCl 10 MG Oral Tablet (NAMENDA)   Class: E-Prescribe   Earliest Fill Date: 3/17/2024   Order: [id number]   E-Prescribing Status: Receipt confirmed by pharmacy (2/21/2024  2:21 PM CST     Heather Armando LPN on 9/10/2024 at 3:52 PM

## 2024-09-11 RX ORDER — MEMANTINE HYDROCHLORIDE 5 MG/1
TABLET ORAL
Qty: 70 TABLET | Refills: 0 | OUTPATIENT
Start: 2024-09-11

## 2024-09-21 ENCOUNTER — HOSPITAL ENCOUNTER (INPATIENT)
Facility: HOSPITAL | Age: 79
LOS: 1 days | Discharge: NURSING, REHAB OR LONG-TERM CARE | DRG: 871 | End: 2024-09-23
Attending: EMERGENCY MEDICINE | Admitting: STUDENT IN AN ORGANIZED HEALTH CARE EDUCATION/TRAINING PROGRAM
Payer: COMMERCIAL

## 2024-09-21 DIAGNOSIS — A41.9 SEVERE SEPSIS (H): Primary | ICD-10-CM

## 2024-09-21 DIAGNOSIS — R50.9 FEVER, UNSPECIFIED FEVER CAUSE: ICD-10-CM

## 2024-09-21 DIAGNOSIS — N30.00 ACUTE CYSTITIS WITHOUT HEMATURIA: ICD-10-CM

## 2024-09-21 DIAGNOSIS — R65.20 SEVERE SEPSIS (H): Primary | ICD-10-CM

## 2024-09-21 DIAGNOSIS — J18.9 PNEUMONIA DUE TO INFECTIOUS ORGANISM, UNSPECIFIED LATERALITY, UNSPECIFIED PART OF LUNG: ICD-10-CM

## 2024-09-21 DIAGNOSIS — G93.41 ACUTE METABOLIC ENCEPHALOPATHY: ICD-10-CM

## 2024-09-21 DIAGNOSIS — R53.1 GENERAL WEAKNESS: ICD-10-CM

## 2024-09-21 LAB
ALBUMIN UR-MCNC: 30 MG/DL
ANION GAP SERPL CALCULATED.3IONS-SCNC: 10 MMOL/L (ref 7–15)
APPEARANCE UR: CLEAR
BACTERIA #/AREA URNS HPF: ABNORMAL /HPF
BASOPHILS # BLD AUTO: 0.1 10E3/UL (ref 0–0.2)
BASOPHILS NFR BLD AUTO: 1 %
BILIRUB UR QL STRIP: NEGATIVE
BUN SERPL-MCNC: 28.5 MG/DL (ref 8–23)
CALCIUM SERPL-MCNC: 8.9 MG/DL (ref 8.8–10.4)
CHLORIDE SERPL-SCNC: 100 MMOL/L (ref 98–107)
COLOR UR AUTO: ABNORMAL
CREAT SERPL-MCNC: 0.84 MG/DL (ref 0.51–0.95)
EGFRCR SERPLBLD CKD-EPI 2021: 71 ML/MIN/1.73M2
EOSINOPHIL # BLD AUTO: 0.1 10E3/UL (ref 0–0.7)
EOSINOPHIL NFR BLD AUTO: 1 %
ERYTHROCYTE [DISTWIDTH] IN BLOOD BY AUTOMATED COUNT: 15.3 % (ref 10–15)
EST. AVERAGE GLUCOSE BLD GHB EST-MCNC: 105 MG/DL
FLUAV RNA SPEC QL NAA+PROBE: NEGATIVE
FLUBV RNA RESP QL NAA+PROBE: NEGATIVE
GLUCOSE SERPL-MCNC: 137 MG/DL (ref 70–99)
GLUCOSE UR STRIP-MCNC: NEGATIVE MG/DL
HBA1C MFR BLD: 5.3 %
HCO3 SERPL-SCNC: 28 MMOL/L (ref 22–29)
HCT VFR BLD AUTO: 41.2 % (ref 35–47)
HGB BLD-MCNC: 12.9 G/DL (ref 11.7–15.7)
HGB UR QL STRIP: ABNORMAL
HOLD SPECIMEN: NORMAL
HYALINE CASTS: 1 /LPF
IMM GRANULOCYTES # BLD: 0 10E3/UL
IMM GRANULOCYTES NFR BLD: 0 %
KETONES UR STRIP-MCNC: NEGATIVE MG/DL
LACTATE SERPL-SCNC: 1.2 MMOL/L (ref 0.7–2)
LEUKOCYTE ESTERASE UR QL STRIP: ABNORMAL
LYMPHOCYTES # BLD AUTO: 0.5 10E3/UL (ref 0.8–5.3)
LYMPHOCYTES NFR BLD AUTO: 8 %
MCH RBC QN AUTO: 31.3 PG (ref 26.5–33)
MCHC RBC AUTO-ENTMCNC: 31.3 G/DL (ref 31.5–36.5)
MCV RBC AUTO: 100 FL (ref 78–100)
MONOCYTES # BLD AUTO: 0.4 10E3/UL (ref 0–1.3)
MONOCYTES NFR BLD AUTO: 5 %
MUCOUS THREADS #/AREA URNS LPF: PRESENT /LPF
NEUTROPHILS # BLD AUTO: 5.6 10E3/UL (ref 1.6–8.3)
NEUTROPHILS NFR BLD AUTO: 84 %
NITRATE UR QL: NEGATIVE
NRBC # BLD AUTO: 0 10E3/UL
NRBC BLD AUTO-RTO: 0 /100
PH UR STRIP: 5.5 [PH] (ref 5–7)
PLATELET # BLD AUTO: 167 10E3/UL (ref 150–450)
POTASSIUM SERPL-SCNC: 3.9 MMOL/L (ref 3.4–5.3)
RBC # BLD AUTO: 4.12 10E6/UL (ref 3.8–5.2)
RBC URINE: 8 /HPF
RSV RNA SPEC NAA+PROBE: NEGATIVE
SARS-COV-2 RNA RESP QL NAA+PROBE: NEGATIVE
SODIUM SERPL-SCNC: 138 MMOL/L (ref 135–145)
SP GR UR STRIP: 1.03 (ref 1–1.03)
SQUAMOUS EPITHELIAL: 2 /HPF
TRANSITIONAL EPI: 1 /HPF
UROBILINOGEN UR STRIP-MCNC: <2 MG/DL
WBC # BLD AUTO: 6.7 10E3/UL (ref 4–11)
WBC URINE: 11 /HPF

## 2024-09-21 PROCEDURE — 99223 1ST HOSP IP/OBS HIGH 75: CPT | Performed by: STUDENT IN AN ORGANIZED HEALTH CARE EDUCATION/TRAINING PROGRAM

## 2024-09-21 PROCEDURE — 87086 URINE CULTURE/COLONY COUNT: CPT | Performed by: EMERGENCY MEDICINE

## 2024-09-21 PROCEDURE — G0378 HOSPITAL OBSERVATION PER HR: HCPCS

## 2024-09-21 PROCEDURE — 93005 ELECTROCARDIOGRAM TRACING: CPT | Performed by: EMERGENCY MEDICINE

## 2024-09-21 PROCEDURE — 87637 SARSCOV2&INF A&B&RSV AMP PRB: CPT | Performed by: EMERGENCY MEDICINE

## 2024-09-21 PROCEDURE — 96375 TX/PRO/DX INJ NEW DRUG ADDON: CPT

## 2024-09-21 PROCEDURE — 36415 COLL VENOUS BLD VENIPUNCTURE: CPT | Performed by: STUDENT IN AN ORGANIZED HEALTH CARE EDUCATION/TRAINING PROGRAM

## 2024-09-21 PROCEDURE — 250N000013 HC RX MED GY IP 250 OP 250 PS 637: Performed by: EMERGENCY MEDICINE

## 2024-09-21 PROCEDURE — 85025 COMPLETE CBC W/AUTO DIFF WBC: CPT | Performed by: EMERGENCY MEDICINE

## 2024-09-21 PROCEDURE — 250N000011 HC RX IP 250 OP 636: Performed by: EMERGENCY MEDICINE

## 2024-09-21 PROCEDURE — 83605 ASSAY OF LACTIC ACID: CPT | Performed by: STUDENT IN AN ORGANIZED HEALTH CARE EDUCATION/TRAINING PROGRAM

## 2024-09-21 PROCEDURE — 250N000013 HC RX MED GY IP 250 OP 250 PS 637: Performed by: STUDENT IN AN ORGANIZED HEALTH CARE EDUCATION/TRAINING PROGRAM

## 2024-09-21 PROCEDURE — 80048 BASIC METABOLIC PNL TOTAL CA: CPT | Performed by: EMERGENCY MEDICINE

## 2024-09-21 PROCEDURE — 99285 EMERGENCY DEPT VISIT HI MDM: CPT | Mod: 25

## 2024-09-21 PROCEDURE — 250N000011 HC RX IP 250 OP 636: Performed by: STUDENT IN AN ORGANIZED HEALTH CARE EDUCATION/TRAINING PROGRAM

## 2024-09-21 PROCEDURE — 83036 HEMOGLOBIN GLYCOSYLATED A1C: CPT | Performed by: EMERGENCY MEDICINE

## 2024-09-21 PROCEDURE — 96374 THER/PROPH/DIAG INJ IV PUSH: CPT

## 2024-09-21 PROCEDURE — 96372 THER/PROPH/DIAG INJ SC/IM: CPT | Performed by: STUDENT IN AN ORGANIZED HEALTH CARE EDUCATION/TRAINING PROGRAM

## 2024-09-21 PROCEDURE — 81001 URINALYSIS AUTO W/SCOPE: CPT | Performed by: EMERGENCY MEDICINE

## 2024-09-21 RX ORDER — ACETAMINOPHEN 650 MG/1
650 SUPPOSITORY RECTAL EVERY 4 HOURS PRN
Status: DISCONTINUED | OUTPATIENT
Start: 2024-09-21 | End: 2024-09-23 | Stop reason: HOSPADM

## 2024-09-21 RX ORDER — CITALOPRAM HYDROBROMIDE 20 MG/1
20 TABLET ORAL EVERY MORNING
Status: DISCONTINUED | OUTPATIENT
Start: 2024-09-22 | End: 2024-09-23 | Stop reason: HOSPADM

## 2024-09-21 RX ORDER — CEFTRIAXONE 1 G/1
1 INJECTION, POWDER, FOR SOLUTION INTRAMUSCULAR; INTRAVENOUS ONCE
Status: DISCONTINUED | OUTPATIENT
Start: 2024-09-21 | End: 2024-09-21

## 2024-09-21 RX ORDER — PIPERACILLIN SODIUM, TAZOBACTAM SODIUM 3; .375 G/15ML; G/15ML
3.38 INJECTION, POWDER, LYOPHILIZED, FOR SOLUTION INTRAVENOUS ONCE
Status: COMPLETED | OUTPATIENT
Start: 2024-09-21 | End: 2024-09-22

## 2024-09-21 RX ORDER — ONDANSETRON 4 MG/1
4 TABLET, ORALLY DISINTEGRATING ORAL EVERY 6 HOURS PRN
Status: DISCONTINUED | OUTPATIENT
Start: 2024-09-21 | End: 2024-09-23 | Stop reason: HOSPADM

## 2024-09-21 RX ORDER — ENOXAPARIN SODIUM 100 MG/ML
40 INJECTION SUBCUTANEOUS EVERY 24 HOURS
Status: DISCONTINUED | OUTPATIENT
Start: 2024-09-21 | End: 2024-09-23 | Stop reason: HOSPADM

## 2024-09-21 RX ORDER — CHOLECALCIFEROL (VITAMIN D3) 50 MCG
1 TABLET ORAL EVERY MORNING
COMMUNITY

## 2024-09-21 RX ORDER — PIPERACILLIN SODIUM, TAZOBACTAM SODIUM 3; .375 G/15ML; G/15ML
3.38 INJECTION, POWDER, LYOPHILIZED, FOR SOLUTION INTRAVENOUS EVERY 8 HOURS
Status: DISCONTINUED | OUTPATIENT
Start: 2024-09-22 | End: 2024-09-23

## 2024-09-21 RX ORDER — POLYETHYLENE GLYCOL 3350 17 G/17G
17 POWDER, FOR SOLUTION ORAL DAILY
Status: DISCONTINUED | OUTPATIENT
Start: 2024-09-22 | End: 2024-09-23 | Stop reason: HOSPADM

## 2024-09-21 RX ORDER — ACETAMINOPHEN 325 MG/1
650 TABLET ORAL EVERY 4 HOURS PRN
Status: DISCONTINUED | OUTPATIENT
Start: 2024-09-21 | End: 2024-09-23 | Stop reason: HOSPADM

## 2024-09-21 RX ORDER — LEFLUNOMIDE 10 MG/1
10 TABLET ORAL EVERY MORNING
Status: DISCONTINUED | OUTPATIENT
Start: 2024-09-22 | End: 2024-09-23 | Stop reason: HOSPADM

## 2024-09-21 RX ORDER — LEVOTHYROXINE SODIUM 100 UG/1
100 TABLET ORAL EVERY MORNING
Status: DISCONTINUED | OUTPATIENT
Start: 2024-09-22 | End: 2024-09-23 | Stop reason: HOSPADM

## 2024-09-21 RX ORDER — AMOXICILLIN 250 MG
2 CAPSULE ORAL 2 TIMES DAILY PRN
Status: DISCONTINUED | OUTPATIENT
Start: 2024-09-21 | End: 2024-09-23 | Stop reason: HOSPADM

## 2024-09-21 RX ORDER — ACETAMINOPHEN 325 MG/1
975 TABLET ORAL ONCE
Status: COMPLETED | OUTPATIENT
Start: 2024-09-21 | End: 2024-09-21

## 2024-09-21 RX ORDER — AMOXICILLIN 250 MG
1 CAPSULE ORAL 2 TIMES DAILY PRN
Status: DISCONTINUED | OUTPATIENT
Start: 2024-09-21 | End: 2024-09-23 | Stop reason: HOSPADM

## 2024-09-21 RX ORDER — BUPROPION HYDROCHLORIDE 150 MG/1
150 TABLET ORAL EVERY MORNING
COMMUNITY
Start: 2024-08-20

## 2024-09-21 RX ORDER — SIMVASTATIN 10 MG
20 TABLET ORAL EVERY EVENING
Status: DISCONTINUED | OUTPATIENT
Start: 2024-09-21 | End: 2024-09-23 | Stop reason: HOSPADM

## 2024-09-21 RX ORDER — LORAZEPAM 0.5 MG/1
0.25 TABLET ORAL
Status: COMPLETED | OUTPATIENT
Start: 2024-09-21 | End: 2024-09-21

## 2024-09-21 RX ORDER — DONEPEZIL HYDROCHLORIDE 5 MG/1
10 TABLET, FILM COATED ORAL AT BEDTIME
Status: DISCONTINUED | OUTPATIENT
Start: 2024-09-21 | End: 2024-09-23 | Stop reason: HOSPADM

## 2024-09-21 RX ORDER — BUPROPION HYDROCHLORIDE 150 MG/1
150 TABLET ORAL EVERY MORNING
Status: DISCONTINUED | OUTPATIENT
Start: 2024-09-22 | End: 2024-09-23 | Stop reason: HOSPADM

## 2024-09-21 RX ORDER — CEFTRIAXONE 2 G/1
2 INJECTION, POWDER, FOR SOLUTION INTRAMUSCULAR; INTRAVENOUS ONCE
Status: COMPLETED | OUTPATIENT
Start: 2024-09-21 | End: 2024-09-21

## 2024-09-21 RX ORDER — ONDANSETRON 2 MG/ML
4 INJECTION INTRAMUSCULAR; INTRAVENOUS EVERY 6 HOURS PRN
Status: DISCONTINUED | OUTPATIENT
Start: 2024-09-21 | End: 2024-09-23 | Stop reason: HOSPADM

## 2024-09-21 RX ORDER — LISINOPRIL 5 MG/1
10 TABLET ORAL DAILY
Status: DISCONTINUED | OUTPATIENT
Start: 2024-09-22 | End: 2024-09-23 | Stop reason: HOSPADM

## 2024-09-21 RX ORDER — LEVOTHYROXINE SODIUM 100 UG/1
1 TABLET ORAL EVERY MORNING
COMMUNITY
Start: 2024-08-20

## 2024-09-21 RX ORDER — MEMANTINE HYDROCHLORIDE 10 MG/1
10 TABLET ORAL 2 TIMES DAILY
Status: DISCONTINUED | OUTPATIENT
Start: 2024-09-21 | End: 2024-09-23 | Stop reason: HOSPADM

## 2024-09-21 RX ADMIN — DONEPEZIL HYDROCHLORIDE 10 MG: 5 TABLET, FILM COATED ORAL at 22:01

## 2024-09-21 RX ADMIN — ACETAMINOPHEN 975 MG: 325 TABLET ORAL at 19:06

## 2024-09-21 RX ADMIN — CEFTRIAXONE SODIUM 2 G: 2 INJECTION, POWDER, FOR SOLUTION INTRAMUSCULAR; INTRAVENOUS at 20:12

## 2024-09-21 RX ADMIN — LORAZEPAM 0.25 MG: 0.5 TABLET ORAL at 23:31

## 2024-09-21 RX ADMIN — MEMANTINE 10 MG: 10 TABLET ORAL at 22:01

## 2024-09-21 RX ADMIN — Medication 1 MG: at 23:31

## 2024-09-21 RX ADMIN — SIMVASTATIN 20 MG: 10 TABLET, FILM COATED ORAL at 21:46

## 2024-09-21 RX ADMIN — PIPERACILLIN AND TAZOBACTAM 3.38 G: 3; .375 INJECTION, POWDER, FOR SOLUTION INTRAVENOUS at 23:31

## 2024-09-21 RX ADMIN — ENOXAPARIN SODIUM 40 MG: 40 INJECTION SUBCUTANEOUS at 21:49

## 2024-09-21 ASSESSMENT — ACTIVITIES OF DAILY LIVING (ADL)
ADLS_ACUITY_SCORE: 38
ADLS_ACUITY_SCORE: 38
ADLS_ACUITY_SCORE: 34
ADLS_ACUITY_SCORE: 38
ADLS_ACUITY_SCORE: 38

## 2024-09-21 ASSESSMENT — COLUMBIA-SUICIDE SEVERITY RATING SCALE - C-SSRS
6. HAVE YOU EVER DONE ANYTHING, STARTED TO DO ANYTHING, OR PREPARED TO DO ANYTHING TO END YOUR LIFE?: NO
2. HAVE YOU ACTUALLY HAD ANY THOUGHTS OF KILLING YOURSELF IN THE PAST MONTH?: NO
1. IN THE PAST MONTH, HAVE YOU WISHED YOU WERE DEAD OR WISHED YOU COULD GO TO SLEEP AND NOT WAKE UP?: NO

## 2024-09-21 NOTE — ED NOTES
Bed: JNED-05  Expected date: 9/21/24  Expected time: 6:18 PM  Means of arrival: Ambulance  Comments:  Mark ADAMS  Weakness

## 2024-09-21 NOTE — ED PROVIDER NOTES
EMERGENCY DEPARTMENT ENCOUNTER      NAME: Sharon De La Vega  AGE: 78 year old female  YOB: 1945  MRN: [id number]  EVALUATION DATE & TIME: 9/21/2024  6:27 PM    PCP: Jaylen Young    ED PROVIDER: Fallon Tafoya MD    Chief Complaint   Patient presents with    Generalized Weakness         FINAL IMPRESSION:  1. Acute cystitis without hematuria    2. General weakness    3. Fever, unspecified fever cause          ED COURSE & MEDICAL DECISION MAKING:    Pertinent Labs & Imaging studies reviewed. (See chart for details)  78 year old female with history of Alzheimer's, depression, HTN and HLD who presents to the Emergency Department for evaluation of a fall after she fell out of her lift chair sliding down onto her buttock because she was too weak to stand.  Patient denies any injuries and her exam is atraumatic.  She is febrile here.  Multiple family members have been ill recently with URI type symptomatology.  Differential includes viral syndrome, influenza, COVID-19, urinary tract infection.  Concern for dehydration or electrolyte abnormality.  No respiratory symptoms for patient, my concern for pneumonia is overall low.  She denies any trauma from the fall.    Patient placed on monitor, IV established and blood obtained.  Given Tylenol for fever.  CBC, BMP unremarkable other than blood glucose of 137.  No previous A1c on file, suspect that this is stress from her fever but did add on a A1c.  COVID/influenza/RSV swab negative.  Urinalysis with leukocyte Estrace, bacteria, red cells and white cells.  No previous culture on file to dictate treatment, given Rocephin.  Patient given trial of ambulation, too weak to stand let alone ambulate with cane at baseline and will be admitted for further management.      ED Course as of 09/21/24 2041   Sat Sep 21, 2024   1840 I met with the patient, obtained history, performed an initial exam, and discussed options and plan for diagnostics and treatment here in  the ED.    1843 Temp(!): 101.8  F (38.8  C)   1845 Spoke w Guy, son. Whole family ill w cold sx at home. They tested for covid and were negative.    1911 ? Aflutter on monitor vs tremor   1912 Glucose(!): 137  No A1c on file   1922 Leukocyte Esterase Urine(!): 250 Tulio/uL   1922 No previous urine culture on file to dictate treatment       Medical Decision Making    History:  Supplemental history from: EMS, son  External Record(s) reviewed: Previous urine culture, blood glucose, A1c    Work Up:  Chart documentation includes differential considered and any EKGs or imaging independently interpreted by provider, see MDM  In additional to work up documented, I considered the following work up: see MDM    External consultation:  Discussion of management with another provider: Hospitalist    Complicating factors:  Care impacted by chronic illness: Dementia and Hypertension  Care affected by social determinants of health: Access care weekend no access PCP    Disposition considerations: Admit.    Not Applicable      At the conclusion of the encounter I discussed the results of all of the tests and the disposition. The questions were answered. The patient or family acknowledged understanding and was agreeable with the care plan.      MEDICATIONS GIVEN IN THE EMERGENCY:  Medications   acetaminophen (TYLENOL) tablet 975 mg (975 mg Oral $Given 9/21/24 1906)   cefTRIAXone (ROCEPHIN) 2 g vial to attach to  ml bag for ADULTS or NS 50 ml bag for PEDS (0 g Intravenous Stopped 9/21/24 2024)       NEW PRESCRIPTIONS STARTED AT TODAY'S ER VISIT  New Prescriptions    No medications on file          =================================================================    HPI    Patient information was obtained from: patient    Use of Intrepreter: N/A     Sharon De La Vega is a 78 year old female with pertinent medical history of hypertension and Alzheimer's disease who presents with generalized weakness with a recent fall.    Per  patient, she was in a lift chair and fell out of it due to weakness. She always has a tremor and walks with a cane at baseline. Currently she lives with her son and daughter in law. She was not sure if she was able to walk with paramedics.     She has no neck/back pain or any pain elsewhere. She has had no recent nausea, vomiting, diarrhea, cough, cold, chest congestion, recent illness, or burning/painful urination. She did not mention taking any daily medications.    Chart review: N/A      PAST MEDICAL HISTORY:  History reviewed. No pertinent past medical history.    PAST SURGICAL HISTORY:  History reviewed. No pertinent surgical history.    CURRENT MEDICATIONS:    Prior to Admission Medications   Prescriptions Last Dose Informant Patient Reported? Taking?   acetaminophen (TYLENOL) 325 MG tablet   Yes No   Sig: Take 2 tablets (650 mg) by mouth every 6 hours as needed for mild pain   benazepril (LOTENSIN) 10 MG tablet  Self Yes No   Sig: Take 10 mg by mouth daily   calcium carbonate-vitamin D 600-200 MG-UNIT TABS  Self Yes No   Sig: Take 1 tablet by mouth 2 times daily.   citalopram (CELEXA) 20 MG tablet  Self Yes No   Sig: Take 1 tablet by mouth daily at 2 pm   cyanocobalamin (VITAMIN B-12) 1000 MCG tablet  Self Yes No   Sig: TAKE 1 TABLET (1,000 MCG) BY MOUTH ONCE DAILY.   donepezil (ARICEPT) 10 MG tablet   No No   Sig: Take 1 tablet (10 mg) by mouth at bedtime   folic acid (FOLVITE) 1 MG tablet  Self Yes No   Sig: Take 1 mg by mouth daily.   leflunomide (ARAVA) 10 MG tablet  Self Yes No   Sig: Take 1 tablet by mouth daily at 2 pm   levothyroxine (TIROSINT) 100 MCG capsule  Self Yes No   Sig: Take 100 mcg by mouth daily   memantine (NAMENDA) 10 MG tablet   No No   Sig: Take 1 tablet (10 mg) by mouth 2 times daily   methotrexate 2.5 MG tablet  Self Yes No   Sig: Take 10 mg by mouth once a week On Thursday   nystatin (MYCOSTATIN) 268994 UNIT/GM external powder  Self Yes No   Sig: Apply topically 2 times daily  "  simvastatin (ZOCOR) 20 MG tablet  Self Yes No   Sig: TAKE 1 TABLET (20 MG) BY MOUTH ONCE DAILY WITH EVENING MEAL.   vitamin E (TOCOPHEROL) 1000 units (450 mg) CAPS capsule   No No   Sig: Take 1 capsule (1,000 Units) by mouth 2 times daily      Facility-Administered Medications: None       ALLERGIES:  Allergies   Allergen Reactions    Acetaminophen-Codeine GI Disturbance     Indigestion    Celecoxib Unknown    Codeine Camsylate Unknown    Conj Estrog-Medroxyprogest Ace Other (See Comments)     Bleeding    Estrogens, Conjugated Synthetic A Unknown    Nsaids Unknown    Sulfa Antibiotics Unknown       FAMILY HISTORY:  Family History   Problem Relation Age of Onset    Lung Cancer Father     Multiple myeloma Brother        SOCIAL HISTORY:  Social History     Tobacco Use    Smoking status: Former     Current packs/day: 0.00     Types: Cigarettes     Quit date:      Years since quittin.7    Smokeless tobacco: Never   Substance Use Topics    Alcohol use: Not Currently        VITALS:  Patient Vitals for the past 24 hrs:   BP Temp Temp src Pulse Resp SpO2 Height Weight   24 (!) 158/70 -- -- 92 22 95 % -- --   24 (!) 149/66 99  F (37.2  C) Oral 93 16 96 % -- --   24 194 (!) 172/76 -- -- 94 26 96 % -- --   24 193 (!) 146/69 -- -- 93 29 97 % -- --   24 1900 (!) 202/88 -- -- 105 29 95 % -- --   24 1845 (!) 197/80 -- -- 95 (!) 44 99 % -- --   24 183 (!) 219/87 (!) 101.8  F (38.8  C) Oral 98 20 99 % 1.676 m (5' 6\") 107 kg (236 lb)       PHYSICAL EXAM    General Appearance: Pleasant elderly female, poor historian but answers most questions appropriately  Head:  Normocephalic, atraumatic  Eyes:  PERRL, conjunctiva/corneas clear, EOM's intact  ENT:   membranes are moist without pallor  Neck:  Supple, no midline tenderness to palpation  Chest:  No tenderness or deformity, no crepitus  Cardio: Hypertensive initially but normalized on recheck without intervention, regular " rate and rhythm,  Pulm:  No respiratory distress, clear to auscultation bilaterally  Back:  No midline tenderness to palpation, no paraspinal tenderness  Abdomen:  Soft, non-tender, obese  Extremities:  Extremities normal, atraumatic, no cyanosis, full ROM and motor tone intact, bilateral pulses intact upper and lower, trace edema at ankles  Skin:  Skin warm, dry, no rashes  Neuro:  Alert and oriented ×3, moving all extremities, no gross sensory defects, resting tremor     RADIOLOGY/LABS:  Reviewed all pertinent imaging. Please see official radiology report. All pertinent labs reviewed and interpreted.    Results for orders placed or performed during the hospital encounter of 09/21/24   Extra Blue Top Tube   Result Value Ref Range    Hold Specimen JIC    Extra Red Top Tube   Result Value Ref Range    Hold Specimen JIC    Extra Green Top (Lithium Heparin) Tube   Result Value Ref Range    Hold Specimen JIC    Extra Purple Top Tube   Result Value Ref Range    Hold Specimen JIC    Extra Green Top (Lithium Heparin) ON ICE   Result Value Ref Range    Hold Specimen JIC    Basic metabolic panel   Result Value Ref Range    Sodium 138 135 - 145 mmol/L    Potassium 3.9 3.4 - 5.3 mmol/L    Chloride 100 98 - 107 mmol/L    Carbon Dioxide (CO2) 28 22 - 29 mmol/L    Anion Gap 10 7 - 15 mmol/L    Urea Nitrogen 28.5 (H) 8.0 - 23.0 mg/dL    Creatinine 0.84 0.51 - 0.95 mg/dL    GFR Estimate 71 >60 mL/min/1.73m2    Calcium 8.9 8.8 - 10.4 mg/dL    Glucose 137 (H) 70 - 99 mg/dL   UA with Microscopic reflex to Culture    Specimen: Urine, Clean Catch   Result Value Ref Range    Color Urine Light Yellow Colorless, Straw, Light Yellow, Yellow    Appearance Urine Clear Clear    Glucose Urine Negative Negative mg/dL    Bilirubin Urine Negative Negative    Ketones Urine Negative Negative mg/dL    Specific Gravity Urine 1.028 1.001 - 1.030    Blood Urine 0.1 mg/dL (A) Negative    pH Urine 5.5 5.0 - 7.0    Protein Albumin Urine 30 (A) Negative  mg/dL    Urobilinogen Urine <2.0 <2.0 mg/dL    Nitrite Urine Negative Negative    Leukocyte Esterase Urine 250 Tulio/uL (A) Negative    Bacteria Urine Few (A) None Seen /HPF    Mucus Urine Present (A) None Seen /LPF    RBC Urine 8 (H) <=2 /HPF    WBC Urine 11 (H) <=5 /HPF    Squamous Epithelials Urine 2 (H) <=1 /HPF    Transitional Epithelials Urine 1 <=1 /HPF    Hyaline Casts Urine 1 <=2 /LPF   Symptomatic Influenza A/B, RSV, & SARS-CoV2 PCR (COVID-19) Nasopharyngeal    Specimen: Nasopharyngeal; Swab   Result Value Ref Range    Influenza A PCR Negative Negative    Influenza B PCR Negative Negative    RSV PCR Negative Negative    SARS CoV2 PCR Negative Negative   CBC with platelets and differential   Result Value Ref Range    WBC Count 6.7 4.0 - 11.0 10e3/uL    RBC Count 4.12 3.80 - 5.20 10e6/uL    Hemoglobin 12.9 11.7 - 15.7 g/dL    Hematocrit 41.2 35.0 - 47.0 %     78 - 100 fL    MCH 31.3 26.5 - 33.0 pg    MCHC 31.3 (L) 31.5 - 36.5 g/dL    RDW 15.3 (H) 10.0 - 15.0 %    Platelet Count 167 150 - 450 10e3/uL    % Neutrophils 84 %    % Lymphocytes 8 %    % Monocytes 5 %    % Eosinophils 1 %    % Basophils 1 %    % Immature Granulocytes 0 %    NRBCs per 100 WBC 0 <1 /100    Absolute Neutrophils 5.6 1.6 - 8.3 10e3/uL    Absolute Lymphocytes 0.5 (L) 0.8 - 5.3 10e3/uL    Absolute Monocytes 0.4 0.0 - 1.3 10e3/uL    Absolute Eosinophils 0.1 0.0 - 0.7 10e3/uL    Absolute Basophils 0.1 0.0 - 0.2 10e3/uL    Absolute Immature Granulocytes 0.0 <=0.4 10e3/uL    Absolute NRBCs 0.0 10e3/uL   Hemoglobin A1c   Result Value Ref Range    Estimated Average Glucose 105 <117 mg/dL    Hemoglobin A1C 5.3 <5.7 %       EKG:  Performed at: 09/21/2024 at 7:13 PM    Impression: Sinus rhythm with Premature supraventricular complexes, Possible Left atrial enlargement, Borderline ECG    Rate: 98 BPM  Rhythm: Sinus  Axis: -22  CT Interval: 138 ms  QRS Interval: 76 ms  QTc Interval: 413 ms  Comparison: When compared to ECG from 09/07/2023  Premature supraventricular complexes are now Present, T wave amplitude has decreased in Inferior leads  I have independently reviewed and interpreted the EKG(s) documented above.    The creation of this record is based on the scribe s observations of the work being performed by Fallon Tafoya MD and the provider s statements to them. It was created on her behalf by Demarco Carbajal, a trained medical scribe. This document has been checked and approved by the attending provider.    Fallon Tafoya MD  Emergency Medicine  [facility] EMERGENCY DEPARTMENT  [address] [place] [id number] [phone]  Dept: [phone]      Fallon Tafoya MD  09/21/24 2047

## 2024-09-21 NOTE — ED TRIAGE NOTES
Patient presents via EMS with weakness. Patient also slid out of chair and landed on her butt earlier today. No LOC or injury noted. No thinners. Family members have been sick recently.     Triage Assessment (Adult)       Row Name 09/21/24 0346          Triage Assessment    Airway WDL WDL        Respiratory WDL    Respiratory WDL WDL        Skin Circulation/Temperature WDL    Skin Circulation/Temperature WDL WDL        Cardiac WDL    Cardiac Rhythm NSR        Peripheral/Neurovascular WDL    Peripheral Neurovascular WDL WDL        Cognitive/Neuro/Behavioral WDL    Cognitive/Neuro/Behavioral WDL WDL

## 2024-09-22 ENCOUNTER — APPOINTMENT (OUTPATIENT)
Dept: OCCUPATIONAL THERAPY | Facility: HOSPITAL | Age: 79
DRG: 871 | End: 2024-09-22
Attending: STUDENT IN AN ORGANIZED HEALTH CARE EDUCATION/TRAINING PROGRAM
Payer: COMMERCIAL

## 2024-09-22 ENCOUNTER — APPOINTMENT (OUTPATIENT)
Dept: PHYSICAL THERAPY | Facility: HOSPITAL | Age: 79
DRG: 871 | End: 2024-09-22
Attending: STUDENT IN AN ORGANIZED HEALTH CARE EDUCATION/TRAINING PROGRAM
Payer: COMMERCIAL

## 2024-09-22 PROBLEM — R65.20 SEVERE SEPSIS (H): Status: ACTIVE | Noted: 2024-09-22

## 2024-09-22 PROBLEM — F32.9 MAJOR DEPRESSIVE DISORDER: Status: ACTIVE | Noted: 2022-11-28

## 2024-09-22 PROBLEM — G93.41 ACUTE METABOLIC ENCEPHALOPATHY: Status: ACTIVE | Noted: 2024-09-22

## 2024-09-22 PROBLEM — F32.1 MAJOR DEPRESSIVE DISORDER, SINGLE EPISODE, MODERATE (H): Status: ACTIVE | Noted: 2022-11-28

## 2024-09-22 PROBLEM — A41.9 SEPSIS (H): Status: ACTIVE | Noted: 2024-09-22

## 2024-09-22 PROBLEM — R50.9 FEVER, UNSPECIFIED FEVER CAUSE: Status: ACTIVE | Noted: 2024-09-22

## 2024-09-22 PROBLEM — F02.80 MAJOR NEUROCOGNITIVE DISORDER DUE TO ALZHEIMER'S DISEASE (H): Status: ACTIVE | Noted: 2022-06-20

## 2024-09-22 PROBLEM — G30.9 MAJOR NEUROCOGNITIVE DISORDER DUE TO ALZHEIMER'S DISEASE (H): Status: ACTIVE | Noted: 2022-06-20

## 2024-09-22 LAB
GLUCOSE BLDC GLUCOMTR-MCNC: 116 MG/DL (ref 70–99)
GLUCOSE BLDC GLUCOMTR-MCNC: 153 MG/DL (ref 70–99)

## 2024-09-22 PROCEDURE — 97530 THERAPEUTIC ACTIVITIES: CPT | Mod: GO

## 2024-09-22 PROCEDURE — 82962 GLUCOSE BLOOD TEST: CPT

## 2024-09-22 PROCEDURE — 96375 TX/PRO/DX INJ NEW DRUG ADDON: CPT

## 2024-09-22 PROCEDURE — 96376 TX/PRO/DX INJ SAME DRUG ADON: CPT

## 2024-09-22 PROCEDURE — 99207 PR APP CREDIT; MD BILLING SHARED VISIT: CPT

## 2024-09-22 PROCEDURE — 250N000011 HC RX IP 250 OP 636: Performed by: STUDENT IN AN ORGANIZED HEALTH CARE EDUCATION/TRAINING PROGRAM

## 2024-09-22 PROCEDURE — 97530 THERAPEUTIC ACTIVITIES: CPT | Mod: GP

## 2024-09-22 PROCEDURE — G0378 HOSPITAL OBSERVATION PER HR: HCPCS

## 2024-09-22 PROCEDURE — 120N000001 HC R&B MED SURG/OB

## 2024-09-22 PROCEDURE — 250N000011 HC RX IP 250 OP 636: Performed by: HOSPITALIST

## 2024-09-22 PROCEDURE — 97162 PT EVAL MOD COMPLEX 30 MIN: CPT | Mod: GP

## 2024-09-22 PROCEDURE — 99232 SBSQ HOSP IP/OBS MODERATE 35: CPT | Mod: FS | Performed by: INTERNAL MEDICINE

## 2024-09-22 PROCEDURE — 250N000013 HC RX MED GY IP 250 OP 250 PS 637: Performed by: STUDENT IN AN ORGANIZED HEALTH CARE EDUCATION/TRAINING PROGRAM

## 2024-09-22 PROCEDURE — 97166 OT EVAL MOD COMPLEX 45 MIN: CPT | Mod: GO

## 2024-09-22 RX ORDER — HYDRALAZINE HYDROCHLORIDE 20 MG/ML
10 INJECTION INTRAMUSCULAR; INTRAVENOUS EVERY 6 HOURS PRN
Status: DISCONTINUED | OUTPATIENT
Start: 2024-09-22 | End: 2024-09-22

## 2024-09-22 RX ORDER — LABETALOL HYDROCHLORIDE 5 MG/ML
20 INJECTION, SOLUTION INTRAVENOUS EVERY 4 HOURS PRN
Status: DISCONTINUED | OUTPATIENT
Start: 2024-09-22 | End: 2024-09-23 | Stop reason: HOSPADM

## 2024-09-22 RX ORDER — HYDRALAZINE HYDROCHLORIDE 20 MG/ML
20 INJECTION INTRAMUSCULAR; INTRAVENOUS EVERY 4 HOURS PRN
Status: DISCONTINUED | OUTPATIENT
Start: 2024-09-22 | End: 2024-09-23 | Stop reason: HOSPADM

## 2024-09-22 RX ADMIN — LISINOPRIL 10 MG: 5 TABLET ORAL at 08:22

## 2024-09-22 RX ADMIN — LEVOTHYROXINE SODIUM 100 MCG: 100 TABLET ORAL at 08:23

## 2024-09-22 RX ADMIN — PIPERACILLIN AND TAZOBACTAM 3.38 G: 3; .375 INJECTION, POWDER, FOR SOLUTION INTRAVENOUS at 13:58

## 2024-09-22 RX ADMIN — PIPERACILLIN AND TAZOBACTAM 3.38 G: 3; .375 INJECTION, POWDER, FOR SOLUTION INTRAVENOUS at 21:20

## 2024-09-22 RX ADMIN — PIPERACILLIN AND TAZOBACTAM 3.38 G: 3; .375 INJECTION, POWDER, FOR SOLUTION INTRAVENOUS at 04:02

## 2024-09-22 RX ADMIN — BUPROPION HYDROCHLORIDE 150 MG: 150 TABLET, FILM COATED, EXTENDED RELEASE ORAL at 08:23

## 2024-09-22 RX ADMIN — LABETALOL HYDROCHLORIDE 20 MG: 5 INJECTION, SOLUTION INTRAVENOUS at 05:24

## 2024-09-22 RX ADMIN — HYDRALAZINE HYDROCHLORIDE 10 MG: 20 INJECTION INTRAMUSCULAR; INTRAVENOUS at 04:02

## 2024-09-22 RX ADMIN — CITALOPRAM HYDROBROMIDE 20 MG: 20 TABLET ORAL at 08:24

## 2024-09-22 RX ADMIN — LEFLUNOMIDE 10 MG: 10 TABLET ORAL at 08:25

## 2024-09-22 RX ADMIN — MEMANTINE 10 MG: 10 TABLET ORAL at 08:24

## 2024-09-22 RX ADMIN — MEMANTINE 10 MG: 10 TABLET ORAL at 19:20

## 2024-09-22 RX ADMIN — POLYETHYLENE GLYCOL 3350 17 G: 17 POWDER, FOR SOLUTION ORAL at 08:23

## 2024-09-22 RX ADMIN — DONEPEZIL HYDROCHLORIDE 10 MG: 5 TABLET, FILM COATED ORAL at 21:23

## 2024-09-22 RX ADMIN — ENOXAPARIN SODIUM 40 MG: 40 INJECTION SUBCUTANEOUS at 21:23

## 2024-09-22 RX ADMIN — SIMVASTATIN 20 MG: 10 TABLET, FILM COATED ORAL at 19:19

## 2024-09-22 ASSESSMENT — ACTIVITIES OF DAILY LIVING (ADL)
ADLS_ACUITY_SCORE: 37
ADLS_ACUITY_SCORE: 34
ADLS_ACUITY_SCORE: 34
DEPENDENT_IADLS:: CLEANING;COOKING;SHOPPING;LAUNDRY;MEAL PREPARATION;MEDICATION MANAGEMENT;MONEY MANAGEMENT;TRANSPORTATION
ADLS_ACUITY_SCORE: 37
ADLS_ACUITY_SCORE: 34
ADLS_ACUITY_SCORE: 37
ADLS_ACUITY_SCORE: 47
ADLS_ACUITY_SCORE: 47
ADLS_ACUITY_SCORE: 37
ADLS_ACUITY_SCORE: 47
ADLS_ACUITY_SCORE: 34
ADLS_ACUITY_SCORE: 37
ADLS_ACUITY_SCORE: 47
ADLS_ACUITY_SCORE: 37
ADLS_ACUITY_SCORE: 34
ADLS_ACUITY_SCORE: 34
ADLS_ACUITY_SCORE: 47
ADLS_ACUITY_SCORE: 34
ADLS_ACUITY_SCORE: 37
ADLS_ACUITY_SCORE: 47
ADLS_ACUITY_SCORE: 34

## 2024-09-22 NOTE — PROGRESS NOTES
[facility]    Medicine Progress Note - Hospitalist Service    Date of Admission:  9/21/2024    Assessment & Plan      Sharon De La Vega is a 78 year old female with history of alzheimer's who is admitted on 9/21/2024 with weakness in the setting of UTI.Patient spiked fever last night at 1832 of 101.8  Increasing intermittent confusion with patient calling out for help frequently. Family states this is increased from her baseline. PT and OT assessed patient and they recommend, TCU Case management to coordinate TCU placement. UA positive urine culture pending.  Continue IV Zosyn awaiting urine culture results.      Principal Problem:    Severe sepsis (H)  Active Problems:    Hypothyroidism    Essential hypertension    Hypercholesterolemia    Psoriatic arthritis (H)    Anxiety    Major neurocognitive disorder due to Alzheimer's disease (H)    Major depressive disorder    Aortic valve stenosis    General weakness    Acute cystitis without hematuria    Acute metabolic encephalopathy     # Severe sepsis  Patient spiked a fever 101.8 last night  Fever tachycardia wright tachypnea  UTI, urine cultures pending  Worsening mental status suggesting acute metabolic encephalopathy which qualifies for severe sepsis  Lactic 1.2  Blood cultures: Will order if patient spikes another fever  Will order IV fluids if patient spikes another fever  IV Zosyn    #UTI  -Other potential source of infection could be cellulitis although patient denies any acute symptoms related to lower extremities.  She does have bilateral swelling which she states is chronic  -Technically meeting sepsis criteria given fever and heart rate >90 on presentation.  Antibiotics given before blood culture could be obtained however will check lactic acid  -follow-up urine culture   -Will broaden antibiotics to Zosyn to include pseudomonal coverage as patient is immunocompromised    #Generalized weakness  -PT/OT recommend TCU  Case management to  "coordinate TCU placement  Fall precaution    # Psoriatec arthritis  -gets methotrexate weekly on Thursdays  -continue home Arava     #HTN  Monitor blood pressure closely  Stable at this time  Hydralazine as needed  Continue home lisinopril    #HLD  -Continue simvastatin    #Alzheimer's dementia  Intermittent confusion per patient's family increased from baseline  -Continue home memantine, Aricept  Encephalopathy protocol    # Major depressive disorder  Continue home Wellbutrin and Celexa    #Hypothyroidism  -Continue PTA levothyroxine       Observation Goals: -diagnostic tests and consults completed and resulted, -vital signs normal or at patient baseline, -infection is improving, -safe disposition plan has been identified, Nurse to notify provider when observation goals have been met and patient is ready for discharge.  Diet: Regular Diet Adult    DVT Prophylaxis: Enoxaparin (Lovenox) SQ  Escobar Catheter: Not present  Lines: None     Cardiac Monitoring: ACTIVE order. Indication: Tachyarrhythmias, acute (48 hours)  Code Status: Full Code      Clinically Significant Risk Factors Present on Admission                  # Hypertension: Noted on problem list   # Dementia: noted on problem list       # Obesity: Estimated body mass index is 37.43 kg/m  as calculated from the following:    Height as of this encounter: 1.676 m (5' 6\").    Weight as of this encounter: 105.2 kg (231 lb 14.8 oz).              Disposition Plan     Medically Ready for Discharge: Anticipated in 2-4 Days           The patient's care was discussed with the Attending Physician, Dr. Centeno .    Eloisa Gandhi NP  Hospitalist Service  [facility]  Securely message with [employer] (more info)  Text page via [employer] Paging/Directory   ______________________________________________________________________    Interval History   Patient spiked fever last night at 1832 of 101.8  Increasing intermittent confusion patient calling out for help " frequently  Family states this is increased from her baseline  PT OT assessed patient recommend TCU  Case management to coordinate TCU  UA positive urine culture pending  IV Zosyn      Physical Exam   Vital Signs: Temp: 99  F (37.2  C) Temp src: Oral BP: (!) 149/65 Pulse: 77   Resp: 16 SpO2: 93 % O2 Device: None (Room air)    Weight: 231 lbs 14.78 oz    Constitutional: awake, alert, cooperative, no apparent distress, and appears stated age  Hematologic / Lymphatic: no cervical lymphadenopathy and no supraclavicular lymphadenopathy  Respiratory: No increased work of breathing, good air exchange, clear to auscultation bilaterally, no crackles or wheezing  Cardiovascular: Normal apical impulse, regular rate and rhythm, normal S1 and S2, no S3 or S4, and no murmur noted  GI: No scars, normal bowel sounds, soft, non-distended, non-tender, no masses palpated, no hepatosplenomegally  Skin: no bruising or bleeding, normal skin color, texture, turgor, and no redness, warmth, or swelling  Musculoskeletal: There is no redness, warmth, or swelling of the joints  Neurologic: Awake, alert, oriented to name, place and time.   Neuropsychiatric: General: normal, calm, and normal eye contact    Medical Decision Making       50 MINUTES SPENT BY ME on the date of service doing chart review, history, exam, documentation & further activities per the note.      Data     I have personally reviewed the following data over the past 24 hrs:    6.7  \   12.9   / 167     138 100 28.5 (H) /  137 (H)   3.9 28 0.84 \     TSH: N/A T4: N/A A1C: 5.3     Procal: N/A CRP: N/A Lactic Acid: 1.2         Imaging results reviewed over the past 24 hrs:   No results found for this or any previous visit (from the past 24 hour(s)).

## 2024-09-22 NOTE — PLAN OF CARE
Patient admitted to room 10 at approximately 2130 via cart from emergency room.  Reason for Admission:  General weakness   Report received from: Anthony Harper RN   Patient was accompanied by Self.  Patient ambulated/transferred:  assist x 2. air rica.  Patient is alert and orientated x 2.  Outpatient Observation education provided to: patient  Safety risks were identified during admission:  fall.   Yellow risk/fall band applied:  Yes  Detailed Belongings: clothing; vision aids

## 2024-09-22 NOTE — PLAN OF CARE
"  Problem: Adult Inpatient Plan of Care  Goal: Patient-Specific Goal (Individualized)  Description: You can add care plan individualizations to a care plan. Examples of Individualization might be:  \"Parent requests to be called daily at 9am for status\", \"I have a hard time hearing out of my right ear\", or \"Do not touch me to wake me up as it startles  me\".  Outcome: Not Progressing     Problem: Adult Inpatient Plan of Care  Goal: Plan of Care Review  Description: The Plan of Care Review/Shift note should be completed every shift.  The Outcome Evaluation is a brief statement about your assessment that the patient is improving, declining, or no change.  This information will be displayed automatically on your shift  note.  9/22/2024 0046 by Mayi Martinez RN  Flowsheets (Taken 9/22/2024 0046)  Plan of Care Reviewed With: patient  Overall Patient Progress: no change  9/22/2024 0042 by Mayi Martinez RN  Flowsheets (Taken 9/22/2024 0042)  Plan of Care Reviewed With: patient  Overall Patient Progress: no change   Goal Outcome Evaluation:      Plan of Care Reviewed With: patient    Overall Patient Progress: no changeOverall Patient Progress: no change     Pt Alert with intermittent confusion, forgetful. Denies pain. On room air. Tele: NSR. Pt had some anxiety, was trying to get out from bed, kept forgetting that she has purewick on. Ativan given. Had elevated BP, Provider notified, hydralazine and labetalol given.       "

## 2024-09-22 NOTE — H&P
"[facility]    History and Physical - Hospitalist Service       Date of Admission:  9/21/2024    Assessment & Plan      Sharon De La Vega is a 78 year old female with history of alzheimer's who is admitted on 9/21/2024 with weakness in the setting of UTI.    UTI  Generalized weakness  -Other potential source of infection could be cellulitis although patient denies any acute symptoms related to lower extremities.  She does have bilateral swelling which she states is chronic  -Technically meeting sepsis criteria given fever and heart rate >90 on presentation.  Antibiotics given before blood culture could be obtained however will check lactic acid  -follow-up urine culture   -Will broaden antibiotics to Zosyn to include pseudomonal coverage as patient is immunocompromised  -PT/OT    RA  -gets methotrexate weekly on Thursdays  -continue home Arava     HTN  HLD  -Continue home lisinopril, simvastatin    Alzheimer's dementia  -Continue home memantine, Aricept, Wellbutrin and Celexa    Hypothyroidism  -Continue PTA levothyroxine       Observation Goals: -diagnostic tests and consults completed and resulted, -vital signs normal or at patient baseline, -infection is improving, -safe disposition plan has been identified, Nurse to notify provider when observation goals have been met and patient is ready for discharge.  Diet: Regular Diet Adult  DVT Prophylaxis: Lovenox  Escobar Catheter: Not present  Lines: None     Cardiac Monitoring: None  Code Status: Full Code    Clinically Significant Risk Factors Present on Admission                  # Hypertension: Noted on problem list   # Dementia: noted on problem list       # Obesity: Estimated body mass index is 37.43 kg/m  as calculated from the following:    Height as of this encounter: 1.676 m (5' 6\").    Weight as of this encounter: 105.2 kg (231 lb 14.8 oz).              Disposition Plan     Medically Ready for Discharge: Anticipated Tomorrow       "     Christopher Martinez DO  Hospitalist Service  [facility]  Securely message with [employer] (more info)  Text page via [facility] Paging/Directory     ______________________________________________________________________    Chief Complaint   Weakness    History is obtained from the patient    History of Present Illness   Sharon De La Vega is a 78 year old female who is presenting after falling out of a chair earlier today.  Patient states she has been feeling weak for approximately 1 day long side fatigue.  Denies additional complaints including asymmetric weakness, numbness or tingling in fingers or toes, visual changes.  She has not noticed any fevers or chills but states she has been going to the bathroom more lately.  Denies any dysuria, hematuria, changes of bowel habit.      Past Medical History    History reviewed. No pertinent past medical history.    Past Surgical History   History reviewed. No pertinent surgical history.    Prior to Admission Medications   Prior to Admission Medications   Prescriptions Last Dose Informant Patient Reported? Taking?   acetaminophen (TYLENOL) 325 MG tablet Past Month at prn  Yes Yes   Sig: Take 2 tablets (650 mg) by mouth every 6 hours as needed for mild pain   benazepril (LOTENSIN) 10 MG tablet 9/21/2024 at am Self Yes Yes   Sig: Take 10 mg by mouth every morning.   buPROPion (WELLBUTRIN XL) 150 MG 24 hr tablet 9/21/2024 at am  Yes Yes   Sig: Take 150 mg by mouth every morning.   citalopram (CELEXA) 20 MG tablet 9/21/2024 at am Self Yes Yes   Sig: Take 1 tablet by mouth every morning.   cyanocobalamin (VITAMIN B-12) 1000 MCG tablet 9/21/2024 at am Self Yes Yes   Sig: Take 1,000 mcg by mouth every morning.   donepezil (ARICEPT) 10 MG tablet 9/20/2024 at pm  No Yes   Sig: Take 1 tablet (10 mg) by mouth at bedtime   folic acid (FOLVITE) 1 MG tablet 9/21/2024 at am Self Yes Yes   Sig: Take 1 mg by mouth every morning.   leflunomide (ARAVA) 10 MG tablet 9/21/2024  at am Self Yes Yes   Sig: Take 1 tablet by mouth every morning.   levothyroxine (SYNTHROID/LEVOTHROID) 100 MCG tablet 2024 at am  Yes Yes   Sig: Take 1 tablet by mouth every morning.   memantine (NAMENDA) 10 MG tablet 2024 at am  No Yes   Sig: Take 1 tablet (10 mg) by mouth 2 times daily   methotrexate 2.5 MG tablet 2024 at am Self Yes Yes   Sig: Take 10 mg by mouth once a week On Thursday   nystatin (MYCOSTATIN) 194914 UNIT/GM external powder Past Month at prn Self Yes Yes   Sig: Apply topically 2 times daily as needed.   simvastatin (ZOCOR) 20 MG tablet 2024 at pm Self Yes Yes   Sig: Take 20 mg by mouth every evening.   vitamin D3 (CHOLECALCIFEROL) 50 mcg (2000 units) tablet 2024 at am  Yes Yes   Sig: Take 1 tablet by mouth every morning.   vitamin E (TOCOPHEROL) 1000 units (450 mg) CAPS capsule 2024 at am  No Yes   Sig: Take 1 capsule (1,000 Units) by mouth 2 times daily      Facility-Administered Medications: None        Social History   I have reviewed this patient's social history and updated it with pertinent information if needed.  Social History     Tobacco Use    Smoking status: Former     Current packs/day: 0.00     Types: Cigarettes     Quit date:      Years since quittin.7    Smokeless tobacco: Never   Substance Use Topics    Alcohol use: Not Currently         Family History   I have reviewed this patient's family history and updated it with pertinent information if needed.  Family History   Problem Relation Age of Onset    Lung Cancer Father     Multiple myeloma Brother          Allergies   Allergies   Allergen Reactions    Acetaminophen-Codeine GI Disturbance     Indigestion    Celecoxib Unknown    Codeine Camsylate Unknown    Conj Estrog-Medroxyprogest Ace Other (See Comments)     Bleeding    Estrogens, Conjugated Synthetic A Unknown    Nsaids Unknown    Sulfa Antibiotics Unknown        Physical Exam   Vital Signs: Temp: 98.5  F (36.9  C) Temp src: Oral BP: (!)  148/64 Pulse: 90   Resp: (!) 34 SpO2: 94 % O2 Device: None (Room air)    Weight: 231 lbs 14.78 oz    General Appearance: Awake, cooperative and conversational, pleasant in no acute distress  Respiratory: Breathing comfortably on room air without use of accessory muscles, CTAB  Cardiovascular: RRR  GI: No pain throughout  Skin: No exposed rash or swollen joints.  Lower extremities are swollen and have a pink coloration to them, not warm to touch  Other: No focal deficits on exam    Medical Decision Making       75 MINUTES SPENT BY ME on the date of service doing chart review, history, exam, documentation & further activities per the note.      Data     I have personally reviewed the following data over the past 24 hrs:    6.7  \   12.9   / 167     138 100 28.5 (H) /  137 (H)   3.9 28 0.84 \     TSH: N/A T4: N/A A1C: 5.3       Imaging results reviewed over the past 24 hrs:   No results found for this or any previous visit (from the past 24 hour(s)).

## 2024-09-22 NOTE — MEDICATION SCRIBE - ADMISSION MEDICATION HISTORY
Medication Scribe Admission Medication History    Admission medication history is complete. The information provided in this note is only as accurate as the sources available at the time of the update.    Information Source(s): Family member via in-person    Pertinent Information: Patient's medications are being managed by son. Guy ([phone]) was present at bedside and provided medication information.     Patient reported to be no longer taking: Calcium Vit D,     Changes made to PTA medication list:  Added: Vit D3, Wellbutrin   Deleted: Calcium Vit D,   Changed: Nystatin to PRN (per patient)    Allergies reviewed with patient and updates made in EHR: yes    Medication History Completed By: Colton Bailey 9/21/2024 9:06 PM    PTA Med List   Medication Sig Last Dose    acetaminophen (TYLENOL) 325 MG tablet Take 2 tablets (650 mg) by mouth every 6 hours as needed for mild pain Past Month at prn    benazepril (LOTENSIN) 10 MG tablet Take 10 mg by mouth every morning. 9/21/2024 at am    buPROPion (WELLBUTRIN XL) 150 MG 24 hr tablet Take 150 mg by mouth every morning. 9/21/2024 at am    citalopram (CELEXA) 20 MG tablet Take 1 tablet by mouth every morning. 9/21/2024 at am    cyanocobalamin (VITAMIN B-12) 1000 MCG tablet Take 1,000 mcg by mouth every morning. 9/21/2024 at am    donepezil (ARICEPT) 10 MG tablet Take 1 tablet (10 mg) by mouth at bedtime 9/20/2024 at pm    folic acid (FOLVITE) 1 MG tablet Take 1 mg by mouth every morning. 9/21/2024 at am    leflunomide (ARAVA) 10 MG tablet Take 1 tablet by mouth every morning. 9/21/2024 at am    levothyroxine (SYNTHROID/LEVOTHROID) 100 MCG tablet Take 1 tablet by mouth every morning. 9/21/2024 at am    memantine (NAMENDA) 10 MG tablet Take 1 tablet (10 mg) by mouth 2 times daily 9/21/2024 at am    methotrexate 2.5 MG tablet Take 10 mg by mouth once a week On Thursday 9/19/2024 at am    nystatin (MYCOSTATIN) 226433 UNIT/GM external powder Apply topically 2 times daily as  needed. Past Month at prn    simvastatin (ZOCOR) 20 MG tablet Take 20 mg by mouth every evening. 9/20/2024 at pm    vitamin D3 (CHOLECALCIFEROL) 50 mcg (2000 units) tablet Take 1 tablet by mouth every morning. 9/21/2024 at am    vitamin E (TOCOPHEROL) 1000 units (450 mg) CAPS capsule Take 1 capsule (1,000 Units) by mouth 2 times daily 9/21/2024 at am

## 2024-09-22 NOTE — PROGRESS NOTES
PRIMARY DIAGNOSIS: GENERALIZED WEAKNESS    OUTPATIENT/OBSERVATION GOALS TO BE MET BEFORE DISCHARGE  1. Orthostatic performed: No    2. Tolerating PO medications: Yes    3. Return to near baseline physical activity: No    4. Cleared for discharge by consultants (if involved): No    Discharge Planner Nurse   Safe discharge environment identified: Yes  Barriers to discharge: Yes       Entered by: Slava Theodore RN 09/22/2024 8:45 AM     Please review provider order for any additional goals.   Nurse to notify provider when observation goals have been met and patient is ready for discharge.

## 2024-09-22 NOTE — PLAN OF CARE
"  Problem: Adult Inpatient Plan of Care  Goal: Absence of Hospital-Acquired Illness or Injury  Intervention: Identify and Manage Fall Risk  Recent Flowsheet Documentation  Taken 9/22/2024 0830 by Slava Zamorano, RN  Safety Promotion/Fall Prevention: activity supervised     Problem: Adult Inpatient Plan of Care  Goal: Optimal Comfort and Wellbeing  Outcome: Progressing   Goal Outcome Evaluation:      Plan of Care Reviewed With: patient, family    Overall Patient Progress: improvingOverall Patient Progress: improving     SUBJECTIVE:  Report given to Rody Vega.    Slava Theodore, RN    BP (!) 149/65 (BP Location: Left arm)   Pulse 77   Temp 99  F (37.2  C) (Oral)   Resp 16   Ht 1.676 m (5' 6\")   Wt 105.2 kg (231 lb 14.8 oz)   SpO2 93%   BMI 37.43 kg/m          "

## 2024-09-22 NOTE — PROGRESS NOTES
09/22/24 0830   Appointment Info   Signing Clinician's Name / Credentials (PT) Franchesca Stuart DPT   Quick Adds   Quick Adds Certification   Living Environment   People in Home child(ashly), adult  (Son, DIL)   Current Living Arrangements house   Home Accessibility stairs to enter home   Number of Stairs, Main Entrance 3;4   Stair Railings, Main Entrance railings safe and in good condition   Transportation Anticipated health plan transportation   Self-Care   Usual Activity Tolerance moderate   Current Activity Tolerance poor   Equipment Currently Used at Home cane, straight   General Information   Onset of Illness/Injury or Date of Surgery 09/21/24   Referring Physician Christopher Martinez DO   Patient/Family Therapy Goals Statement (PT) none   Pertinent History of Current Problem (include personal factors and/or comorbidities that impact the POC) 78 year old female with history of alzheimer's who is admitted on 9/21/2024 with weakness in the setting of UTI.   Existing Precautions/Restrictions (S)  fall   Cognition   Affect/Mental Status (Cognition) confused   Follows Commands (Cognition) follows one-step commands;verbal cues/prompting required   Strength (Manual Muscle Testing)   Strength (Manual Muscle Testing) Deficits observed during functional mobility   Bed Mobility   Comment, (Bed Mobility) Pt in recliner when PT arrived.   Transfers   Transfers sit-stand transfer   Sit-Stand Transfer   Sit-Stand [place] (Transfers) maximum assist (25% patient effort);1 person assist   Assistive Device (Sit-Stand Transfers) walker, front-wheeled   Gait/Stairs (Locomotion)   Comment, (Gait/Stairs) n/a- pt unable to ambulate functional distance at this time.   Clinical Impression   Criteria for Skilled Therapeutic Intervention Yes, treatment indicated   PT Diagnosis (PT) Impaired functional mobility   Influenced by the following impairments Weakness, fatigue, balance deficits, cognition   Functional limitations due to  impairments Impaired strength, transfers, gait   Clinical Presentation (PT Evaluation Complexity) evolving   Clinical Presentation Rationale Presents as diagnosed   Clinical Decision Making (Complexity) moderate complexity   Planned Therapy Interventions (PT) balance training;bed mobility training;gait training;home exercise program;stair training;strengthening;transfer training   Risk & Benefits of therapy have been explained patient   PT Total Evaluation Time   PT Eval, Moderate Complexity Minutes (76326) 10   Therapy Certification   Start of care date 09/22/24   Certification date from 09/22/24   Certification date to 09/29/24   Medical Diagnosis UTI   Physical Therapy Goals   PT Frequency 5x/week   PT Predicted Duration/Target Date for Goal Attainment 09/29/24   PT Goals Transfers;Gait   PT: Transfers Minimal assist;Sit to/from stand;Bed to/from chair;Assistive device   PT: Gait Minimal assist;Rolling walker;50 feet   PT Discharge Planning   PT Plan progress standing tolerance, transfers, amb with chair follow, LE ex   PT Discharge Recommendation (DC Rec) Transitional Care Facility   PT Rationale for DC Rec Unable to ambulate functional distance due to weakness/cognition. TCU safest d/c plan.   PT Brief overview of current status Recliner <> bed, mod Ax1 with FWW.   PT Equipment Needed at Discharge walker, rolling;wheelchair   Total Session Time   Total Session Time (sum of timed and untimed services) [address] [place] [address]  OUTPATIENT PHYSICAL THERAPY EVALUATION  PLAN OF TREATMENT FOR OUTPATIENT REHABILITATION  (COMPLETE FOR INITIAL CLAIMS ONLY)  Patient's Last Name, First Name, M.I.  YOB: 1945  Sharon De La Vega                        Provider's Name  [facility] Medical Record No.  [id number]                             Onset Date:  09/21/24   Start of Care Date:  (P) 09/22/24   Type:     _X_PT   ___OT   ___SLP Medical Diagnosis:   (P) UTI              PT Diagnosis:  Impaired functional mobility Visits from SOC:  1     See note for plan of treatment, functional goals and certification details    I CERTIFY THE NEED FOR THESE SERVICES FURNISHED UNDER        THIS PLAN OF TREATMENT AND WHILE UNDER MY CARE     (Physician co-signature of this document indicates review and certification of the therapy plan).                Franchesca Stuart, PT, DPT  9/22/2024

## 2024-09-22 NOTE — PLAN OF CARE
Goal Outcome Evaluation:      Plan of Care Reviewed With: patient  Overall Patient Progress: no change    Patient is alert to self and place only. Patient is an assist of 2 for mobility. Patient is currently up in the chair. Patient frequently believes she must stool and wants to use the commode.  Tele is NSR. Patient is on Room Air. Patient has IV Zosyn running at present. Patient is inpatient status. Patient is on a regular diet.

## 2024-09-22 NOTE — PROGRESS NOTES
PRIMARY DIAGNOSIS: GENERALIZED WEAKNESS    OUTPATIENT/OBSERVATION GOALS TO BE MET BEFORE DISCHARGE  1. Orthostatic performed: No    2. Tolerating PO medications: Yes    3. Return to near baseline physical activity: No    4. Cleared for discharge by consultants (if involved): No    Discharge Planner Nurse   Safe discharge environment identified: Yes  Barriers to discharge: Yes       Entered by: Slava Theodore RN 09/22/2024 8:46 AM     Please review provider order for any additional goals.   Nurse to notify provider when observation goals have been met and patient is ready for discharge.

## 2024-09-22 NOTE — ED NOTES
"[facility] ED Handoff Report    ED Chief Complaint: Weakness and fall.    ED Diagnosis:  (N30.00) Acute cystitis without hematuria  Comment:   Plan:     (R53.1) General weakness  Comment:   Plan:     (R50.9) Fever, unspecified fever cause  Comment:   Plan:        PMH:  History reviewed. No pertinent past medical history.     Code Status:  Prior     Falls Risk: Yes Band: Applied    Current Living Situation/Residence: lives with their son or daughter     Elimination Status: Continent: Yes     Activity Level: SBA w/ walker    Patients Preferred Language:  English     Needed: No    Vital Signs:  BP (!) 158/70   Pulse 92   Temp 99  F (37.2  C) (Oral)   Resp 22   Ht 1.676 m (5' 6\")   Wt 107 kg (236 lb)   SpO2 95%   BMI 38.09 kg/m       Cardiac Rhythm: SR    Pain Score: 5/10    Is the Patient Confused:  Yes    Last Food or Drink: 09/21/24 at 1700    Focused Assessment:  Generalized    Tests Performed: Done: Labs    Treatments Provided:  See MAR    Family Dynamics/Concerns: No    Family Updated On Visitor Policy: Yes    Plan of Care Communicated to Family: Yes    Who Was Updated about Plan of Care: Patient and family.    Belongings Checklist Done and Signed by Patient: Yes    Belongings Sent with Patient: Yes    Medications sent with patient: NA    Covid: symptomatic, negative    Additional Information: Purewick in place    RN: Anthony Harper RN   9/21/2024 8:51 PM        " Louis Mcdermott discharge to home/self care.

## 2024-09-22 NOTE — CONSULTS
Care Management Initial Consult    General Information  Assessment completed with: Children, Son Adelaida  Type of CM/SW Visit: Initial Assessment    Primary Care Provider verified and updated as needed: No   Readmission within the last 30 days:           Advance Care Planning: Advance Care Planning Reviewed: other (see comments) (Son Adelaida is pt's POA, he plans to bring in documents, no HCD)          Communication Assessment  Patient's communication style: spoken language (English or Bilingual)    Hearing Difficulty or Deaf: no        Cognitive  Cognitive/Neuro/Behavioral: WDL                      Living Environment:   People in home: child(ashly), adult  Son Adelaida and adelaida's wife  Current living Arrangements: house      Able to return to prior arrangements: other (see comments) (Son Adelaida prefers TCU)       Family/Social Support:  Care provided by: child(ashly)  Provides care for: no one, unable/limited ability to care for self  Marital Status:   Support system: Children          Description of Support System: Supportive         Current Resources:   Patient receiving home care services: Yes  Skilled Home Care Services: [facility] ([employer] twice a week for showers, private pay)     Community Resources: None  Equipment currently used at home: wheelchair, manual, cane, straight  Supplies currently used at home:      Employment/Financial:  Employment Status: retired        Financial Concerns:             Does the patient's insurance plan have a 3 day qualifying hospital stay waiver?  No    Lifestyle & Psychosocial Needs:  Social Determinants of Health     Food Insecurity: Low Risk  (9/22/2024)    Food Insecurity     Within the past 12 months, did you worry that your food would run out before you got money to buy more?: No     Within the past 12 months, did the food you bought just not last and you didn t have money to get more?: No   Depression: Not at risk (8/20/2024)    Received from [facility]    PHQ-2     PHQ-2 TOTAL SCORE: 0   Housing Stability: Low Risk  (9/22/2024)    Housing Stability     Do you have housing? : Yes     Are you worried about losing your housing?: No   Tobacco Use: Medium Risk (9/21/2024)    Patient History     Smoking Tobacco Use: Former     Smokeless Tobacco Use: Never     Passive Exposure: Not on file   Financial Resource Strain: Low Risk  (9/22/2024)    Financial Resource Strain     Within the past 12 months, have you or your family members you live with been unable to get utilities (heat, electricity) when it was really needed?: No   Alcohol Use: Not on file   Transportation Needs: Low Risk  (9/22/2024)    Transportation Needs     Within the past 12 months, has lack of transportation kept you from medical appointments, getting your medicines, non-medical meetings or appointments, work, or from getting things that you need?: No   Physical Activity: Not on file   Interpersonal Safety: Not on file   Stress: Not on file   Social Connections: Unknown (6/24/2023)    Received from [facility], [facility]    Social Connections     Frequency of Communication with Friends and Family: Not on file   Health Literacy: Not on file       Functional Status:  Prior to admission patient needed assistance:   Dependent ADLs:: Ambulation-walker, Ambulation-cane, Bathing, Dressing, Grooming, Positioning, Transfers, Toileting  Dependent IADLs:: Cleaning, Cooking, Shopping, Laundry, Meal Preparation, Medication Management, Money Management, Transportation       Mental Health Status:  Mental Health Status: No Current Concerns       Chemical Dependency Status:  Chemical Dependency Status: No Current Concerns             Values/Beliefs:  Spiritual, Cultural Beliefs, Voodoo Practices, Values that affect care:                 Discussed  Partnership in Safe Discharge Planning  document with patient/family:  "No    Additional Information:  Assessed. RNCM introduced self and CM role to pt's son Guy over the phone. Pt's address, and contacts verified. Guy is pt's POA, he plans to bring in documents, pt does not have a HCD. Pt lives with son Guy and his wife in their home. Pt is dependant on Son/son's wife for ADLS and IADLS. Pt is STBA with FWW/or Cane. Pt has hx of dementia and has some confusion at baseline, son says, \"pt is a little more confused.\" Pt has a private pay HHA through [employer] to assist with showers twice a week. Pt has an established PCP. Pt is retired. Therapy recs TCU, pt's son Guy agreeable, pt went to [facility] in the past and they liked that. They are also ok with [facility], and [facility]. TCU referrals sent (pending). Pt has [employer], RNCM submitted pt's insurance auth today 9/22.  [facility] transport likely needed, depending on pt's medical progression.         RNCM could not get [facility] to work today, RNCM sent old way via payor communications tab, and sent updated therapy notes via communications tab.       GOLDSMITH given over the phone to pt's son , he stated understanding.       Next Steps: Follow up on TCU referrals, and insurance auth to [employer]. Cm will continue to follow plan of care,review recommendations, and assist with any discharge needs anticipated.       Lena Nelson RN      "

## 2024-09-22 NOTE — PROGRESS NOTES
Occupational Therapy      09/22/24 1330   Appointment Info   Signing Clinician's Name / Credentials (OT) CLARA Miles   Living Environment   People in Home child(ashly), adult   Current Living Arrangements house   Home Accessibility stairs to enter home   Number of Stairs, Main Entrance 3;4   Stair Railings, Main Entrance railings safe and in good condition   Transportation Anticipated health plan transportation;family or friend will provide   Self-Care   Usual Activity Tolerance moderate   Current Activity Tolerance poor   Regular Exercise No   Equipment Currently Used at Home wheelchair, manual;cane, straight   Activity/Exercise/Self-Care Comment Pt is dependent on son/daughter in law for ADLs - pt claims she does it independently, poor historian   Instrumental Activities of Daily Living (IADL)   IADL Comments Pt is dependent on son/daughter in law for IADLs - pt claims she does it independently, poor historian   General Information   Onset of Illness/Injury or Date of Surgery 09/21/24   Referring Physician Christopher Martinez,    Patient/Family Therapy Goal Statement (OT) Return home   Additional Occupational Profile Info/Pertinent History of Current Problem 78 year old female with history of alzheimer's who is admitted on 9/21/2024 with weakness in the setting of UTI.   Existing Precautions/Restrictions fall   Limitations/Impairments safety/cognitive   Cognitive Status Examination   Orientation Status person   Affect/Mental Status (Cognitive) confused   Follows Commands verbal cues/prompting required;physical/tactile prompts required   Memory Deficit severe deficit   Cognitive Status Comments Pt unoriented to time/place, did not respond to verbal and tactile cues with hand placement   Pain Assessment   Patient Currently in Pain No   Range of Motion Comprehensive   General Range of Motion bilateral upper extremity ROM WFL   Strength Comprehensive (MMT)   Comment, General Manual Muscle Testing (MMT)  Assessment Lower body weakness w/ standing   Transfers   Transfers sit-stand transfer   Sit-Stand Transfer   Sit-Stand [place] (Transfers) moderate assist (50% patient effort);2 person assist   Assistive Device (Sit-Stand Transfers) walker, front-wheeled   Balance   Balance Assessment sitting static balance;sitting dynamic balance;sit to stand dynamic balance;standing static balance;standing dynamic balance   Sitting Balance: Static independent   Sitting Balance: Dynamic contact guard   Position, Sitting Balance supported;sitting in chair   Sit-to-Stand Balance moderate assist;2-person assist   Standing Balance: Static minimal assist;2-person assist   Standing Balance: Dynamic moderate assist;2-person assist   Position/Device Used, Standing Balance walker, front-wheeled   Activities of Daily Living   BADL Assessment/Intervention toileting;lower body dressing   Lower Body Dressing Assessment/Training   Comment, (Lower Body Dressing) Per clinical reasoning, it is anticipated that Pt would have difficulty lower body dressing d/t decreased activity tolerance and decreased strength   [place] Level (Lower Body Dressing) not tested   Toileting   Comment, (Toileting) Per clinical reasoning, it is anticipated that Pt would have difficulty lower body dressing d/t decreased activity tolerance and decreased strength   [place] Level (Toileting) not tested   Clinical Impression   Criteria for Skilled Therapeutic Interventions Met (OT) Yes, treatment indicated   OT Diagnosis Decreased activity tolerance, decrease safety and independence w/ ADLs/IADLs, cognitive decline/dementia   Influenced by the following impairments Severe sepsis, dementia/cognition   OT Problem List-Impairments impacting ADL problems related to;activity tolerance impaired;balance;cognition;inability to direct their own care;mobility;strength   Assessment of Occupational Performance 3-5 Performance Deficits   Identified Performance Deficits LB  "dressing, toileting, trsfs, functional mobility   Planned Therapy Interventions (OT) ADL retraining;IADL retraining;balance training;strengthening;home program guidelines;progressive activity/exercise;transfer training;cognition   Clinical Decision Making Complexity (OT) detailed assessment/moderate complexity   Risk & Benefits of therapy have been explained evaluation/treatment results reviewed;care plan/treatment goals reviewed;risks/benefits reviewed;participants included;patient   OT Total Evaluation Time   OT Eval, Moderate Complexity Minutes (39621) 12   OT Goals   Therapy Frequency (OT) 4 times/week   OT Predicted Duration/Target Date for Goal Attainment 09/29/24   OT Goals Lower Body Dressing;Transfers;Toilet Transfer/Toileting;Cognition   OT: Lower Body Dressing Minimal assist;using adaptive equipment  (seated)   OT: Transfer with assistive device;Moderate assist  (Modx1)   OT: Toilet Transfer/Toileting Moderate assist;toilet transfer;cleaning and garment management;using adaptive equipment   OT: Cognitive Patient/caregiver will verbalize understanding of cognitive assessment results/recommendations as needed for safe discharge planning   Therapeutic Activities   Therapeutic Activity Minutes (63594) 8   Symptoms noted during/after treatment fatigue   Treatment Detail/Skilled Intervention Pt greeted in recliner and agreeable to treatment. Pt completed STSx3 w/ Mod.Ax2 and verbal/tactile cues for hand placement and safety. Pt expressed feeling very weak in her lower body and that \"it never used to be this bad.\" Pt was not responsive to cueing and slightly impulsive with sitting. While standing pt was cued to march in place, shift weight side to side, and for safe posturing. Pt was able to tolerate ~1-2 min of dynamic standing to focus on increasing strength and activity tolerance for functional mobility and dynamic balance. Pt was in recliner at end of session with chair alarm on and call light in reach. Pt " confused throughout session- cues for re-orientation pt very pleasent.   OT Discharge Planning   OT Plan Trsfs, increasing activity tolerance, toileting, lower body dressing   OT Discharge Recommendation (DC Rec) Transitional Care Facility   OT Rationale for DC Rec Pt currently lives in a home w/ her son and his wife. At baseline, pt ambulates independently w/ walker or cane. Pt currently requires mod.Ax2 for trsfs, functional mobility, and ADLs/IADLs. Pt current living arrangements may not be adequate to support her physical and cognitive needs. OT recommends TCU to increase activity tolerance, strength, and independence and safety w/ ADLs/IADLs. TCU can also aid in cognitive support.   OT Brief overview of current status Mod.Ax2 for trsfs and functional mobility. Assist required for all ADLs/IADLs.   Total Session Time   Timed Code Treatment Minutes 8   Total Session Time (sum of timed and untimed services) 20

## 2024-09-23 ENCOUNTER — DOCUMENTATION ONLY (OUTPATIENT)
Dept: GERIATRICS | Facility: CLINIC | Age: 79
End: 2024-09-23
Payer: COMMERCIAL

## 2024-09-23 ENCOUNTER — APPOINTMENT (OUTPATIENT)
Dept: PHYSICAL THERAPY | Facility: HOSPITAL | Age: 79
DRG: 871 | End: 2024-09-23
Payer: COMMERCIAL

## 2024-09-23 ENCOUNTER — APPOINTMENT (OUTPATIENT)
Dept: RADIOLOGY | Facility: HOSPITAL | Age: 79
DRG: 871 | End: 2024-09-23
Payer: COMMERCIAL

## 2024-09-23 VITALS
HEART RATE: 85 BPM | WEIGHT: 231.92 LBS | OXYGEN SATURATION: 97 % | SYSTOLIC BLOOD PRESSURE: 157 MMHG | TEMPERATURE: 98 F | BODY MASS INDEX: 37.27 KG/M2 | RESPIRATION RATE: 18 BRPM | DIASTOLIC BLOOD PRESSURE: 78 MMHG | HEIGHT: 66 IN

## 2024-09-23 LAB
ANION GAP SERPL CALCULATED.3IONS-SCNC: 10 MMOL/L (ref 7–15)
BACTERIA UR CULT: NORMAL
BUN SERPL-MCNC: 17.5 MG/DL (ref 8–23)
CALCIUM SERPL-MCNC: 8.1 MG/DL (ref 8.8–10.4)
CHLORIDE SERPL-SCNC: 104 MMOL/L (ref 98–107)
CREAT SERPL-MCNC: 0.92 MG/DL (ref 0.51–0.95)
EGFRCR SERPLBLD CKD-EPI 2021: 64 ML/MIN/1.73M2
ERYTHROCYTE [DISTWIDTH] IN BLOOD BY AUTOMATED COUNT: 15.3 % (ref 10–15)
GLUCOSE SERPL-MCNC: 112 MG/DL (ref 70–99)
HCO3 SERPL-SCNC: 25 MMOL/L (ref 22–29)
HCT VFR BLD AUTO: 35 % (ref 35–47)
HGB BLD-MCNC: 11.2 G/DL (ref 11.7–15.7)
MCH RBC QN AUTO: 31.5 PG (ref 26.5–33)
MCHC RBC AUTO-ENTMCNC: 32 G/DL (ref 31.5–36.5)
MCV RBC AUTO: 98 FL (ref 78–100)
PLATELET # BLD AUTO: 121 10E3/UL (ref 150–450)
POTASSIUM SERPL-SCNC: 3.6 MMOL/L (ref 3.4–5.3)
RBC # BLD AUTO: 3.56 10E6/UL (ref 3.8–5.2)
SODIUM SERPL-SCNC: 139 MMOL/L (ref 135–145)
WBC # BLD AUTO: 7.2 10E3/UL (ref 4–11)

## 2024-09-23 PROCEDURE — 250N000013 HC RX MED GY IP 250 OP 250 PS 637: Performed by: STUDENT IN AN ORGANIZED HEALTH CARE EDUCATION/TRAINING PROGRAM

## 2024-09-23 PROCEDURE — 71046 X-RAY EXAM CHEST 2 VIEWS: CPT

## 2024-09-23 PROCEDURE — 250N000013 HC RX MED GY IP 250 OP 250 PS 637

## 2024-09-23 PROCEDURE — 250N000011 HC RX IP 250 OP 636: Performed by: STUDENT IN AN ORGANIZED HEALTH CARE EDUCATION/TRAINING PROGRAM

## 2024-09-23 PROCEDURE — 99207 PR APP CREDIT; MD BILLING SHARED VISIT: CPT | Mod: FS | Performed by: HOSPITALIST

## 2024-09-23 PROCEDURE — 36415 COLL VENOUS BLD VENIPUNCTURE: CPT

## 2024-09-23 PROCEDURE — 99239 HOSP IP/OBS DSCHRG MGMT >30: CPT | Mod: FS

## 2024-09-23 PROCEDURE — 80048 BASIC METABOLIC PNL TOTAL CA: CPT

## 2024-09-23 PROCEDURE — 90662 IIV NO PRSV INCREASED AG IM: CPT | Performed by: STUDENT IN AN ORGANIZED HEALTH CARE EDUCATION/TRAINING PROGRAM

## 2024-09-23 PROCEDURE — G0008 ADMIN INFLUENZA VIRUS VAC: HCPCS | Performed by: STUDENT IN AN ORGANIZED HEALTH CARE EDUCATION/TRAINING PROGRAM

## 2024-09-23 PROCEDURE — 97530 THERAPEUTIC ACTIVITIES: CPT | Mod: GP

## 2024-09-23 PROCEDURE — 85027 COMPLETE CBC AUTOMATED: CPT

## 2024-09-23 RX ORDER — CEFPODOXIME PROXETIL 200 MG/1
200 TABLET, FILM COATED ORAL 2 TIMES DAILY
Status: SHIPPED
Start: 2024-09-23 | End: 2024-09-30

## 2024-09-23 RX ORDER — CEFDINIR 300 MG/1
300 CAPSULE ORAL EVERY 12 HOURS SCHEDULED
Status: DISCONTINUED | OUTPATIENT
Start: 2024-09-23 | End: 2024-09-23

## 2024-09-23 RX ORDER — CEFDINIR 300 MG/1
300 CAPSULE ORAL 2 TIMES DAILY
Status: DISCONTINUED | OUTPATIENT
Start: 2024-09-23 | End: 2024-09-23 | Stop reason: HOSPADM

## 2024-09-23 RX ADMIN — MEMANTINE 10 MG: 10 TABLET ORAL at 07:55

## 2024-09-23 RX ADMIN — PIPERACILLIN AND TAZOBACTAM 3.38 G: 3; .375 INJECTION, POWDER, FOR SOLUTION INTRAVENOUS at 05:24

## 2024-09-23 RX ADMIN — CITALOPRAM HYDROBROMIDE 20 MG: 20 TABLET ORAL at 07:55

## 2024-09-23 RX ADMIN — LEVOTHYROXINE SODIUM 100 MCG: 100 TABLET ORAL at 07:54

## 2024-09-23 RX ADMIN — INFLUENZA A VIRUS A/VICTORIA/4897/2022 IVR-238 (H1N1) ANTIGEN (FORMALDEHYDE INACTIVATED), INFLUENZA A VIRUS A/CALIFORNIA/122/2022 SAN-022 (H3N2) ANTIGEN (FORMALDEHYDE INACTIVATED), AND INFLUENZA B VIRUS B/MICHIGAN/01/2021 ANTIGEN (FORMALDEHYDE INACTIVATED) 0.5 ML: 60; 60; 60 INJECTION, SUSPENSION INTRAMUSCULAR at 11:41

## 2024-09-23 RX ADMIN — LEFLUNOMIDE 10 MG: 10 TABLET ORAL at 07:55

## 2024-09-23 RX ADMIN — BUPROPION HYDROCHLORIDE 150 MG: 150 TABLET, FILM COATED, EXTENDED RELEASE ORAL at 07:54

## 2024-09-23 RX ADMIN — LISINOPRIL 10 MG: 5 TABLET ORAL at 07:54

## 2024-09-23 RX ADMIN — POLYETHYLENE GLYCOL 3350 17 G: 17 POWDER, FOR SOLUTION ORAL at 07:54

## 2024-09-23 RX ADMIN — CEFDINIR 300 MG: 300 CAPSULE ORAL at 12:25

## 2024-09-23 ASSESSMENT — ACTIVITIES OF DAILY LIVING (ADL)
DIFFICULTY_COMMUNICATING: NO
ADLS_ACUITY_SCORE: 47
WALKING_OR_CLIMBING_STAIRS_DIFFICULTY: YES
ADLS_ACUITY_SCORE: 53
DOING_ERRANDS_INDEPENDENTLY_DIFFICULTY: YES
TOILETING_ISSUES: YES
DIFFICULTY_EATING/SWALLOWING: NO
DRESSING/BATHING: BATHING DIFFICULTY, ASSISTANCE 1 PERSON
ADLS_ACUITY_SCORE: 47
DRESSING/BATHING_DIFFICULTY: YES
ADLS_ACUITY_SCORE: 47
VISION_MANAGEMENT: GLASSES
CONCENTRATING,_REMEMBERING_OR_MAKING_DECISIONS_DIFFICULTY: YES
ADLS_ACUITY_SCORE: 47
TOILETING: 2-->COMPLETELY DEPENDENT
ADLS_ACUITY_SCORE: 53
TOILETING: 2-->COMPLETELY DEPENDENT (NOT DEVELOPMENTALLY APPROPRIATE)
NUMBER_OF_TIMES_PATIENT_HAS_FALLEN_WITHIN_LAST_SIX_MONTHS: 1
ADLS_ACUITY_SCORE: 47
ADLS_ACUITY_SCORE: 53
TOILETING_ASSISTANCE: TOILETING DIFFICULTY, DEPENDENT
CHANGE_IN_FUNCTIONAL_STATUS_SINCE_ONSET_OF_CURRENT_ILLNESS/INJURY: NO
EQUIPMENT_CURRENTLY_USED_AT_HOME: CANE, STRAIGHT;WHEELCHAIR, MANUAL
ADLS_ACUITY_SCORE: 53
ADLS_ACUITY_SCORE: 47
ADLS_ACUITY_SCORE: 47
FALL_HISTORY_WITHIN_LAST_SIX_MONTHS: YES
ADLS_ACUITY_SCORE: 49
WEAR_GLASSES_OR_BLIND: YES
WALKING_OR_CLIMBING_STAIRS: TRANSFERRING DIFFICULTY, REQUIRES EQUIPMENT
ADLS_ACUITY_SCORE: 53
ADLS_ACUITY_SCORE: 47

## 2024-09-23 NOTE — DISCHARGE SUMMARY
"[facility]  Hospitalist Discharge Summary      Date of Admission:  9/21/2024  Date of Discharge:  No discharge date for patient encounter.  Discharging Provider: Eloisa Gandhi NP  Discharge Service: Hospitalist Service    Discharge Diagnoses       Clinically Significant Risk Factors     # Obesity: Estimated body mass index is 37.43 kg/m  as calculated from the following:    Height as of this encounter: 1.676 m (5' 6\").    Weight as of this encounter: 105.2 kg (231 lb 14.8 oz).       Follow-ups Needed After Discharge   Follow-up Appointments     Follow Up and recommended labs and tests      Follow up with care home physician.  The following labs/tests are   recommended: Recheck in 3-5 days CBC for HGB.          Discharge Disposition   Discharged to home  Condition at discharge: Stable    Hospital Course       Shaorn De La Vega is a 78 year old female with history of alzheimer's who is admitted on 9/21/2024 with weakness in the setting of UTI. Patient spiked fever last night at 1832 of 101.8  Increasing intermittent confusion with patient calling out for help frequently. Family states this is increased from her baseline. PT and OT assessed patient and they recommend, TCU Case management to coordinate TCU placement. UA abnormal urine culture pending.  Patient denies urinary symptoms. Continue IV Zosyn while inpatient.  Urine culture results negative.  Chest x-ray obtained, showed possible atypical pneumonia.  Patient denies chest pain shortness of breath or cough .  No fever today. Cefpodoxime prescribed for discharge.  Patient agrees with plan to discharge to TCU and follow-up with primary care doctor.    # Severe sepsis  Patient spiked a fever 101.8 last night  Fever tachycardia wright tachypnea  UTI, urine cultures negative  Chest x-ray results:No pleural fluid or pneumothorax. No confluent airspace disease. Reticular interstitial opacities could be seen with edema or atypical infection. " Normal size of the heart. An old right shoulder rotator cuff injury is suspected.   Worsening mental status suggesting acute metabolic encephalopathy which qualifies for severe sepsis  Lactic 1.2  Blood cultures: Will order if patient spikes another fever  Will order IV fluids if patient spikes another fever  IV Zosyn while inpatient  Cefpodoxime prescribed for discharge     #UTI, suspected  -UA abnormal  -Patient denies urinary symptoms  -Other potential source of infection could be cellulitis although patient denies any acute symptoms related to lower extremities.  She does have bilateral swelling which she states is chronic  -Technically meeting sepsis criteria given fever and heart rate >90 on presentation.  Antibiotics given before blood culture could be obtained however will check lactic acid  -follow-up urine culture negative  -Chest x-ray positive atypical pneumonia and likely cause of fever spike  Cefpodoxime prescribed for discharge     #Generalized weakness  -PT/OT recommend TCU  -Case management to coordinate TCU placement  -Fall precaution     # Psoriatec arthritis  -gets methotrexate weekly on Thursdays  -continue home Arava      #HTN  Monitor blood pressure closely  Stable at this time  Hydralazine as needed  Continue home lisinopril     #HLD  -Continue simvastatin     #Alzheimer's dementia  -Intermittent confusion per patient's family increased from baseline  -Continue home memantine, Aricept  -Encephalopathy protocol     # Major depressive disorder  -Continue home Wellbutrin and Celexa     #Hypothyroidism  -Continue PTA levothyroxine          Consultations This Hospital Stay   PHYSICAL THERAPY ADULT IP CONSULT  OCCUPATIONAL THERAPY ADULT IP CONSULT  CARE MANAGEMENT / SOCIAL WORK IP CONSULT  PHARMACY IP CONSULT  PHYSICAL THERAPY ADULT IP CONSULT  OCCUPATIONAL THERAPY ADULT IP CONSULT    Code Status   Full Code    Time Spent on this Encounter   Eloisa DEL TORO NP, personally saw the patient today and  spent greater than 30 minutes discharging this patient.       Eloisa Gandhi NP  [facility] EXTENDED RECOVERY AND SHORT STAY  [address] [place] [id number]  Phone: [phone]  Fax: [phone]  ______________________________________________________________________    Physical Exam   Vital Signs: Temp: 98  F (36.7  C) Temp src: Oral BP: (!) 157/78 Pulse: 85   Resp: 18 SpO2: 97 % O2 Device: None (Room air)    Weight: 231 lbs 14.78 oz  Constitutional: awake, alert, cooperative, no apparent distress, and appears stated age  Hematologic / Lymphatic: no cervical lymphadenopathy and no supraclavicular lymphadenopathy  Respiratory: No increased work of breathing, good air exchange, diminished to auscultation bilaterally, no crackles or wheezing  Cardiovascular: Normal apical impulse, regular rate and rhythm, normal S1 and S2, no S3 or S4, and no murmur noted +2 pedal  GI: No scars, normal bowel sounds, soft, non-distended, non-tender, no masses palpated, no hepatosplenomegally  Skin: no bruising or bleeding, normal skin color, texture, turgor, and no redness, warmth, bilateral lower extremity swelling +2  Musculoskeletal: There is no redness, warmth, or swelling of the joints.  Full range of motion noted.  Motor strength is 5 out of 5 all extremities bilaterally.    Neurologic: Awake, alert, oriented to self  Neuropsychiatric: General: normal, calm, and normal eye contact       Primary Care Physician   Jaylen Young    Discharge Orders      General info for SNF    Length of Stay Estimate: Short Term Care: Estimated # of Days <30  Condition at Discharge: Stable  Level of care:skilled   Rehabilitation Potential: Excellent  Admission H&P remains valid and up-to-date: Yes  Recent Chemotherapy: N/A  Use Nursing Home Standing Orders: Yes     Mantoux instructions    Give two-step Mantoux (PPD) Per Facility Policy Yes     Follow Up and recommended labs and tests    Follow up with Nursing  home physician.  The following labs/tests are recommended: Recheck in 3-5 days CBC for HGB.     Reason for your hospital stay    Fever  Encephalopathy  Atypical Pneumonia     Activity - Up ad jose j     Full Code     Physical Therapy Adult Consult    Evaluate and treat as clinically indicated.    Reason:  Weakness     Occupational Therapy Adult Consult    Evaluate and treat as clinically indicated.    Reason:  Weakness     Fall precautions     Diet    Follow this diet upon discharge: Current Diet:Orders Placed This Encounter      Regular Diet Adult       Significant Results and Procedures   Most Recent 3 CBC's:  Recent Labs   Lab Test 09/23/24  0528 09/21/24 1837 10/02/23  0533   WBC 7.2 6.7 5.9   HGB 11.2* 12.9 10.5*   MCV 98 100 105*   * 167 138*     Most Recent 3 BMP's:  Recent Labs   Lab Test 09/23/24 0528 09/22/24  1749 09/22/24  1222 09/21/24  1837 10/02/23  0533     --   --  138 144   POTASSIUM 3.6  --   --  3.9 3.8   CHLORIDE 104  --   --  100 106   CO2 25  --   --  28 29   BUN 17.5  --   --  28.5* 22.5   CR 0.92  --   --  0.84 1.20*   ANIONGAP 10  --   --  10 9   RIZWANA 8.1*  --   --  8.9 8.6*   * 153* 116* 137* 93   ,   Results for orders placed or performed during the hospital encounter of 09/21/24   XR Chest 2 Views    Narrative    EXAM: XR CHEST 2 VIEWS  LOCATION: [facility]  DATE: 9/23/2024    INDICATION: fever 101.8 no clear source  COMPARISON: 9/6/2023 radiograph and CT      Impression    IMPRESSION: No pleural fluid or pneumothorax. No confluent airspace disease. Reticular interstitial opacities could be seen with edema or atypical infection. Normal size of the heart. An old right shoulder rotator cuff injury is suspected.       Discharge Medications   Current Discharge Medication List        START taking these medications    Details   cefpodoxime (VANTIN) 200 MG tablet Take 1 tablet (200 mg) by mouth 2 times daily for 7 days.    Associated Diagnoses:  Pneumonia due to infectious organism, unspecified laterality, unspecified part of lung           CONTINUE these medications which have NOT CHANGED    Details   acetaminophen (TYLENOL) 325 MG tablet Take 2 tablets (650 mg) by mouth every 6 hours as needed for mild pain      benazepril (LOTENSIN) 10 MG tablet Take 10 mg by mouth every morning.      buPROPion (WELLBUTRIN XL) 150 MG 24 hr tablet Take 150 mg by mouth every morning.      citalopram (CELEXA) 20 MG tablet Take 1 tablet by mouth every morning.      cyanocobalamin (VITAMIN B-12) 1000 MCG tablet Take 1,000 mcg by mouth every morning.      donepezil (ARICEPT) 10 MG tablet Take 1 tablet (10 mg) by mouth at bedtime  Qty: 90 tablet, Refills: 3    Associated Diagnoses: Major neurocognitive disorder due to Alzheimer's disease      folic acid (FOLVITE) 1 MG tablet Take 1 mg by mouth every morning.      leflunomide (ARAVA) 10 MG tablet Take 1 tablet by mouth every morning.      levothyroxine (SYNTHROID/LEVOTHROID) 100 MCG tablet Take 1 tablet by mouth every morning.      memantine (NAMENDA) 10 MG tablet Take 1 tablet (10 mg) by mouth 2 times daily  Qty: 180 tablet, Refills: 3    Associated Diagnoses: Major neurocognitive disorder due to Alzheimer's disease      methotrexate 2.5 MG tablet Take 10 mg by mouth once a week On Thursday      nystatin (MYCOSTATIN) 845189 UNIT/GM external powder Apply topically 2 times daily as needed.      simvastatin (ZOCOR) 20 MG tablet Take 20 mg by mouth every evening.      vitamin D3 (CHOLECALCIFEROL) 50 mcg (2000 units) tablet Take 1 tablet by mouth every morning.      vitamin E (TOCOPHEROL) 1000 units (450 mg) CAPS capsule Take 1 capsule (1,000 Units) by mouth 2 times daily  Qty: 60 capsule, Refills: 11    Associated Diagnoses: Major neurocognitive disorder due to Alzheimer's disease           Allergies   Allergies   Allergen Reactions    Acetaminophen-Codeine GI Disturbance     Indigestion    Celecoxib Unknown    Codeine Camsylate  Unknown    Conj Estrog-Medroxyprogest Ace Other (See Comments)     Bleeding    Estrogens, Conjugated Synthetic A Unknown    Nsaids Unknown    Sulfa Antibiotics Unknown

## 2024-09-23 NOTE — PLAN OF CARE
"Goal Outcome Evaluation:      Plan of Care Reviewed With: patient    Overall Patient Progress: improving    Patient is alert to self with disorientation to time and situation.  Denies pain.  Incontinent of urine and stool.  Had multiple loose stools this shift.  Discharging to TCU.  Problem: Adult Inpatient Plan of Care  Goal: Plan of Care Review  Description: The Plan of Care Review/Shift note should be completed every shift.  The Outcome Evaluation is a brief statement about your assessment that the patient is improving, declining, or no change.  This information will be displayed automatically on your shift  note.  9/23/2024 1435 by Christiana Cordova RN  Outcome: Progressing  Flowsheets (Taken 9/23/2024 1435)  Overall Patient Progress: improving  9/23/2024 1156 by Christiana Cordova RN  Outcome: Progressing  Flowsheets (Taken 9/23/2024 1156)  Plan of Care Reviewed With: patient  Overall Patient Progress: improving  Goal: Patient-Specific Goal (Individualized)  Description: You can add care plan individualizations to a care plan. Examples of Individualization might be:  \"Parent requests to be called daily at 9am for status\", \"I have a hard time hearing out of my right ear\", or \"Do not touch me to wake me up as it startles  me\".  9/23/2024 1435 by Christiana Cordova RN  Outcome: Progressing  9/23/2024 1156 by Christiana Cordova RN  Outcome: Progressing  Goal: Absence of Hospital-Acquired Illness or Injury  9/23/2024 1435 by Christiana Cordova RN  Outcome: Progressing  9/23/2024 1156 by Christiana Cordova RN  Outcome: Progressing  Intervention: Identify and Manage Fall Risk  Recent Flowsheet Documentation  Taken 9/23/2024 0756 by Christiana Cordova RN  Safety Promotion/Fall Prevention:   activity supervised   assistive device/personal items within reach   clutter free environment maintained  Intervention: Prevent Skin Injury  Recent Flowsheet Documentation  Taken 9/23/2024 0756 by Christiana Cordova RN  Body Position: " position changed independently  Goal: Optimal Comfort and Wellbeing  9/23/2024 1435 by Christiana Cordova, RN  Outcome: Progressing  9/23/2024 1156 by Christiana Cordova, RN  Outcome: Progressing  Goal: Readiness for Transition of Care  9/23/2024 1435 by Christiana Cordova, RN  Outcome: Progressing  9/23/2024 1156 by Christiana Cordova, RN  Outcome: Progressing  Intervention: Mutually Develop Transition Plan  Recent Flowsheet Documentation  Taken 9/23/2024 0800 by Christiana Cordova, RN  Equipment Currently Used at Home:   cane, straight   wheelchair, manual

## 2024-09-23 NOTE — PLAN OF CARE
Goal Outcome Evaluation:      Plan of Care Reviewed With: patient    Overall Patient Progress: no changeOverall Patient Progress: no change    Shift 2614-9050    Pt alert & oriented to self with intermittent confusion. Denies pain. Had incontinent loose BM. Pt had difficulty ambulating, used emory to transfer from recliner to bed with Ax2. Tele - NSR.  pending result. Purewick in place.      Lashae Lane RN

## 2024-09-23 NOTE — PLAN OF CARE
"  Problem: Adult Inpatient Plan of Care  Goal: Patient-Specific Goal (Individualized)  Description: You can add care plan individualizations to a care plan. Examples of Individualization might be:  \"Parent requests to be called daily at 9am for status\", \"I have a hard time hearing out of my right ear\", or \"Do not touch me to wake me up as it startles  me\".  Outcome: Progressing  Goal: Absence of Hospital-Acquired Illness or Injury  Outcome: Progressing  Intervention: Identify and Manage Fall Risk  Recent Flowsheet Documentation  Taken 9/23/2024 0500 by Balbina Kwok RN  Safety Promotion/Fall Prevention:   activity supervised   clutter free environment maintained   lighting adjusted   nonskid shoes/slippers when out of bed   patient and family education   room near nurse's station   safety round/check completed  Taken 9/23/2024 0100 by Balbina Kwok RN  Safety Promotion/Fall Prevention:   activity supervised   clutter free environment maintained   lighting adjusted   nonskid shoes/slippers when out of bed   patient and family education   room near nurse's station   safety round/check completed  Intervention: Prevent Skin Injury  Recent Flowsheet Documentation  Taken 9/23/2024 0500 by Balbina Kwok RN  Body Position: position maintained  Taken 9/23/2024 0100 by Balbina Kwok RN  Body Position: position maintained  Goal: Optimal Comfort and Wellbeing  Outcome: Progressing  Intervention: Monitor Pain and Promote Comfort  Recent Flowsheet Documentation  Taken 9/23/2024 0500 by Balbina Kwok RN  Pain Management Interventions: emotional support  Taken 9/23/2024 0100 by Balbina Kwok RN  Pain Management Interventions: emotional support     Problem: Risk for Delirium  Goal: Optimal Coping  Outcome: Progressing  Goal: Improved Behavioral Control  Outcome: Progressing  Intervention: Minimize Safety Risk  Recent Flowsheet Documentation  Taken 9/23/2024 0500 by Balbina Kwok RN  Communication Enhancement Strategies:   call light " "answered in person   extra time allowed for response  Enhanced Safety Measures:   pain management   patient/family teach back on injury risk   room near unit station  Taken 9/23/2024 0100 by Balbina Kwok RN  Communication Enhancement Strategies:   call light answered in person   extra time allowed for response  Enhanced Safety Measures:   pain management   patient/family teach back on injury risk   room near unit station  Goal: Improved Attention and Thought Clarity  Outcome: Progressing  Intervention: Maximize Cognitive Function  Recent Flowsheet Documentation  Taken 9/23/2024 0500 by Balibna Kwok RN  Sensory Stimulation Regulation:   care clustered   quiet environment promoted  Reorientation Measures:   clock in view   glasses use encouraged  Taken 9/23/2024 0100 by Balbina Kwok RN  Sensory Stimulation Regulation:   care clustered   quiet environment promoted  Reorientation Measures:   clock in view   glasses use encouraged  Goal: Improved Sleep  Outcome: Progressing       Pt denies nausea and pain. Slept in between cares. Pt was disoriented to time and situation, she knew she was at the hospital. She attempted to get out of bed around 0200 stating \"I need to get my shoes on\". Pt unable to explain where she is going. Writer explained to pt that it's 0200 and she should get some rest. Also, that she already has a purewick in so she can void easily. Pt was redirectable. Purewick patent and draining properly. On tele - NSR with occasional PAC's. On IV abx - otherwise SL.   "

## 2024-09-23 NOTE — PLAN OF CARE
Physical Therapy Discharge Summary    Reason for therapy discharge:    Discharged to transitional care facility.    Progress towards therapy goal(s). See goals on Care Plan in [facility] electronic health record for goal details.  Goals not met.  Barriers to achieving goals:   discharge from facility.    Therapy recommendation(s):    Recommend continued therapies to improve overall strength, balance and mobility.       Franchesca Stuart, PT, DPT  9/23/2024

## 2024-09-23 NOTE — PLAN OF CARE
Nursing Discharge note    Patient discharged to transitional care unit at 1614 via Health plan transportation.  Accompanied by transport and staff.  Discharge instructions were reviewed with  TCU staff , opportunity offered to ask questions.    Prescriptions N/A.  The following belongings were sent home with patient/family:   clothing, pants, shirt, shoes, socks, glasses .    Christiana Cordova RN

## 2024-09-23 NOTE — PROGRESS NOTES
Care Management Discharge Note    Discharge Date: 09/23/2024       Discharge Disposition:      Discharge Services:      Discharge DME:      Discharge Transportation: health plan transportation, family or friend will provide    Private pay costs discussed: transportation costs    Does the patient's insurance plan have a 3 day qualifying hospital stay waiver?  Yes     Which insurance plan 3 day waiver is available? Alternative insurance waiver    Will the waiver be used for post-acute placement? Yes    PAS Confirmation Code:    Patient/family educated on Medicare website which has current facility and service quality ratings:  yes    Education Provided on the Discharge Plan:  yes  Persons Notified of Discharge Plans: yes  Patient/Family in Agreement with the Plan: yes      Handoff Referral Completed: No, handoff not indicated or clinically appropriate    Additional Information:  SW placed calls to [facility] and [facility] admissions inquiring about referral that had been sent and notified that pt is medically stable. Waiting for calls to returned after review.  8:55 AM    Pt accepted to [facility] TCU for today. [facility] ride set for 1400 with a  window between [phone] (agreeable to private pay). All parties aware and agreeable to discharge plan. PAS and PCS done.  11:04 AM     Jung byers approved ([id number]).  11:48 AM    Brenna Kjellberg, BSW

## 2024-09-23 NOTE — PLAN OF CARE
"Goal Outcome Evaluation:      Plan of Care Reviewed With: patient    Overall Patient Progress: improving      Problem: Adult Inpatient Plan of Care  Goal: Plan of Care Review  Description: The Plan of Care Review/Shift note should be completed every shift.  The Outcome Evaluation is a brief statement about your assessment that the patient is improving, declining, or no change.  This information will be displayed automatically on your shift  note.  Outcome: Progressing  Flowsheets (Taken 9/23/2024 1156)  Plan of Care Reviewed With: patient  Overall Patient Progress: improving  Goal: Patient-Specific Goal (Individualized)  Description: You can add care plan individualizations to a care plan. Examples of Individualization might be:  \"Parent requests to be called daily at 9am for status\", \"I have a hard time hearing out of my right ear\", or \"Do not touch me to wake me up as it startles  me\".  Outcome: Progressing  Goal: Absence of Hospital-Acquired Illness or Injury  Outcome: Progressing  Intervention: Identify and Manage Fall Risk  Recent Flowsheet Documentation  Taken 9/23/2024 0756 by Christiana Cordova RN  Safety Promotion/Fall Prevention:   activity supervised   assistive device/personal items within reach   clutter free environment maintained  Intervention: Prevent Skin Injury  Recent Flowsheet Documentation  Taken 9/23/2024 0756 by Christiana Cordova RN  Body Position: position changed independently  Goal: Optimal Comfort and Wellbeing  Outcome: Progressing  Goal: Readiness for Transition of Care  Outcome: Progressing  Intervention: Mutually Develop Transition Plan  Recent Flowsheet Documentation  Taken 9/23/2024 0800 by Christiana Cordova, RN  Equipment Currently Used at Home:   cane, straight   wheelchair, manual    Patient is alert to self but disorientated to time and situation.  Denies pain.  Incontinent of bowel and bladder.  Needs assist of 2 and emory for all transfers.  Discharging to TCU.    BP (!) 157/78 " "(BP Location: Left arm)   Pulse 85   Temp 98  F (36.7  C) (Oral)   Resp 18   Ht 1.676 m (5' 6\")   Wt 105.2 kg (231 lb 14.8 oz)   SpO2 97%   BMI 37.43 kg/m         "

## 2024-09-23 NOTE — PROGRESS NOTES
SW assisted with completing preadmission screening. [id number]    CAMMIE Monte on 9/23/2024 at 11:57 AM

## 2024-09-24 ENCOUNTER — TRANSITIONAL CARE UNIT VISIT (OUTPATIENT)
Dept: GERIATRICS | Facility: CLINIC | Age: 79
End: 2024-09-24
Payer: COMMERCIAL

## 2024-09-24 VITALS
SYSTOLIC BLOOD PRESSURE: 168 MMHG | RESPIRATION RATE: 18 BRPM | DIASTOLIC BLOOD PRESSURE: 89 MMHG | TEMPERATURE: 97.1 F | OXYGEN SATURATION: 94 % | BODY MASS INDEX: 37.93 KG/M2 | HEART RATE: 87 BPM | HEIGHT: 66 IN | WEIGHT: 236 LBS

## 2024-09-24 DIAGNOSIS — J18.9 ATYPICAL PNEUMONIA: ICD-10-CM

## 2024-09-24 DIAGNOSIS — N30.00 ACUTE CYSTITIS WITHOUT HEMATURIA: Primary | ICD-10-CM

## 2024-09-24 DIAGNOSIS — L40.50 PSORIATIC ARTHRITIS (H): ICD-10-CM

## 2024-09-24 DIAGNOSIS — G30.0 EARLY ONSET ALZHEIMER'S DEMENTIA WITHOUT BEHAVIORAL DISTURBANCE, PSYCHOTIC DISTURBANCE, MOOD DISTURBANCE, OR ANXIETY, UNSPECIFIED DEMENTIA SEVERITY (H): ICD-10-CM

## 2024-09-24 DIAGNOSIS — E02 SUBCLINICAL IODINE-DEFICIENCY HYPOTHYROIDISM: ICD-10-CM

## 2024-09-24 DIAGNOSIS — I10 ESSENTIAL HYPERTENSION: ICD-10-CM

## 2024-09-24 DIAGNOSIS — E78.00 HYPERCHOLESTEROLEMIA: ICD-10-CM

## 2024-09-24 DIAGNOSIS — F02.80 EARLY ONSET ALZHEIMER'S DEMENTIA WITHOUT BEHAVIORAL DISTURBANCE, PSYCHOTIC DISTURBANCE, MOOD DISTURBANCE, OR ANXIETY, UNSPECIFIED DEMENTIA SEVERITY (H): ICD-10-CM

## 2024-09-24 DIAGNOSIS — F32.0 CURRENT MILD EPISODE OF MAJOR DEPRESSIVE DISORDER, UNSPECIFIED WHETHER RECURRENT (H): ICD-10-CM

## 2024-09-24 PROCEDURE — 99310 SBSQ NF CARE HIGH MDM 45: CPT | Performed by: NURSE PRACTITIONER

## 2024-09-24 RX ORDER — LISINOPRIL 10 MG/1
10 TABLET ORAL DAILY
COMMUNITY
Start: 2024-09-24

## 2024-09-24 NOTE — LETTER
9/24/2024      Sharon De La Vega  [address] [place] [address]        [facility]    Code Status:  FULL CODE   Visit Type:   Chief Complaint   Patient presents with     TCU Admission     [facility] 9/21/2024 - 9/23/2024     Facility:  [facility] ([facility]) [[id number]]         HPI: Sharon De La Vega is a 78 year old female who I am seeing today for admit to the TCU. Past medical history includes hypertension, hyperlipidemia, Alzheimer's dementia, major depressive disorder and hypothyroidism.  Patient recently hospitalized secondary to increased confusion, fevers and weakness.  Patient found to have severe sepsis with UTI and possible pneumonia.  Chest x-ray showed no pleural fluid or pneumothorax however she did have reticular interstitial opacities which could be seen with edema or atypical infection.  UA was abnormal.  Urine culture was negative.  She was treated with IV Zosyn and transition to Cefpodoxime.     Transitional Care Course: On today's visit patient is sitting up in her wheelchair.  She is very pleasant and although she has Alzheimer's dementia she answers questions appropriately.  She was living independently prior to plans to meeting with her son.  She continues on Aricept and Namenda.  Patient afebrile.  She continues on oral antibiotic.  She does have some urinary incontinence but also voids.  She denies any dysuria frequency burning or pain.  She reports she is having regular bowel movements.  She denies any shortness of breath or chest pain.  No cough.  She does have underlying psoriatic arthritis and continues on methotrexate and Arava.  She denies any pain on today's visit.  Blood pressures appear satisfactory.      Assessment/Plan:      Acute cystitis without hematuria  Atypical pneumonia  -Patient completed a course of IV Zosyn and transition to cefpodoxime until 9/30/2024.  -Urine culture returned negative.  -Encourage cough and deep breathe.  -Follow-up  CBC and BMP.      Early onset Alzheimer's dementia without behavioral disturbance, psychotic disturbance, mood disturbance, or anxiety, unspecified dementia severity (H)  -Continue Aricept and Namenda.    Essential hypertension  -Lisinopril 10 mg daily.    Subclinical iodine-deficiency hypothyroidism  -Levothyroxine 100 mcg daily.    Hypercholesterolemia  -Simvastatin 20 mg nightly.    Psoriatic arthritis (H)  -Continue prior to admit methotrexate weekly on Thursdays and home Arava.     Major depressive disorder  -Continue home Wellbutrin and Celexa     -Okay for PT OT eval and treat.      Active Ambulatory Problems     Diagnosis Date Noted     Hypothyroidism 10/29/2012     Essential hypertension 10/29/2012     Hypercholesterolemia 10/29/2012     DJD (degenerative joint disease)- right knee 10/29/2012     Psoriatic arthritis (H) 10/29/2012     S/P knee replacement- right 10/29/2012     Intertrigo 11/01/2012     Anxiety 02/14/2013     Major neurocognitive disorder due to Alzheimer's disease (H) 06/20/2022     Major depressive disorder 11/28/2022     Elevated troponin 09/06/2023     COVID-19 09/06/2023     Aortic valve stenosis 04/07/2023     General weakness 09/21/2024     Acute cystitis without hematuria 09/21/2024     Severe sepsis (H) 09/22/2024     Acute metabolic encephalopathy 09/22/2024     Fever, unspecified fever cause 09/22/2024     Resolved Ambulatory Problems     Diagnosis Date Noted     Cellulitis- right leg after TKA 10/29/2012     Acute renal failure (H24) 10/29/2012     Constipation 11/01/2012     Past Medical History:   Diagnosis Date     Major depressive disorder, single episode, moderate (H) 11/28/2022     Allergies   Allergen Reactions     Acetaminophen-Codeine GI Disturbance     Indigestion     Celecoxib Unknown     Codeine Camsylate Unknown     Conj Estrog-Medroxyprogest Ace Other (See Comments)     Bleeding     Estrogens, Conjugated Synthetic A Unknown     Nsaids Unknown     Sulfa Antibiotics  Unknown       All Meds and Allergies reviewed in the record at the facility and is the most up-to-date.    Post Discharge Medication Reconciliation Status: discharge medications reconciled, continue medications without change  Current Outpatient Medications   Medication Sig Dispense Refill     acetaminophen (TYLENOL) 325 MG tablet Take 2 tablets (650 mg) by mouth every 6 hours as needed for mild pain       buPROPion (WELLBUTRIN XL) 150 MG 24 hr tablet Take 150 mg by mouth every morning.       cefpodoxime (VANTIN) 200 MG tablet Take 1 tablet (200 mg) by mouth 2 times daily for 7 days.       citalopram (CELEXA) 20 MG tablet Take 1 tablet by mouth every morning.       cyanocobalamin (VITAMIN B-12) 1000 MCG tablet Take 1,000 mcg by mouth every morning.       donepezil (ARICEPT) 10 MG tablet Take 1 tablet (10 mg) by mouth at bedtime 90 tablet 3     folic acid (FOLVITE) 1 MG tablet Take 1 mg by mouth every morning.       leflunomide (ARAVA) 10 MG tablet Take 1 tablet by mouth every morning.       levothyroxine (SYNTHROID/LEVOTHROID) 100 MCG tablet Take 1 tablet by mouth every morning.       lisinopril (ZESTRIL) 10 MG tablet Take 10 mg by mouth daily.       memantine (NAMENDA) 10 MG tablet Take 1 tablet (10 mg) by mouth 2 times daily 180 tablet 3     methotrexate 2.5 MG tablet Take 10 mg by mouth once a week On Thursday       nystatin (MYCOSTATIN) 049811 UNIT/GM external powder Apply topically 2 times daily as needed.       simvastatin (ZOCOR) 20 MG tablet Take 20 mg by mouth every evening.       vitamin D3 (CHOLECALCIFEROL) 50 mcg (2000 units) tablet Take 1 tablet by mouth every morning.       vitamin E (TOCOPHEROL) 1000 units (450 mg) CAPS capsule Take 1 capsule (1,000 Units) by mouth 2 times daily 60 capsule 11     benazepril (LOTENSIN) 10 MG tablet Take 10 mg by mouth every morning. (Patient not taking: Reported on 9/24/2024)       No current facility-administered medications for this visit.       REVIEW OF SYSTEMS:  "  10 point review of systems reviewed and pertinent positives in the HPI.     PHYSICAL EXAMINATION:  Physical Exam     Vital signs: BP (!) 168/89   Pulse 87   Temp 97.1  F (36.2  C)   Resp 18   Ht 1.676 m (5' 6\")   Wt 107 kg (236 lb)   SpO2 94%   BMI 38.09 kg/m    General: Awake, Alert, oriented x3, sitting up in wheelchair, appropriately, follows simple commands, conversant  HEENT:PERRLA  NECK: Supple  CVS:  S1  S2, without murmur or gallop.   LUNG: Clear to auscultation, No wheezes, rales or rhonci.  BACK: No kyphosis of the thoracic spine  ABDOMEN: Soft, obese, nontender to palpation, with positive bowel sounds  EXTREMITIES: Moves both upper and lower extremities with generalized weakness, trace pedal edema, no calf tenderness  SKIN: Warm and dry  NEUROLOGIC: Intact, pulses palpable  PSYCHIATRIC: Pleasant affect.      Labs:  All labs reviewed in the nursing home record and Epic   @  Lab Results   Component Value Date    WBC 7.2 09/23/2024     Lab Results   Component Value Date    RBC 3.56 09/23/2024     Lab Results   Component Value Date    HGB 11.2 09/23/2024     Lab Results   Component Value Date    HCT 35.0 09/23/2024     Lab Results   Component Value Date    MCV 98 09/23/2024     Lab Results   Component Value Date    MCH 31.5 09/23/2024     Lab Results   Component Value Date    MCHC 32.0 09/23/2024     Lab Results   Component Value Date    RDW 15.3 09/23/2024     Lab Results   Component Value Date     09/23/2024        @Last Comprehensive Metabolic Panel:  Sodium   Date Value Ref Range Status   09/23/2024 139 135 - 145 mmol/L Final     Potassium   Date Value Ref Range Status   09/23/2024 3.6 3.4 - 5.3 mmol/L Final     Chloride   Date Value Ref Range Status   09/23/2024 104 98 - 107 mmol/L Final     Carbon Dioxide (CO2)   Date Value Ref Range Status   09/23/2024 25 22 - 29 mmol/L Final     Anion Gap   Date Value Ref Range Status   09/23/2024 10 7 - 15 mmol/L Final     Glucose   Date Value Ref " Range Status   09/23/2024 112 (H) 70 - 99 mg/dL Final     GLUCOSE BY METER POCT   Date Value Ref Range Status   09/22/2024 153 (H) 70 - 99 mg/dL Final     Urea Nitrogen   Date Value Ref Range Status   09/23/2024 17.5 8.0 - 23.0 mg/dL Final     Creatinine   Date Value Ref Range Status   09/23/2024 0.92 0.51 - 0.95 mg/dL Final     GFR Estimate   Date Value Ref Range Status   09/23/2024 64 >60 mL/min/1.73m2 Final     Comment:     eGFR calculated using 2021 CKD-EPI equation.     Calcium   Date Value Ref Range Status   09/23/2024 8.1 (L) 8.8 - 10.4 mg/dL Final     Comment:     Reference intervals for this test were updated on 7/16/2024 to reflect our healthy population more accurately. There may be differences in the flagging of prior results with similar values performed with this method. Those prior results can be interpreted in the context of the updated reference intervals.     Greater than 45 minutes spent for this visit which included preparing for this visit, reviewing hospital records, labs, medications, imaging as well as face-to-face time spent with patient and collaborating nursing staff.      This note has been dictated using voice recognition software. Any grammatical or context distortions are unintentional and inherent to the software    Electronically signed by: Janett Wiseman CNP       Sincerely,        Janett Wiseman NP

## 2024-09-24 NOTE — PROGRESS NOTES
[facility]    Code Status:  FULL CODE   Visit Type:   Chief Complaint   Patient presents with    TCU Admission     [facility] 9/21/2024 - 9/23/2024     Facility:  [facility] ([facility]) [[id number]]         HPI: Sharon De La Vega is a 78 year old female who I am seeing today for admit to the TCU. Past medical history includes hypertension, hyperlipidemia, Alzheimer's dementia, major depressive disorder and hypothyroidism.  Patient recently hospitalized secondary to increased confusion, fevers and weakness.  Patient found to have severe sepsis with UTI and possible pneumonia.  Chest x-ray showed no pleural fluid or pneumothorax however she did have reticular interstitial opacities which could be seen with edema or atypical infection.  UA was abnormal.  Urine culture was negative.  She was treated with IV Zosyn and transition to Cefpodoxime.     Transitional Care Course: On today's visit patient is sitting up in her wheelchair.  She is very pleasant and although she has Alzheimer's dementia she answers questions appropriately.  She was living independently prior to plans to meeting with her son.  She continues on Aricept and Namenda.  Patient afebrile.  She continues on oral antibiotic.  She does have some urinary incontinence but also voids.  She denies any dysuria frequency burning or pain.  She reports she is having regular bowel movements.  She denies any shortness of breath or chest pain.  No cough.  She does have underlying psoriatic arthritis and continues on methotrexate and Arava.  She denies any pain on today's visit.  Blood pressures appear satisfactory.      Assessment/Plan:      Acute cystitis without hematuria  Atypical pneumonia  -Patient completed a course of IV Zosyn and transition to cefpodoxime until 9/30/2024.  -Urine culture returned negative.  -Encourage cough and deep breathe.  -Follow-up CBC and BMP.      Early onset Alzheimer's dementia without behavioral disturbance,  psychotic disturbance, mood disturbance, or anxiety, unspecified dementia severity (H)  -Continue Aricept and Namenda.    Essential hypertension  -Lisinopril 10 mg daily.    Subclinical iodine-deficiency hypothyroidism  -Levothyroxine 100 mcg daily.    Hypercholesterolemia  -Simvastatin 20 mg nightly.    Psoriatic arthritis (H)  -Continue prior to admit methotrexate weekly on Thursdays and home Arava.     Major depressive disorder  -Continue home Wellbutrin and Celexa     -Okay for PT OT eval and treat.      Active Ambulatory Problems     Diagnosis Date Noted    Hypothyroidism 10/29/2012    Essential hypertension 10/29/2012    Hypercholesterolemia 10/29/2012    DJD (degenerative joint disease)- right knee 10/29/2012    Psoriatic arthritis (H) 10/29/2012    S/P knee replacement- right 10/29/2012    Intertrigo 11/01/2012    Anxiety 02/14/2013    Major neurocognitive disorder due to Alzheimer's disease (H) 06/20/2022    Major depressive disorder 11/28/2022    Elevated troponin 09/06/2023    COVID-19 09/06/2023    Aortic valve stenosis 04/07/2023    General weakness 09/21/2024    Acute cystitis without hematuria 09/21/2024    Severe sepsis (H) 09/22/2024    Acute metabolic encephalopathy 09/22/2024    Fever, unspecified fever cause 09/22/2024     Resolved Ambulatory Problems     Diagnosis Date Noted    Cellulitis- right leg after TKA 10/29/2012    Acute renal failure (H24) 10/29/2012    Constipation 11/01/2012     Past Medical History:   Diagnosis Date    Major depressive disorder, single episode, moderate (H) 11/28/2022     Allergies   Allergen Reactions    Acetaminophen-Codeine GI Disturbance     Indigestion    Celecoxib Unknown    Codeine Camsylate Unknown    Conj Estrog-Medroxyprogest Ace Other (See Comments)     Bleeding    Estrogens, Conjugated Synthetic A Unknown    Nsaids Unknown    Sulfa Antibiotics Unknown       All Meds and Allergies reviewed in the record at the facility and is the most up-to-date.    Post  Discharge Medication Reconciliation Status: discharge medications reconciled, continue medications without change  Current Outpatient Medications   Medication Sig Dispense Refill    acetaminophen (TYLENOL) 325 MG tablet Take 2 tablets (650 mg) by mouth every 6 hours as needed for mild pain      buPROPion (WELLBUTRIN XL) 150 MG 24 hr tablet Take 150 mg by mouth every morning.      cefpodoxime (VANTIN) 200 MG tablet Take 1 tablet (200 mg) by mouth 2 times daily for 7 days.      citalopram (CELEXA) 20 MG tablet Take 1 tablet by mouth every morning.      cyanocobalamin (VITAMIN B-12) 1000 MCG tablet Take 1,000 mcg by mouth every morning.      donepezil (ARICEPT) 10 MG tablet Take 1 tablet (10 mg) by mouth at bedtime 90 tablet 3    folic acid (FOLVITE) 1 MG tablet Take 1 mg by mouth every morning.      leflunomide (ARAVA) 10 MG tablet Take 1 tablet by mouth every morning.      levothyroxine (SYNTHROID/LEVOTHROID) 100 MCG tablet Take 1 tablet by mouth every morning.      lisinopril (ZESTRIL) 10 MG tablet Take 10 mg by mouth daily.      memantine (NAMENDA) 10 MG tablet Take 1 tablet (10 mg) by mouth 2 times daily 180 tablet 3    methotrexate 2.5 MG tablet Take 10 mg by mouth once a week On Thursday      nystatin (MYCOSTATIN) 845880 UNIT/GM external powder Apply topically 2 times daily as needed.      simvastatin (ZOCOR) 20 MG tablet Take 20 mg by mouth every evening.      vitamin D3 (CHOLECALCIFEROL) 50 mcg (2000 units) tablet Take 1 tablet by mouth every morning.      vitamin E (TOCOPHEROL) 1000 units (450 mg) CAPS capsule Take 1 capsule (1,000 Units) by mouth 2 times daily 60 capsule 11    benazepril (LOTENSIN) 10 MG tablet Take 10 mg by mouth every morning. (Patient not taking: Reported on 9/24/2024)       No current facility-administered medications for this visit.       REVIEW OF SYSTEMS:   10 point review of systems reviewed and pertinent positives in the HPI.     PHYSICAL EXAMINATION:  Physical Exam     Vital signs:  "BP (!) 168/89   Pulse 87   Temp 97.1  F (36.2  C)   Resp 18   Ht 1.676 m (5' 6\")   Wt 107 kg (236 lb)   SpO2 94%   BMI 38.09 kg/m    General: Awake, Alert, oriented x3, sitting up in wheelchair, appropriately, follows simple commands, conversant  HEENT:PERRLA  NECK: Supple  CVS:  S1  S2, without murmur or gallop.   LUNG: Clear to auscultation, No wheezes, rales or rhonci.  BACK: No kyphosis of the thoracic spine  ABDOMEN: Soft, obese, nontender to palpation, with positive bowel sounds  EXTREMITIES: Moves both upper and lower extremities with generalized weakness, trace pedal edema, no calf tenderness  SKIN: Warm and dry  NEUROLOGIC: Intact, pulses palpable  PSYCHIATRIC: Pleasant affect.      Labs:  All labs reviewed in the nursing home record and [facility]   @  Lab Results   Component Value Date    WBC 7.2 09/23/2024     Lab Results   Component Value Date    RBC 3.56 09/23/2024     Lab Results   Component Value Date    HGB 11.2 09/23/2024     Lab Results   Component Value Date    HCT 35.0 09/23/2024     Lab Results   Component Value Date    MCV 98 09/23/2024     Lab Results   Component Value Date    MCH 31.5 09/23/2024     Lab Results   Component Value Date    MCHC 32.0 09/23/2024     Lab Results   Component Value Date    RDW 15.3 09/23/2024     Lab Results   Component Value Date     09/23/2024        @Last Comprehensive Metabolic Panel:  Sodium   Date Value Ref Range Status   09/23/2024 139 135 - 145 mmol/L Final     Potassium   Date Value Ref Range Status   09/23/2024 3.6 3.4 - 5.3 mmol/L Final     Chloride   Date Value Ref Range Status   09/23/2024 104 98 - 107 mmol/L Final     Carbon Dioxide (CO2)   Date Value Ref Range Status   09/23/2024 25 22 - 29 mmol/L Final     Anion Gap   Date Value Ref Range Status   09/23/2024 10 7 - 15 mmol/L Final     Glucose   Date Value Ref Range Status   09/23/2024 112 (H) 70 - 99 mg/dL Final     GLUCOSE BY METER POCT   Date Value Ref Range Status   09/22/2024 153 (H) 70 - " 99 mg/dL Final     Urea Nitrogen   Date Value Ref Range Status   09/23/2024 17.5 8.0 - 23.0 mg/dL Final     Creatinine   Date Value Ref Range Status   09/23/2024 0.92 0.51 - 0.95 mg/dL Final     GFR Estimate   Date Value Ref Range Status   09/23/2024 64 >60 mL/min/1.73m2 Final     Comment:     eGFR calculated using 2021 CKD-EPI equation.     Calcium   Date Value Ref Range Status   09/23/2024 8.1 (L) 8.8 - 10.4 mg/dL Final     Comment:     Reference intervals for this test were updated on 7/16/2024 to reflect our healthy population more accurately. There may be differences in the flagging of prior results with similar values performed with this method. Those prior results can be interpreted in the context of the updated reference intervals.     Greater than 45 minutes spent for this visit which included preparing for this visit, reviewing hospital records, labs, medications, imaging as well as face-to-face time spent with patient and collaborating nursing staff.      This note has been dictated using voice recognition software. Any grammatical or context distortions are unintentional and inherent to the software    Electronically signed by: Janett Wiseman, CNP

## 2024-09-24 NOTE — PLAN OF CARE
Occupational Therapy Discharge Summary    Reason for therapy discharge:    Discharged to transitional care facility.    Progress towards therapy goal(s). See goals on Care Plan in [facility] electronic health record for goal details.  Goals partially met.  Barriers to achieving goals:   discharge from facility.    Therapy recommendation(s):    Continued therapy is recommended.  Rationale/Recommendations:  @[facility].    Nichol WARD, OTR/L, CLT 9/24/2024 , 1:32 PM      Goal Outcome Evaluation:

## 2024-09-25 ENCOUNTER — LAB REQUISITION (OUTPATIENT)
Dept: LAB | Facility: CLINIC | Age: 79
End: 2024-09-25
Payer: COMMERCIAL

## 2024-09-25 DIAGNOSIS — N18.9 CHRONIC KIDNEY DISEASE, UNSPECIFIED: ICD-10-CM

## 2024-09-25 DIAGNOSIS — A41.9 SEPSIS, UNSPECIFIED ORGANISM (H): ICD-10-CM

## 2024-09-26 ENCOUNTER — TRANSITIONAL CARE UNIT VISIT (OUTPATIENT)
Dept: GERIATRICS | Facility: CLINIC | Age: 79
End: 2024-09-26
Payer: COMMERCIAL

## 2024-09-26 VITALS
OXYGEN SATURATION: 96 % | DIASTOLIC BLOOD PRESSURE: 82 MMHG | HEART RATE: 70 BPM | SYSTOLIC BLOOD PRESSURE: 153 MMHG | TEMPERATURE: 97.2 F | RESPIRATION RATE: 16 BRPM

## 2024-09-26 DIAGNOSIS — Z53.9 ERRONEOUS ENCOUNTER--DISREGARD: Primary | ICD-10-CM

## 2024-09-26 NOTE — PROGRESS NOTES
[facility] GERIATRICS  ACUTE/EPISODIC VISIT    [facility] Medical Record Number:  [id number]  Place of Service where encounter took place:  [facility] ([facility]) [[id number]]    Chief Complaint   Patient presents with    RECHECK       HPI:    Sharon De La Vega is a 78 year old  (1945), who is being seen today for an episodic care visit.  HPI information obtained from: {FGS HPI:732288}.    Today's concern is:      ALLERGIES:    Allergies   Allergen Reactions    Acetaminophen-Codeine GI Disturbance     Indigestion    Celecoxib Unknown    Codeine Camsylate Unknown    Conj Estrog-Medroxyprogest Ace Other (See Comments)     Bleeding    Estrogens, Conjugated Synthetic A Unknown    Nsaids Unknown    Sulfa Antibiotics Unknown        MEDICATIONS:  Post Discharge Medication Reconciliation Status: {ACO Med Rec (Provider):378588}. ***    Current Outpatient Medications   Medication Sig Dispense Refill    acetaminophen (TYLENOL) 325 MG tablet Take 2 tablets (650 mg) by mouth every 6 hours as needed for mild pain      buPROPion (WELLBUTRIN XL) 150 MG 24 hr tablet Take 150 mg by mouth every morning.      cefpodoxime (VANTIN) 200 MG tablet Take 1 tablet (200 mg) by mouth 2 times daily for 7 days.      citalopram (CELEXA) 20 MG tablet Take 1 tablet by mouth every morning.      cyanocobalamin (VITAMIN B-12) 1000 MCG tablet Take 1,000 mcg by mouth every morning.      donepezil (ARICEPT) 10 MG tablet Take 1 tablet (10 mg) by mouth at bedtime 90 tablet 3    folic acid (FOLVITE) 1 MG tablet Take 1 mg by mouth every morning.      leflunomide (ARAVA) 10 MG tablet Take 1 tablet by mouth every morning.      levothyroxine (SYNTHROID/LEVOTHROID) 100 MCG tablet Take 1 tablet by mouth every morning.      lisinopril (ZESTRIL) 10 MG tablet Take 10 mg by mouth daily.      memantine (NAMENDA) 10 MG tablet Take 1 tablet (10 mg) by mouth 2 times daily 180 tablet 3    methotrexate 2.5 MG tablet Take 10 mg by mouth once a week On  Thursday      nystatin (MYCOSTATIN) 780908 UNIT/GM external powder Apply topically 2 times daily as needed.      simvastatin (ZOCOR) 20 MG tablet Take 20 mg by mouth every evening.      vitamin D3 (CHOLECALCIFEROL) 50 mcg (2000 units) tablet Take 1 tablet by mouth every morning.      vitamin E (TOCOPHEROL) 1000 units (450 mg) CAPS capsule Take 1 capsule (1,000 Units) by mouth 2 times daily 60 capsule 11     Medications reviewed:  Medications reconciled to facility chart and changes were made to reflect current medications as identified as above med list. Below are the changes that were made:   Medications stopped since last EPIC medication reconciliation:   There are no discontinued medications.    Medications started since last [facility] medication reconciliation:  No orders of the defined types were placed in this encounter.    ***    REVIEW OF SYSTEMS:  4 point ROS neg other than the symptoms noted above in the HPI.***  Unable to be obtained due to cognitive impairment or aphasia.   ROS    PHYSICAL EXAM:  BP (!) 153/82   Pulse 70   Temp 97.2  F (36.2  C)   Resp 16   SpO2 96%   Physical Exam      ASSESSMENT / PLAN:  {FGS DX:458386}    Orders:  ***    Electronically signed by  Cruz Floyd

## 2024-09-26 NOTE — LETTER
9/26/2024      Sharon De La Vega  [address] [place] [address]        No notes on file      Sincerely,        JAMEL Clay CNP

## 2024-09-27 LAB
ANION GAP SERPL CALCULATED.3IONS-SCNC: 6 MMOL/L (ref 7–15)
BUN SERPL-MCNC: 23 MG/DL (ref 8–23)
CALCIUM SERPL-MCNC: 8.5 MG/DL (ref 8.8–10.4)
CHLORIDE SERPL-SCNC: 106 MMOL/L (ref 98–107)
CREAT SERPL-MCNC: 0.94 MG/DL (ref 0.51–0.95)
EGFRCR SERPLBLD CKD-EPI 2021: 62 ML/MIN/1.73M2
ERYTHROCYTE [DISTWIDTH] IN BLOOD BY AUTOMATED COUNT: 15.2 % (ref 10–15)
GLUCOSE SERPL-MCNC: 99 MG/DL (ref 70–99)
HCO3 SERPL-SCNC: 30 MMOL/L (ref 22–29)
HCT VFR BLD AUTO: 39 % (ref 35–47)
HGB BLD-MCNC: 11.8 G/DL (ref 11.7–15.7)
HGB BLD-MCNC: 11.8 G/DL (ref 11.7–15.7)
MCH RBC QN AUTO: 30.7 PG (ref 26.5–33)
MCHC RBC AUTO-ENTMCNC: 30.3 G/DL (ref 31.5–36.5)
MCV RBC AUTO: 102 FL (ref 78–100)
PLATELET # BLD AUTO: 154 10E3/UL (ref 150–450)
POTASSIUM SERPL-SCNC: 4.1 MMOL/L (ref 3.4–5.3)
RBC # BLD AUTO: 3.84 10E6/UL (ref 3.8–5.2)
SODIUM SERPL-SCNC: 142 MMOL/L (ref 135–145)
WBC # BLD AUTO: 6 10E3/UL (ref 4–11)

## 2024-09-27 PROCEDURE — 85027 COMPLETE CBC AUTOMATED: CPT | Mod: ORL | Performed by: FAMILY MEDICINE

## 2024-09-27 PROCEDURE — P9603 ONE-WAY ALLOW PRORATED MILES: HCPCS | Mod: ORL | Performed by: FAMILY MEDICINE

## 2024-09-27 PROCEDURE — 36415 COLL VENOUS BLD VENIPUNCTURE: CPT | Mod: ORL | Performed by: FAMILY MEDICINE

## 2024-09-27 PROCEDURE — 80048 BASIC METABOLIC PNL TOTAL CA: CPT | Mod: ORL | Performed by: FAMILY MEDICINE

## 2024-09-30 LAB
ATRIAL RATE - MUSE: 98 BPM
DIASTOLIC BLOOD PRESSURE - MUSE: 88 MMHG
INTERPRETATION ECG - MUSE: NORMAL
P AXIS - MUSE: 53 DEGREES
PR INTERVAL - MUSE: 138 MS
QRS DURATION - MUSE: 76 MS
QT - MUSE: 324 MS
QTC - MUSE: 413 MS
R AXIS - MUSE: -22 DEGREES
SYSTOLIC BLOOD PRESSURE - MUSE: 202 MMHG
T AXIS - MUSE: 53 DEGREES
VENTRICULAR RATE- MUSE: 98 BPM

## 2024-10-01 ENCOUNTER — TRANSITIONAL CARE UNIT VISIT (OUTPATIENT)
Dept: GERIATRICS | Facility: CLINIC | Age: 79
End: 2024-10-01
Payer: COMMERCIAL

## 2024-10-01 VITALS
OXYGEN SATURATION: 95 % | BODY MASS INDEX: 39.5 KG/M2 | WEIGHT: 231.4 LBS | DIASTOLIC BLOOD PRESSURE: 73 MMHG | HEIGHT: 64 IN | TEMPERATURE: 96 F | RESPIRATION RATE: 20 BRPM | SYSTOLIC BLOOD PRESSURE: 172 MMHG | HEART RATE: 75 BPM

## 2024-10-01 DIAGNOSIS — N30.00 ACUTE CYSTITIS WITHOUT HEMATURIA: Primary | ICD-10-CM

## 2024-10-01 DIAGNOSIS — I10 ESSENTIAL HYPERTENSION: ICD-10-CM

## 2024-10-01 DIAGNOSIS — F02.80 EARLY ONSET ALZHEIMER'S DEMENTIA WITHOUT BEHAVIORAL DISTURBANCE, PSYCHOTIC DISTURBANCE, MOOD DISTURBANCE, OR ANXIETY, UNSPECIFIED DEMENTIA SEVERITY (H): ICD-10-CM

## 2024-10-01 DIAGNOSIS — J18.9 ATYPICAL PNEUMONIA: ICD-10-CM

## 2024-10-01 DIAGNOSIS — G30.0 EARLY ONSET ALZHEIMER'S DEMENTIA WITHOUT BEHAVIORAL DISTURBANCE, PSYCHOTIC DISTURBANCE, MOOD DISTURBANCE, OR ANXIETY, UNSPECIFIED DEMENTIA SEVERITY (H): ICD-10-CM

## 2024-10-01 PROCEDURE — 99309 SBSQ NF CARE MODERATE MDM 30: CPT | Performed by: NURSE PRACTITIONER

## 2024-10-01 NOTE — LETTER
10/1/2024      Sharon De La Vega  [address] [place] [address]        [facility]    Code Status:  FULL CODE   Visit Type:   Chief Complaint   Patient presents with     TCU Follow Up     Facility:  [facility] ([facility]) [[id number]]         HPI: Sharon De La Vega is a 78 year old female who I am seeing today for follow up on  the TCU. Past medical history includes hypertension, hyperlipidemia, Alzheimer's dementia, major depressive disorder and hypothyroidism.  Patient recently hospitalized secondary to increased confusion, fevers and weakness.  Patient found to have severe sepsis with UTI and possible pneumonia.  Chest x-ray showed no pleural fluid or pneumothorax however she did have reticular interstitial opacities which could be seen with edema or atypical infection.  UA was abnormal.  Urine culture was negative.  She was treated with IV Zosyn and transition to Cefpodoxime.     Transitional Care Course: On today's visit patient is sitting up in her wheelchair. Pt with underlying cognitive impairment and is a poor historian. Today she denies any dysuria. No SOB or CP. She has completed her antibiotics. Underlying psoriatic arthritis and continues on methotrexate and Arava.  She denies any pain on today's visit.  Blood pressures appear satisfactory.      Assessment/Plan:      Acute cystitis without hematuria  Atypical pneumonia  -Patient completed a course of IV Zosyn and transition to cefpodoxime until 9/30/2024.  -Urine culture returned negative.  -Encourage cough and deep breathe.  -Follow-up CBC and BMP unremarkable.       Early onset Alzheimer's dementia without behavioral disturbance, psychotic disturbance, mood disturbance, or anxiety, unspecified dementia severity (H)  -Continue Aricept and Namenda.    Essential hypertension  -Lisinopril 10 mg daily.  -BP satisfactory.     Psoriatic arthritis (H)  -Continue prior to admit methotrexate weekly on Thursdays and home  Arava.        Active Ambulatory Problems     Diagnosis Date Noted     Hypothyroidism 10/29/2012     Essential hypertension 10/29/2012     Hypercholesterolemia 10/29/2012     DJD (degenerative joint disease)- right knee 10/29/2012     Psoriatic arthritis (H) 10/29/2012     S/P knee replacement- right 10/29/2012     Intertrigo 11/01/2012     Anxiety 02/14/2013     Major neurocognitive disorder due to Alzheimer's disease (H) 06/20/2022     Major depressive disorder 11/28/2022     Elevated troponin 09/06/2023     COVID-19 09/06/2023     Aortic valve stenosis 04/07/2023     General weakness 09/21/2024     Acute cystitis without hematuria 09/21/2024     Severe sepsis (H) 09/22/2024     Acute metabolic encephalopathy 09/22/2024     Fever, unspecified fever cause 09/22/2024     Resolved Ambulatory Problems     Diagnosis Date Noted     Cellulitis- right leg after TKA 10/29/2012     Acute renal failure (H) 10/29/2012     Constipation 11/01/2012     Past Medical History:   Diagnosis Date     Major depressive disorder, single episode, moderate (H) 11/28/2022     Allergies   Allergen Reactions     Acetaminophen-Codeine GI Disturbance     Indigestion     Celecoxib Unknown     Codeine Camsylate Unknown     Conj Estrog-Medroxyprogest Ace Other (See Comments)     Bleeding     Estrogens, Conjugated Synthetic A Unknown     Nsaids Unknown     Sulfa Antibiotics Unknown       All Meds and Allergies reviewed in the record at the facility and is the most up-to-date.    Current Outpatient Medications   Medication Sig Dispense Refill     acetaminophen (TYLENOL) 325 MG tablet Take 2 tablets (650 mg) by mouth every 6 hours as needed for mild pain       buPROPion (WELLBUTRIN XL) 150 MG 24 hr tablet Take 150 mg by mouth every morning.       citalopram (CELEXA) 20 MG tablet Take 1 tablet by mouth every morning.       cyanocobalamin (VITAMIN B-12) 1000 MCG tablet Take 1,000 mcg by mouth every morning.       donepezil (ARICEPT) 10 MG tablet Take 1  "tablet (10 mg) by mouth at bedtime 90 tablet 3     folic acid (FOLVITE) 1 MG tablet Take 1 mg by mouth every morning.       leflunomide (ARAVA) 10 MG tablet Take 1 tablet by mouth every morning.       levothyroxine (SYNTHROID/LEVOTHROID) 100 MCG tablet Take 1 tablet by mouth every morning.       lisinopril (ZESTRIL) 10 MG tablet Take 10 mg by mouth daily.       memantine (NAMENDA) 10 MG tablet Take 1 tablet (10 mg) by mouth 2 times daily 180 tablet 3     methotrexate 2.5 MG tablet Take 10 mg by mouth once a week On Thursday       nystatin (MYCOSTATIN) 491871 UNIT/GM external powder Apply topically 2 times daily as needed.       simvastatin (ZOCOR) 20 MG tablet Take 20 mg by mouth every evening.       vitamin D3 (CHOLECALCIFEROL) 50 mcg (2000 units) tablet Take 1 tablet by mouth every morning.       vitamin E (TOCOPHEROL) 1000 units (450 mg) CAPS capsule Take 1 capsule (1,000 Units) by mouth 2 times daily 60 capsule 11     No current facility-administered medications for this visit.       REVIEW OF SYSTEMS:   10 point review of systems reviewed and pertinent positives in the HPI.     PHYSICAL EXAMINATION:  Physical Exam     Vital signs: BP (!) 172/73   Pulse 75   Temp (!) 96  F (35.6  C)   Resp 20   Ht 1.626 m (5' 4\")   Wt 105 kg (231 lb 6.4 oz)   SpO2 95%   BMI 39.72 kg/m    General: Awake, Alert,  sitting up in wheelchair, follows simple commands, conversant  HEENT: Adequate hearing, moist oral mucous.   NECK: Supple  CVS:  S1  S2, without murmur or gallop.   LUNG: Clear to auscultation, No wheezes, rales or rhonci.  BACK: No kyphosis of the thoracic spine  ABDOMEN: Soft, obese, nontender to palpation, with positive bowel sounds  EXTREMITIES: Moves both upper and lower extremities with generalized weakness, trace pedal edema, no calf tenderness  SKIN: Warm and dry  NEUROLOGIC: Intact, pulses palpable  PSYCHIATRIC: Pleasant affect.      Labs:  All labs reviewed in the nursing home record and Epic   @  Lab " Results   Component Value Date    WBC 7.2 09/23/2024     Lab Results   Component Value Date    RBC 3.56 09/23/2024     Lab Results   Component Value Date    HGB 11.2 09/23/2024     Lab Results   Component Value Date    HCT 35.0 09/23/2024     Lab Results   Component Value Date    MCV 98 09/23/2024     Lab Results   Component Value Date    MCH 31.5 09/23/2024     Lab Results   Component Value Date    MCHC 32.0 09/23/2024     Lab Results   Component Value Date    RDW 15.3 09/23/2024     Lab Results   Component Value Date     09/23/2024        @Last Comprehensive Metabolic Panel:  Sodium   Date Value Ref Range Status   09/27/2024 142 135 - 145 mmol/L Final     Potassium   Date Value Ref Range Status   09/27/2024 4.1 3.4 - 5.3 mmol/L Final     Chloride   Date Value Ref Range Status   09/27/2024 106 98 - 107 mmol/L Final     Carbon Dioxide (CO2)   Date Value Ref Range Status   09/27/2024 30 (H) 22 - 29 mmol/L Final     Anion Gap   Date Value Ref Range Status   09/27/2024 6 (L) 7 - 15 mmol/L Final     Glucose   Date Value Ref Range Status   09/27/2024 99 70 - 99 mg/dL Final     GLUCOSE BY METER POCT   Date Value Ref Range Status   09/22/2024 153 (H) 70 - 99 mg/dL Final     Urea Nitrogen   Date Value Ref Range Status   09/27/2024 23.0 8.0 - 23.0 mg/dL Final     Creatinine   Date Value Ref Range Status   09/27/2024 0.94 0.51 - 0.95 mg/dL Final     GFR Estimate   Date Value Ref Range Status   09/27/2024 62 >60 mL/min/1.73m2 Final     Calcium   Date Value Ref Range Status   09/27/2024 8.5 (L) 8.8 - 10.4 mg/dL Final     Comment:     Reference intervals for this test were updated on 7/16/2024 to reflect our healthy population more accurately. There may be differences in the flagging of prior results with similar values performed with this method. Those prior results can be interpreted in the context of the updated reference intervals.     This note has been dictated using voice recognition software. Any grammatical or  context distortions are unintentional and inherent to the software    Electronically signed by: Janett Wiseman CNP       Sincerely,        Janett Wiseman, NP

## 2024-10-02 NOTE — PROGRESS NOTES
[facility] GERIATRIC SERVICES    Code Status:  FULL CODE   Visit Type:   Chief Complaint   Patient presents with    TCU Follow Up     Facility:  [facility] ([facility]) [[id number]]         HPI: Sharon De La Vega is a 78 year old female who I am seeing today for follow up on  the TCU. Past medical history includes hypertension, hyperlipidemia, Alzheimer's dementia, major depressive disorder and hypothyroidism.  Patient recently hospitalized secondary to increased confusion, fevers and weakness.  Patient found to have severe sepsis with UTI and possible pneumonia.  Chest x-ray showed no pleural fluid or pneumothorax however she did have reticular interstitial opacities which could be seen with edema or atypical infection.  UA was abnormal.  Urine culture was negative.  She was treated with IV Zosyn and transition to Cefpodoxime.     Transitional Care Course: On today's visit patient is sitting up in her wheelchair. Pt with underlying cognitive impairment and is a poor historian. Today she denies any dysuria. No SOB or CP. She has completed her antibiotics. Underlying psoriatic arthritis and continues on methotrexate and Arava.  She denies any pain on today's visit.  Blood pressures appear satisfactory.      Assessment/Plan:      Acute cystitis without hematuria  Atypical pneumonia  -Patient completed a course of IV Zosyn and transition to cefpodoxime until 9/30/2024.  -Urine culture returned negative.  -Encourage cough and deep breathe.  -Follow-up CBC and BMP unremarkable.       Early onset Alzheimer's dementia without behavioral disturbance, psychotic disturbance, mood disturbance, or anxiety, unspecified dementia severity (H)  -Continue Aricept and Namenda.    Essential hypertension  -Lisinopril 10 mg daily.  -BP satisfactory.     Psoriatic arthritis (H)  -Continue prior to admit methotrexate weekly on Thursdays and home Arava.        Active Ambulatory Problems     Diagnosis Date Noted    Hypothyroidism  10/29/2012    Essential hypertension 10/29/2012    Hypercholesterolemia 10/29/2012    DJD (degenerative joint disease)- right knee 10/29/2012    Psoriatic arthritis (H) 10/29/2012    S/P knee replacement- right 10/29/2012    Intertrigo 11/01/2012    Anxiety 02/14/2013    Major neurocognitive disorder due to Alzheimer's disease (H) 06/20/2022    Major depressive disorder 11/28/2022    Elevated troponin 09/06/2023    COVID-19 09/06/2023    Aortic valve stenosis 04/07/2023    General weakness 09/21/2024    Acute cystitis without hematuria 09/21/2024    Severe sepsis (H) 09/22/2024    Acute metabolic encephalopathy 09/22/2024    Fever, unspecified fever cause 09/22/2024     Resolved Ambulatory Problems     Diagnosis Date Noted    Cellulitis- right leg after TKA 10/29/2012    Acute renal failure (H) 10/29/2012    Constipation 11/01/2012     Past Medical History:   Diagnosis Date    Major depressive disorder, single episode, moderate (H) 11/28/2022     Allergies   Allergen Reactions    Acetaminophen-Codeine GI Disturbance     Indigestion    Celecoxib Unknown    Codeine Camsylate Unknown    Conj Estrog-Medroxyprogest Ace Other (See Comments)     Bleeding    Estrogens, Conjugated Synthetic A Unknown    Nsaids Unknown    Sulfa Antibiotics Unknown       All Meds and Allergies reviewed in the record at the facility and is the most up-to-date.    Current Outpatient Medications   Medication Sig Dispense Refill    acetaminophen (TYLENOL) 325 MG tablet Take 2 tablets (650 mg) by mouth every 6 hours as needed for mild pain      buPROPion (WELLBUTRIN XL) 150 MG 24 hr tablet Take 150 mg by mouth every morning.      citalopram (CELEXA) 20 MG tablet Take 1 tablet by mouth every morning.      cyanocobalamin (VITAMIN B-12) 1000 MCG tablet Take 1,000 mcg by mouth every morning.      donepezil (ARICEPT) 10 MG tablet Take 1 tablet (10 mg) by mouth at bedtime 90 tablet 3    folic acid (FOLVITE) 1 MG tablet Take 1 mg by mouth every morning.    "   leflunomide (ARAVA) 10 MG tablet Take 1 tablet by mouth every morning.      levothyroxine (SYNTHROID/LEVOTHROID) 100 MCG tablet Take 1 tablet by mouth every morning.      lisinopril (ZESTRIL) 10 MG tablet Take 10 mg by mouth daily.      memantine (NAMENDA) 10 MG tablet Take 1 tablet (10 mg) by mouth 2 times daily 180 tablet 3    methotrexate 2.5 MG tablet Take 10 mg by mouth once a week On Thursday      nystatin (MYCOSTATIN) 370137 UNIT/GM external powder Apply topically 2 times daily as needed.      simvastatin (ZOCOR) 20 MG tablet Take 20 mg by mouth every evening.      vitamin D3 (CHOLECALCIFEROL) 50 mcg (2000 units) tablet Take 1 tablet by mouth every morning.      vitamin E (TOCOPHEROL) 1000 units (450 mg) CAPS capsule Take 1 capsule (1,000 Units) by mouth 2 times daily 60 capsule 11     No current facility-administered medications for this visit.       REVIEW OF SYSTEMS:   10 point review of systems reviewed and pertinent positives in the HPI.     PHYSICAL EXAMINATION:  Physical Exam     Vital signs: BP (!) 172/73   Pulse 75   Temp (!) 96  F (35.6  C)   Resp 20   Ht 1.626 m (5' 4\")   Wt 105 kg (231 lb 6.4 oz)   SpO2 95%   BMI 39.72 kg/m    General: Awake, Alert,  sitting up in wheelchair, follows simple commands, conversant  HEENT: Adequate hearing, moist oral mucous.   NECK: Supple  CVS:  S1  S2, without murmur or gallop.   LUNG: Clear to auscultation, No wheezes, rales or rhonci.  BACK: No kyphosis of the thoracic spine  ABDOMEN: Soft, obese, nontender to palpation, with positive bowel sounds  EXTREMITIES: Moves both upper and lower extremities with generalized weakness, trace pedal edema, no calf tenderness  SKIN: Warm and dry  NEUROLOGIC: Intact, pulses palpable  PSYCHIATRIC: Pleasant affect.      Labs:  All labs reviewed in the nursing home record and Epic   @  Lab Results   Component Value Date    WBC 7.2 09/23/2024     Lab Results   Component Value Date    RBC 3.56 09/23/2024     Lab Results "   Component Value Date    HGB 11.2 09/23/2024     Lab Results   Component Value Date    HCT 35.0 09/23/2024     Lab Results   Component Value Date    MCV 98 09/23/2024     Lab Results   Component Value Date    MCH 31.5 09/23/2024     Lab Results   Component Value Date    MCHC 32.0 09/23/2024     Lab Results   Component Value Date    RDW 15.3 09/23/2024     Lab Results   Component Value Date     09/23/2024        @Last Comprehensive Metabolic Panel:  Sodium   Date Value Ref Range Status   09/27/2024 142 135 - 145 mmol/L Final     Potassium   Date Value Ref Range Status   09/27/2024 4.1 3.4 - 5.3 mmol/L Final     Chloride   Date Value Ref Range Status   09/27/2024 106 98 - 107 mmol/L Final     Carbon Dioxide (CO2)   Date Value Ref Range Status   09/27/2024 30 (H) 22 - 29 mmol/L Final     Anion Gap   Date Value Ref Range Status   09/27/2024 6 (L) 7 - 15 mmol/L Final     Glucose   Date Value Ref Range Status   09/27/2024 99 70 - 99 mg/dL Final     GLUCOSE BY METER POCT   Date Value Ref Range Status   09/22/2024 153 (H) 70 - 99 mg/dL Final     Urea Nitrogen   Date Value Ref Range Status   09/27/2024 23.0 8.0 - 23.0 mg/dL Final     Creatinine   Date Value Ref Range Status   09/27/2024 0.94 0.51 - 0.95 mg/dL Final     GFR Estimate   Date Value Ref Range Status   09/27/2024 62 >60 mL/min/1.73m2 Final     Calcium   Date Value Ref Range Status   09/27/2024 8.5 (L) 8.8 - 10.4 mg/dL Final     Comment:     Reference intervals for this test were updated on 7/16/2024 to reflect our healthy population more accurately. There may be differences in the flagging of prior results with similar values performed with this method. Those prior results can be interpreted in the context of the updated reference intervals.     This note has been dictated using voice recognition software. Any grammatical or context distortions are unintentional and inherent to the software    Electronically signed by: Janett Wiseman CNP

## 2024-10-03 ENCOUNTER — TRANSITIONAL CARE UNIT VISIT (OUTPATIENT)
Dept: GERIATRICS | Facility: CLINIC | Age: 79
End: 2024-10-03
Payer: COMMERCIAL

## 2024-10-03 VITALS
HEIGHT: 64 IN | TEMPERATURE: 96.1 F | RESPIRATION RATE: 16 BRPM | OXYGEN SATURATION: 97 % | WEIGHT: 231.3 LBS | SYSTOLIC BLOOD PRESSURE: 159 MMHG | DIASTOLIC BLOOD PRESSURE: 88 MMHG | HEART RATE: 70 BPM | BODY MASS INDEX: 39.49 KG/M2

## 2024-10-03 DIAGNOSIS — I10 ESSENTIAL HYPERTENSION: ICD-10-CM

## 2024-10-03 DIAGNOSIS — F02.80 EARLY ONSET ALZHEIMER'S DEMENTIA WITHOUT BEHAVIORAL DISTURBANCE, PSYCHOTIC DISTURBANCE, MOOD DISTURBANCE, OR ANXIETY, UNSPECIFIED DEMENTIA SEVERITY (H): ICD-10-CM

## 2024-10-03 DIAGNOSIS — N30.00 ACUTE CYSTITIS WITHOUT HEMATURIA: Primary | ICD-10-CM

## 2024-10-03 DIAGNOSIS — L40.50 PSORIATIC ARTHRITIS (H): ICD-10-CM

## 2024-10-03 DIAGNOSIS — E78.00 HYPERCHOLESTEROLEMIA: ICD-10-CM

## 2024-10-03 DIAGNOSIS — E02 SUBCLINICAL IODINE-DEFICIENCY HYPOTHYROIDISM: ICD-10-CM

## 2024-10-03 DIAGNOSIS — J18.9 ATYPICAL PNEUMONIA: ICD-10-CM

## 2024-10-03 DIAGNOSIS — G30.0 EARLY ONSET ALZHEIMER'S DEMENTIA WITHOUT BEHAVIORAL DISTURBANCE, PSYCHOTIC DISTURBANCE, MOOD DISTURBANCE, OR ANXIETY, UNSPECIFIED DEMENTIA SEVERITY (H): ICD-10-CM

## 2024-10-03 PROCEDURE — 99316 NF DSCHRG MGMT 30 MIN+: CPT | Performed by: NURSE PRACTITIONER

## 2024-10-03 NOTE — LETTER
10/3/2024      Sharon De La Vega  [address] [place] [address]        [facility]    Code Status:  FULL CODE   Visit Type:   Chief Complaint   Patient presents with     TCU Follow Up     Facility:  [facility] ([facility]) [[id number]]         HPI: Sharon De La Vega is a 78 year old female who I am seeing today for discharge from the TCU. Past medical history includes hypertension, hyperlipidemia, Alzheimer's dementia, major depressive disorder and hypothyroidism.  Patient recently hospitalized secondary to increased confusion, fevers and weakness.  Patient found to have severe sepsis with UTI and possible pneumonia.  Chest x-ray showed no pleural fluid or pneumothorax however she did have reticular interstitial opacities which could be seen with edema or atypical infection.  UA was abnormal.  Urine culture was negative.  She was treated with IV Zosyn and transition to Cefpodoxime.     Transitional Care Course: On today's visit patient is sitting up in her wheelchair. Pt with underlying cognitive impairment and is a poor historian.  Pleasant affect.  Recent UTI.  No further dysuria.  Pneumonia.  Antibiotics complete.  No SOB or CP.  Patient voiding adequately.  Underlying psoriatic arthritis and continues on methotrexate and Arava.  She denies any pain on today's visit.  Blood pressures appear satisfactory.      Assessment/Plan:      Acute cystitis without hematuria  Atypical pneumonia  -Patient completed a course of IV Zosyn and transition to cefpodoxime until 9/30/2024.  -Urine culture returned negative.  -Encourage cough and deep breathe.  -Follow-up CBC and BMP unremarkable.     Early onset Alzheimer's dementia without behavioral disturbance, psychotic disturbance, mood disturbance, or anxiety, unspecified dementia severity (H)  -Continue Aricept and Namenda.    Essential hypertension  -Lisinopril 10 mg daily.  -BP satisfactory.     Psoriatic arthritis (H)  -Continue prior to  admit methotrexate weekly on Thursdays and home Arava.    Hypothyroidism  -Continue levothyroxine.    Hyperlipidemia  -Continue statin.    -Okay to DC home with current meds and treatments.  -Home PT, OT, home health aide and RN for medication management.  -Follow-up with primary care provider in 1 to 2 weeks.    Active Ambulatory Problems     Diagnosis Date Noted     Hypothyroidism 10/29/2012     Essential hypertension 10/29/2012     Hypercholesterolemia 10/29/2012     DJD (degenerative joint disease)- right knee 10/29/2012     Psoriatic arthritis (H) 10/29/2012     S/P knee replacement- right 10/29/2012     Intertrigo 11/01/2012     Anxiety 02/14/2013     Major neurocognitive disorder due to Alzheimer's disease (H) 06/20/2022     Major depressive disorder 11/28/2022     Elevated troponin 09/06/2023     COVID-19 09/06/2023     Aortic valve stenosis 04/07/2023     General weakness 09/21/2024     Acute cystitis without hematuria 09/21/2024     Severe sepsis (H) 09/22/2024     Acute metabolic encephalopathy 09/22/2024     Fever, unspecified fever cause 09/22/2024     Resolved Ambulatory Problems     Diagnosis Date Noted     Cellulitis- right leg after TKA 10/29/2012     Acute renal failure (H) 10/29/2012     Constipation 11/01/2012     Past Medical History:   Diagnosis Date     Major depressive disorder, single episode, moderate (H) 11/28/2022     Allergies   Allergen Reactions     Acetaminophen-Codeine GI Disturbance     Indigestion     Celecoxib Unknown     Codeine Camsylate Unknown     Conj Estrog-Medroxyprogest Ace Other (See Comments)     Bleeding     Estrogens, Conjugated Synthetic A Unknown     Nsaids Unknown     Sulfa Antibiotics Unknown       All Meds and Allergies reviewed in the record at the facility and is the most up-to-date.    Current Outpatient Medications   Medication Sig Dispense Refill     acetaminophen (TYLENOL) 325 MG tablet Take 2 tablets (650 mg) by mouth every 6 hours as needed for mild pain    "    buPROPion (WELLBUTRIN XL) 150 MG 24 hr tablet Take 150 mg by mouth every morning.       citalopram (CELEXA) 20 MG tablet Take 1 tablet by mouth every morning.       cyanocobalamin (VITAMIN B-12) 1000 MCG tablet Take 1,000 mcg by mouth every morning.       donepezil (ARICEPT) 10 MG tablet Take 1 tablet (10 mg) by mouth at bedtime 90 tablet 3     folic acid (FOLVITE) 1 MG tablet Take 1 mg by mouth every morning.       leflunomide (ARAVA) 10 MG tablet Take 1 tablet by mouth every morning.       levothyroxine (SYNTHROID/LEVOTHROID) 100 MCG tablet Take 1 tablet by mouth every morning.       lisinopril (ZESTRIL) 10 MG tablet Take 10 mg by mouth daily.       memantine (NAMENDA) 10 MG tablet Take 1 tablet (10 mg) by mouth 2 times daily 180 tablet 3     methotrexate 2.5 MG tablet Take 10 mg by mouth once a week On Thursday       nystatin (MYCOSTATIN) 064051 UNIT/GM external powder Apply topically 2 times daily as needed.       simvastatin (ZOCOR) 20 MG tablet Take 20 mg by mouth every evening.       vitamin D3 (CHOLECALCIFEROL) 50 mcg (2000 units) tablet Take 1 tablet by mouth every morning.       vitamin E (TOCOPHEROL) 1000 units (450 mg) CAPS capsule Take 1 capsule (1,000 Units) by mouth 2 times daily 60 capsule 11     No current facility-administered medications for this visit.       REVIEW OF SYSTEMS:   10 point review of systems reviewed and pertinent positives in the HPI.     PHYSICAL EXAMINATION:  Physical Exam     Vital signs: BP (!) 159/88   Pulse 70   Temp (!) 96.1  F (35.6  C)   Resp 16   Ht 1.626 m (5' 4\")   Wt 104.9 kg (231 lb 4.8 oz)   SpO2 97%   BMI 39.70 kg/m    General: Awake, Alert, sitting up in wheelchair, follows simple commands, conversant  HEENT: Adequate hearing, moist oral mucous.   NECK: Supple  CVS:  S1  S2, without murmur or gallop.   LUNG: Clear to auscultation, No wheezes, rales or rhonci.  BACK: No kyphosis of the thoracic spine  ABDOMEN: Soft, obese, nontender to palpation, with " positive bowel sounds  EXTREMITIES: Moves both upper and lower extremities with generalized weakness, trace pedal edema, no calf tenderness  SKIN: Warm and dry  NEUROLOGIC: Intact, pulses palpable  PSYCHIATRIC: Pleasant affect.      Labs:  All labs reviewed in the nursing home record and Epic   @  Lab Results   Component Value Date    WBC 7.2 09/23/2024     Lab Results   Component Value Date    RBC 3.56 09/23/2024     Lab Results   Component Value Date    HGB 11.2 09/23/2024     Lab Results   Component Value Date    HCT 35.0 09/23/2024     Lab Results   Component Value Date    MCV 98 09/23/2024     Lab Results   Component Value Date    MCH 31.5 09/23/2024     Lab Results   Component Value Date    MCHC 32.0 09/23/2024     Lab Results   Component Value Date    RDW 15.3 09/23/2024     Lab Results   Component Value Date     09/23/2024        @Last Comprehensive Metabolic Panel:  Sodium   Date Value Ref Range Status   09/27/2024 142 135 - 145 mmol/L Final     Potassium   Date Value Ref Range Status   09/27/2024 4.1 3.4 - 5.3 mmol/L Final     Chloride   Date Value Ref Range Status   09/27/2024 106 98 - 107 mmol/L Final     Carbon Dioxide (CO2)   Date Value Ref Range Status   09/27/2024 30 (H) 22 - 29 mmol/L Final     Anion Gap   Date Value Ref Range Status   09/27/2024 6 (L) 7 - 15 mmol/L Final     Glucose   Date Value Ref Range Status   09/27/2024 99 70 - 99 mg/dL Final     GLUCOSE BY METER POCT   Date Value Ref Range Status   09/22/2024 153 (H) 70 - 99 mg/dL Final     Urea Nitrogen   Date Value Ref Range Status   09/27/2024 23.0 8.0 - 23.0 mg/dL Final     Creatinine   Date Value Ref Range Status   09/27/2024 0.94 0.51 - 0.95 mg/dL Final     GFR Estimate   Date Value Ref Range Status   09/27/2024 62 >60 mL/min/1.73m2 Final     Calcium   Date Value Ref Range Status   09/27/2024 8.5 (L) 8.8 - 10.4 mg/dL Final     Comment:     Reference intervals for this test were updated on 7/16/2024 to reflect our healthy  population more accurately. There may be differences in the flagging of prior results with similar values performed with this method. Those prior results can be interpreted in the context of the updated reference intervals.     DISCHARGE PLAN/FACE TO FACE:  I certify that this patient is under my care and that I, or a nurse practitioner or physician's assistant working with me, had a face-to-face encounter that meets the physician face-to-face encounter requirements with this patient.       I certify that, based on my findings, the following services are medically necessary home health services.    My clinical findings support the need for the above skilled services.    This patient is homebound because: Recent hospitalization with UTI and pneumonia.  Underlying Alzheimer's dementia.    The patient is, or has been, under my care and I have initiated the establishment of the plan of care. This patient will be followed by a physician who will periodically review the plan of care.    35 minutes spent for this visit which included reviewing discharge medications, home care services and follow-ups as well as collaborating with  and nursing.    This note has been dictated using voice recognition software. Any grammatical or context distortions are unintentional and inherent to the software    Electronically signed by: Janett Wiseman CNP       Sincerely,        Janett Wiseman NP

## 2024-10-04 NOTE — PROGRESS NOTES
[facility]    Code Status:  FULL CODE   Visit Type:   Chief Complaint   Patient presents with    TCU Follow Up     Facility:  [facility] ([facility]) [[id number]]         HPI: Sharon De La Vega is a 78 year old female who I am seeing today for discharge from the TCU. Past medical history includes hypertension, hyperlipidemia, Alzheimer's dementia, major depressive disorder and hypothyroidism.  Patient recently hospitalized secondary to increased confusion, fevers and weakness.  Patient found to have severe sepsis with UTI and possible pneumonia.  Chest x-ray showed no pleural fluid or pneumothorax however she did have reticular interstitial opacities which could be seen with edema or atypical infection.  UA was abnormal.  Urine culture was negative.  She was treated with IV Zosyn and transition to Cefpodoxime.     Transitional Care Course: On today's visit patient is sitting up in her wheelchair. Pt with underlying cognitive impairment and is a poor historian.  Pleasant affect.  Recent UTI.  No further dysuria.  Pneumonia.  Antibiotics complete.  No SOB or CP.  Patient voiding adequately.  Underlying psoriatic arthritis and continues on methotrexate and Arava.  She denies any pain on today's visit.  Blood pressures appear satisfactory.      Assessment/Plan:      Acute cystitis without hematuria  Atypical pneumonia  -Patient completed a course of IV Zosyn and transition to cefpodoxime until 9/30/2024.  -Urine culture returned negative.  -Encourage cough and deep breathe.  -Follow-up CBC and BMP unremarkable.     Early onset Alzheimer's dementia without behavioral disturbance, psychotic disturbance, mood disturbance, or anxiety, unspecified dementia severity (H)  -Continue Aricept and Namenda.    Essential hypertension  -Lisinopril 10 mg daily.  -BP satisfactory.     Psoriatic arthritis (H)  -Continue prior to admit methotrexate weekly on Thursdays and home Arava.    Hypothyroidism  -Continue  levothyroxine.    Hyperlipidemia  -Continue statin.    -Okay to DC home with current meds and treatments.  -Home PT, OT, home health aide and RN for medication management.  -Follow-up with primary care provider in 1 to 2 weeks.    Active Ambulatory Problems     Diagnosis Date Noted    Hypothyroidism 10/29/2012    Essential hypertension 10/29/2012    Hypercholesterolemia 10/29/2012    DJD (degenerative joint disease)- right knee 10/29/2012    Psoriatic arthritis (H) 10/29/2012    S/P knee replacement- right 10/29/2012    Intertrigo 11/01/2012    Anxiety 02/14/2013    Major neurocognitive disorder due to Alzheimer's disease (H) 06/20/2022    Major depressive disorder 11/28/2022    Elevated troponin 09/06/2023    COVID-19 09/06/2023    Aortic valve stenosis 04/07/2023    General weakness 09/21/2024    Acute cystitis without hematuria 09/21/2024    Severe sepsis (H) 09/22/2024    Acute metabolic encephalopathy 09/22/2024    Fever, unspecified fever cause 09/22/2024     Resolved Ambulatory Problems     Diagnosis Date Noted    Cellulitis- right leg after TKA 10/29/2012    Acute renal failure (H) 10/29/2012    Constipation 11/01/2012     Past Medical History:   Diagnosis Date    Major depressive disorder, single episode, moderate (H) 11/28/2022     Allergies   Allergen Reactions    Acetaminophen-Codeine GI Disturbance     Indigestion    Celecoxib Unknown    Codeine Camsylate Unknown    Conj Estrog-Medroxyprogest Ace Other (See Comments)     Bleeding    Estrogens, Conjugated Synthetic A Unknown    Nsaids Unknown    Sulfa Antibiotics Unknown       All Meds and Allergies reviewed in the record at the facility and is the most up-to-date.    Current Outpatient Medications   Medication Sig Dispense Refill    acetaminophen (TYLENOL) 325 MG tablet Take 2 tablets (650 mg) by mouth every 6 hours as needed for mild pain      buPROPion (WELLBUTRIN XL) 150 MG 24 hr tablet Take 150 mg by mouth every morning.      citalopram (CELEXA) 20  "MG tablet Take 1 tablet by mouth every morning.      cyanocobalamin (VITAMIN B-12) 1000 MCG tablet Take 1,000 mcg by mouth every morning.      donepezil (ARICEPT) 10 MG tablet Take 1 tablet (10 mg) by mouth at bedtime 90 tablet 3    folic acid (FOLVITE) 1 MG tablet Take 1 mg by mouth every morning.      leflunomide (ARAVA) 10 MG tablet Take 1 tablet by mouth every morning.      levothyroxine (SYNTHROID/LEVOTHROID) 100 MCG tablet Take 1 tablet by mouth every morning.      lisinopril (ZESTRIL) 10 MG tablet Take 10 mg by mouth daily.      memantine (NAMENDA) 10 MG tablet Take 1 tablet (10 mg) by mouth 2 times daily 180 tablet 3    methotrexate 2.5 MG tablet Take 10 mg by mouth once a week On Thursday      nystatin (MYCOSTATIN) 573383 UNIT/GM external powder Apply topically 2 times daily as needed.      simvastatin (ZOCOR) 20 MG tablet Take 20 mg by mouth every evening.      vitamin D3 (CHOLECALCIFEROL) 50 mcg (2000 units) tablet Take 1 tablet by mouth every morning.      vitamin E (TOCOPHEROL) 1000 units (450 mg) CAPS capsule Take 1 capsule (1,000 Units) by mouth 2 times daily 60 capsule 11     No current facility-administered medications for this visit.       REVIEW OF SYSTEMS:   10 point review of systems reviewed and pertinent positives in the HPI.     PHYSICAL EXAMINATION:  Physical Exam     Vital signs: BP (!) 159/88   Pulse 70   Temp (!) 96.1  F (35.6  C)   Resp 16   Ht 1.626 m (5' 4\")   Wt 104.9 kg (231 lb 4.8 oz)   SpO2 97%   BMI 39.70 kg/m    General: Awake, Alert, sitting up in wheelchair, follows simple commands, conversant  HEENT: Adequate hearing, moist oral mucous.   NECK: Supple  CVS:  S1  S2, without murmur or gallop.   LUNG: Clear to auscultation, No wheezes, rales or rhonci.  BACK: No kyphosis of the thoracic spine  ABDOMEN: Soft, obese, nontender to palpation, with positive bowel sounds  EXTREMITIES: Moves both upper and lower extremities with generalized weakness, trace pedal edema, no calf " tenderness  SKIN: Warm and dry  NEUROLOGIC: Intact, pulses palpable  PSYCHIATRIC: Pleasant affect.      Labs:  All labs reviewed in the nursing home record and Epic   @  Lab Results   Component Value Date    WBC 7.2 09/23/2024     Lab Results   Component Value Date    RBC 3.56 09/23/2024     Lab Results   Component Value Date    HGB 11.2 09/23/2024     Lab Results   Component Value Date    HCT 35.0 09/23/2024     Lab Results   Component Value Date    MCV 98 09/23/2024     Lab Results   Component Value Date    MCH 31.5 09/23/2024     Lab Results   Component Value Date    MCHC 32.0 09/23/2024     Lab Results   Component Value Date    RDW 15.3 09/23/2024     Lab Results   Component Value Date     09/23/2024        @Last Comprehensive Metabolic Panel:  Sodium   Date Value Ref Range Status   09/27/2024 142 135 - 145 mmol/L Final     Potassium   Date Value Ref Range Status   09/27/2024 4.1 3.4 - 5.3 mmol/L Final     Chloride   Date Value Ref Range Status   09/27/2024 106 98 - 107 mmol/L Final     Carbon Dioxide (CO2)   Date Value Ref Range Status   09/27/2024 30 (H) 22 - 29 mmol/L Final     Anion Gap   Date Value Ref Range Status   09/27/2024 6 (L) 7 - 15 mmol/L Final     Glucose   Date Value Ref Range Status   09/27/2024 99 70 - 99 mg/dL Final     GLUCOSE BY METER POCT   Date Value Ref Range Status   09/22/2024 153 (H) 70 - 99 mg/dL Final     Urea Nitrogen   Date Value Ref Range Status   09/27/2024 23.0 8.0 - 23.0 mg/dL Final     Creatinine   Date Value Ref Range Status   09/27/2024 0.94 0.51 - 0.95 mg/dL Final     GFR Estimate   Date Value Ref Range Status   09/27/2024 62 >60 mL/min/1.73m2 Final     Calcium   Date Value Ref Range Status   09/27/2024 8.5 (L) 8.8 - 10.4 mg/dL Final     Comment:     Reference intervals for this test were updated on 7/16/2024 to reflect our healthy population more accurately. There may be differences in the flagging of prior results with similar values performed with this method.  Those prior results can be interpreted in the context of the updated reference intervals.     DISCHARGE PLAN/FACE TO FACE:  I certify that this patient is under my care and that I, or a nurse practitioner or physician's assistant working with me, had a face-to-face encounter that meets the physician face-to-face encounter requirements with this patient.       I certify that, based on my findings, the following services are medically necessary home health services.    My clinical findings support the need for the above skilled services.    This patient is homebound because: Recent hospitalization with UTI and pneumonia.  Underlying Alzheimer's dementia.    The patient is, or has been, under my care and I have initiated the establishment of the plan of care. This patient will be followed by a physician who will periodically review the plan of care.    35 minutes spent for this visit which included reviewing discharge medications, home care services and follow-ups as well as collaborating with  and nursing.    This note has been dictated using voice recognition software. Any grammatical or context distortions are unintentional and inherent to the software    Electronically signed by: Janett Wiseman CNP

## 2024-12-29 ENCOUNTER — APPOINTMENT (OUTPATIENT)
Dept: CT IMAGING | Facility: HOSPITAL | Age: 79
End: 2024-12-29
Attending: EMERGENCY MEDICINE
Payer: COMMERCIAL

## 2024-12-29 ENCOUNTER — APPOINTMENT (OUTPATIENT)
Dept: RADIOLOGY | Facility: HOSPITAL | Age: 79
End: 2024-12-29
Attending: EMERGENCY MEDICINE
Payer: COMMERCIAL

## 2024-12-29 ENCOUNTER — HOSPITAL ENCOUNTER (INPATIENT)
Facility: HOSPITAL | Age: 79
End: 2024-12-29
Attending: EMERGENCY MEDICINE | Admitting: HOSPITALIST
Payer: COMMERCIAL

## 2024-12-29 ENCOUNTER — HOSPITAL ENCOUNTER (EMERGENCY)
Facility: HOSPITAL | Age: 79
Discharge: HOME | End: 2024-12-29
Attending: EMERGENCY MEDICINE
Payer: COMMERCIAL

## 2024-12-29 VITALS
WEIGHT: 231 LBS | OXYGEN SATURATION: 100 % | TEMPERATURE: 98.1 F | BODY MASS INDEX: 39.65 KG/M2 | DIASTOLIC BLOOD PRESSURE: 73 MMHG | SYSTOLIC BLOOD PRESSURE: 163 MMHG | HEART RATE: 73 BPM | RESPIRATION RATE: 16 BRPM

## 2024-12-29 DIAGNOSIS — I48.19 PERSISTENT ATRIAL FIBRILLATION (H): ICD-10-CM

## 2024-12-29 DIAGNOSIS — Z95.2 S/P TAVR (TRANSCATHETER AORTIC VALVE REPLACEMENT): ICD-10-CM

## 2024-12-29 DIAGNOSIS — R29.6 MULTIPLE FALLS: ICD-10-CM

## 2024-12-29 DIAGNOSIS — S42.402A LEFT ELBOW FRACTURE, CLOSED, INITIAL ENCOUNTER: ICD-10-CM

## 2024-12-29 DIAGNOSIS — S42.402A CLOSED FRACTURE OF DISTAL END OF LEFT HUMERUS, UNSPECIFIED FRACTURE MORPHOLOGY, INITIAL ENCOUNTER: ICD-10-CM

## 2024-12-29 DIAGNOSIS — Z01.810 PRE-OPERATIVE CARDIOVASCULAR EXAMINATION: ICD-10-CM

## 2024-12-29 DIAGNOSIS — R53.1 GENERALIZED WEAKNESS: ICD-10-CM
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
## 2024-12-29 DIAGNOSIS — I35.0 SEVERE AORTIC STENOSIS: Primary | ICD-10-CM
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
## 2024-12-29 LAB
ALBUMIN UR-MCNC: 70 MG/DL
ANION GAP SERPL CALCULATED.3IONS-SCNC: 10 MMOL/L (ref 7–15)
APPEARANCE UR: ABNORMAL
BACTERIA #/AREA URNS HPF: ABNORMAL /HPF
BASOPHILS # BLD AUTO: 0 10E3/UL (ref 0–0.2)
BASOPHILS NFR BLD AUTO: 1 %
BILIRUB UR QL STRIP: NEGATIVE
BUN SERPL-MCNC: 23.5 MG/DL (ref 8–23)
CALCIUM SERPL-MCNC: 9.5 MG/DL (ref 8.8–10.4)
CHLORIDE SERPL-SCNC: 100 MMOL/L (ref 98–107)
CK SERPL-CCNC: 520 U/L (ref 26–192)
COLOR UR AUTO: YELLOW
CREAT SERPL-MCNC: 0.89 MG/DL (ref 0.51–0.95)
EGFRCR SERPLBLD CKD-EPI 2021: 66 ML/MIN/1.73M2
EOSINOPHIL # BLD AUTO: 0.1 10E3/UL (ref 0–0.7)
EOSINOPHIL NFR BLD AUTO: 1 %
ERYTHROCYTE [DISTWIDTH] IN BLOOD BY AUTOMATED COUNT: 16 % (ref 10–15)
FLUAV RNA SPEC QL NAA+PROBE: NEGATIVE
FLUBV RNA RESP QL NAA+PROBE: NEGATIVE
GLUCOSE SERPL-MCNC: 111 MG/DL (ref 70–99)
GLUCOSE UR STRIP-MCNC: NEGATIVE MG/DL
HCO3 SERPL-SCNC: 28 MMOL/L (ref 22–29)
HCT VFR BLD AUTO: 40.5 % (ref 35–47)
HGB BLD-MCNC: 13.1 G/DL (ref 11.7–15.7)
HGB UR QL STRIP: ABNORMAL
IMM GRANULOCYTES # BLD: 0 10E3/UL
IMM GRANULOCYTES NFR BLD: 0 %
KETONES UR STRIP-MCNC: ABNORMAL MG/DL
LEUKOCYTE ESTERASE UR QL STRIP: ABNORMAL
LYMPHOCYTES # BLD AUTO: 0.8 10E3/UL (ref 0.8–5.3)
LYMPHOCYTES NFR BLD AUTO: 9 %
MAGNESIUM SERPL-MCNC: 2.1 MG/DL (ref 1.7–2.3)
MCH RBC QN AUTO: 31.6 PG (ref 26.5–33)
MCHC RBC AUTO-ENTMCNC: 32.3 G/DL (ref 31.5–36.5)
MCV RBC AUTO: 98 FL (ref 78–100)
MONOCYTES # BLD AUTO: 0.4 10E3/UL (ref 0–1.3)
MONOCYTES NFR BLD AUTO: 4 %
MUCOUS THREADS #/AREA URNS LPF: PRESENT /LPF
NEUTROPHILS # BLD AUTO: 7.1 10E3/UL (ref 1.6–8.3)
NEUTROPHILS NFR BLD AUTO: 85 %
NITRATE UR QL: NEGATIVE
NRBC # BLD AUTO: 0 10E3/UL
NRBC BLD AUTO-RTO: 0 /100
PH UR STRIP: 6 [PH] (ref 5–7)
PLATELET # BLD AUTO: 151 10E3/UL (ref 150–450)
POTASSIUM SERPL-SCNC: 3.3 MMOL/L (ref 3.4–5.3)
RBC # BLD AUTO: 4.14 10E6/UL (ref 3.8–5.2)
RBC URINE: 22 /HPF
RSV RNA SPEC NAA+PROBE: NEGATIVE
SARS-COV-2 RNA RESP QL NAA+PROBE: NEGATIVE
SODIUM SERPL-SCNC: 138 MMOL/L (ref 135–145)
SP GR UR STRIP: 1.03 (ref 1–1.03)
SQUAMOUS EPITHELIAL: 16 /HPF
TSH SERPL DL<=0.005 MIU/L-ACNC: 1.63 UIU/ML (ref 0.3–4.2)
UROBILINOGEN UR STRIP-MCNC: 2 MG/DL
WBC # BLD AUTO: 8.4 10E3/UL (ref 4–11)
WBC CLUMPS #/AREA URNS HPF: PRESENT /HPF
WBC URINE: 32 /HPF

## 2024-12-29 PROCEDURE — G0378 HOSPITAL OBSERVATION PER HR: HCPCS

## 2024-12-29 PROCEDURE — 36415 COLL VENOUS BLD VENIPUNCTURE: CPT | Performed by: EMERGENCY MEDICINE

## 2024-12-29 PROCEDURE — 96360 HYDRATION IV INFUSION INIT: CPT

## 2024-12-29 PROCEDURE — 82550 ASSAY OF CK (CPK): CPT | Performed by: EMERGENCY MEDICINE

## 2024-12-29 PROCEDURE — 80048 BASIC METABOLIC PNL TOTAL CA: CPT | Performed by: EMERGENCY MEDICINE

## 2024-12-29 PROCEDURE — 73502 X-RAY EXAM HIP UNI 2-3 VIEWS: CPT

## 2024-12-29 PROCEDURE — 87637 SARSCOV2&INF A&B&RSV AMP PRB: CPT | Performed by: EMERGENCY MEDICINE

## 2024-12-29 PROCEDURE — 250N000011 HC RX IP 250 OP 636: Performed by: HOSPITALIST

## 2024-12-29 PROCEDURE — 81001 URINALYSIS AUTO W/SCOPE: CPT | Performed by: EMERGENCY MEDICINE

## 2024-12-29 PROCEDURE — 70450 CT HEAD/BRAIN W/O DYE: CPT

## 2024-12-29 PROCEDURE — 83735 ASSAY OF MAGNESIUM: CPT | Performed by: EMERGENCY MEDICINE

## 2024-12-29 PROCEDURE — 73060 X-RAY EXAM OF HUMERUS: CPT | Mod: LT

## 2024-12-29 PROCEDURE — 99285 EMERGENCY DEPT VISIT HI MDM: CPT

## 2024-12-29 PROCEDURE — 72125 CT NECK SPINE W/O DYE: CPT

## 2024-12-29 PROCEDURE — 87086 URINE CULTURE/COLONY COUNT: CPT | Performed by: EMERGENCY MEDICINE

## 2024-12-29 PROCEDURE — 73560 X-RAY EXAM OF KNEE 1 OR 2: CPT | Mod: LT

## 2024-12-29 PROCEDURE — 85025 COMPLETE CBC W/AUTO DIFF WBC: CPT | Performed by: EMERGENCY MEDICINE

## 2024-12-29 PROCEDURE — 250N000013 HC RX MED GY IP 250 OP 250 PS 637: Performed by: EMERGENCY MEDICINE

## 2024-12-29 PROCEDURE — 73080 X-RAY EXAM OF ELBOW: CPT | Mod: LT

## 2024-12-29 PROCEDURE — 84443 ASSAY THYROID STIM HORMONE: CPT | Performed by: EMERGENCY MEDICINE

## 2024-12-29 PROCEDURE — 93005 ELECTROCARDIOGRAM TRACING: CPT | Performed by: EMERGENCY MEDICINE

## 2024-12-29 PROCEDURE — 99222 1ST HOSP IP/OBS MODERATE 55: CPT | Performed by: HOSPITALIST

## 2024-12-29 PROCEDURE — 250N000013 HC RX MED GY IP 250 OP 250 PS 637: Performed by: HOSPITALIST

## 2024-12-29 PROCEDURE — 73090 X-RAY EXAM OF FOREARM: CPT | Mod: LT

## 2024-12-29 RX ORDER — ACETAMINOPHEN 650 MG/1
650 SUPPOSITORY RECTAL EVERY 4 HOURS PRN
Status: ACTIVE | OUTPATIENT
Start: 2024-12-29

## 2024-12-29 RX ORDER — SIMVASTATIN 10 MG
20 TABLET ORAL EVERY EVENING
Status: DISPENSED | OUTPATIENT
Start: 2024-12-29

## 2024-12-29 RX ORDER — OLANZAPINE 10 MG/2ML
2.5 INJECTION, POWDER, FOR SOLUTION INTRAMUSCULAR EVERY 6 HOURS PRN
Status: ACTIVE | OUTPATIENT
Start: 2024-12-29

## 2024-12-29 RX ORDER — SODIUM CHLORIDE AND POTASSIUM CHLORIDE 150; 900 MG/100ML; MG/100ML
INJECTION, SOLUTION INTRAVENOUS CONTINUOUS
Status: DISPENSED | OUTPATIENT
Start: 2024-12-29 | End: 2024-12-30

## 2024-12-29 RX ORDER — ONDANSETRON 2 MG/ML
4 INJECTION INTRAMUSCULAR; INTRAVENOUS EVERY 6 HOURS PRN
Status: ACTIVE | OUTPATIENT
Start: 2024-12-29

## 2024-12-29 RX ORDER — BISACODYL 10 MG
10 SUPPOSITORY, RECTAL RECTAL DAILY PRN
Status: ACTIVE | OUTPATIENT
Start: 2024-12-29

## 2024-12-29 RX ORDER — ACETAMINOPHEN 325 MG/1
650 TABLET ORAL EVERY 4 HOURS PRN
Status: DISCONTINUED | OUTPATIENT
Start: 2024-12-29 | End: 2024-12-29

## 2024-12-29 RX ORDER — ACETAMINOPHEN 500 MG
1000 TABLET ORAL EVERY 6 HOURS
Qty: 56 TABLET | Refills: 0 | Status: SHIPPED | OUTPATIENT
Start: 2024-12-29 | End: 2024-12-29

## 2024-12-29 RX ORDER — AMOXICILLIN 250 MG
2 CAPSULE ORAL 2 TIMES DAILY PRN
Status: ACTIVE | OUTPATIENT
Start: 2024-12-29

## 2024-12-29 RX ORDER — ACETAMINOPHEN 325 MG/1
975 TABLET ORAL ONCE
Status: COMPLETED | OUTPATIENT
Start: 2024-12-29 | End: 2024-12-29

## 2024-12-29 RX ORDER — CITALOPRAM HYDROBROMIDE 20 MG/1
20 TABLET ORAL EVERY MORNING
Status: DISPENSED | OUTPATIENT
Start: 2024-12-30

## 2024-12-29 RX ORDER — LEVOTHYROXINE SODIUM 100 UG/1
100 TABLET ORAL EVERY MORNING
Status: DISPENSED | OUTPATIENT
Start: 2024-12-30

## 2024-12-29 RX ORDER — FENTANYL CITRATE 50 UG/ML
50 INJECTION, SOLUTION INTRAMUSCULAR; INTRAVENOUS ONCE
Status: DISCONTINUED | OUTPATIENT
Start: 2024-12-29 | End: 2024-12-29 | Stop reason: HOSPADM

## 2024-12-29 RX ORDER — PROCHLORPERAZINE MALEATE 5 MG/1
5 TABLET ORAL EVERY 6 HOURS PRN
Status: ACTIVE | OUTPATIENT
Start: 2024-12-29

## 2024-12-29 RX ORDER — BUPROPION HYDROCHLORIDE 150 MG/1
150 TABLET ORAL EVERY MORNING
Status: DISPENSED | OUTPATIENT
Start: 2024-12-30

## 2024-12-29 RX ORDER — AMOXICILLIN 250 MG
1 CAPSULE ORAL 2 TIMES DAILY PRN
Status: ACTIVE | OUTPATIENT
Start: 2024-12-29

## 2024-12-29 RX ORDER — LISINOPRIL 20 MG/1
20 TABLET ORAL DAILY
Status: DISCONTINUED | OUTPATIENT
Start: 2024-12-30 | End: 2025-01-06

## 2024-12-29 RX ORDER — BENAZEPRIL HYDROCHLORIDE 20 MG/1
20 TABLET ORAL DAILY
Status: ON HOLD | COMMUNITY

## 2024-12-29 RX ORDER — MEMANTINE HYDROCHLORIDE 10 MG/1
10 TABLET ORAL 2 TIMES DAILY
Status: DISPENSED | OUTPATIENT
Start: 2024-12-30

## 2024-12-29 RX ORDER — DONEPEZIL HYDROCHLORIDE 5 MG/1
10 TABLET, FILM COATED ORAL AT BEDTIME
Status: DISPENSED | OUTPATIENT
Start: 2024-12-29

## 2024-12-29 RX ORDER — ONDANSETRON 4 MG/1
4 TABLET, ORALLY DISINTEGRATING ORAL EVERY 6 HOURS PRN
Status: ACTIVE | OUTPATIENT
Start: 2024-12-29

## 2024-12-29 RX ORDER — ACETAMINOPHEN 325 MG/1
650 TABLET ORAL EVERY 6 HOURS PRN
Status: DISCONTINUED | OUTPATIENT
Start: 2024-12-29 | End: 2024-12-31

## 2024-12-29 RX ORDER — LEFLUNOMIDE 10 MG/1
10 TABLET ORAL EVERY MORNING
Status: DISPENSED | OUTPATIENT
Start: 2024-12-30

## 2024-12-29 RX ADMIN — POTASSIUM CHLORIDE AND SODIUM CHLORIDE: 900; 150 INJECTION, SOLUTION INTRAVENOUS at 22:59

## 2024-12-29 RX ADMIN — DONEPEZIL HYDROCHLORIDE 10 MG: 5 TABLET, FILM COATED ORAL at 23:41

## 2024-12-29 RX ADMIN — ACETAMINOPHEN 975 MG: 325 TABLET ORAL at 20:16

## 2024-12-29 ASSESSMENT — ACTIVITIES OF DAILY LIVING (ADL)
ADLS_ACUITY_SCORE: 59

## 2024-12-29 ASSESSMENT — COLUMBIA-SUICIDE SEVERITY RATING SCALE - C-SSRS
2. HAVE YOU ACTUALLY HAD ANY THOUGHTS OF KILLING YOURSELF IN THE PAST MONTH?: NO
6. HAVE YOU EVER DONE ANYTHING, STARTED TO DO ANYTHING, OR PREPARED TO DO ANYTHING TO END YOUR LIFE?: NO
2. HAVE YOU ACTUALLY HAD ANY THOUGHTS OF KILLING YOURSELF IN THE PAST MONTH?: NO
6. HAVE YOU EVER DONE ANYTHING, STARTED TO DO ANYTHING, OR PREPARED TO DO ANYTHING TO END YOUR LIFE?: NO
1. IN THE PAST MONTH, HAVE YOU WISHED YOU WERE DEAD OR WISHED YOU COULD GO TO SLEEP AND NOT WAKE UP?: NO
1. IN THE PAST MONTH, HAVE YOU WISHED YOU WERE DEAD OR WISHED YOU COULD GO TO SLEEP AND NOT WAKE UP?: NO

## 2024-12-29 NOTE — Clinical Note
Prepped: groin, right radial, chest and abdomen. Prepped with: ChloraPrep. The patient was draped. .Neck to knees
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
## 2024-12-29 NOTE — Clinical Note
The DP pulses are 1+ bilaterally. The PT pulses are detected w/ doppler bilaterally. The radial pulses are 1+ bilaterally.

## 2024-12-30 ENCOUNTER — APPOINTMENT (OUTPATIENT)
Dept: OCCUPATIONAL THERAPY | Facility: HOSPITAL | Age: 79
DRG: 267 | End: 2024-12-30
Attending: HOSPITALIST
Payer: COMMERCIAL

## 2024-12-30 ENCOUNTER — APPOINTMENT (OUTPATIENT)
Dept: CT IMAGING | Facility: HOSPITAL | Age: 79
DRG: 267 | End: 2024-12-30
Attending: PHYSICIAN ASSISTANT
Payer: COMMERCIAL

## 2024-12-30 ENCOUNTER — APPOINTMENT (OUTPATIENT)
Dept: PHYSICAL THERAPY | Facility: HOSPITAL | Age: 79
DRG: 267 | End: 2024-12-30
Attending: HOSPITALIST
Payer: COMMERCIAL

## 2024-12-30 ENCOUNTER — APPOINTMENT (OUTPATIENT)
Dept: CARDIOLOGY | Facility: HOSPITAL | Age: 79
DRG: 267 | End: 2024-12-30
Attending: HOSPITALIST
Payer: COMMERCIAL

## 2024-12-30 LAB
ANION GAP SERPL CALCULATED.3IONS-SCNC: 12 MMOL/L (ref 7–15)
BUN SERPL-MCNC: 25.8 MG/DL (ref 8–23)
CALCIUM SERPL-MCNC: 9 MG/DL (ref 8.8–10.4)
CHLORIDE SERPL-SCNC: 105 MMOL/L (ref 98–107)
CK SERPL-CCNC: 514 U/L (ref 26–192)
CREAT SERPL-MCNC: 0.95 MG/DL (ref 0.51–0.95)
EGFRCR SERPLBLD CKD-EPI 2021: 61 ML/MIN/1.73M2
GLUCOSE SERPL-MCNC: 132 MG/DL (ref 70–99)
HCO3 SERPL-SCNC: 24 MMOL/L (ref 22–29)
POTASSIUM SERPL-SCNC: 3.8 MMOL/L (ref 3.4–5.3)
SODIUM SERPL-SCNC: 141 MMOL/L (ref 135–145)

## 2024-12-30 PROCEDURE — 99233 SBSQ HOSP IP/OBS HIGH 50: CPT | Performed by: STUDENT IN AN ORGANIZED HEALTH CARE EDUCATION/TRAINING PROGRAM

## 2024-12-30 PROCEDURE — 93308 TTE F-UP OR LMTD: CPT | Mod: 26 | Performed by: INTERNAL MEDICINE

## 2024-12-30 PROCEDURE — G0378 HOSPITAL OBSERVATION PER HR: HCPCS

## 2024-12-30 PROCEDURE — 36415 COLL VENOUS BLD VENIPUNCTURE: CPT | Performed by: HOSPITALIST

## 2024-12-30 PROCEDURE — 120N000001 HC R&B MED SURG/OB

## 2024-12-30 PROCEDURE — 99223 1ST HOSP IP/OBS HIGH 75: CPT | Performed by: STUDENT IN AN ORGANIZED HEALTH CARE EDUCATION/TRAINING PROGRAM

## 2024-12-30 PROCEDURE — 73200 CT UPPER EXTREMITY W/O DYE: CPT | Mod: LT

## 2024-12-30 PROCEDURE — 82550 ASSAY OF CK (CPK): CPT | Performed by: HOSPITALIST

## 2024-12-30 PROCEDURE — 97162 PT EVAL MOD COMPLEX 30 MIN: CPT | Mod: GP

## 2024-12-30 PROCEDURE — 80048 BASIC METABOLIC PNL TOTAL CA: CPT | Performed by: HOSPITALIST

## 2024-12-30 PROCEDURE — 93321 DOPPLER ECHO F-UP/LMTD STD: CPT | Mod: 26 | Performed by: INTERNAL MEDICINE

## 2024-12-30 PROCEDURE — 97530 THERAPEUTIC ACTIVITIES: CPT | Mod: GO

## 2024-12-30 PROCEDURE — 93325 DOPPLER ECHO COLOR FLOW MAPG: CPT

## 2024-12-30 PROCEDURE — 93325 DOPPLER ECHO COLOR FLOW MAPG: CPT | Mod: 26 | Performed by: INTERNAL MEDICINE

## 2024-12-30 PROCEDURE — 96361 HYDRATE IV INFUSION ADD-ON: CPT

## 2024-12-30 PROCEDURE — 250N000013 HC RX MED GY IP 250 OP 250 PS 637: Performed by: HOSPITALIST

## 2024-12-30 PROCEDURE — 97165 OT EVAL LOW COMPLEX 30 MIN: CPT | Mod: GO

## 2024-12-30 PROCEDURE — 97530 THERAPEUTIC ACTIVITIES: CPT | Mod: GP

## 2024-12-30 RX ORDER — POTASSIUM CHLORIDE 1500 MG/1
40 TABLET, EXTENDED RELEASE ORAL ONCE
Status: COMPLETED | OUTPATIENT
Start: 2024-12-30 | End: 2024-12-30

## 2024-12-30 RX ORDER — HYDRALAZINE HYDROCHLORIDE 20 MG/ML
10 INJECTION INTRAMUSCULAR; INTRAVENOUS EVERY 6 HOURS PRN
Status: DISCONTINUED | OUTPATIENT
Start: 2024-12-30 | End: 2025-01-03

## 2024-12-30 RX ADMIN — QUETIAPINE FUMARATE 12.5 MG: 25 TABLET ORAL at 20:12

## 2024-12-30 RX ADMIN — BUPROPION HYDROCHLORIDE 150 MG: 150 TABLET, FILM COATED, EXTENDED RELEASE ORAL at 08:06

## 2024-12-30 RX ADMIN — ACETAMINOPHEN 650 MG: 325 TABLET ORAL at 20:12

## 2024-12-30 RX ADMIN — QUETIAPINE FUMARATE 12.5 MG: 25 TABLET ORAL at 04:12

## 2024-12-30 RX ADMIN — POTASSIUM CHLORIDE 40 MEQ: 1500 TABLET, EXTENDED RELEASE ORAL at 02:04

## 2024-12-30 RX ADMIN — LEFLUNOMIDE 10 MG: 10 TABLET ORAL at 08:06

## 2024-12-30 RX ADMIN — LEVOTHYROXINE SODIUM 100 MCG: 100 TABLET ORAL at 08:06

## 2024-12-30 RX ADMIN — CITALOPRAM HYDROBROMIDE 20 MG: 20 TABLET ORAL at 08:06

## 2024-12-30 RX ADMIN — LISINOPRIL 20 MG: 20 TABLET ORAL at 08:07

## 2024-12-30 RX ADMIN — MEMANTINE 10 MG: 10 TABLET ORAL at 20:12

## 2024-12-30 RX ADMIN — DONEPEZIL HYDROCHLORIDE 10 MG: 5 TABLET, FILM COATED ORAL at 21:00

## 2024-12-30 RX ADMIN — MEMANTINE 10 MG: 10 TABLET ORAL at 08:07

## 2024-12-30 ASSESSMENT — ACTIVITIES OF DAILY LIVING (ADL)
ADLS_ACUITY_SCORE: 59
ADLS_ACUITY_SCORE: 59
ADLS_ACUITY_SCORE: 57
ADLS_ACUITY_SCORE: 59
ADLS_ACUITY_SCORE: 56
ADLS_ACUITY_SCORE: 59
PREVIOUS_RESPONSIBILITIES: MEAL PREP;MEDICATION MANAGEMENT
ADLS_ACUITY_SCORE: 59
ADLS_ACUITY_SCORE: 58
ADLS_ACUITY_SCORE: 57
ADLS_ACUITY_SCORE: 58
DEPENDENT_IADLS:: CLEANING;COOKING;SHOPPING;LAUNDRY;MEAL PREPARATION;MEDICATION MANAGEMENT;MONEY MANAGEMENT;TRANSPORTATION
ADLS_ACUITY_SCORE: 57
ADLS_ACUITY_SCORE: 57
ADLS_ACUITY_SCORE: 59
ADLS_ACUITY_SCORE: 58
ADLS_ACUITY_SCORE: 57
ADLS_ACUITY_SCORE: 59
ADLS_ACUITY_SCORE: 57

## 2024-12-30 NOTE — PROGRESS NOTES
[facility]    Medicine Progress Note - Hospitalist Service    Date of Admission:  12/29/2024    Assessment & Plan   Sharon De La Vega is a 79 year old female admitted on 12/29/2024. She was brought back to the ED by ambulance from home for evaluation after a second fall at home    #Left humeral fracture  -Status post splint/sling  -ortho following, tentatively planning on OR tomorrow pending cardiac clearance   -Patient is still denying any pain  -PT/OT eval      #Recurrent falls  -Patient denies LOC however doesn't remember falling. Rule out syncope versus deconditioning  -CT head unrevealing for acute process, orthostatics pending   -History of moderate to severe valvular aortic stenosis with mild regurgitation, repeat echo noting severe stenosis  -cardiology consult prior to surgery for cardiac clearance given severe aortic stenosis   -continue tele in the meantime   -Abnormal contaminated UA, follow-up urine culture  -Fall precautions  -PT/OT evaluation and treatment     #Abnormal ECG  -Questionable atrial flutter versus PVCs, personally reviewed: Probable 4:1 block with nonspecific ST waves; no changes when compared to ECG from 9/21/2024  -Repeat ECG  -cardiology consulted as noted above      #Essential hypertension  -As above, check orthostatic blood pressure  -hold lisinopril prior to surgery, add hydralazine      #Hypokalemia- resolved  -Replace per RN protocol     #Hypothyroidism  -Euthyroid, TSH 1.63  -Continue PTA levothyroxine     #Alzheimer's dementia without behavioral disturbance  -Risk for violent behavior and/or acute delirium  -Delirium prevention and treatment  -As needed quetiapine and olanzapine  -Continue PTA donepezil, memantine     #Depression  #Anxiety  -Continue PTA bupropion, citalopram     #Psoriatic arthritis  -On PTA methotrexate, folic acid and leflunomide  -hold these meds prior to surgery     #Hyperlipidemia  -Hold PTA simvastatin secondary to elevated CK  "level, restart when appropriate   -CK down-trending, encourage oral intake. Avoid excessive IVF while NPO given aortic stenosis      #Obesity  -BMI 38.44          Diet: Regular Diet Adult  NPO per Anesthesia Guidelines for Procedure/Surgery Except for: Meds    DVT Prophylaxis: SCDs pending surgery   Escobar Catheter: Not present  Lines: None     Cardiac Monitoring: ACTIVE order. Indication: Tachyarrhythmias, acute (48 hours)  Code Status: Full Code      Clinically Significant Risk Factors Present on Admission        # Hypokalemia: Lowest K = 3.3 mmol/L in last 2 days, will replace as needed            # Hypertension: Noted on problem list     # Dementia: noted on problem list       # Obesity: Estimated body mass index is 38.44 kg/m  as calculated from the following:    Height as of this encounter: 1.651 m (5' 5\").    Weight as of this encounter: 104.8 kg (231 lb).       # Financial/Environmental Concerns: none         Social Drivers of Health    Tobacco Use: Medium Risk (12/29/2024)    Patient History     Smoking Tobacco Use: Former     Smokeless Tobacco Use: Never    Received from [employer], [employer]    Social Connections          Disposition Plan     Medically Ready for Discharge: Anticipated in 2-4 Days             Christopher Martinez DO  Hospitalist Service  [facility]  Securely message with Personally (more info)  Text page via [employer] Paging/Directory   ______________________________________________________________________    Interval History   Patient without arm pain. States she doesn remember falling however doesn't think she lost consciousness. No palpitations, chest pain, shortness of breath. No fevers or chills at home. No joint pains or rash.     Physical Exam   Vital Signs: Temp: 98.3  F (36.8  C) Temp src: Oral BP: (!) 144/71 Pulse: 69   Resp: 18 SpO2: 97 % O2 Device: None (Room air)    Weight: 231 lbs 0 oz    General " Appearance: Awake, pleasant and cooperative in no acute distress. Left arm sling in place   Respiratory: CTAB with poor effort  Cardiovascular: holosystolic murmur with soft S2 and without S3, no JVD  GI: no pain  Skin: no swollen joints or rash  Other: Strength bilateral LE 4/5, sensation grossly intact without focal deficit, pupils equal and reactive      Medical Decision Making       60 MINUTES SPENT BY ME on the date of service doing chart review, history, exam, documentation & further activities per the note.      Data     I have personally reviewed the following data over the past 24 hrs:    N/A  \   N/A   / N/A     141 105 25.8 (H) /  132 (H)   3.8 24 0.95 \       Imaging results reviewed over the past 24 hrs:   Recent Results (from the past 24 hours)   Humerus XR,  G/E 2 views, left    Narrative    EXAM: XR HUMERUS LEFT G/E 2 VIEWS  LOCATION: [facility]  DATE: 2024    INDICATION: Elbow pain after a fall.  COMPARISON: Elbow radiographs, same date.      Impression    IMPRESSION: Comminuted intra-articular fractures of the distal left humerus, including a complete transverse oblique avulsion at the level of the metaphysis, in addition to a longitudinal vertical fracture through the humeral trochlea. No additional   fracture. Normal joint alignment at the shoulder and elbow.   Echocardiogram Limited    Narrative    [id number]  [facility]  [id number]  [id number] [address]     Name: ROCCO DEGROOT  MRN: [id number]  : 1945  Study Date: 2024 08:33 AM  Age: 79 yrs  Gender: Female  Patient Location: [facility]  Reason For Study: Aortic Valve Disorder  Ordering Physician: DALE LUNSFORD  Performed By: [facility]     BSA: 2.1 m2  Height: 65 in  Weight: 231 lb  HR: 79  BP: 119/52 mmHg  ______________________________________________________________________________  Procedure  Limited Echocardiogram with  "two-dimensional, color and spectral Doppler.  ______________________________________________________________________________  Interpretation Summary     1. Limited echocardiographic study.  2. Normal left ventricular size and systolic performance with a visually  estimated ejection fraction of 60%.  3. There is severe aortic stenosis.  Â  The mean gradient is measured at 57 mmHg with peak velocity of 4.6 m/s.  Calculated valve area 0.60-0.63 cmÂ .  4. There is trace aortic insufficiency.  5. Normal right ventricular size and systolic performance.  6. There is mild left atrial enlargement.     When compared to the prior real-time echocardiogram dated 7 September 2023,  the mean gradient across the aortic valve has increased from 29 mmHg to 57  mmHg.  ______________________________________________________________________________  Left ventricle:  Normal left ventricular size and systolic performance with a visually  estimated ejection fraction of 60%. Left ventricular wall thickness is normal.  There is borderline concentric increase in left ventricular wall thickness.     Assessment of LV Diastolic Function: Examination was performed as a \"limited  study\". Assessment of diastolic filling consequently not performed.     Right ventricle:  Normal right ventricular size and systolic performance.     Left atrium:  There is mild left atrial enlargement.     Right atrium:  The right atrium is of normal size.     IVC:  The IVC is of normal caliber.     Aortic valve:  The aortic valve is comprised of three cusps. The aortic valve is calcified  with reduced cusp separation on two-dimensional imaging. There is severe  aortic stenosis. There is trace aortic insufficiency.     Mitral valve:  There is mild mitral annular calcification. There is trace mitral  insufficiency.     Tricuspid valve:  The tricuspid valve is grossly morphologically normal. There is trace  tricuspid insufficiency.     Pulmonic valve:  The pulmonic valve is " grossly morphologically normal.     Thoracic aorta:  The aortic root and proximal ascending aorta are of normal dimension.     Pericardium:  There is no significant pericardial effusion.  ______________________________________________________________________________  ______________________________________________________________________________  MMode/2D Measurements & Calculations  IVSd: 1.1 cm  LVIDd: 4.5 cm  LVIDs: 2.1 cm  LVPWd: 1.3 cm  FS: 52.9 %  LV mass(C)d: 190.9 grams  LV mass(C)dI: 90.8 grams/m2  Ao root diam: 2.9 cm  asc Aorta Diam: 2.8 cm  LVOT diam: 1.9 cm  LVOT area: 2.8 cm2  Ao root diam index Ht(cm/m): 1.7  Ao root diam index BSA (cm/m2): 1.4  Asc Ao diam index BSA (cm/m2): 1.3  Asc Ao diam index Ht(cm/m): 1.7  EF Biplane: 69.0 %  RWT: 0.56     Doppler Measurements & Calculations  MV max PG: 10.8 mmHg  MV mean PG: 3.0 mmHg  MV V2 VTI: 33.9 cm  MVA(VTI): 2.0 cm2  Ao V2 max: 458.3 cm/sec  Ao max P.0 mmHg  Ao V2 mean: 354.0 cm/sec  Ao mean P.0 mmHg  Ao V2 VTI: 113.0 cm  BEHT(I,D): 0.60 cm2  BETH(V,D): 0.63 cm2  AI P1/2t: 358.5 msec  LV V1 max P.2 mmHg  LV V1 max: 102.6 cm/sec  LV V1 VTI: 24.0 cm  SV(LVOT): 67.9 ml  SI(LVOT): 32.3 ml/m2  AV Deonte Ratio (DI): 0.22  BETH Index (cm2/m2): 0.29     ______________________________________________________________________________  Report approved by: Fransisco Moncada MD on 2024 10:23 AM         CT Elbow Left w/o Contrast    Narrative    EXAM: CT ELBOW LEFT W/O CONTRAST  LOCATION: [facility]  DATE: 2024    INDICATION: Assess fracture.  COMPARISON: 2024  TECHNIQUE: Noncontrast. Axial, sagittal and coronal thin-section reconstruction. Dose reduction techniques were used.     FINDINGS:     BONES:  -Comminuted intra-articular fracture of the distal left humerus with a complete transverse fracture plane at the level of the metaphysis and a longitudinal oblique fracture plane extending to the articular surface through  the lateral margin of the   trochlea. Mild displacement and angulation of the dominant trochlear fracture fragment. Tiny minimally displaced fracture along the medial margin of the olecranon process (series 2 image 53). Subtle cortical irregularity along the articular surface of   the radial head suspicious for a minimally displaced fracture (series 5 image 58).    SOFT TISSUES:  -Moderate-sized elbow joint effusion. Diffuse soft tissue and intramuscular edema surrounding the elbow fracture. No discrete organized hematoma.      Impression    IMPRESSION:  1.  Acute comminuted intra-articular fracture of the distal humerus with mild displacement and angulation of the dominant trochlear fracture fragment.  2.  Minimally displaced fracture along the medial margin the olecranon process.  3.  Minimally displaced fracture of the radial head.

## 2024-12-30 NOTE — PLAN OF CARE
Problem: Fall Injury Risk  Goal: Absence of Fall and Fall-Related Injury  Outcome: Progressing  Intervention: Identify and Manage Contributors  Recent Flowsheet Documentation  Taken 12/30/2024 1424 by Nitin Mackey RN  Medication Review/Management: medications reviewed  Intervention: Promote Injury-Free Environment  Recent Flowsheet Documentation  Taken 12/30/2024 1424 by Nitin Mackey RN  Safety Promotion/Fall Prevention:   increased rounding and observation   mobility aid in reach   nonskid shoes/slippers when out of bed   room near nurse's station     Problem: Pain Acute  Goal: Optimal Pain Control and Function  Outcome: Progressing  Intervention: Prevent or Manage Pain  Recent Flowsheet Documentation  Taken 12/30/2024 1424 by Nitin Mackey RN  Medication Review/Management: medications reviewed    Goal Outcome Evaluation:      Plan of Care Reviewed With: patient      A/ox3. D/o to time. Forgetful. RA. C/o of L arm pain rated 3/10. Cardio/ortho/PT/OT consult. On tele. K protocol - AM recheck. NPO at 0000. Sling on L arm. R PIV SL. Plans to have fx surg tomorrow per [facility]. Continue poc.

## 2024-12-30 NOTE — ED NOTES
Bed: JNEDH-D  Expected date:   Expected time:   Means of arrival:   Comments:  [place]- 78yo F weakness, broken elbow earlier today

## 2024-12-30 NOTE — CONSULTS
Care Management Initial Consult    General Information  Assessment completed with: Children, VM-chart review (marcelle Ware [phone]), son Chaz  Type of CM/SW Visit: CM Role Introduction    Primary Care Provider verified and updated as needed: Yes   Readmission within the last 30 days: no previous admission in last 30 days      Reason for Consult: discharge planning, length of stay  Advance Care Planning: Advance Care Planning Reviewed: no concerns identified     General Information Comments: lives w/son Chaz and he helps with ADLs and IADLs, transportation and decision making, he would like [facility] if recommended, states that pt is repetitve at her baseline, but recognizes people she knows    Communication Assessment  Patient's communication style: spoken language (English or Bilingual)             Cognitive  Cognitive/Neuro/Behavioral: WDL                      Living Environment:   People in home: child(ashly), adult     Current living Arrangements: house      Able to return to prior arrangements: yes       Family/Social Support:  Care provided by: self, child(ashly)  Provides care for: no one  Marital Status: Single  Support system: Children          Description of Support System: Involved, Supportive    Support Assessment: Adequate family and caregiver support, Adequate social supports    Current Resources:   Patient receiving home care services: No        Community Resources: None  Equipment currently used at home: cane, straight, walker, rolling  Supplies currently used at home: None    Employment/Financial:  Employment Status:          Financial Concerns: none   Referral to Financial Worker: No       Does the patient's insurance plan have a 3 day qualifying hospital stay waiver?  No    Lifestyle & Psychosocial Needs:  Social Drivers of Health     Food Insecurity: Low Risk  (9/22/2024)    Food Insecurity     Within the past 12 months, did you worry that your food would run out before you got money  to buy more?: No     Within the past 12 months, did the food you bought just not last and you didn t have money to get more?: No   Depression: Not at risk (8/20/2024)    Received from [facility]    PHQ-2     PHQ-2 TOTAL SCORE: 0   Housing Stability: Low Risk  (9/22/2024)    Housing Stability     Do you have housing? : Yes     Are you worried about losing your housing?: No   Tobacco Use: Medium Risk (12/29/2024)    Patient History     Smoking Tobacco Use: Former     Smokeless Tobacco Use: Never     Passive Exposure: Not on file   Financial Resource Strain: Low Risk  (9/22/2024)    Financial Resource Strain     Within the past 12 months, have you or your family members you live with been unable to get utilities (heat, electricity) when it was really needed?: No   Alcohol Use: Not on file   Transportation Needs: Low Risk  (9/22/2024)    Transportation Needs     Within the past 12 months, has lack of transportation kept you from medical appointments, getting your medicines, non-medical meetings or appointments, work, or from getting things that you need?: No   Physical Activity: Not on file   Interpersonal Safety: Low Risk  (9/23/2024)    Interpersonal Safety     Do you feel physically and emotionally safe where you currently live?: Yes     Within the past 12 months, have you been hit, slapped, kicked or otherwise physically hurt by someone?: No     Within the past 12 months, have you been humiliated or emotionally abused in other ways by your partner or ex-partner?: No   Stress: Not on file   Social Connections: Unknown (6/24/2023)    Received from [employer] [facility], [facility]    Social Connections     Frequency of Communication with Friends and Family: Not on file   Health Literacy: Not on file       Functional Status:  Prior to admission patient needed assistance:   Dependent ADLs:: Ambulation-walker, Ambulation-cane,  Bathing, Dressing, Grooming, Positioning, Transfers, Toileting  Dependent IADLs:: Cleaning, Cooking, Shopping, Laundry, Meal Preparation, Medication Management, Money Management, Transportation  Assesssment of Functional Status: Not at baseline with ADL Functioning    Mental Health Status:  Mental Health Status: No Current Concerns       Chemical Dependency Status:                Values/Beliefs:  Spiritual, Cultural Beliefs, Christianity Practices, Values that affect care:                 Discussed  Partnership in Safe Discharge Planning  document with patient/family: Yes:     Additional Information:  Pt lives w/son Chaz and he helps with ADLs and IADLs, uses cane and walker at home if needed, at baseline is repetitive but still recognizes people she knows. Pt has been to [facility] before and would prefer referral sent there first in case needed. CM sent ref. Son can transport if pt can tolerate transfers. Pt is assist of 2 today and is forgetfull. Had two falls in last two days.     Next Steps: prior authorization will need to be submitted, ortho plans to do surgery, TCU referrals if [facility] can't take    Fish Montano RN

## 2024-12-30 NOTE — PROGRESS NOTES
12/30/24 5219   Appointment Info   Signing Clinician's Name / Credentials (PT) Radha Gillette DPT   Living Environment   People in Home child(ashly), adult  (son and DIL)   Current Living Arrangements house   Home Accessibility stairs to enter home   Number of Stairs, Main Entrance 2   Stair Railings, Main Entrance railings safe and in good condition   Living Environment Comments pt stays on main level. son and DIL have room in basement. tub/shower, grab bars, unsure about shower chair, standard toilet height, sink nearby toilet.   Self-Care   Usual Activity Tolerance good   Current Activity Tolerance moderate   Equipment Currently Used at Home cane, straight;walker, rolling   Fall history within last six months yes   Number of times patient has fallen within last six months 2   Activity/Exercise/Self-Care Comment pt normally manages medications, son will double check meds. does not drive anymore. share cooking and cleaning, son/DIL does laundry. Normally ind w/ ADLs   General Information   Onset of Illness/Injury or Date of Surgery 12/29/24   Referring Physician Slava Head MD   Patient/Family Therapy Goals Statement (PT) none stated   Pertinent History of Current Problem (include personal factors and/or comorbidities that impact the POC) Sharon De La Vega is a 79 year old female who was brought to the ED by ambulance from home for evaluation of fall.  Patient lives with her son who called EMS and workup earlier in the day was remarkable for left elbow fracture.  After discussion was had between ED provider and orthopedic provider, decision was made to applied a splint and a sling with follow-up in the next day or 2 as an outpatient.  Patient was taken home, however she got up from the toilet and fell on her bottom.  Patient denied head trauma, loss of consciousness, chest pain, shortness of breath or palpitations.  She has been doing otherwise well and denies fevers, chills, sore throat, cough,  nausea, vomiting or diarrhea.  Patient is a former smoker and denies alcohol or drugs.   Existing Precautions/Restrictions fall;weight bearing  (NWB)   Weight-Bearing Status - LUE nonweight-bearing   Cognition   Cognitive Status Comments forgetful, spoke of being  multiple times throughout session. forgot she broke elbow   Pain Assessment   Patient Currently in Pain Yes, see Vital Sign flowsheet  (L elbow+)   Range of Motion (ROM)   Range of Motion ROM is WFL   ROM Comment BLEs   Strength (Manual Muscle Testing)   Strength (Manual Muscle Testing) Deficits observed during functional mobility   Strength Comments unsteady   Bed Mobility   Bed Mobility supine-sit   Supine-Sit Baca (Bed Mobility) maximum assist (25% patient effort);2 person assist   Bed Mobility Limitations cognitive deficits;decreased ability to use arms for pushing/pulling   Impairments Contributing to Impaired Bed Mobility pain   Comment, (Bed Mobility) needing maxA at trunk into upright, posterior lean initially.   Transfers   Transfers sit-stand transfer   Sit-Stand Transfer   Sit-Stand Baca (Transfers) moderate assist (50% patient effort);2 person assist   Comment, (Sit-Stand Transfer) HHA once standing, A x 2. initially dizzy, only able to stand x 10 seconds before needing to sit   Gait/Stairs (Locomotion)   Comment, (Gait/Stairs) unable d/t dizziness   Clinical Impression   Criteria for Skilled Therapeutic Intervention Yes, treatment indicated   PT Diagnosis (PT) impaired functional mobility, gait abnormality   Influenced by the following impairments decreased strength, decreased endurance   Functional limitations due to impairments gait, transfers, bed mob, stairs   Clinical Presentation (PT Evaluation Complexity) evolving   Clinical Presentation Rationale pt presents as medically diagnosed   Clinical Decision Making (Complexity) moderate complexity   Planned Therapy Interventions (PT) balance training;bed mobility  training;gait training;home exercise program;neuromuscular re-education;patient/family education;strengthening;transfer training;stair training   Risk & Benefits of therapy have been explained evaluation/treatment results reviewed;patient   PT Total Evaluation Time   PT Eval, Moderate Complexity Minutes (23887) 10   Physical Therapy Goals   PT Frequency 6x/week   PT Predicted Duration/Target Date for Goal Attainment 01/06/25   PT Goals Bed Mobility;Transfers;Gait;Stairs   PT: Bed Mobility Minimal assist;Supine to/from sit;Within precautions   PT: Transfers Minimal assist;Sit to/from stand;Bed to/from chair;Assistive device;Within precautions   PT: Gait Minimal assist;Assistive device;Straight cane;25 feet;Within precautions   PT: Stairs Moderate assist;2 stairs   Interventions   Interventions Quick Adds Therapeutic Activity   Therapeutic Activity   Therapeutic Activities: dynamic activities to improve functional performance Minutes (48376) 25   Symptoms Noted During/After Treatment Fatigue;Dizziness;Increased pain   Treatment Detail/Skilled Intervention static sitting EOB x 10 minutes with varying levels of assist from SBA-modA pending pt's cognition. Very forgetful throughout session, needing inc reminders for NWB through LUE and why she's in pain, forgets she broke elbow. Additional sit > stand from EOB with R hand on bed to push into standing and then reaching for therapist hand once standing, min-modA x 2, knees blocked bilaterally. EOB > recliner x 3' with HHA x 1 and modA x 2 on belt, inc v/c for moving feet and pushing through therapist hand on R to advance each LE. Unsteady and brie during transfer. Sit <> stand from recliner modA x 1, knees blocked, cues for hand placement. Static standing x 1 minute with modA x 1 knees blocked, cues for upright. in chair after PT, with OT.   PT Discharge Planning   PT Plan bed mobility, transfers, gait. higher level balance.   PT Discharge Recommendation (DC Rec)  Transitional Care Facility   PT Rationale for DC Rec modA x 2 bed mob, sit <> stand, EOB > chair. not safe for home d/c   PT Brief overview of current status needing inc assist for all mobility, inc confusion.   PT Equipment Needed at Discharge cane, straight   Physical Therapy Time and Intention   Timed Code Treatment Minutes 25   Total Session Time (sum of timed and untimed services) 35

## 2024-12-30 NOTE — SUMMARY OF CARE
Patient admitted to room 03 at approximately 13:47PM via cart from emergency room.    Patient ambulated/transferred:  with one assist. air rica.    Detailed List of Belongings (be very specific listing out each item):       Shirt, pants, shoes and glasses

## 2024-12-30 NOTE — CONSULTS
[facility] HEART CARE   [address], [place] [address]   [email or web address]   OFFICE: [phone]     CARDIOLOGY INPATIENT CONSULT NOTE     Impression and Plan     Assessment/Plan:  Ms. Sharon De La Vega is a 79 year old female with severe aortic stenosis, alzheimer's dementia, HTN, psoriatic arthritis, HLD, who presented to the hospital with falls.     Falls    - Unclear etiology, worry about severe symptomatic aortic stenosis   - Continue telemetry    Severe aortic stenosis   - Mean gradient 57 mmHg, BETH 0.6 cm2.   - Symptoms of dyspnea on exertion, possible dizziness/falls   - Plan on diagnostic LHC/CA to assess coronary status and for surgical planning.  NPO at midnight   - Spoke with interventional cardiology - will consult tomorrow before LHC.    L elbow fracture   - Spoke with ortho, planning on operative management, can be delayed until next week    - Plan on LHC/CA as above to understand surgical risk.  Tentatively TAVR vs. BAV can be done before surgery    Will continue to follow    Primary Cardiologist: Not established    History of Present Illness      Ms. Sharon De La Vega is a 79 year old female with severe aortic stenosis, alzheimer's dementia, HTN, psoriatic arthritis, HLD, who presented to the hospital with falls.  The patient notes she does not recall these events.  Per chart review she fell at home and suffered a L elbow fracture.  She was sent home but then had another fall while getting up from the toilet.  Spoke to pt's son - this was unwitnessed but he feels she may have tripped.  He notes pt has to stop to catch her breath multiple times while walking.  She uses a cane at home and a rolling walker while outside the house.  She has not mentioned any dizziness or chest pain.            Review of Systems:  Further review of systems is otherwise negative/noncontributory (based on review of medical record (admission H&P) and 13 point review of  systems reviewed. Pertinent positives noted).    Cardiac Diagnostics     ECG: Personally reviewed and interpreted: 12/29/2024  NSR  Artifact - likely tremor  Poor R wave    Telemetry (personally reviewed): NSR    Most recent:  Echocardiogram (results reviewed): 12/30/2024  Interpretation Summary     1. Limited echocardiographic study.  2. Normal left ventricular size and systolic performance with a visually  estimated ejection fraction of 60%.  3. There is severe aortic stenosis.  Â  The mean gradient is measured at 57 mmHg with peak velocity of 4.6 m/s.  Calculated valve area 0.60-0.63 cmÂ .  4. There is trace aortic insufficiency.  5. Normal right ventricular size and systolic performance.  6. There is mild left atrial enlargement.     When compared to the prior real-time echocardiogram dated 7 September 2023,  the mean gradient across the aortic valve has increased from 29 mmHg to 57  mmHg.      Medical History  Surgical History Family History Social History   Past Medical History:   Diagnosis Date    Anxiety 02/14/2013    Formatting of this note might be different from the original.  CURRENT MEDICATION(S) celexa 40 mg;   Lorazepam prn  Only occ  Stopped no need 11-17. Use PHQ 9 = 6   11-15;      Aortic valve stenosis 04/07/2023    Essential hypertension 10/29/2012    Hypercholesterolemia 10/29/2012    Hypothyroidism 10/29/2012    Major depressive disorder, single episode, moderate (H) 11/28/2022    Formatting of this note might be different from the original.  CURRENT MEDICATION(S) citalopram 40 mg    PHQ 9 =  2  11-11;  phq 9 = 2    12-11;  = 2   5-12; = 2   11-12;  Called her  Doing better  No need to see a psychologist. 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) 06/20/2022    Psoriatic arthritis (H) 10/29/2012     Past Surgical History:   Procedure Laterality Date    APPENDECTOMY  1964    CARPAL TUNNEL RELEASE RT/LT Bilateral 1982     CATARACT EXTRACTION, BILATERAL  1998    CHOLECYSTECTOMY  1964    HC KNEE ARTHROSCOPY, MED/LAT MENISCUS REPAIR Right 2000    RI EXCISE HAND/FOOT NEUROMA  2004    right foot    TOTAL KNEE ARTHROPLASTY Right 2012    TRABECULECTOMY Right 1998    laser surgery right eye     Family History   Problem Relation Age of Onset    Lung Cancer Father     Multiple myeloma Brother            Social History     Socioeconomic History    Marital status:      Spouse name: Not on file    Number of children: Not on file    Years of education: Not on file    Highest education level: Not on file   Occupational History    Not on file   Tobacco Use    Smoking status: Former     Current packs/day: 0.00     Types: Cigarettes     Quit date:      Years since quittin.0    Smokeless tobacco: Never   Substance and Sexual Activity    Alcohol use: Not Currently    Drug use: Not on file    Sexual activity: Not on file   Other Topics Concern    Not on file   Social History Narrative    Not on file     Social Drivers of Health     Financial Resource Strain: Low Risk  (2024)    Financial Resource Strain     Within the past 12 months, have you or your family members you live with been unable to get utilities (heat, electricity) when it was really needed?: No   Food Insecurity: Low Risk  (2024)    Food Insecurity     Within the past 12 months, did you worry that your food would run out before you got money to buy more?: No     Within the past 12 months, did the food you bought just not last and you didn t have money to get more?: No   Transportation Needs: Low Risk  (2024)    Transportation Needs     Within the past 12 months, has lack of transportation kept you from medical appointments, getting your medicines, non-medical meetings or appointments, work, or from getting things that you need?: No   Physical Activity: Not on file   Stress: Not on file   Social Connections: Unknown (2023)    Received from [employer]  [facility]    Social Connections     Frequency of Communication with Friends and Family: Not on file   Interpersonal Safety: Low Risk  (12/30/2024)    Interpersonal Safety     Do you feel physically and emotionally safe where you currently live?: Yes     Within the past 12 months, have you been hit, slapped, kicked or otherwise physically hurt by someone?: No     Within the past 12 months, have you been humiliated or emotionally abused in other ways by your partner or ex-partner?: No   Housing Stability: Low Risk  (12/30/2024)    Housing Stability     Do you have housing? : Yes     Are you worried about losing your housing?: No             Physical Examination   VITALS: /63 (BP Location: Right arm)   Pulse 81   Temp 98.4  F (36.9  C) (Oral)   Resp 18   Ht 1.651 m (5' 5\")   Wt 103.1 kg (227 lb 4.7 oz)   SpO2 95%   BMI 37.82 kg/m    BMI: Body mass index is 37.82 kg/m .  Wt Readings from Last 3 Encounters:   12/30/24 103.1 kg (227 lb 4.7 oz)   10/03/24 104.9 kg (231 lb 4.8 oz)   10/01/24 105 kg (231 lb 6.4 oz)     No intake or output data in the 24 hours ending 12/30/24 1446    General: pleasant female. No acute distress.   HENT: external ears normal. Nares patent. Mucous membranes moist.  Neck: No JVD  Lungs: clear to auscultation  COR:  regular rate and rhythm, IV/VI late peaking murmur, no rubs, or gallops  Abd: nondistended, BS present  Extrem: No edema          Lab Results Reviewed    Chemistry/lipid CBC Cardiac Enzymes/BNP/TSH/INR   No results for input(s): \"CHOL\", \"HDL\", \"LDL\", \"TRIG\", \"CHOLHDLRATIO\" in the last 97403 hours.  No results for input(s): \"LDL\" in the last 32176 hours.  Recent Labs   Lab Test 12/30/24  0942      POTASSIUM 3.8   CHLORIDE 105   CO2 24   *   BUN 25.8*   CR 0.95   GFRESTIMATED 61   RIZWANA 9.0     Recent Labs   Lab Test 12/30/24  0942 12/29/24  1306 09/27/24  0510   CR 0.95 0.89 0.94     Recent Labs " "  Lab Test 09/21/24  1837   A1C 5.3          Recent Labs   Lab Test 12/29/24  1306   WBC 8.4   HGB 13.1   HCT 40.5   MCV 98        Recent Labs   Lab Test 12/29/24  1306 09/27/24  0510 09/23/24  0528   HGB 13.1 11.8  11.8 11.2*    No results for input(s): \"TROPONINI\" in the last 47893 hours.  Recent Labs   Lab Test 09/06/23  2139 02/14/23  1712   NTBNPI 2,868* 171     Recent Labs   Lab Test 12/29/24  1306   TSH 1.63     Recent Labs   Lab Test 01/10/23  1252   INR 1.15           Current Inpatient Scheduled Medications   Scheduled Meds:  Current Facility-Administered Medications   Medication Dose Route Frequency Provider Last Rate Last Admin    buPROPion (WELLBUTRIN XL) 24 hr tablet 150 mg  150 mg Oral Slava Boyce MD   150 mg at 12/30/24 0806    citalopram (celeXA) tablet 20 mg  20 mg Oral Slava Boyce MD   20 mg at 12/30/24 0806    donepezil (ARICEPT) tablet 10 mg  10 mg Oral At Bedtime Slava Head MD   10 mg at 12/29/24 2341    [Held by provider] leflunomide (ARAVA) tablet 10 mg  10 mg Oral Slava Boyce MD   10 mg at 12/30/24 0806    levothyroxine (SYNTHROID/LEVOTHROID) tablet 100 mcg  100 mcg Oral Slava Boyce MD   100 mcg at 12/30/24 0806    [Held by provider] lisinopril (ZESTRIL) tablet 20 mg  20 mg Oral Daily Slava Head MD   20 mg at 12/30/24 0807    memantine (NAMENDA) tablet 10 mg  10 mg Oral BID Slava Head MD   10 mg at 12/30/24 0807    [Held by provider] simvastatin (ZOCOR) tablet 20 mg  20 mg Oral QPM Slava Head MD         Continuous Infusions:  Current Facility-Administered Medications   Medication Dose Route Frequency Provider Last Rate Last Admin       No current outpatient medications on file.          Medications Prior to Admission   Prior to Admission medications    Medication Sig Start Date End Date Taking? Authorizing Provider   acetaminophen (TYLENOL) 325 MG tablet Take 2 tablets (650 mg) by mouth every 6 hours " as needed for mild pain 10/1/23  Yes Sherley Armando APRN CNP   benazepril (LOTENSIN) 20 MG tablet Take 20 mg by mouth daily.   Yes Unknown, Entered By History   buPROPion (WELLBUTRIN XL) 150 MG 24 hr tablet Take 150 mg by mouth every morning. 8/20/24  Yes Reported, Patient   citalopram (CELEXA) 20 MG tablet Take 1 tablet by mouth every morning. 12/15/22  Yes Reported, Patient   cyanocobalamin (VITAMIN B-12) 1000 MCG tablet Take 1,000 mcg by mouth every morning. 12/26/22  Yes Reported, Patient   donepezil (ARICEPT) 10 MG tablet Take 1 tablet (10 mg) by mouth at bedtime 2/21/24  Yes Manuel Osuna MD   folic acid (FOLVITE) 1 MG tablet Take 1 mg by mouth every morning.   Yes Reported, Patient   leflunomide (ARAVA) 10 MG tablet Take 1 tablet by mouth every morning. 2/8/23  Yes Reported, Patient   levothyroxine (SYNTHROID/LEVOTHROID) 100 MCG tablet Take 1 tablet by mouth every morning. 8/20/24  Yes Reported, Patient   memantine (NAMENDA) 10 MG tablet Take 1 tablet (10 mg) by mouth 2 times daily 3/23/24  Yes Manuel Osuna MD   methotrexate 2.5 MG tablet Take 10 mg by mouth once a week On Thursday   Yes Reported, Patient   nystatin (MYCOSTATIN) 116386 UNIT/GM external powder Apply topically 2 times daily as needed. 6/15/23  Yes Reported, Patient   simvastatin (ZOCOR) 20 MG tablet Take 20 mg by mouth every evening. 1/14/23  Yes Reported, Patient   vitamin D3 (CHOLECALCIFEROL) 50 mcg (2000 units) tablet Take 1 tablet by mouth every morning.   Yes Reported, Patient   vitamin E (TOCOPHEROL) 1000 units (450 mg) CAPS capsule Take 1 capsule (1,000 Units) by mouth 2 times daily 2/21/24  Yes Manuel Osuna MD Timothy Shih, DO [facility]  Non-invasive Cardiologist  [facility]      Clinically Significant Risk Factors Present on Admission        # Hypokalemia: Lowest K = 3.3 mmol/L in last 2 days, will replace as needed            # Hypertension: Noted on problem list     # Dementia: noted on problem  "list       # Obesity: Estimated body mass index is 37.82 kg/m  as calculated from the following:    Height as of this encounter: 1.651 m (5' 5\").    Weight as of this encounter: 103.1 kg (227 lb 4.7 oz).       # Financial/Environmental Concerns: none            "

## 2024-12-30 NOTE — PHARMACY-ADMISSION MEDICATION HISTORY
Pharmacist Admission Medication History    Admission medication history is complete. The information provided in this note is only as accurate as the sources available at the time of the update.    Information Source(s): Patient and CareEverywhere/SureScripts via in-person    Pertinent Information: Patient took all AM medications around 1700 today because she was here this AM.     Changes made to PTA medication list:  Added: benazepril   Deleted: lisinopril   Changed: None    Allergies reviewed with patient and updates made in EHR: yes    Medication History Completed By: ELI WEBER [facility] 12/29/2024 7:42 PM    PTA Med List   Medication Sig Last Dose/Taking    acetaminophen (TYLENOL) 325 MG tablet Take 2 tablets (650 mg) by mouth every 6 hours as needed for mild pain Taking As Needed    benazepril (LOTENSIN) 20 MG tablet Take 20 mg by mouth daily. 12/29/2024 at  5:00 PM    buPROPion (WELLBUTRIN XL) 150 MG 24 hr tablet Take 150 mg by mouth every morning. 12/29/2024 at  5:00 PM    citalopram (CELEXA) 20 MG tablet Take 1 tablet by mouth every morning. 12/29/2024 at  5:00 PM    cyanocobalamin (VITAMIN B-12) 1000 MCG tablet Take 1,000 mcg by mouth every morning. 12/29/2024 at  5:00 PM    donepezil (ARICEPT) 10 MG tablet Take 1 tablet (10 mg) by mouth at bedtime 12/28/2024 Bedtime    folic acid (FOLVITE) 1 MG tablet Take 1 mg by mouth every morning. 12/29/2024 at  5:00 PM    leflunomide (ARAVA) 10 MG tablet Take 1 tablet by mouth every morning. 12/29/2024 at  5:00 PM    levothyroxine (SYNTHROID/LEVOTHROID) 100 MCG tablet Take 1 tablet by mouth every morning. 12/29/2024 at  5:00 PM    memantine (NAMENDA) 10 MG tablet Take 1 tablet (10 mg) by mouth 2 times daily 12/29/2024 at  5:00 PM    methotrexate 2.5 MG tablet Take 10 mg by mouth once a week On Thursday 12/26/2024    nystatin (MYCOSTATIN) 381427 UNIT/GM external powder Apply topically 2 times daily as needed. Taking As Needed    simvastatin (ZOCOR) 20 MG tablet Take 20  mg by mouth every evening. 12/28/2024 Bedtime    vitamin D3 (CHOLECALCIFEROL) 50 mcg (2000 units) tablet Take 1 tablet by mouth every morning. 12/29/2024 at  5:00 PM    vitamin E (TOCOPHEROL) 1000 units (450 mg) CAPS capsule Take 1 capsule (1,000 Units) by mouth 2 times daily 12/29/2024 at  5:00 PM

## 2024-12-30 NOTE — ED NOTES
"[facility] ED Handoff Report    ED Chief Complaint: Fall    ED Diagnosis:  (R53.1) Generalized weakness    (R29.6) Multiple falls    (S42.402A) Closed fracture of distal end of left humerus, unspecified fracture morphology, initial encounter    PMH:    Past Medical History:   Diagnosis Date    Anxiety 02/14/2013    Formatting of this note might be different from the original.  CURRENT MEDICATION(S) celexa 40 mg;   Lorazepam prn  Only occ  Stopped no need 11-17. Use PHQ 9 = 6   11-15;      Aortic valve stenosis 04/07/2023    Essential hypertension 10/29/2012    Hypercholesterolemia 10/29/2012    Hypothyroidism 10/29/2012    Major depressive disorder, single episode, moderate (H) 11/28/2022    Formatting of this note might be different from the original.  CURRENT MEDICATION(S) citalopram 40 mg    PHQ 9 =  2  11-11;  phq 9 = 2    12-11;  = 2   5-12; = 2   11-12;  Called her  Doing better  No need to see a psychologist. 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) 06/20/2022    Psoriatic arthritis (H) 10/29/2012        Code Status:  Full Code     Falls Risk: Yes Band: Applied    Current Living Situation/Residence: lives in an assisted living facility     Elimination Status: Continent: No     Activity Level: 2 assist    Patients Preferred Language:  English     Needed: No    Vital Signs:  BP (!) 144/71   Pulse 69   Temp 98.3  F (36.8  C) (Oral)   Resp 18   Ht 1.651 m (5' 5\")   Wt 104.8 kg (231 lb)   SpO2 97%   BMI 38.44 kg/m       Cardiac Rhythm: SR    Pain Score: 4/10    Is the Patient Confused:  Yes    Last Food or Drink: 12/30/24    Focused Assessment:  Pt has had multiple falls recently due to increasing weakness and confusion.  One of the falls resulted in a fracture in the left humerus.  Pt going to have surgery on fracture tomorrow per [place] orthopedist at bedside.  Pt has been having incontinent loose stools requiring " multiple bed changes.  Pt likes to get up to the commode.    Tests Performed: Done: Labs and Imaging    Treatments Provided:  IV medications and fluids and monitoring    Family Dynamics/Concerns: No    Family Updated On Visitor Policy: Yes    Plan of Care Communicated to Family: Yes    Who Was Updated about Plan of Care: son at bedside    Belongings Checklist Done and Signed by Patient: Yes    Medications sent with patient: NA    Covid: asymptomatic , negative    Additional Information: Ran potassium protocol for today.    Adela Jc RN 12/30/2024 1:12 PM

## 2024-12-30 NOTE — ED PROVIDER NOTES
EMERGENCY DEPARTMENT ENCOUNTER      NAME: Sharon De La Vega  AGE: 79 year old female  YOB: 1945  MRN: [id number]  EVALUATION DATE & TIME: 12/29/2024  6:51 PM    PCP: Jaylen Young    ED PROVIDER: Fior Dave DO      Chief Complaint   Patient presents with    Generalized Weakness    Fall         FINAL IMPRESSION:  1. Generalized weakness    2. Multiple falls    3. Closed fracture of distal end of left humerus, unspecified fracture morphology, initial encounter          ED COURSE & MEDICAL DECISION MAKING:    Pertinent Labs & Imaging studies reviewed. (See chart for details)  7:31 PM I met the patient and performed my initial interview and exam.  9:16 PM I spoke to the admitting hospitalist, Dr. Head.    79 year old female presents to the Emergency Department for evaluation of weakness, fall.  Patient seen earlier today after fall, had a distal humerus fracture.  Labs were obtained and fairly unremarkable.  She additionally had CT imaging of her head and neck as well as a variety of x-rays.  Sounds like went home and since then difficulty ambulating.  Lives with son.  Could not get her up off the toilet, she fell down.  Did not strike her head.  Per EMS full assist to get up.  This is new for her.  She is nontoxic-appearing here.  CK only mildly elevated.  Labs from earlier today reviewed, I do not think we need additional labs.  Will obtain urinalysis.  Given her recurrent falls and weakness requiring full assist, will admit.  Pending UA at time of admission.  Nursing did show me a rhythm strip that looks like a tacky arrhythmia, patient reportedly asymptomatic.  However patient does have intermittent tremor to her right upper extremity so unclear if this is truly an arrhythmia versus artifact.  Will admit on telemetry for further evaluation.    At the conclusion of the encounter I discussed the results of all of the tests and the disposition. The questions were answered. The patient or  family acknowledged understanding and was agreeable with the care plan.     Medical Decision Making  Obtained supplemental history:Supplemental history obtained?: Documented in chart and Family Member/Significant Other  Reviewed external records: External records reviewed?: No  Care impacted by chronic illness:Documented in Chart  Did you consider but not order tests?: Work up considered but not performed and documented in chart, if applicable  Did you interpret images independently?: Independent interpretation of ECG and images noted in documentation, when applicable.  Consultation discussion with other provider:Did you involve another provider (consultant, , pharmacy, etc.)?: I discussed the care with another health care provider, see documentation for details.  Admit.    MIPS: Not Applicable        MEDICATIONS GIVEN IN THE EMERGENCY:  Medications   acetaminophen (TYLENOL) tablet 975 mg (975 mg Oral $Given 12/29/24 2016)       NEW PRESCRIPTIONS STARTED AT TODAY'S ER VISIT  New Prescriptions    No medications on file          =================================================================    HPI    Patient information was obtained from: Patient and her son    Use of : N/A         Sharon De La Vega is a 79 year old female with a pertinent history of hypertension, DJD, s/p right knee replacement, and hypercholesterolemia who presents to this ED by EMS with her son for evaluation of generalized weakness and fall.     Per son, patient was seen earlier today in the ED for evaluation of a mechanical fall at home. After she was discharged, patient had another fall in her bathroom. Her son explains the patient got up from the toilet, felt weak, and fell onto her buttocks. Patient's son was there to witness the fall and called EMS. Patient reports no lost of consciousness or head injury/trauma from the fall.     Denies any fevers or chill. No abdominal, back, or leg pain. Normally ambulates with a walking  cane. No new or changed medications.     Per chart review, patient was seen on 12/29/2024 at [facility] Emergency Department for evaluation of a fall and elbow injury. CT head and c-spine negative for acute traumatic pathology. XR left elbow with complex left elbow fracture. Splint procedure was performed. Patient was prescribed Tylenol and discharged home in stable condition.       REVIEW OF SYSTEMS   Per HPI    PAST MEDICAL HISTORY:  Past Medical History:   Diagnosis Date    Anxiety 02/14/2013    Formatting of this note might be different from the original.  CURRENT MEDICATION(S) celexa 40 mg;   Lorazepam prn  Only occ  Stopped no need 11-17. Use PHQ 9 = 6   11-15;      Aortic valve stenosis 04/07/2023    Essential hypertension 10/29/2012    Hypercholesterolemia 10/29/2012    Hypothyroidism 10/29/2012    Major depressive disorder, single episode, moderate (H) 11/28/2022    Formatting of this note might be different from the original.  CURRENT MEDICATION(S) citalopram 40 mg    PHQ 9 =  2  11-11;  phq 9 = 2    12-11;  = 2   5-12; = 2   11-12;  Called her  Doing better  No need to see a psychologist. 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) 06/20/2022    Psoriatic arthritis (H) 10/29/2012       PAST SURGICAL HISTORY:  Past Surgical History:   Procedure Laterality Date    APPENDECTOMY  1964    CARPAL TUNNEL RELEASE RT/LT Bilateral 1982    CATARACT EXTRACTION, BILATERAL  1998    CHOLECYSTECTOMY  1964    HC KNEE ARTHROSCOPY, MED/LAT MENISCUS REPAIR Right 2000    [place] EXCISE HAND/FOOT NEUROMA  2004    right foot    TOTAL KNEE ARTHROPLASTY Right 2012    TRABECULECTOMY Right 1998    laser surgery right eye           CURRENT MEDICATIONS:    acetaminophen (TYLENOL) 325 MG tablet  benazepril (LOTENSIN) 20 MG tablet  buPROPion (WELLBUTRIN XL) 150 MG 24 hr tablet  citalopram (CELEXA) 20 MG tablet  cyanocobalamin (VITAMIN B-12) 1000  MCG tablet  donepezil (ARICEPT) 10 MG tablet  folic acid (FOLVITE) 1 MG tablet  leflunomide (ARAVA) 10 MG tablet  levothyroxine (SYNTHROID/LEVOTHROID) 100 MCG tablet  memantine (NAMENDA) 10 MG tablet  methotrexate 2.5 MG tablet  nystatin (MYCOSTATIN) 000396 UNIT/GM external powder  simvastatin (ZOCOR) 20 MG tablet  vitamin D3 (CHOLECALCIFEROL) 50 mcg (2000 units) tablet  vitamin E (TOCOPHEROL) 1000 units (450 mg) CAPS capsule         ALLERGIES:  Allergies   Allergen Reactions    Acetaminophen-Codeine GI Disturbance     Indigestion    Celecoxib Unknown    Codeine Camsylate Unknown    Conj Estrog-Medroxyprogest Ace Other (See Comments)     Bleeding    Estrogens, Conjugated Synthetic A Unknown    Nsaids Unknown    Sulfa Antibiotics Unknown       FAMILY HISTORY:  Family History   Problem Relation Age of Onset    Lung Cancer Father     Multiple myeloma Brother        SOCIAL HISTORY:   Social History     Socioeconomic History    Marital status:    Tobacco Use    Smoking status: Former     Current packs/day: 0.00     Types: Cigarettes     Quit date:      Years since quittin.0    Smokeless tobacco: Never   Substance and Sexual Activity    Alcohol use: Not Currently     Social Drivers of Health     Financial Resource Strain: Low Risk  (2024)    Financial Resource Strain     Within the past 12 months, have you or your family members you live with been unable to get utilities (heat, electricity) when it was really needed?: No   Food Insecurity: Low Risk  (2024)    Food Insecurity     Within the past 12 months, did you worry that your food would run out before you got money to buy more?: No     Within the past 12 months, did the food you bought just not last and you didn t have money to get more?: No   Transportation Needs: Low Risk  (2024)    Transportation Needs     Within the past 12 months, has lack of transportation kept you from medical appointments, getting your medicines, non-medical  "meetings or appointments, work, or from getting things that you need?: No    Received from [facility], [facility]    Social Connections   Interpersonal Safety: Low Risk  (9/23/2024)    Interpersonal Safety     Do you feel physically and emotionally safe where you currently live?: Yes     Within the past 12 months, have you been hit, slapped, kicked or otherwise physically hurt by someone?: No     Within the past 12 months, have you been humiliated or emotionally abused in other ways by your partner or ex-partner?: No   Housing Stability: Low Risk  (9/22/2024)    Housing Stability     Do you have housing? : Yes     Are you worried about losing your housing?: No       VITALS:  /61   Pulse 75   Temp 98.2  F (36.8  C) (Oral)   Resp 22   Ht 1.651 m (5' 5\")   Wt 104.8 kg (231 lb)   SpO2 98%   BMI 38.44 kg/m      PHYSICAL EXAM    Physical Exam  Constitutional:       General: She is not in acute distress.  HENT:      Head: Normocephalic and atraumatic.      Mouth/Throat:      Pharynx: Oropharynx is clear.   Eyes:      Pupils: Pupils are equal, round, and reactive to light.   Cardiovascular:      Rate and Rhythm: Normal rate and regular rhythm.      Pulses: Normal pulses.      Heart sounds: Normal heart sounds.   Pulmonary:      Effort: Pulmonary effort is normal.      Breath sounds: Normal breath sounds.   Abdominal:      General: Abdomen is flat. Bowel sounds are normal.      Palpations: Abdomen is soft.      Tenderness: There is no abdominal tenderness.   Musculoskeletal:         General: Normal range of motion.      Comments: Splint placed on her left upper extremity. Resting tremor to her right upper extremity.    Skin:     General: Skin is warm and dry.      Capillary Refill: Capillary refill takes less than 2 seconds.   Neurological:      General: No focal deficit present.      Mental Status: She is alert and oriented to person, place, " and time.           LAB:  All pertinent labs reviewed and interpreted.  Labs Ordered and Resulted from Time of ED Arrival to Time of ED Departure   CK TOTAL - Abnormal       Result Value     (*)    MAGNESIUM - Normal    Magnesium 2.1     ROUTINE UA WITH MICROSCOPIC REFLEX TO CULTURE       RADIOLOGY:  Reviewed all pertinent imaging. Please see official radiology report.  No orders to display       EKG:    Performed at: [facility]    Impression: Sinus rhythm with occasional PVCs, baseline tremor with some interference on EKG    Rate: 82 bpm  Rhythm: sinus  Axis: normal  CA Interval: 164 ms  QRS Interval: 80 ms  QTc Interval: 462 ms  ST Changes: n/a  Comparison: no significant changes compared to EKG obtained 9/21/24    I have independently reviewed and interpreted the EKG(s) documented above.      I, Rosemary Montano, am serving as a scribe to document services personally performed by Dr. Fior Dave based on my observation and the provider's statements to me. Fior DEL TORO DO attest that Rosemary Montano is acting in a scribe capacity, has observed my performance of the services and has documented them in accordance with my direction.    Fior Dave DO  Emergency Medicine  [facility] EMERGENCY DEPARTMENT  [address] [place] [id number]  [phone]  Dept: [phone]       Fior Dave DO  12/30/24 0030

## 2024-12-30 NOTE — H&P
[facility]    History and Physical - Hospitalist Service       Date of Admission:  12/29/2024    Assessment & Plan      Sharon De La Vega is a 79 year old female admitted on 12/29/2024. She was brought back to the ED by ambulance from home for evaluation after a second fall at home    #Recurrent falls  -Rule out near syncope versus orthostatic hypotension versus deconditioning  -History of moderate to severe valvular aortic stenosis with mild regurgitation  -Limited echocardiogram to reassess aortic stenosis  -Patient did not receive narcotics earlier in the day for left elbow fracture  -ECG difficult to rule out arrhythmia versus artifact from tremor  -Check orthostatic blood pressure  -, will trend  -IV hydration overnight  -Abnormal contaminated UA, follow-up urine culture  -Fall precautions  -PT/OT evaluation and treatment    #Left elbow fracture  -Status post splint/sling  -Original plan was to follow-up with orthopedic as an outpatient in 1 to 2 days, however, due to admission will consult Ortho service for further recommendations  -Patient is still denying any pain    #Abnormal ECG  -Questionable atrial flutter versus PVCs, personally reviewed: Probable 4:1 block with nonspecific ST waves; no changes when compared to ECG from 9/21/2024  -Repeat ECG  -Cardiac monitoring overnight, consider cardiology consult if ongoing concern for arrhythmia    #Essential hypertension  -Currently normotensive  -As above, check orthostatic blood pressure  -Continue PTA benazepril (auto substituted with lisinopril), hold for SBP < 110    #Hypokalemia  -Replace    #Prerenal azotemia  -IV hydration overnight as above  -Recheck BMP in a.m.    #Hypothyroidism  -Euthyroid, TSH 1.63  -Continue PTA levothyroxine    #Alzheimer's dementia without behavioral disturbance  -Risk for violent behavior and/or acute delirium  -Delirium prevention and treatment  -As needed quetiapine and olanzapine  -Continue PTA  donepezil, memantine    #Depression  #Anxiety  -Continue PTA bupropion, citalopram    #Psoriatic arthritis  -On PTA methotrexate, folic acid and leflunomide    #Hyperlipidemia  -Hold PTA simvastatin secondary to elevated CK level    #Obesity  -BMI 38.44     Observation Goals: -diagnostic tests and consults completed and resulted, -vital signs normal or at patient baseline, Nurse to notify provider when observation goals have been met and patient is ready for discharge.  Diet: Combination Diet No Caffeine Diet, Low Saturated Fat Na <2400mg Diet    DVT Prophylaxis: Pneumatic Compression Devices  Escobar Catheter: Not present  Lines: None     Cardiac Monitoring: ACTIVE order. Indication: Tachyarrhythmias, acute (48 hours)  Code Status: Full Code          Disposition Plan     Medically Ready for Discharge: Anticipated Tomorrow           Slava Head MD  Hospitalist Service  [facility]  Securely message with [employer] (more info)  Text page via [facility] Paging/Directory     ______________________________________________________________________    Chief Complaint   Falls    History is obtained from the patient, electronic health record, and emergency department physician    History of Present Illness   Sharon De La Vega is a 79 year old female who was brought to the ED by ambulance from home for evaluation of fall.  Patient lives with her son who called EMS and workup earlier in the day was remarkable for left elbow fracture.  After discussion was had between ED provider and orthopedic provider, decision was made to applied a splint and a sling with follow-up in the next day or 2 as an outpatient.  Patient was taken home, however she got up from the toilet and fell on her bottom.  Patient denied head trauma, loss of consciousness, chest pain, shortness of breath or palpitations.  She has been doing otherwise well and denies fevers, chills, sore throat, cough, nausea, vomiting or diarrhea.  Patient is  a former smoker and denies alcohol or drugs.      Past Medical History    Past Medical History:   Diagnosis Date    Anxiety 02/14/2013    Formatting of this note might be different from the original.  CURRENT MEDICATION(S) celexa 40 mg;   Lorazepam prn  Only occ  Stopped no need 11-17. Use PHQ 9 = 6   11-15;      Aortic valve stenosis 04/07/2023    Essential hypertension 10/29/2012    Hypercholesterolemia 10/29/2012    Hypothyroidism 10/29/2012    Major depressive disorder, single episode, moderate (H) 11/28/2022    Formatting of this note might be different from the original.  CURRENT MEDICATION(S) citalopram 40 mg    PHQ 9 =  2  11-11;  phq 9 = 2    12-11;  = 2   5-12; = 2   11-12;  Called her  Doing better  No need to see a psychologist. 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) 06/20/2022    Psoriatic arthritis (H) 10/29/2012       Past Surgical History   Past Surgical History:   Procedure Laterality Date    APPENDECTOMY  1964    CARPAL TUNNEL RELEASE RT/LT Bilateral 1982    CATARACT EXTRACTION, BILATERAL  1998    CHOLECYSTECTOMY  1964    HC KNEE ARTHROSCOPY, MED/LAT MENISCUS REPAIR Right 2000    NM EXCISE HAND/FOOT NEUROMA  2004    right foot    TOTAL KNEE ARTHROPLASTY Right 2012    TRABECULECTOMY Right 1998    laser surgery right eye       Prior to Admission Medications   Prior to Admission Medications   Prescriptions Last Dose Informant Patient Reported? Taking?   acetaminophen (TYLENOL) 325 MG tablet   Yes Yes   Sig: Take 2 tablets (650 mg) by mouth every 6 hours as needed for mild pain   benazepril (LOTENSIN) 20 MG tablet 12/29/2024 at  5:00 PM  Yes Yes   Sig: Take 20 mg by mouth daily.   buPROPion (WELLBUTRIN XL) 150 MG 24 hr tablet 12/29/2024 at  5:00 PM  Yes Yes   Sig: Take 150 mg by mouth every morning.   citalopram (CELEXA) 20 MG tablet 12/29/2024 at  5:00 PM Self Yes Yes   Sig: Take 1 tablet by mouth every morning.    cyanocobalamin (VITAMIN B-12) 1000 MCG tablet 12/29/2024 at  5:00 PM Self Yes Yes   Sig: Take 1,000 mcg by mouth every morning.   donepezil (ARICEPT) 10 MG tablet 12/28/2024 Bedtime  No Yes   Sig: Take 1 tablet (10 mg) by mouth at bedtime   folic acid (FOLVITE) 1 MG tablet 12/29/2024 at  5:00 PM Self Yes Yes   Sig: Take 1 mg by mouth every morning.   leflunomide (ARAVA) 10 MG tablet 12/29/2024 at  5:00 PM Self Yes Yes   Sig: Take 1 tablet by mouth every morning.   levothyroxine (SYNTHROID/LEVOTHROID) 100 MCG tablet 12/29/2024 at  5:00 PM  Yes Yes   Sig: Take 1 tablet by mouth every morning.   memantine (NAMENDA) 10 MG tablet 12/29/2024 at  5:00 PM  No Yes   Sig: Take 1 tablet (10 mg) by mouth 2 times daily   methotrexate 2.5 MG tablet 12/26/2024 Self Yes Yes   Sig: Take 10 mg by mouth once a week On Thursday   nystatin (MYCOSTATIN) 532453 UNIT/GM external powder  Self Yes Yes   Sig: Apply topically 2 times daily as needed.   simvastatin (ZOCOR) 20 MG tablet 12/28/2024 Bedtime Self Yes Yes   Sig: Take 20 mg by mouth every evening.   vitamin D3 (CHOLECALCIFEROL) 50 mcg (2000 units) tablet 12/29/2024 at  5:00 PM  Yes Yes   Sig: Take 1 tablet by mouth every morning.   vitamin E (TOCOPHEROL) 1000 units (450 mg) CAPS capsule 12/29/2024 at  5:00 PM  No Yes   Sig: Take 1 capsule (1,000 Units) by mouth 2 times daily      Facility-Administered Medications: None           Physical Exam   Vital Signs: Temp: 98.2  F (36.8  C) Temp src: Oral BP: 134/61 Pulse: 75   Resp: 22 SpO2: 98 %      Weight: 231 lbs 0 oz    Constitutional: no apparent distress  Respiratory: no increased work of breathing and clear to auscultation  Cardiovascular: regular rate and rhythm  GI: normal bowel sounds and non-tender  Skin: Dry  Musculoskeletal: no lower extremity pitting edema present, left upper extremity sling in place  Neurologic: Mental Status Exam:  Level of Alertness:   awake  Orientation:   person, place    Medical Decision Making        MANAGEMENT DISCUSSED with the following over the past 24 hours: ED provider and patient       Data     I have personally reviewed the following data over the past 24 hrs:    8.4  \   13.1   / 151     138 100 23.5 (H) /  111 (H)   3.3 (L) 28 0.89 \     TSH: 1.63 T4: N/A A1C: N/A       Imaging results reviewed over the past 24 hrs:   Recent Results (from the past 24 hours)   CT Head w/o Contrast    Narrative    EXAM: CT HEAD W/O CONTRAST, CT CERVICAL SPINE W/O CONTRAST  LOCATION: [facility]  DATE: 12/29/2024    INDICATION: fall alzheimer's dementia age 79 with distracting left elbow pain  COMPARISON: Head CT: 9/7/2023.  TECHNIQUE:   1) Routine CT Head without IV contrast. Multiplanar reformats. Dose reduction techniques were used.  2) Routine CT Cervical Spine without IV contrast. Multiplanar reformats. Dose reduction techniques were used.    FINDINGS:   HEAD CT:   INTRACRANIAL CONTENTS: Mildly motion degraded study. No evidence of intracranial hemorrhage, extraaxial collection, or mass effect.  No CT evidence of acute transcortical infarct. Mild presumed chronic small vessel ischemic changes. Moderate generalized   volume loss. No hydrocephalus.     VISUALIZED ORBITS/SINUSES/MASTOIDS: No intraorbital abnormality. No paranasal sinus mucosal disease. No middle ear or mastoid effusion.    BONES/SOFT TISSUES: No acute abnormality.    CERVICAL SPINE CT:   VERTEBRA: No evidence of acute fracture or traumatic subluxation involving the cervical spine. Vertebral body heights are maintained. Mild anterolisthesis of C4 on C5 measuring 2 mm, favored degenerative in the setting of bulky left facet arthropathy at   this level. Osteopenia.    CANAL/FORAMINA/DEGENERATIVE CHANGES: Multilevel degenerative disc disease with disc height loss greatest and moderate to advanced at C6-C7. Multilevel facet arthropathy, greatest and advanced on the left at C3-C4 and C4-C5. No CT evidence for high-grade   spinal  canal stenosis. Neural foraminal stenosis is greatest and advanced on the left at C3-C4 and C4-C5 as well as bilaterally at C5-C6 in the setting of uncinate spurring and facet hypertrophy.    PARASPINAL: No prevertebral soft tissue edema. Calcified atherosclerosis of the cervical carotid arteries. Visualized lung fields are clear.      Impression    IMPRESSION:  HEAD CT:  1.  No evidence of acute traumatic intracranial abnormality.  2.  Brain atrophy and presumed chronic microvascular ischemic changes as above.    CERVICAL SPINE CT:  1.  No CT evidence for acute fracture or traumatic malalignment involving the cervical spine.  2.  Multilevel cervical spondylosis, further detailed above.   CT Cervical Spine w/o Contrast    Narrative    EXAM: CT HEAD W/O CONTRAST, CT CERVICAL SPINE W/O CONTRAST  LOCATION: [facility]  DATE: 12/29/2024    INDICATION: fall alzheimer's dementia age 79 with distracting left elbow pain  COMPARISON: Head CT: 9/7/2023.  TECHNIQUE:   1) Routine CT Head without IV contrast. Multiplanar reformats. Dose reduction techniques were used.  2) Routine CT Cervical Spine without IV contrast. Multiplanar reformats. Dose reduction techniques were used.    FINDINGS:   HEAD CT:   INTRACRANIAL CONTENTS: Mildly motion degraded study. No evidence of intracranial hemorrhage, extraaxial collection, or mass effect.  No CT evidence of acute transcortical infarct. Mild presumed chronic small vessel ischemic changes. Moderate generalized   volume loss. No hydrocephalus.     VISUALIZED ORBITS/SINUSES/MASTOIDS: No intraorbital abnormality. No paranasal sinus mucosal disease. No middle ear or mastoid effusion.    BONES/SOFT TISSUES: No acute abnormality.    CERVICAL SPINE CT:   VERTEBRA: No evidence of acute fracture or traumatic subluxation involving the cervical spine. Vertebral body heights are maintained. Mild anterolisthesis of C4 on C5 measuring 2 mm, favored degenerative in the setting  of bulky left facet arthropathy at   this level. Osteopenia.    CANAL/FORAMINA/DEGENERATIVE CHANGES: Multilevel degenerative disc disease with disc height loss greatest and moderate to advanced at C6-C7. Multilevel facet arthropathy, greatest and advanced on the left at C3-C4 and C4-C5. No CT evidence for high-grade   spinal canal stenosis. Neural foraminal stenosis is greatest and advanced on the left at C3-C4 and C4-C5 as well as bilaterally at C5-C6 in the setting of uncinate spurring and facet hypertrophy.    PARASPINAL: No prevertebral soft tissue edema. Calcified atherosclerosis of the cervical carotid arteries. Visualized lung fields are clear.      Impression    IMPRESSION:  HEAD CT:  1.  No evidence of acute traumatic intracranial abnormality.  2.  Brain atrophy and presumed chronic microvascular ischemic changes as above.    CERVICAL SPINE CT:  1.  No CT evidence for acute fracture or traumatic malalignment involving the cervical spine.  2.  Multilevel cervical spondylosis, further detailed above.   XR Pelvis and Hip Left 2 Views    Narrative    EXAM: XR PELVIS AND HIP LEFT 2 VIEWS  LOCATION: [facility]  DATE: 12/29/2024    INDICATION: Fall.  COMPARISON: None.      Impression    IMPRESSION: No acute displaced fracture. Normal alignment of the hips. If there is persistent clinical concern for occult hip fracture, consider MRI which is more sensitive. Hip joint spaces are maintained. Degenerative changes the lower lumbar spine.   Osseous demineralization.   XR Knee Left 1/2 Views    Narrative    EXAM: XR KNEE LEFT 1/2 VIEWS  LOCATION: [facility]  DATE: 12/29/2024    INDICATION: left knee tender pain after fall  COMPARISON: None.      Impression    IMPRESSION: No acute displaced fracture. Diffuse osseous demineralization which limits evaluation for nondisplaced fractures and subtle osseous lesions. No effusion. Mild tricompartmental osteoarthritis with joint  space narrowing and marginal osteophyte   formation.   Radius/Ulna XR,  PA &LAT, left    Narrative    EXAM: XR FOREARM LEFT 2 VIEWS  LOCATION: [facility]  DATE: 12/29/2024    INDICATION: left elbow pain after fall  COMPARISON: None.      Impression    IMPRESSION:   Acute significantly displaced transverse fracture of the distal humerus primarily extending through the transcondylar region. Distal humerus fragments are displaced medially relative to the humeral shaft. Radiocapitellar alignment appears intact.   Findings are better evaluated on dedicated elbow radiographs.    Normal alignment of the wrist. No radius or ulna fracture. Moderate first CMC degenerative change.   Elbow  XR, G/E 3 views, left    Narrative    EXAM: XR ELBOW LEFT G/E 3 VIEWS  LOCATION: [facility]  DATE: 12/29/2024    INDICATION: Left elbow pain after a fall.  COMPARISON: Humerus radiographs, same date.      Impression    IMPRESSION: Comminuted distal humerus fracture. Dominant transverse oblique fracture at the level of the distal metadiaphysis, with complete separation of the distal humeral epiphysis, and 2.0 cm medial displacement of the distal fracture components.   Additional longitudinal fracture line through the medial humeral epiphysis, splitting the humeral trochlea, with 3 mm displacement/step-off at the articular surface. Radiocapitellar and ulnohumeral joint alignment is within normal limits, with no   significant elbow joint subluxation or dislocation.   Humerus XR,  G/E 2 views, left    Narrative    EXAM: XR HUMERUS LEFT G/E 2 VIEWS  LOCATION: [facility]  DATE: 12/29/2024    INDICATION: Elbow pain after a fall.  COMPARISON: Elbow radiographs, same date.      Impression    IMPRESSION: Comminuted intra-articular fractures of the distal left humerus, including a complete transverse oblique avulsion at the level of the metaphysis, in addition to a longitudinal  vertical fracture through the humeral trochlea. No additional   fracture. Normal joint alignment at the shoulder and elbow.

## 2024-12-30 NOTE — PLAN OF CARE
Goal Outcome Evaluation:      Plan of Care Reviewed With: patient    Overall Patient Progress: no changeOverall Patient Progress: no change    Assumed cares: [id number]  Vitals: VSS on RA  Pain: Pt denies any pain  Neuro: A&O with intermittent forgetfulness with time and situation  Cardiac: SR  Respiratory: WDL   GI/: Incontinent- purewick in place  Skin: Left arm in sling wrapped in ace wrap- CDI  IV/Drains: R PIV-SL   Activity: Assist x2 with walker, gait belt  Behavior: Pt is calm and cooperative    Plan of Care: NPO at midnight for angio tomorrow 12/31.

## 2024-12-30 NOTE — PLAN OF CARE
Patient admitted to room 03 at approximately 1333 via cart from emergency room.  Reason for Admission: Falls  Report received from: None. ED notes  Patient was accompanied by spouse  Discharge transportation provided by:  Patient ambulated/transferred:  ax2. air rica.  Patient is alert and orientated x 3. Disoriented to time  Outpatient Observation education provided to: (patient, family, friend)  MDRO Education done if applicable (MRSA, VRE, etc)  Safety risks were identified during admission:  fall.   Yellow risk/fall band applied:  Yes  Home meds sent home: No  Home meds sent to pharmacy:No IF YES add 1/2 sheet laminated page reminder to chart/clipboard   Detailed Belongings: refer to previous belonging note completed by ANNABELLE

## 2024-12-30 NOTE — ED TRIAGE NOTES
Patient arrives to ED from home via [facility] with chief complaint of increased generalized weakness since leaving ED earlier for left elbow.  Patient reports she fell onto her buttocks from the toilet, denies hitting her head, not on thinners.  EMS reports they had to fully assist patient up.  VSS per EMS report.  Alert and oriented x4.

## 2024-12-30 NOTE — CONSULTS
ORTHOPEDIC CONSULTATION    Consultation  Sharon De La Vega,  1945, MRN [id number]    Generalized weakness  Multiple falls  Closed fracture of distal end of left humerus, unspecified fracture morphology, initial encounter    PCP: Jaylen Young, [phone]   Code status:  Full Code       Extended Emergency Contact Information  Primary Emergency Contact: Guy De La Vega  Address: [address] [place] [address] [place]  Mobile Phone: [phone]  Relation: Son         IMPRESSION:  Left acute comminuted intra-articular mildly displaced distal humerus fracture, minimally displaced olecranon process fracture, minimally displaced radial head fracture     PLAN:  This patient was discussed with Dr. Palma, on-call surgeon for [facility] and they are in agreement with the following plan.   -Patient will require surgical ORIF of the left elbow.  Will plan to have this done while inpatient here in the hospital.  Ongoing planning regarding surgical timing and OR availability but no plan for surgery today.  Likely surgery tomorrow  -N.p.o. at midnight tonight  -Hold anticoagulation until surgery  -Pain control per primary team  -Splint to remain clean, dry, and intact until surgery.  -Sling to remain on at all times besides hygiene cares and skin checks.  -Nonweightbearing left upper extremity  -Medical optimization per primary team.  Hemoglobin 13.1    Thank you for including [facility] in the care of Sharon De La Vega. It has been a pleasure participating in their care.        CHIEF COMPLAINT: Multiple falls    HISTORY OF PRESENT ILLNESS:  The patient is seen in orthopedic consultation at the request of Christopher Martinez DO for left elbow fracture.  The patient is a 79 year old female    The patient presents today with pain in the left elbow following a fall.  She does have history of Alzheimer's dementia and so history was verified with patient's son at the bedside.  " She reports that she had a fall yesterday while at home and fell onto her left elbow.  She came into the emergency department and was found to have a distal humerus fracture.  During the time my visit she is in a splint and sling, notes she is fairly comfortable in the splint.  Denies any numbness/tingling in her left upper extremity.  Pain is well-controlled.  She uses a cane to assist with weightbearing and ambulating well at home.  She is not on any anticoagulation.  She does have a history of aortic stenosis      ALLERGIES:   Review of patient's allergies indicates   Allergies   Allergen Reactions    Acetaminophen-Codeine GI Disturbance     Indigestion    Celecoxib Unknown    Codeine Camsylate Unknown    Conj Estrog-Medroxyprogest Ace Other (See Comments)     Bleeding    Estrogens, Conjugated Synthetic A Unknown    Nsaids Unknown    Sulfa Antibiotics Unknown         MEDICATIONS UPON ADMISSION:  Medications were reviewed.  They include:   (Not in a hospital admission)        SOCIAL HISTORY:   she  reports that she quit smoking about 38 years ago. Her smoking use included cigarettes. She has never used smokeless tobacco. She reports that she does not currently use alcohol.      FAMILY HISTORY:  family history includes Lung Cancer in her father; Multiple myeloma in her brother.      REVIEW OF SYSTEMS:   Reviewed with patient. See HPI, otherwise negative       PHYSICAL EXAMINATION:  Vitals: BP (!) 144/71   Pulse 69   Temp 98.3  F (36.8  C) (Oral)   Resp 18   Ht 1.651 m (5' 5\")   Wt 104.8 kg (231 lb)   SpO2 97%   BMI 38.44 kg/m    General: On examination, the patient is resting comfortably, NAD, awake, and alert and oriented to person, place, time, and, and general circumstances   SKIN: There is splint in place covering the skin.  Fingers are warm and well-perfused  Pulses: Capillary refill normal and fingers  Sensation: intact and equal bilaterally to the distal upper extremities.  Tenderness: Unable to " assess elbow tenderness with splint in place  ROM: Able to wiggle fingers, do a thumbs up, okay sign, cross fingers  Motor: hand  intact, wrist flexion/extension with resistance intact.  Contralateral side= Full range of motion, Negative joint instability findings, 5/5 motor groups about the joint, Non-tender.       RADIOGRAPHIC EVALUATION:  Personally reviewed  EXAM: CT ELBOW LEFT W/O CONTRAST  LOCATION: [facility]  DATE: 12/30/2024     INDICATION: Assess fracture.  COMPARISON: 12/29/2024  TECHNIQUE: Noncontrast. Axial, sagittal and coronal thin-section reconstruction. Dose reduction techniques were used.      FINDINGS:      BONES:  -Comminuted intra-articular fracture of the distal left humerus with a complete transverse fracture plane at the level of the metaphysis and a longitudinal oblique fracture plane extending to the articular surface through the lateral margin of the   trochlea. Mild displacement and angulation of the dominant trochlear fracture fragment. Tiny minimally displaced fracture along the medial margin of the olecranon process (series 2 image 53). Subtle cortical irregularity along the articular surface of   the radial head suspicious for a minimally displaced fracture (series 5 image 58).     SOFT TISSUES:  -Moderate-sized elbow joint effusion. Diffuse soft tissue and intramuscular edema surrounding the elbow fracture. No discrete organized hematoma.                                                                      IMPRESSION:  1.  Acute comminuted intra-articular fracture of the distal humerus with mild displacement and angulation of the dominant trochlear fracture fragment.  2.  Minimally displaced fracture along the medial margin the olecranon process.  3.  Minimally displaced fracture of the radial head.      EXAM: XR ELBOW LEFT G/E 3 VIEWS  LOCATION: [facility]  DATE: 12/29/2024     INDICATION: Left elbow pain after a  fall.  COMPARISON: Humerus radiographs, same date.                                                                      IMPRESSION: Comminuted distal humerus fracture. Dominant transverse oblique fracture at the level of the distal metadiaphysis, with complete separation of the distal humeral epiphysis, and 2.0 cm medial displacement of the distal fracture components.   Additional longitudinal fracture line through the medial humeral epiphysis, splitting the humeral trochlea, with 3 mm displacement/step-off at the articular surface. Radiocapitellar and ulnohumeral joint alignment is within normal limits, with no   significant elbow joint subluxation or dislocation.    PERTINENT LABS:  Personally reviewed  Recent Labs   Lab Test 12/29/24  1306 02/14/23  1712 01/10/23  1252   INR  --   --  1.15   HGB 13.1   < > 11.8      < > 205    < > = values in this interval not displayed.         LYDIA PELLETIER PA-C  Date: 12/30/2024  Time: 12:14 PM  [facility]    CC1:   Christopher Martinez DO    CC2:   Jaylen Young

## 2024-12-31 LAB
ABO + RH BLD: NORMAL
ANION GAP SERPL CALCULATED.3IONS-SCNC: 7 MMOL/L (ref 7–15)
BACTERIA UR CULT: NORMAL
BLD GP AB SCN SERPL QL: NEGATIVE
BUN SERPL-MCNC: 22.4 MG/DL (ref 8–23)
CALCIUM SERPL-MCNC: 8.9 MG/DL (ref 8.8–10.4)
CHLORIDE SERPL-SCNC: 107 MMOL/L (ref 98–107)
CREAT SERPL-MCNC: 0.86 MG/DL (ref 0.51–0.95)
EGFRCR SERPLBLD CKD-EPI 2021: 69 ML/MIN/1.73M2
ERYTHROCYTE [DISTWIDTH] IN BLOOD BY AUTOMATED COUNT: 16.2 % (ref 10–15)
GLUCOSE SERPL-MCNC: 89 MG/DL (ref 70–99)
HCO3 SERPL-SCNC: 29 MMOL/L (ref 22–29)
HCT VFR BLD AUTO: 32.6 % (ref 35–47)
HGB BLD-MCNC: 10.4 G/DL (ref 11.7–15.7)
MAGNESIUM SERPL-MCNC: 2.2 MG/DL (ref 1.7–2.3)
MCH RBC QN AUTO: 31.5 PG (ref 26.5–33)
MCHC RBC AUTO-ENTMCNC: 31.9 G/DL (ref 31.5–36.5)
MCV RBC AUTO: 99 FL (ref 78–100)
PHOSPHATE SERPL-MCNC: 2.6 MG/DL (ref 2.5–4.5)
PLATELET # BLD AUTO: 129 10E3/UL (ref 150–450)
POTASSIUM SERPL-SCNC: 3.7 MMOL/L (ref 3.4–5.3)
RBC # BLD AUTO: 3.3 10E6/UL (ref 3.8–5.2)
SODIUM SERPL-SCNC: 143 MMOL/L (ref 135–145)
SPECIMEN EXP DATE BLD: NORMAL
WBC # BLD AUTO: 6.1 10E3/UL (ref 4–11)

## 2024-12-31 PROCEDURE — C1894 INTRO/SHEATH, NON-LASER: HCPCS | Performed by: STUDENT IN AN ORGANIZED HEALTH CARE EDUCATION/TRAINING PROGRAM

## 2024-12-31 PROCEDURE — 250N000013 HC RX MED GY IP 250 OP 250 PS 637: Performed by: NURSE PRACTITIONER

## 2024-12-31 PROCEDURE — 250N000013 HC RX MED GY IP 250 OP 250 PS 637: Performed by: HOSPITALIST

## 2024-12-31 PROCEDURE — 36415 COLL VENOUS BLD VENIPUNCTURE: CPT | Performed by: STUDENT IN AN ORGANIZED HEALTH CARE EDUCATION/TRAINING PROGRAM

## 2024-12-31 PROCEDURE — 255N000002 HC RX 255 OP 636: Performed by: STUDENT IN AN ORGANIZED HEALTH CARE EDUCATION/TRAINING PROGRAM

## 2024-12-31 PROCEDURE — 84100 ASSAY OF PHOSPHORUS: CPT | Performed by: STUDENT IN AN ORGANIZED HEALTH CARE EDUCATION/TRAINING PROGRAM

## 2024-12-31 PROCEDURE — 250N000011 HC RX IP 250 OP 636: Performed by: STUDENT IN AN ORGANIZED HEALTH CARE EDUCATION/TRAINING PROGRAM

## 2024-12-31 PROCEDURE — 99223 1ST HOSP IP/OBS HIGH 75: CPT | Performed by: INTERNAL MEDICINE

## 2024-12-31 PROCEDURE — 250N000009 HC RX 250: Performed by: STUDENT IN AN ORGANIZED HEALTH CARE EDUCATION/TRAINING PROGRAM

## 2024-12-31 PROCEDURE — 93454 CORONARY ARTERY ANGIO S&I: CPT | Performed by: STUDENT IN AN ORGANIZED HEALTH CARE EDUCATION/TRAINING PROGRAM

## 2024-12-31 PROCEDURE — 85027 COMPLETE CBC AUTOMATED: CPT | Performed by: STUDENT IN AN ORGANIZED HEALTH CARE EDUCATION/TRAINING PROGRAM

## 2024-12-31 PROCEDURE — 86923 COMPATIBILITY TEST ELECTRIC: CPT | Performed by: INTERNAL MEDICINE

## 2024-12-31 PROCEDURE — 99222 1ST HOSP IP/OBS MODERATE 55: CPT | Performed by: THORACIC SURGERY (CARDIOTHORACIC VASCULAR SURGERY)

## 2024-12-31 PROCEDURE — 86900 BLOOD TYPING SEROLOGIC ABO: CPT | Performed by: INTERNAL MEDICINE

## 2024-12-31 PROCEDURE — 80048 BASIC METABOLIC PNL TOTAL CA: CPT | Performed by: STUDENT IN AN ORGANIZED HEALTH CARE EDUCATION/TRAINING PROGRAM

## 2024-12-31 PROCEDURE — 272N000001 HC OR GENERAL SUPPLY STERILE: Performed by: STUDENT IN AN ORGANIZED HEALTH CARE EDUCATION/TRAINING PROGRAM

## 2024-12-31 PROCEDURE — 99232 SBSQ HOSP IP/OBS MODERATE 35: CPT | Performed by: INTERNAL MEDICINE

## 2024-12-31 PROCEDURE — 83735 ASSAY OF MAGNESIUM: CPT | Performed by: STUDENT IN AN ORGANIZED HEALTH CARE EDUCATION/TRAINING PROGRAM

## 2024-12-31 PROCEDURE — 250N000011 HC RX IP 250 OP 636: Performed by: INTERNAL MEDICINE

## 2024-12-31 PROCEDURE — 999N000248 HC STATISTIC IV INSERT WITH US BY RN

## 2024-12-31 PROCEDURE — 99232 SBSQ HOSP IP/OBS MODERATE 35: CPT | Performed by: STUDENT IN AN ORGANIZED HEALTH CARE EDUCATION/TRAINING PROGRAM

## 2024-12-31 PROCEDURE — 120N000001 HC R&B MED SURG/OB

## 2024-12-31 PROCEDURE — 86850 RBC ANTIBODY SCREEN: CPT | Performed by: INTERNAL MEDICINE

## 2024-12-31 PROCEDURE — 250N000013 HC RX MED GY IP 250 OP 250 PS 637: Performed by: INTERNAL MEDICINE

## 2024-12-31 PROCEDURE — 93454 CORONARY ARTERY ANGIO S&I: CPT | Mod: 26 | Performed by: STUDENT IN AN ORGANIZED HEALTH CARE EDUCATION/TRAINING PROGRAM

## 2024-12-31 RX ORDER — OXYCODONE HYDROCHLORIDE 5 MG/1
10 TABLET ORAL EVERY 4 HOURS PRN
Status: DISCONTINUED | OUTPATIENT
Start: 2024-12-31 | End: 2025-01-03

## 2024-12-31 RX ORDER — FENTANYL CITRATE 50 UG/ML
INJECTION, SOLUTION INTRAMUSCULAR; INTRAVENOUS
Status: DISCONTINUED | OUTPATIENT
Start: 2024-12-31 | End: 2024-12-31 | Stop reason: HOSPADM

## 2024-12-31 RX ORDER — NALOXONE HYDROCHLORIDE 0.4 MG/ML
0.4 INJECTION, SOLUTION INTRAMUSCULAR; INTRAVENOUS; SUBCUTANEOUS
Status: ACTIVE | OUTPATIENT
Start: 2024-12-31 | End: 2024-12-31

## 2024-12-31 RX ORDER — NALOXONE HYDROCHLORIDE 0.4 MG/ML
0.2 INJECTION, SOLUTION INTRAMUSCULAR; INTRAVENOUS; SUBCUTANEOUS
Status: ACTIVE | OUTPATIENT
Start: 2024-12-31 | End: 2024-12-31

## 2024-12-31 RX ORDER — LIDOCAINE 40 MG/G
CREAM TOPICAL
Status: DISCONTINUED | OUTPATIENT
Start: 2024-12-31 | End: 2024-12-31 | Stop reason: HOSPADM

## 2024-12-31 RX ORDER — FENTANYL CITRATE 50 UG/ML
25 INJECTION, SOLUTION INTRAMUSCULAR; INTRAVENOUS
Status: DISCONTINUED | OUTPATIENT
Start: 2024-12-31 | End: 2024-12-31 | Stop reason: HOSPADM

## 2024-12-31 RX ORDER — HYDRALAZINE HYDROCHLORIDE 20 MG/ML
10 INJECTION INTRAMUSCULAR; INTRAVENOUS
Status: DISCONTINUED | OUTPATIENT
Start: 2024-12-31 | End: 2025-01-01

## 2024-12-31 RX ORDER — FENTANYL CITRATE 50 UG/ML
25 INJECTION, SOLUTION INTRAMUSCULAR; INTRAVENOUS
Status: DISCONTINUED | OUTPATIENT
Start: 2024-12-31 | End: 2024-12-31

## 2024-12-31 RX ORDER — ASPIRIN 81 MG/1
243 TABLET, CHEWABLE ORAL ONCE
Status: COMPLETED | OUTPATIENT
Start: 2024-12-31 | End: 2024-12-31

## 2024-12-31 RX ORDER — FLUMAZENIL 0.1 MG/ML
0.2 INJECTION, SOLUTION INTRAVENOUS
Status: ACTIVE | OUTPATIENT
Start: 2024-12-31 | End: 2024-12-31

## 2024-12-31 RX ORDER — ACETAMINOPHEN 325 MG/1
650 TABLET ORAL EVERY 4 HOURS PRN
Status: DISCONTINUED | OUTPATIENT
Start: 2024-12-31 | End: 2025-01-03

## 2024-12-31 RX ORDER — HEPARIN SODIUM 1000 [USP'U]/ML
INJECTION, SOLUTION INTRAVENOUS; SUBCUTANEOUS
Status: DISCONTINUED | OUTPATIENT
Start: 2024-12-31 | End: 2024-12-31 | Stop reason: HOSPADM

## 2024-12-31 RX ORDER — SODIUM CHLORIDE 9 MG/ML
75 INJECTION, SOLUTION INTRAVENOUS CONTINUOUS
Status: ACTIVE | OUTPATIENT
Start: 2024-12-31 | End: 2024-12-31

## 2024-12-31 RX ORDER — IOPAMIDOL 755 MG/ML
90 INJECTION, SOLUTION INTRAVASCULAR ONCE
Status: COMPLETED | OUTPATIENT
Start: 2024-12-31 | End: 2024-12-31

## 2024-12-31 RX ORDER — ASPIRIN 325 MG
325 TABLET ORAL ONCE
Status: COMPLETED | OUTPATIENT
Start: 2024-12-31 | End: 2024-12-31

## 2024-12-31 RX ORDER — ATROPINE SULFATE 0.1 MG/ML
0.5 INJECTION INTRAVENOUS
Status: ACTIVE | OUTPATIENT
Start: 2024-12-31 | End: 2024-12-31

## 2024-12-31 RX ORDER — IODIXANOL 320 MG/ML
INJECTION, SOLUTION INTRAVASCULAR
Status: DISCONTINUED | OUTPATIENT
Start: 2024-12-31 | End: 2024-12-31 | Stop reason: HOSPADM

## 2024-12-31 RX ORDER — OXYCODONE HYDROCHLORIDE 5 MG/1
5 TABLET ORAL EVERY 4 HOURS PRN
Status: DISCONTINUED | OUTPATIENT
Start: 2024-12-31 | End: 2025-01-03

## 2024-12-31 RX ORDER — SODIUM CHLORIDE 9 MG/ML
INJECTION, SOLUTION INTRAVENOUS CONTINUOUS
Status: DISCONTINUED | OUTPATIENT
Start: 2024-12-31 | End: 2024-12-31 | Stop reason: HOSPADM

## 2024-12-31 RX ADMIN — MEMANTINE 10 MG: 10 TABLET ORAL at 08:48

## 2024-12-31 RX ADMIN — CITALOPRAM HYDROBROMIDE 20 MG: 20 TABLET ORAL at 08:48

## 2024-12-31 RX ADMIN — LEVOTHYROXINE SODIUM 100 MCG: 100 TABLET ORAL at 08:48

## 2024-12-31 RX ADMIN — SIMVASTATIN 20 MG: 10 TABLET, FILM COATED ORAL at 21:36

## 2024-12-31 RX ADMIN — IOPAMIDOL 90 ML: 755 INJECTION, SOLUTION INTRAVENOUS at 17:43

## 2024-12-31 RX ADMIN — BUPROPION HYDROCHLORIDE 150 MG: 150 TABLET, FILM COATED, EXTENDED RELEASE ORAL at 08:48

## 2024-12-31 RX ADMIN — ASPIRIN 81 MG CHEWABLE TABLET 243 MG: 81 TABLET CHEWABLE at 08:58

## 2024-12-31 RX ADMIN — DONEPEZIL HYDROCHLORIDE 10 MG: 5 TABLET, FILM COATED ORAL at 21:36

## 2024-12-31 RX ADMIN — MEMANTINE 10 MG: 10 TABLET ORAL at 21:36

## 2024-12-31 ASSESSMENT — ACTIVITIES OF DAILY LIVING (ADL)
ADLS_ACUITY_SCORE: 58
ADLS_ACUITY_SCORE: 57
ADLS_ACUITY_SCORE: 58
ADLS_ACUITY_SCORE: 57
ADLS_ACUITY_SCORE: 57
ADLS_ACUITY_SCORE: 58
ADLS_ACUITY_SCORE: 57
ADLS_ACUITY_SCORE: 58
ADLS_ACUITY_SCORE: 58
ADLS_ACUITY_SCORE: 57
ADLS_ACUITY_SCORE: 58
ADLS_ACUITY_SCORE: 57
ADLS_ACUITY_SCORE: 58
ADLS_ACUITY_SCORE: 57
ADLS_ACUITY_SCORE: 58

## 2024-12-31 NOTE — PLAN OF CARE
Problem: Adult Inpatient Plan of Care  Goal: Absence of Hospital-Acquired Illness or Injury  Outcome: Progressing  Intervention: Identify and Manage Fall Risk  Recent Flowsheet Documentation  Taken 12/30/2024 2100 by Jc Montano RN  Safety Promotion/Fall Prevention:   assistive device/personal items within reach   clutter free environment maintained   nonskid shoes/slippers when out of bed   room near nurse's station   room organization consistent   safety round/check completed  Intervention: Prevent Skin Injury  Recent Flowsheet Documentation  Taken 12/30/2024 2100 by Jc Montano RN  Body Position: supine, head elevated  Intervention: Prevent and Manage VTE (Venous Thromboembolism) Risk  Recent Flowsheet Documentation  Taken 12/30/2024 2100 by cJ Montano RN  VTE Prevention/Management: SCDs off (sequential compression devices)  Intervention: Prevent Infection  Recent Flowsheet Documentation  Taken 12/30/2024 2100 by cJ Montano RN  Infection Prevention:   cohorting utilized   hand hygiene promoted   single patient room provided  Goal: Optimal Comfort and Wellbeing  Outcome: Progressing     Problem: Fall Injury Risk  Goal: Absence of Fall and Fall-Related Injury  Outcome: Progressing  Intervention: Identify and Manage Contributors  Recent Flowsheet Documentation  Taken 12/30/2024 2100 by Jc Montano RN  Medication Review/Management: medications reviewed  Intervention: Promote Injury-Free Environment  Recent Flowsheet Documentation  Taken 12/30/2024 2100 by Jc Montano RN  Safety Promotion/Fall Prevention:   assistive device/personal items within reach   clutter free environment maintained   nonskid shoes/slippers when out of bed   room near nurse's station   room organization consistent   safety round/check completed     Problem: Pain Acute  Goal: Optimal Pain Control and Function  Outcome: Progressing  Intervention: Prevent or Manage Pain  Recent Flowsheet Documentation  Taken 12/30/2024 2100 by  Jc Montano, RN  Medication Review/Management: medications reviewed   Goal Outcome Evaluation:       A&Ox3. Intermittent confusion. Vitals stable on room air. Has 5/10 elbow pain, gave tylenol. On tele, SR. Assist x2 to commode. Purewick removed because patient said it was uncomfortable. Pt bladder scanned for 441 at beginning of shift, unable to void at the end of shift, straight cath for 600 mL.  NPO at midnight for angio.

## 2024-12-31 NOTE — PRE-PROCEDURE
GENERAL PRE-PROCEDURE:   Procedure:  Coronary angiogram with possible PCI, left heart cathterization  Date/Time:  12/31/2024 9:59 AM    Written consent obtained?: Yes    Risks and benefits: Risks, benefits and alternatives were discussed    Consent given by:  Power of  (son Guy due to hx of alzheimers dementia)  Patient states understanding of procedure being performed: Yes    Patient's understanding of procedure matches consent: Yes    Procedure consent matches procedure scheduled: Yes    Expected level of sedation:  Moderate  Appropriately NPO:  Yes  ASA Class:  4 (severe AS, HTN, HLD, alzheimers dementia, psoriatic arthritis, frequent falls)  Mallampati  :  Grade 3- soft palate visible, posterior pharyngeal wall not visible  Lungs:  Lungs clear with good breath sounds bilaterally  Heart:  Systolic murmur  History & Physical reviewed:  History and physical reviewed and updates made (see comment)  H&P Comments:  Clinically Significant Risk Factors Present on Admission    Cardiovascular : severe AS, HTN, HLD    Fluid & Electrolyte Disorders : Not present on admission    Gastroenterology : Not present on admission    Hematology/Oncology : Not present on admission    Nephrology : Not present on admission    Neurology : alzheimers dementia    Pulmonology : Not present on admission    Systemic : psoriatic arthritis, frequent falls    Statement of review:  I have reviewed the lab findings, diagnostic data, medications, and the plan for sedation

## 2024-12-31 NOTE — CONSULTS
[facility] Structural Heart        [facility]   [address], [place] [address]   [email or web address]   OFFICE: [phone]     IMPRESSION:  Severe-critical, symptomatic aortic valve stenosis (PV 4.6 m/s, MG 57 mmHg, and calculated valve area of 0.6 cm .  DI 0.22).    Functional Capacity:  NYHA class III, CCS class 0   Metabolic syndrome with hypertension and hyperlipidemia  Alzheimer's dementia, moderate   Frequent falls (mechanical) with left humeral fracture - needs surgical repair  Psoriatec arthritis on immunosuppression with methotrexate and leflunomide  Hypothyroidism        SUMMARY OF RECOMMENDATIONS: We had a long discussion with Ms. De La Vega and her son regarding natural progression and therapeutic options of calcific aortic valve disease.  Given her symptoms and degree of aortic valve stenosis favor aortic valve intervention prior to orthopedic surgery with general anesthesia.  We discussed balloon valvuloplasty versus expedited workup for TAVR, family favors TAVR if possible (suboptimal candidate for surgical AVR).  Will proceed with TAVR CT and contact Ms. De La Vega and her family with a further plan.      Dear Mina Young and Guido,     I had the pleasure of seeing Sharon De La Vega today at the [facility] for evaluation for Transcatheter Aortic Valve Replacement for symptomatic severe to critical aortic valve stenosis at the request of Dr. Lora.  Briefly, Ms. De La Vega has history of frequent falls (presumed mechanical) and presented with left humeral fracture that needs surgical repair.  Preoperative evaluation was notable for progression of her aortic valve stenosis, now severe to critical.  Coronary angiogram with no significant coronary artery disease.  Today, Ms. De La Vega reports progressive dyspnea on exertion. The patient denies symptoms of chest pain,  palpitations, dizziness, lightheadedness, presyncope,  syncope, orthopnea, PND, early satiety/abdominal bloating, lower extremity edema, stroke like symptoms, or claudication.   History is somewhat limited due to patient's dementia.    ROS:  A 14 point review of symptoms was reviewed.  No prior hx of rheumatic fever or chest irradiation. There were no additional findings pertinent to this encounter.     Past Medical History: as above     Social History: denies tobacco or alcohol use     Family History: no early family history of CAD or SCD    Allergies: reviewed   Allergies   Allergen Reactions    Acetaminophen-Codeine GI Disturbance     Indigestion    Celecoxib Unknown    Codeine Camsylate Unknown    Conj Estrog-Medroxyprogest Ace Other (See Comments)     Bleeding    Estrogens, Conjugated Synthetic A Unknown    Nsaids Unknown    Sulfa Antibiotics Unknown       Pertinent Medications:    Current Facility-Administered Medications   Medication Dose Route Frequency Provider Last Rate Last Admin    acetaminophen (TYLENOL) Suppository 650 mg  650 mg Rectal Q4H PRN Slava Head MD        acetaminophen (TYLENOL) tablet 650 mg  650 mg Oral Q4H PRN Christiana Garza CNP        atropine injection 0.5 mg  0.5 mg Intravenous Once PRN Christiana Garza CNP        bisacodyl (DULCOLAX) suppository 10 mg  10 mg Rectal Daily PRN Slava Head MD        buPROPion (WELLBUTRIN XL) 24 hr tablet 150 mg  150 mg Oral Slava Boyce MD   150 mg at 12/31/24 0848    citalopram (celeXA) tablet 20 mg  20 mg Oral Slava Boyce MD   20 mg at 12/31/24 0848    donepezil (ARICEPT) tablet 10 mg  10 mg Oral At Bedtime Slava Head MD   10 mg at 12/30/24 2100    fentaNYL (PF) (SUBLIMAZE) injection 25 mcg  25 mcg Intravenous Q15 Min PRN Christiana Garza CNP        flumazenil (ROMAZICON) injection 0.2 mg  0.2 mg Intravenous q1 min prn Christiana Garza CNP        HOLD:  Metformin and metformin containing medications if patient received IV  contrast with acute kidney injury or severe chronic kidney disease (stage IV or stage V; i.e., eGFR less than 30)   Does not apply HOLD Christiana Garza CNP        hydrALAZINE (APRESOLINE) injection 10 mg  10 mg Intravenous Once PRN Christiana Garza CNP        hydrALAZINE (APRESOLINE) injection 10 mg  10 mg Intravenous Q6H PRN Christopher Martinez DO        [Held by provider] leflunomide (ARAVA) tablet 10 mg  10 mg Oral Slava Boyec MD   10 mg at 12/30/24 0806    levothyroxine (SYNTHROID/LEVOTHROID) tablet 100 mcg  100 mcg Oral Slava Boyce MD   100 mcg at 12/31/24 0848    [Held by provider] lisinopril (ZESTRIL) tablet 20 mg  20 mg Oral Daily Slava Head MD   20 mg at 12/30/24 0807    melatonin tablet 1 mg  1 mg Oral At Bedtime PRN Slava Head MD        memantine (NAMENDA) tablet 10 mg  10 mg Oral BID Slava Head MD   10 mg at 12/31/24 0848    midazolam (VERSED) injection 0.5 mg  0.5 mg Intravenous Q5 Min PRN Christiana Garza CNP        naloxone (NARCAN) injection 0.2 mg  0.2 mg Intravenous Q2 Min PRN Christiana Garza CNP        Or    naloxone (NARCAN) injection 0.4 mg  0.4 mg Intravenous Q2 Min PRN Christiana Garza CNP        Or    naloxone (NARCAN) injection 0.2 mg  0.2 mg Intramuscular Q2 Min PRN Christiana Garza CNP        Or    naloxone (NARCAN) injection 0.4 mg  0.4 mg Intramuscular Q2 Min PRN Christiana Garza CNP        OLANZapine (zyPREXA) injection 2.5 mg  2.5 mg Intramuscular Q6H PRN Slava Head MD        ondansetron (ZOFRAN ODT) ODT tab 4 mg  4 mg Oral Q6H PRN Slava Head MD        Or    ondansetron (ZOFRAN) injection 4 mg  4 mg Intravenous Q6H PRN Slava Head MD        oxyCODONE (ROXICODONE) tablet 5 mg  5 mg Oral Q4H PRN Christiana Garza CNP        Or    oxyCODONE (ROXICODONE) tablet 10 mg  10 mg Oral Q4H PRN Christiana Garza CNP        prochlorperazine (COMPAZINE)  "injection 5 mg  5 mg Intravenous Q6H PRN Slava Head MD        Or    prochlorperazine (COMPAZINE) tablet 5 mg  5 mg Oral Q6H PRN Slava Head MD        QUEtiapine (SEROquel) half-tab 12.5 mg  12.5 mg Oral Q6H PRN Slava Head MD   12.5 mg at 12/30/24 2012    senna-docusate (SENOKOT-S/PERICOLACE) 8.6-50 MG per tablet 1 tablet  1 tablet Oral BID PRSlava Lagunas MD        Or    senna-docusate (SENOKOT-S/PERICOLACE) 8.6-50 MG per tablet 2 tablet  2 tablet Oral BID Slava Blackburn MD        simvastatin (ZOCOR) tablet 20 mg  20 mg Oral QPM Armando Hurley MD        sodium chloride 0.9 % infusion  75 mL/hr Intravenous Continuous Christiana Garza CNP        sodium chloride 0.9% BOLUS 250 mL  250 mL Intravenous Once PRN Christiana Garza CNP           OBJECTIVE:  Physical Examination   BP (!) 164/67   Pulse 75   Temp 98.4  F (36.9  C) (Oral)   Resp 17   Ht 1.651 m (5' 5\")   Wt 103 kg (227 lb)   SpO2 96%   BMI 37.77 kg/m     Constitutional: NAD, answers simple questions appropriately, but has difficulty with higher level questions.  Chest:  Clear bilaterally   Cardiac: regular rhythm, normal S1, soft S2, late peaking systolic murmur consistent with severe aortic stenosis. JVP not elevated   Abdoment:  Soft, non-tender  Pulses: +2 distal pulses.  No carotid bruits  Skin and Integument:  trace peripheral edema, extremities warm  Neuro:  Grossly normal for motor, sensory.      Studies:     Laboratory performed today and reviewed by me personally showed: Serum creatinine of 0.86, hemoglobin 10.4 and platelet count of 129 (below baseline).       Electrocardiogram performed today and reviewed by me personally showed: Sinus with PACs, poor R wave progression    TTE performed December 2024 and reviewed by me personally showed: Normal LV size and function with estimated EF of 60%, normal RV size and function, trace AI, and severe aortic valve stenosis with PV 4.6 m/s, MG 57 " mmHg, and calculated valve area of 0.6 cm .  DI 0.22.    Cath performed today and reviewed by me personally showed: No angiographic evidence of obstructive coronary disease.    TAVR CT pending.      I reviewed the TAVR procedure, risk and expected outcomes with the patient and family members in detail.  I explained that the aortic valve is very significantly impaired, and strenuous exercise should be avoided until after the valve is repaired. Risks associated with transcatheter valve therapy were reviewed. Risks including death, MI, stroke, permanent disability, neurologic injury, renal failure, major bleeding requiring blood transfusion, emergency thoracotomy for catastrophic complications, vascular injury requiring endovascular or surgical repair, need for permanent pacemaker and anesthesia related complications were fully explained.  Alternative approaches including surgical aortic valve replacement and palliative care were discussed.  My own experience with these procedures were reviewed, and the patient and family appeared to understand the information I presented.  Questions were asked and answered to their apparent satisfaction.      I sincerely appreciate the opportunity to participate in this patient's care.  Please do not hesitate to contact me if any questions or concerns arise.     Graeme Torres MD  Interventional Cardiology

## 2024-12-31 NOTE — DISCHARGE INSTRUCTIONS
Interventional Cardiology  Coronary Angiogram/Angioplasty/Stent/Atherectomy Discharge  Instructions -   Radial (wrist) Approach     The instructions below are to help you understand how to take care of yourself. There is also information about when to call the doctor or emergency services.    **Do not stop your aspirin or platelet inhibitor unless directed by your Cardiologist.  These medications help to prevent platelets in your blood from sticking together and forming a clot.   Examples of these medications are: Ticagrelor (Brilinta), Clopidogrel (Plavix), Prasugrel (Effient)    For 24 hours after procedure:  Do not subject hand/arm to any forceful movements for 24 hours, such as supporting weight when rising from a chair or bed.  Do not drive a car for 24 hours.  The dressing on the puncture site may be removed after 24 hours and left open to air. If minor oozing, you may apply a Band-Aid and remove after 12 hours.   You may shower on the day after your procedure. Do not take a tub bath or wash dishes (no soaking wrist) with the puncture site in water for 3 days after the procedure.    For 48 hours following the procedure:  Do not operate a lawnmower, motorcycle, chainsaw or all-terrain vehicle.  Do not lift anything heavier than 5-10 pounds with affected arm for 5 days.  Avoid excessive bending (flexion/extension) wrist movement.  Do not engage in vigorous exercise (i.e. tennis, golf) using the affected arm for 5 days after discharge.  You may return to work in 72 hours if no complications and no heavy lifting.    If bleeding should occur following discharge:  Sit down and apply firm pressure with your thumb against the puncture site and fingers against back of wrist for 10 minutes.  If the bleeding stops, continue to rest, keeping your wrist still for 2 hours. Notify your doctor as soon as possible.  If bleeding does not stop after 10 minutes or if there is a large amount of bleeding or spurting, call 911  immediately. Do not drive yourself to the hospital.           Contact the Heart Clinic at [phone] if you develop:  Fever over 100.4, that lasts more than one day  Redness, heat, or pus at the puncture site  Change in color or temperature of the hand or arm    Expect mild tingling of hand and tenderness at the puncture site for up to 3 days. You may take Tylenol or a pain medicine recommended by your doctor.                       Our Cardiac Rehab staff may visit briefly with you while your in the hospital.   If they miss you, someone will contact you after you are home.  You are encouraged to enroll in an Outpatient Cardiac Rehab Program       Your Procedural Physician was: Dr Blue  the phone number is: [phone]    [facility]:  [phone]  If you are calling after hours, please listen to the entire voicemail, a live  will answer at the end of the message    daily living and hobbies.   Let the cardiology clinic know if you need to leave town before your first follow-up visit.     When to call the Cardiology Clinic to speak with a nurse?   Swelling of the legs, ankles, or feet  Increasing shortness of breath  Change in the color or temperature of your lower legs and feet  Abdominal pain or unrelieved nausea and/or vomiting  Redness and warmth around your access site and/or incision that does not go away  Yellow or green drainage from your access site and/or incision   Weight gain of 3 pounds in one day or 5 pounds in one week  Develop any numbness, tingling, or limited movement of your arms or legs.   Chest pain that does not go away  New confusion or you cannot think clearly  Fever and chills     For Non-urgent concerns that could wait up to 48 hours for a response from a nurse: call the Valve Clinic Nurse between 8am-4:30pm at [phone]. You can leave a message after hours/on the weekend, messages will be returned in 1-2 business days.    For URGENT Concerns that cannot wait please call: [facility]: [phone]    If you are calling after hours, please listen to the entire voicemail, a live  will answer at the end of the message.    If you are experiencing a medical EMERGENCY please call [phone]. Do not wait to speak to your heart care team.

## 2024-12-31 NOTE — PROGRESS NOTES
[facility]   [address], [place] [address]   [email or web address]   OFFICE: [phone]     CARDIOLOGY INPATIENT PROGRESS NOTE     Impression and Plan     Assessment/Plan:  Ms. Sharon De La Vega is a 79 year old female with severe aortic stenosis, alzheimer's dementia, HTN, psoriatic arthritis, HLD, who presented to the hospital with falls.      Falls    - Unclear etiology, worry about severe symptomatic aortic stenosis   - Continue telemetry     Severe aortic stenosis   - Mean gradient 57 mmHg, BETH 0.6 cm2.   - Symptoms of dyspnea on exertion, possible dizziness/falls   - Planning on TAVR tentatively on Friday.  Appreciate valve team     L elbow fracture   - Spoke with ortho, planning on operative management, can be delayed until next week    - Plan on TAVR to be done before proceeding with surgery.       General cardiology will follow peripherally over the Holiday and resume seeing 1/2.  Please do not hesitate to call with an updates/questions.     Primary Cardiologist: Not established, may establish with me.      Subjective     Sharon De La Vega is feeling good        Review of Systems:  Further review of systems is otherwise negative/noncontributory (based on review of medical record (admission H&P) and 13 point review of systems reviewed. Pertinent positives noted).    Cardiac Diagnostics     ECG: Personally reviewed and interpreted:   NSR  Artifact - likely tremor  Poor R wave     Telemetry (personally reviewed): NSR    Most recent:  Echocardiogram (results reviewed): 12/30/2024  Interpretation Summary     1. Limited echocardiographic study.  2. Normal left ventricular size and systolic performance with a visually  estimated ejection fraction of 60%.  3. There is severe aortic stenosis.  Â  The mean gradient is measured at 57 mmHg with peak velocity of 4.6 m/s.  Calculated valve area 0.60-0.63 cmÂ .  4. There is trace aortic insufficiency.  5. Normal  right ventricular size and systolic performance.  6. There is mild left atrial enlargement.     When compared to the prior real-time echocardiogram dated 7 September 2023,  the mean gradient across the aortic valve has increased from 29 mmHg to 57  mmHg.    Cardiac Cath (results reviewed): 12/31/2024  Coronary dominance: Right     LM: 0%  LAD:              P: 0%              M: 0%              D: 20%  D1: 0%, small  D2: 0%  Ramus: 0%  Lcx:              P: 0%              M: 0%                D: 0%  OM1: 0%  OM2: 0%  RCA:              P: 0%              M: 20%              D: 0%  PDA: 0%              Medical History  Surgical History Family History Social History   Past Medical History:   Diagnosis Date    Anxiety 02/14/2013    Formatting of this note might be different from the original.  CURRENT MEDICATION(S) celexa 40 mg;   Lorazepam prn  Only occ  Stopped no need 11-17. Use PHQ 9 = 6   11-15;      Aortic valve stenosis 04/07/2023    Essential hypertension 10/29/2012    Hypercholesterolemia 10/29/2012    Hypothyroidism 10/29/2012    Major depressive disorder, single episode, moderate (H) 11/28/2022    Formatting of this note might be different from the original.  CURRENT MEDICATION(S) citalopram 40 mg    PHQ 9 =  2  11-11;  phq 9 = 2    12-11;  = 2   5-12; = 2   11-12;  Called her  Doing better  No need to see a psychologist. 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) 06/20/2022    Psoriatic arthritis (H) 10/29/2012     Past Surgical History:   Procedure Laterality Date    APPENDECTOMY  1964    CARPAL TUNNEL RELEASE RT/LT Bilateral 1982    CATARACT EXTRACTION, BILATERAL  1998    CHOLECYSTECTOMY  1964    HC KNEE ARTHROSCOPY, MED/LAT MENISCUS REPAIR Right 2000    WA EXCISE HAND/FOOT NEUROMA  2004    right foot    TOTAL KNEE ARTHROPLASTY Right 2012    TRABECULECTOMY Right 1998    laser surgery right eye     Family History   Problem Relation Age of  Onset    Lung Cancer Father     Multiple myeloma Brother            Social History     Socioeconomic History    Marital status:      Spouse name: Not on file    Number of children: Not on file    Years of education: Not on file    Highest education level: Not on file   Occupational History    Not on file   Tobacco Use    Smoking status: Former     Current packs/day: 0.00     Types: Cigarettes     Quit date:      Years since quittin.0    Smokeless tobacco: Never   Substance and Sexual Activity    Alcohol use: Not Currently    Drug use: Not on file    Sexual activity: Not on file   Other Topics Concern    Not on file   Social History Narrative    Not on file     Social Drivers of Health     Financial Resource Strain: Low Risk  (2024)    Financial Resource Strain     Within the past 12 months, have you or your family members you live with been unable to get utilities (heat, electricity) when it was really needed?: No   Food Insecurity: Low Risk  (2024)    Food Insecurity     Within the past 12 months, did you worry that your food would run out before you got money to buy more?: No     Within the past 12 months, did the food you bought just not last and you didn t have money to get more?: No   Transportation Needs: Low Risk  (2024)    Transportation Needs     Within the past 12 months, has lack of transportation kept you from medical appointments, getting your medicines, non-medical meetings or appointments, work, or from getting things that you need?: No   Physical Activity: Not on file   Stress: Not on file   Social Connections: Unknown (2023)    Received from [facility], [facility]    Social Connections     Frequency of Communication with Friends and Family: Not on file   Interpersonal Safety: Low Risk  (2024)    Interpersonal Safety     Do you feel physically and emotionally safe where you currently  "live?: Yes     Within the past 12 months, have you been hit, slapped, kicked or otherwise physically hurt by someone?: No     Within the past 12 months, have you been humiliated or emotionally abused in other ways by your partner or ex-partner?: No   Housing Stability: Low Risk  (12/30/2024)    Housing Stability     Do you have housing? : Yes     Are you worried about losing your housing?: No             Physical Examination   VITALS: BP (!) 160/65 (BP Location: Left leg)   Pulse 70   Temp 98.1  F (36.7  C) (Oral)   Resp 18   Ht 1.651 m (5' 5\")   Wt 103 kg (227 lb)   SpO2 97%   BMI 37.77 kg/m    BMI: Body mass index is 37.77 kg/m .  Wt Readings from Last 3 Encounters:   12/31/24 103 kg (227 lb)   10/03/24 104.9 kg (231 lb 4.8 oz)   10/01/24 105 kg (231 lb 6.4 oz)       Intake/Output Summary (Last 24 hours) at 12/31/2024 1459  Last data filed at 12/31/2024 0553  Gross per 24 hour   Intake --   Output 700 ml   Net -700 ml       General: pleasant female. No acute distress.   HENT: external ears normal. Nares patent. Mucous membranes moist.  Neck: No JVD  Lungs: clear to auscultation  COR:  regular rate and rhythm,  IV/VI late peaking murmur , rubs, or gallops  Abd: nondistended, BS present  Extrem: No edema        Lab Results Reviewed    Chemistry/lipid CBC Cardiac Enzymes/BNP/TSH/INR   No results for input(s): \"CHOL\", \"HDL\", \"LDL\", \"TRIG\", \"CHOLHDLRATIO\" in the last 55455 hours.  No results for input(s): \"LDL\" in the last 49988 hours.  Recent Labs   Lab Test 12/31/24  0523      POTASSIUM 3.7   CHLORIDE 107   CO2 29   GLC 89   BUN 22.4   CR 0.86   GFRESTIMATED 69   RIZWANA 8.9     Recent Labs   Lab Test 12/31/24  0523 12/30/24  0942 12/29/24  1306   CR 0.86 0.95 0.89     Recent Labs   Lab Test 09/21/24  1837   A1C 5.3          Recent Labs   Lab Test 12/31/24  0523   WBC 6.1   HGB 10.4*   HCT 32.6*   MCV 99   *     Recent Labs   Lab Test 12/31/24  0523 12/29/24  1306 09/27/24  0510   HGB 10.4* 13.1 11.8 " " 11.8    No results for input(s): \"TROPONINI\" in the last 20872 hours.  Recent Labs   Lab Test 09/06/23  2139 02/14/23  1712   NTBNPI 2,868* 171     Recent Labs   Lab Test 12/29/24  1306   TSH 1.63     Recent Labs   Lab Test 01/10/23  1252   INR 1.15           Current Inpatient Scheduled Medications   Scheduled Meds:  Current Facility-Administered Medications   Medication Dose Route Frequency Provider Last Rate Last Admin    buPROPion (WELLBUTRIN XL) 24 hr tablet 150 mg  150 mg Oral Slava Boyce MD   150 mg at 12/31/24 0848    citalopram (celeXA) tablet 20 mg  20 mg Oral Slava Boyce MD   20 mg at 12/31/24 0848    donepezil (ARICEPT) tablet 10 mg  10 mg Oral At Bedtime Slava Head MD   10 mg at 12/30/24 2100    [Held by provider] leflunomide (ARAVA) tablet 10 mg  10 mg Oral Slava Boyce MD   10 mg at 12/30/24 0806    levothyroxine (SYNTHROID/LEVOTHROID) tablet 100 mcg  100 mcg Oral Slava Boyce MD   100 mcg at 12/31/24 0848    [Held by provider] lisinopril (ZESTRIL) tablet 20 mg  20 mg Oral Daily Slava Head MD   20 mg at 12/30/24 0807    memantine (NAMENDA) tablet 10 mg  10 mg Oral BID Slava Head MD   10 mg at 12/31/24 0848    simvastatin (ZOCOR) tablet 20 mg  20 mg Oral QPM Armando Hurley MD         Continuous Infusions:  Current Facility-Administered Medications   Medication Dose Route Frequency Provider Last Rate Last Admin    sodium chloride 0.9 % infusion  75 mL/hr Intravenous Continuous Christiana Garza CNP 75 mL/hr at 12/31/24 1230 75 mL/hr at 12/31/24 1230       No current outpatient medications on file.          Medications Prior to Admission   Prior to Admission medications    Medication Sig Start Date End Date Taking? Authorizing Provider   acetaminophen (TYLENOL) 325 MG tablet Take 2 tablets (650 mg) by mouth every 6 hours as needed for mild pain 10/1/23  Yes Sherley Armando APRN CNP   benazepril (LOTENSIN) 20 MG " tablet Take 20 mg by mouth daily.   Yes Unknown, Entered By History   buPROPion (WELLBUTRIN XL) 150 MG 24 hr tablet Take 150 mg by mouth every morning. 8/20/24  Yes Reported, Patient   citalopram (CELEXA) 20 MG tablet Take 1 tablet by mouth every morning. 12/15/22  Yes Reported, Patient   cyanocobalamin (VITAMIN B-12) 1000 MCG tablet Take 1,000 mcg by mouth every morning. 12/26/22  Yes Reported, Patient   donepezil (ARICEPT) 10 MG tablet Take 1 tablet (10 mg) by mouth at bedtime 2/21/24  Yes Manuel Osuna MD   folic acid (FOLVITE) 1 MG tablet Take 1 mg by mouth every morning.   Yes Reported, Patient   leflunomide (ARAVA) 10 MG tablet Take 1 tablet by mouth every morning. 2/8/23  Yes Reported, Patient   levothyroxine (SYNTHROID/LEVOTHROID) 100 MCG tablet Take 1 tablet by mouth every morning. 8/20/24  Yes Reported, Patient   memantine (NAMENDA) 10 MG tablet Take 1 tablet (10 mg) by mouth 2 times daily 3/23/24  Yes Manuel Osuna MD   methotrexate 2.5 MG tablet Take 10 mg by mouth once a week On Thursday   Yes Reported, Patient   nystatin (MYCOSTATIN) 021423 UNIT/GM external powder Apply topically 2 times daily as needed. 6/15/23  Yes Reported, Patient   simvastatin (ZOCOR) 20 MG tablet Take 20 mg by mouth every evening. 1/14/23  Yes Reported, Patient   vitamin D3 (CHOLECALCIFEROL) 50 mcg (2000 units) tablet Take 1 tablet by mouth every morning.   Yes Reported, Patient   vitamin E (TOCOPHEROL) 1000 units (450 mg) CAPS capsule Take 1 capsule (1,000 Units) by mouth 2 times daily 2/21/24  Yes Manuel Osuna MD Timothy Shih, DO [facility]  Non-invasive Cardiologist  [facility]

## 2024-12-31 NOTE — PROGRESS NOTES
RN Valve Team Inpatient Note     Summary: 78 y/o female hx of HTN, HLD, recurrent falls, dementia, anxiety, DJD, psoriatic arthritis. Pt presented to the ED at [facility] after a fall resulting in injury. Ortho team is recommending surgical fixation. During her pre op evaluation patient was found to have critical aortic stenosis. She underwent diagnostic angiogram today and has no coronary artery disease. Patient was seen by Dr. Torres with our structural heart team for evaluation.      Physical Exam: Deferred      Plan: Patient will undergo CTA TAVR today 12/31 (ordered STAT). Our CT surgery team will see patient for consultation as part of routine work up. Team plans to see her this afternoon or Thursday AM. Team plans to forgo dental clearance given the urgent nature of her findings and inability to discharge for formal clearance at this point. If patient does not require alternative access we will likely plan for her TAVR to be added on Friday 1/3/25 as an urgent add on. We are waiting for final confirmation that all required parties for procedure will have adequate staffing to add this case on. Valve team will provide update when confirmation of add on date/time is known.     Eric Blair RN BSN  Structural Heart Coordinator   [facility]  [phone]  C: [phone]

## 2024-12-31 NOTE — CONSULTS
Asked to evaluate this patient for open surgical versus transcatheter aortic valve replacement by Dr. Graeme Torres.    CHIEF COMPLAINT:  Fell and injured arm.    HISTORY OF PRESENT ILLNESS:  Mrs. De La Vega is a 79 year old woman with severe aortic stenosis, Alzheimer's dementia, hypertension, psoriatric arthritis, and hyperlipidemia.  She presented to this hospital with a history of falls.  Echo revealed a mean gradient across her aortic valve of 57 mmHg.  Her aortic valve area is 0.6 cm squared.  She has a new left humerus fracture.  Coronary angiography done today reveals no significant coronary artery disease.      SURGICAL/MEDICAL HISTORY:  Surgical Procedures: None, needs surgical repair of the left humeral fracture  Medical Problems:  1.  Generalized weakness  2.  Multiple falls, at least 2  3.  Hypertension  4.  Hyperlipidemia  5.  Moderate Alzheimer's dementia  6.  Obesity  7.  Severe aortic stenosis    ALLERGIES:     Acetaminophen-codeine causes indigestion   Celecoxib causes an unknown side effect   Codeine causes an unknown side effect   Conj Estrog-medtroxyprogest A causes bleeding   Estrogens conjugated synthetic A causes an unknown side effect   NSAIDS causes an unknown side effect   Sulfa causes an unknown side effect    CURRENT MEDICATIONS:  See chart    FAMILY HISTORY:  Noncontributory    SOCIAL HISTORY:  Lives at home with her son.    REVIEW OF SYSTEMS:  Obtained from the son  General:  Weakness and falls x 2  HEENT:  Noncontributory  Respiratory:  Some occasional shortness of breath  Cardiovascular:  Severe aortic stenosis without coronary artery disease  GI:  Noncontributory  :  Creatinine is 0.86 today  Neurologic:  Dementia   Musculoskeletal:  Psoriatric arthritis with left humeral fracture    PHYSICAL EXAMINATION:  Height is 5 feet 5 inches and weight is 103 kg  Vital Signs:  Afebrile.  HR is 68 RR is 15 BP is 161/73  Skin:  Scattered senile changes  Lymph Nodes:  No significant  adenopathy  HEENT:  Normocephalic. PERRL EOMs intact  Chest:  Without deformity  Lungs:  Clear to auscultation  Heart:  RRR with a grade III/VI systolic ejection murmur heard best over the right upper sternal border  Pulses:  2+ and symmetrical  Abdomen: Obese, non-tender with normoactive bowel sounds  /Rectal:  Deferred  Neurologic:  Moderate Alzheimer's  Musculoskeletal:  Left humeral fracture.  Generalized weakness    ASSESSMENT:  Mrs. De La Vega has severe symptomatic aortic stenosis.  With her moderate Alzheimer's she is a good candidate to undergo transcatheter aortic valve replacement.  Her son understands that the risks for this procedure include: bleeding, infection, stroke, heart block requiring a pacemaker, cardiac perforation, aortic root rupture, aortic dissection, and an operative mortality of 1 to 2 percent.  He accepts these risks for her.    PLAN:    Complete pre-TAVR work-up   TF TAVR later this week.

## 2024-12-31 NOTE — PROGRESS NOTES
Care Management Follow Up    Length of Stay (days): 1    Expected Discharge Date: 12/31/2024     Concerns to be Addressed:       Patient plan of care discussed at interdisciplinary rounds: Yes    Anticipated Discharge Disposition:  TCU      Anticipated Discharge Services:  cards following, ortho following  Anticipated Discharge DME:  n/a    Patient/family educated on Medicare website which has current facility and service quality ratings:  yes  Education Provided on the Discharge Plan:  yes  Patient/Family in Agreement with the Plan:  yes    Referrals Placed by CM/SW:  yes  Private pay costs discussed: Not applicable    Discussed  Partnership in Safe Discharge Planning  document with patient/family: Yes: pt     Handoff Completed: No, handoff not indicated or clinically appropriate    Additional Information:  Pt is anticipated to need TCU when medically stable. Cards following in regards to addressing pt's valve. Following cardiac intervention, orthopedic intervention anticipated  as a surgical repair is needed. Care management following for safe discharge dispo.  8:38 AM    Brenna Kjellberg, BSW

## 2024-12-31 NOTE — PROGRESS NOTES
[facility]    Medicine Progress Note - Hospitalist Service    Date of Admission:  12/29/2024    Assessment & Plan   Sharon De La Vega is a 79 year old female admitted on 12/29/2024. She was brought back to the ED by ambulance from home for evaluation after a second fall at home     Left humeral fracture  -Status post splint/sling  -ortho following, needs surgical repair, however AoS needs repair first   -Pain controlled   -PT/OT    Severe AoS  - TTE 12/29:   1. Limited echocardiographic study.  2. Normal left ventricular size and systolic performance with a visually  estimated ejection fraction of 60%.  3. There is severe aortic stenosis.  Â  The mean gradient is measured at 57 mmHg with peak velocity of 4.6 m/s.  Calculated valve area 0.60-0.63 cmÂ .  4. There is trace aortic insufficiency.  5. Normal right ventricular size and systolic performance.  6. There is mild left atrial enlargement.  - Workup in progress per Cardiology, greatly appreciate their care    Recurrent falls  -Patient denies LOC however doesn't remember falling. Limited historian with dementia, however could also be indicative of symptomatic AS  -CT head unrevealing for acute process, orthostatics pending   -continue tele  -Abnormal contaminated UA, follow-up urine culture   - will start empiric rocephin as soon as repeat UA obtained   -PT/OT evaluation and treatment    Essential hypertension - Controlled  -hold lisinopril prior to surgery, add hydralazine      Hypokalemia- resolved  -Replaced per RN protocol      Hypothyroidism  -Euthyroid, TSH 1.63  -Continue PTA levothyroxine     Alzheimer's dementia without behavioral disturbance  -Risk for violent behavior and/or acute delirium  -Delirium prevention and treatment  -As needed quetiapine and olanzapine  -Continue PTA donepezil, memantine     Depression / Anxiety  -Continue PTA bupropion, citalopram     Psoriatic arthritis  -On PTA methotrexate, folic acid and  "leflunomide  -hold these meds prior to surgery     Hyperlipidemia  -resume simvastatin  - CK mildly elevated due to MSK trauma     Obesity  -BMI 38.44          Diet: NPO per Anesthesia Guidelines for Procedure/Surgery Except for: Meds    DVT Prophylaxis: Pneumatic Compression Devices  Escobar Catheter: Not present  Lines: None     Cardiac Monitoring: ACTIVE order. Indication: Tachyarrhythmias, acute (48 hours)  Code Status: Full Code      Clinically Significant Risk Factors Present on Admission        # Hypokalemia: Lowest K = 3.3 mmol/L in last 2 days, will replace as needed          # Thrombocytopenia: Lowest platelets = 129 in last 2 days, will monitor for bleeding   # Hypertension: Noted on problem list     # Dementia: noted on problem list  # Anemia: based on hgb <11       # Obesity: Estimated body mass index is 37.82 kg/m  as calculated from the following:    Height as of this encounter: 1.651 m (5' 5\").    Weight as of this encounter: 103.1 kg (227 lb 4.7 oz).       # Financial/Environmental Concerns: none         Social Drivers of Health    Tobacco Use: Medium Risk (12/29/2024)    Patient History     Smoking Tobacco Use: Former     Smokeless Tobacco Use: Never    Received from [employer]    Social Connections          Disposition Plan     Medically Ready for Discharge: Anticipated in 5+ Days             Armando Hurley MD  Hospitalist Service  [facility]  Securely message with "[email or web address] (more info)  Text page via [employer] Paging/Directory   ______________________________________________________________________    Interval History   Pain controlled, no complaints this morning.      Physical Exam   Vital Signs: Temp: 97.5  F (36.4  C) Temp src: Oral BP: (!) 149/67 Pulse: 72   Resp: 16 SpO2: 100 % O2 Device: None (Room air)    Weight: 227 lbs 4.71 oz    Constitutional: awake, alert, cooperative, no apparent distress, " and appears stated age  Hematologic / Lymphatic: no cervical lymphadenopathy  Respiratory: No increased work of breathing, good air exchange, clear to auscultation bilaterally, no crackles or wheezing  Cardiovascular: Normal apical impulse, regular rate and rhythm, normal S1 and S2, no S3 or S4, III/VI sys murmur  GI: No scars, normal bowel sounds, soft, non-distended, non-tender, no masses palpated, no hepatosplenomegally  Neuropsychiatric: General: normal, calm, normal eye contact, with evident memory impairment    Medical Decision Making       45 MINUTES SPENT BY ME on the date of service doing chart review, history, exam, documentation & further activities per the note.      Data     I have personally reviewed the following data over the past 24 hrs:    6.1  \   10.4 (L)   / 129 (L)     143 107 22.4 /  89   3.7 29 0.86 \       Imaging results reviewed over the past 24 hrs:   Recent Results (from the past 24 hours)   Echocardiogram Limited    Narrative    [id number]  [id number]  [id number]  [id number]^ISATU^DALE^[place] [address]     Name: ROCCO DEGROOT  MRN: [id number]  : 1945  Study Date: 2024 08:33 AM  Age: 79 yrs  Gender: Female  Patient Location: [facility]  Reason For Study: Aortic Valve Disorder  Ordering Physician: DALE LUNSFORD  Performed By: PUMA     BSA: 2.1 m2  Height: 65 in  Weight: 231 lb  HR: 79  BP: 119/52 mmHg  ______________________________________________________________________________  Procedure  Limited Echocardiogram with two-dimensional, color and spectral Doppler.  ______________________________________________________________________________  Interpretation Summary     1. Limited echocardiographic study.  2. Normal left ventricular size and systolic performance with a visually  estimated ejection fraction of 60%.  3. There is severe aortic stenosis.  Â  The mean gradient is measured at 57 mmHg with peak velocity of 4.6  "m/s.  Calculated valve area 0.60-0.63 cmÂ .  4. There is trace aortic insufficiency.  5. Normal right ventricular size and systolic performance.  6. There is mild left atrial enlargement.     When compared to the prior real-time echocardiogram dated 7 September 2023,  the mean gradient across the aortic valve has increased from 29 mmHg to 57  mmHg.  ______________________________________________________________________________  Left ventricle:  Normal left ventricular size and systolic performance with a visually  estimated ejection fraction of 60%. Left ventricular wall thickness is normal.  There is borderline concentric increase in left ventricular wall thickness.     Assessment of LV Diastolic Function: Examination was performed as a \"limited  study\". Assessment of diastolic filling consequently not performed.     Right ventricle:  Normal right ventricular size and systolic performance.     Left atrium:  There is mild left atrial enlargement.     Right atrium:  The right atrium is of normal size.     IVC:  The IVC is of normal caliber.     Aortic valve:  The aortic valve is comprised of three cusps. The aortic valve is calcified  with reduced cusp separation on two-dimensional imaging. There is severe  aortic stenosis. There is trace aortic insufficiency.     Mitral valve:  There is mild mitral annular calcification. There is trace mitral  insufficiency.     Tricuspid valve:  The tricuspid valve is grossly morphologically normal. There is trace  tricuspid insufficiency.     Pulmonic valve:  The pulmonic valve is grossly morphologically normal.     Thoracic aorta:  The aortic root and proximal ascending aorta are of normal dimension.     Pericardium:  There is no significant pericardial effusion.  ______________________________________________________________________________  ______________________________________________________________________________  MMode/2D Measurements & Calculations  IVSd: 1.1 cm  LVIDd: " 4.5 cm  LVIDs: 2.1 cm  LVPWd: 1.3 cm  FS: 52.9 %  LV mass(C)d: 190.9 grams  LV mass(C)dI: 90.8 grams/m2  Ao root diam: 2.9 cm  asc Aorta Diam: 2.8 cm  LVOT diam: 1.9 cm  LVOT area: 2.8 cm2  Ao root diam index Ht(cm/m): 1.7  Ao root diam index BSA (cm/m2): 1.4  Asc Ao diam index BSA (cm/m2): 1.3  Asc Ao diam index Ht(cm/m): 1.7  EF Biplane: 69.0 %  RWT: 0.56     Doppler Measurements & Calculations  MV max PG: 10.8 mmHg  MV mean PG: 3.0 mmHg  MV V2 VTI: 33.9 cm  MVA(VTI): 2.0 cm2  Ao V2 max: 458.3 cm/sec  Ao max P.0 mmHg  Ao V2 mean: 354.0 cm/sec  Ao mean P.0 mmHg  Ao V2 VTI: 113.0 cm  BETH(I,D): 0.60 cm2  BETH(V,D): 0.63 cm2  AI P1/2t: 358.5 msec  LV V1 max P.2 mmHg  LV V1 max: 102.6 cm/sec  LV V1 VTI: 24.0 cm  SV(LVOT): 67.9 ml  SI(LVOT): 32.3 ml/m2  AV Deonte Ratio (DI): 0.22  BETH Index (cm2/m2): 0.29     ______________________________________________________________________________  Report approved by: Fransisco Moncada MD on 2024 10:23 AM         CT Elbow Left w/o Contrast    Narrative    EXAM: CT ELBOW LEFT W/O CONTRAST  LOCATION: [facility]  DATE: 2024    INDICATION: Assess fracture.  COMPARISON: 2024  TECHNIQUE: Noncontrast. Axial, sagittal and coronal thin-section reconstruction. Dose reduction techniques were used.     FINDINGS:     BONES:  -Comminuted intra-articular fracture of the distal left humerus with a complete transverse fracture plane at the level of the metaphysis and a longitudinal oblique fracture plane extending to the articular surface through the lateral margin of the   trochlea. Mild displacement and angulation of the dominant trochlear fracture fragment. Tiny minimally displaced fracture along the medial margin of the olecranon process (series 2 image 53). Subtle cortical irregularity along the articular surface of   the radial head suspicious for a minimally displaced fracture (series 5 image 58).    SOFT TISSUES:  -Moderate-sized elbow  joint effusion. Diffuse soft tissue and intramuscular edema surrounding the elbow fracture. No discrete organized hematoma.      Impression    IMPRESSION:  1.  Acute comminuted intra-articular fracture of the distal humerus with mild displacement and angulation of the dominant trochlear fracture fragment.  2.  Minimally displaced fracture along the medial margin the olecranon process.  3.  Minimally displaced fracture of the radial head.

## 2024-12-31 NOTE — PLAN OF CARE
Will plan for surgical fixation of left elbow fracture following cardiology intervention.  TAVR possibly Friday.  Will continue to follow.  Continue splint and sling to LUE in the meantime.  MARK PELLETIER PA-C

## 2024-12-31 NOTE — PROGRESS NOTES
Valve team brief inpatient update--   CTA TAVR to be done ASAP. Dr. Torres to review.     Patient will be added on as first case in AM ~7AM start on Friday 1/3/25.     Valve nurse clinician will see patient Thursday 1/2 and place pre-op orders.     Rounding Cardiologist Dr. Lora updated.     Eric Blair RN BSN  Structural Heart Coordinator   [facility]  [phone]

## 2024-12-31 NOTE — PROGRESS NOTES
Patient is kept comfortable during post-procedure stay. VSS. Denies pain. Right radial access site remains dry & free from signs of bleeding. Tolerated  fluids. Able to ask questions. Verbalized no concerns. Belongings returned. Discharged in stable condition.

## 2025-01-01 LAB
ALBUMIN UR-MCNC: 20 MG/DL
ANION GAP SERPL CALCULATED.3IONS-SCNC: 10 MMOL/L (ref 7–15)
APPEARANCE UR: ABNORMAL
BILIRUB UR QL STRIP: NEGATIVE
BUN SERPL-MCNC: 15.8 MG/DL (ref 8–23)
CALCIUM SERPL-MCNC: 8.6 MG/DL (ref 8.8–10.4)
CHLORIDE SERPL-SCNC: 103 MMOL/L (ref 98–107)
COLOR UR AUTO: YELLOW
CREAT SERPL-MCNC: 0.78 MG/DL (ref 0.51–0.95)
EGFRCR SERPLBLD CKD-EPI 2021: 77 ML/MIN/1.73M2
ERYTHROCYTE [DISTWIDTH] IN BLOOD BY AUTOMATED COUNT: 16.1 % (ref 10–15)
GLUCOSE BLDC GLUCOMTR-MCNC: 104 MG/DL (ref 70–99)
GLUCOSE SERPL-MCNC: 89 MG/DL (ref 70–99)
GLUCOSE UR STRIP-MCNC: NEGATIVE MG/DL
HCO3 SERPL-SCNC: 25 MMOL/L (ref 22–29)
HCT VFR BLD AUTO: 31.6 % (ref 35–47)
HGB BLD-MCNC: 10.5 G/DL (ref 11.7–15.7)
HGB UR QL STRIP: NEGATIVE
KETONES UR STRIP-MCNC: 10 MG/DL
LEUKOCYTE ESTERASE UR QL STRIP: ABNORMAL
MAGNESIUM SERPL-MCNC: 2.1 MG/DL (ref 1.7–2.3)
MCH RBC QN AUTO: 32.2 PG (ref 26.5–33)
MCHC RBC AUTO-ENTMCNC: 33.2 G/DL (ref 31.5–36.5)
MCV RBC AUTO: 97 FL (ref 78–100)
NITRATE UR QL: NEGATIVE
PH UR STRIP: 6 [PH] (ref 5–7)
PHOSPHATE SERPL-MCNC: 2.3 MG/DL (ref 2.5–4.5)
PLATELET # BLD AUTO: 121 10E3/UL (ref 150–450)
POTASSIUM SERPL-SCNC: 3.8 MMOL/L (ref 3.4–5.3)
RBC # BLD AUTO: 3.26 10E6/UL (ref 3.8–5.2)
RBC URINE: 57 /HPF
SODIUM SERPL-SCNC: 138 MMOL/L (ref 135–145)
SP GR UR STRIP: >1.015 (ref 1–1.03)
SQUAMOUS EPITHELIAL: 5 /HPF
UROBILINOGEN UR STRIP-MCNC: <2 MG/DL
WBC # BLD AUTO: 6.1 10E3/UL (ref 4–11)
WBC URINE: 44 /HPF

## 2025-01-01 PROCEDURE — 250N000013 HC RX MED GY IP 250 OP 250 PS 637: Performed by: NURSE PRACTITIONER

## 2025-01-01 PROCEDURE — 84100 ASSAY OF PHOSPHORUS: CPT | Performed by: STUDENT IN AN ORGANIZED HEALTH CARE EDUCATION/TRAINING PROGRAM

## 2025-01-01 PROCEDURE — 250N000013 HC RX MED GY IP 250 OP 250 PS 637: Performed by: INTERNAL MEDICINE

## 2025-01-01 PROCEDURE — 99207 PR NO CHARGE LOS: CPT | Performed by: GENERAL ACUTE CARE HOSPITAL

## 2025-01-01 PROCEDURE — 99232 SBSQ HOSP IP/OBS MODERATE 35: CPT | Performed by: INTERNAL MEDICINE

## 2025-01-01 PROCEDURE — 36415 COLL VENOUS BLD VENIPUNCTURE: CPT | Performed by: STUDENT IN AN ORGANIZED HEALTH CARE EDUCATION/TRAINING PROGRAM

## 2025-01-01 PROCEDURE — 82435 ASSAY OF BLOOD CHLORIDE: CPT | Performed by: STUDENT IN AN ORGANIZED HEALTH CARE EDUCATION/TRAINING PROGRAM

## 2025-01-01 PROCEDURE — 85014 HEMATOCRIT: CPT | Performed by: STUDENT IN AN ORGANIZED HEALTH CARE EDUCATION/TRAINING PROGRAM

## 2025-01-01 PROCEDURE — 120N000004 HC R&B MS OVERFLOW

## 2025-01-01 PROCEDURE — 81003 URINALYSIS AUTO W/O SCOPE: CPT | Performed by: INTERNAL MEDICINE

## 2025-01-01 PROCEDURE — 87086 URINE CULTURE/COLONY COUNT: CPT | Performed by: INTERNAL MEDICINE

## 2025-01-01 PROCEDURE — 80048 BASIC METABOLIC PNL TOTAL CA: CPT | Performed by: STUDENT IN AN ORGANIZED HEALTH CARE EDUCATION/TRAINING PROGRAM

## 2025-01-01 PROCEDURE — 83735 ASSAY OF MAGNESIUM: CPT | Performed by: STUDENT IN AN ORGANIZED HEALTH CARE EDUCATION/TRAINING PROGRAM

## 2025-01-01 PROCEDURE — 250N000011 HC RX IP 250 OP 636: Performed by: INTERNAL MEDICINE

## 2025-01-01 PROCEDURE — 250N000013 HC RX MED GY IP 250 OP 250 PS 637: Performed by: HOSPITALIST

## 2025-01-01 RX ORDER — NALOXONE HYDROCHLORIDE 0.4 MG/ML
0.4 INJECTION, SOLUTION INTRAMUSCULAR; INTRAVENOUS; SUBCUTANEOUS
Status: DISCONTINUED | OUTPATIENT
Start: 2025-01-01 | End: 2025-01-03

## 2025-01-01 RX ORDER — CEFTRIAXONE 1 G/1
1 INJECTION, POWDER, FOR SOLUTION INTRAMUSCULAR; INTRAVENOUS EVERY 24 HOURS
Status: COMPLETED | OUTPATIENT
Start: 2025-01-01 | End: 2025-01-03

## 2025-01-01 RX ORDER — NALOXONE HYDROCHLORIDE 0.4 MG/ML
0.2 INJECTION, SOLUTION INTRAMUSCULAR; INTRAVENOUS; SUBCUTANEOUS
Status: DISCONTINUED | OUTPATIENT
Start: 2025-01-01 | End: 2025-01-03

## 2025-01-01 RX ADMIN — LEVOTHYROXINE SODIUM 100 MCG: 100 TABLET ORAL at 09:32

## 2025-01-01 RX ADMIN — SIMVASTATIN 20 MG: 10 TABLET, FILM COATED ORAL at 21:07

## 2025-01-01 RX ADMIN — CEFTRIAXONE SODIUM 1 G: 1 INJECTION, POWDER, FOR SOLUTION INTRAMUSCULAR; INTRAVENOUS at 09:32

## 2025-01-01 RX ADMIN — BUPROPION HYDROCHLORIDE 150 MG: 150 TABLET, FILM COATED, EXTENDED RELEASE ORAL at 09:32

## 2025-01-01 RX ADMIN — QUETIAPINE FUMARATE 12.5 MG: 25 TABLET ORAL at 21:07

## 2025-01-01 RX ADMIN — MICONAZOLE NITRATE ANTIFUNGAL POWDER: 2 POWDER TOPICAL at 18:21

## 2025-01-01 RX ADMIN — Medication 1 MG: at 21:07

## 2025-01-01 RX ADMIN — DONEPEZIL HYDROCHLORIDE 10 MG: 5 TABLET, FILM COATED ORAL at 21:07

## 2025-01-01 RX ADMIN — MEMANTINE 10 MG: 10 TABLET ORAL at 09:32

## 2025-01-01 RX ADMIN — CITALOPRAM HYDROBROMIDE 20 MG: 20 TABLET ORAL at 09:32

## 2025-01-01 RX ADMIN — LISINOPRIL 20 MG: 20 TABLET ORAL at 09:32

## 2025-01-01 RX ADMIN — ACETAMINOPHEN 650 MG: 325 TABLET ORAL at 18:35

## 2025-01-01 RX ADMIN — MEMANTINE 10 MG: 10 TABLET ORAL at 21:07

## 2025-01-01 ASSESSMENT — ACTIVITIES OF DAILY LIVING (ADL)
ADLS_ACUITY_SCORE: 65
ADLS_ACUITY_SCORE: 63
ADLS_ACUITY_SCORE: 58
ADLS_ACUITY_SCORE: 63
ADLS_ACUITY_SCORE: 58
ADLS_ACUITY_SCORE: 65
ADLS_ACUITY_SCORE: 69
ADLS_ACUITY_SCORE: 58
ADLS_ACUITY_SCORE: 63
ADLS_ACUITY_SCORE: 65
ADLS_ACUITY_SCORE: 58
ADLS_ACUITY_SCORE: 69
ADLS_ACUITY_SCORE: 58
ADLS_ACUITY_SCORE: 65
ADLS_ACUITY_SCORE: 58
ADLS_ACUITY_SCORE: 58
ADLS_ACUITY_SCORE: 63
ADLS_ACUITY_SCORE: 58
ADLS_ACUITY_SCORE: 65

## 2025-01-01 NOTE — PLAN OF CARE
Transferred to [facility] at 1600 PM   Reason for transfer: Inpatient status  Report given to: JOSE ALFREDO Mccormick  Family Notified: Family informed while they were here visiting patient.  Detailed list of belongings sent: Pants, shirt, 1 pair of shoes

## 2025-01-01 NOTE — PROGRESS NOTES
Cardiology Brief Note  Chart review only. No documented acute events overnight. Intermittently high blood pressure but otherwise vitals have been stable. No arrhythmias on telemetry. Stable labs. No new recommendations. Will be seen by our cardiology team tomorrow.    Simon Ibanez MD [facility]  Non-Invasive Cardiologist  [facility]  Pager [phone]

## 2025-01-01 NOTE — PLAN OF CARE
Problem: Adult Inpatient Plan of Care  Goal: Absence of Hospital-Acquired Illness or Injury  Outcome: Progressing  Intervention: Prevent Skin Injury  Recent Flowsheet Documentation  Taken 1/1/2025 0342 by Gonsalo Sherwood RN  Body Position: upper extremity elevated  Taken 12/31/2024 2324 by Gonsalo Sherwood RN  Body Position: upper extremity elevated  Taken 12/31/2024 1925 by Gonsalo Sherwood RN  Body Position: upper extremity elevated  Goal: Optimal Comfort and Wellbeing  Outcome: Progressing     Problem: Fall Injury Risk  Goal: Absence of Fall and Fall-Related Injury  Outcome: Progressing     Problem: Pain Acute  Goal: Optimal Pain Control and Function  Outcome: Progressing   Goal Outcome Evaluation: pt alert with intermittent confusion. Denies pain or discomfort. R wrist with gauze and tegaderm. Neuros intact to RUE. Tele Sinus rhythm. Urine sample sent to lab.       Plan of Care Reviewed With: patient    Overall Patient Progress: improvingOverall Patient Progress: improving    Outcome Evaluation: pt able to make her needs known

## 2025-01-01 NOTE — PROGRESS NOTES
Chart review only, per notes pt remains in left UE splint and sling without changes in LUE status.  Remain in sling/splint. NWB L UE. Elevate as needed.   Recommendation for left elbow ORIF pending cardiology clearance.   Pending TAVR possibly 1/2/25.     Gema Reyes PAC, [facility] Upper Extremity

## 2025-01-01 NOTE — PLAN OF CARE
"Goal Outcome Evaluation:      Plan of Care Reviewed With: patient    Overall Patient Progress: no change  Problem: Adult Inpatient Plan of Care  Goal: Plan of Care Review  Description: The Plan of Care Review/Shift note should be completed every shift.  The Outcome Evaluation is a brief statement about your assessment that the patient is improving, declining, or no change.  This information will be displayed automatically on your shift  note.  Outcome: Not Progressing  Flowsheets (Taken 12/31/2024 1905)  Plan of Care Reviewed With: patient  Overall Patient Progress: no change  Goal: Patient-Specific Goal (Individualized)  Description: You can add care plan individualizations to a care plan. Examples of Individualization might be:  \"Parent requests to be called daily at 9am for status\", \"I have a hard time hearing out of my right ear\", or \"Do not touch me to wake me up as it startles  me\".  Outcome: Not Progressing  Goal: Absence of Hospital-Acquired Illness or Injury  Outcome: Not Progressing  Intervention: Identify and Manage Fall Risk  Recent Flowsheet Documentation  Taken 12/31/2024 1600 by Christiana Cordova RN  Safety Promotion/Fall Prevention:   activity supervised   assistive device/personal items within reach   clutter free environment maintained   nonskid shoes/slippers when out of bed  Taken 12/31/2024 0845 by Christiana Cordova RN  Safety Promotion/Fall Prevention:   activity supervised   assistive device/personal items within reach   clutter free environment maintained   nonskid shoes/slippers when out of bed  Intervention: Prevent Skin Injury  Recent Flowsheet Documentation  Taken 12/31/2024 1600 by Christiana Cordova RN  Body Position: position maintained  Taken 12/31/2024 0845 by Christiana Cordova RN  Body Position: position maintained  Goal: Optimal Comfort and Wellbeing  Outcome: Not Progressing  Goal: Readiness for Transition of Care  Outcome: Not Progressing     Patient is alert with intermittent " confusion.  C/O pain left elbow but declines PRN pain medication.  Perwick in place.  Right wrist dressing CDI.  Some bruising noted.  Left arm in cast and sling, CMS intact.  Assist of two to use bedpan.

## 2025-01-01 NOTE — PROGRESS NOTES
[facility]    Medicine Progress Note - Hospitalist Service    Date of Admission:  12/29/2024    Assessment & Plan   Sahron De La Vega is a 79 year old female admitted on 12/29/2024. She was brought back to the ED by ambulance from home for evaluation after a second fall at home     Left humeral fracture  -Status post splint/sling  -ortho following, needs surgical repair, however AoS needs repair first   -Pain controlled   -PT/OT  -Surgery anticipated early next week, appreciate ortho input    Severe AoS  - TTE 12/29:   1. Limited echocardiographic study.  2. Normal left ventricular size and systolic performance with a visually  estimated ejection fraction of 60%.  3. There is severe aortic stenosis.  Â  The mean gradient is measured at 57 mmHg with peak velocity of 4.6 m/s.  Calculated valve area 0.60-0.63 cmÂ .  4. There is trace aortic insufficiency.  5. Normal right ventricular size and systolic performance.  6. There is mild left atrial enlargement.  - Greatly appreciate Cardiology team input > TAVR planned for Friday     Recurrent falls  -Patient denies LOC however doesn't remember falling. Limited historian with dementia, however suspect indicative of symptomatic AS  -CT head unrevealing for acute process  -continue tele  -Abnormal UA   - likely contaminated, however with falls and impending procedures will empirically treat with rocephin x 3d  -PT/OT     Essential hypertension - Controlled  -cont home lisinopril      Hypokalemia- resolved  -Replaced per RN protocol      Hypothyroidism  -Euthyroid, TSH 1.63  -Continue PTA levothyroxine     Alzheimer's dementia without behavioral disturbance  -Risk for violent behavior and/or acute delirium  -As needed quetiapine and olanzapine  -Continue PTA donepezil, memantine     Depression / Anxiety  -Continue PTA bupropion, citalopram     Psoriatic arthritis  -On PTA methotrexate, folic acid and leflunomide  -holding these meds prior to surgery    "  Hyperlipidemia  - cont home simvastatin  - CK mildly elevated due to MSK trauma (520)     Obesity  -BMI 38.44          Diet: Advance Diet as Tolerated: Full Liquid Diet    DVT Prophylaxis: Pneumatic Compression Devices  Escobar Catheter: Not present  Lines: None     Cardiac Monitoring: ACTIVE order. Indication: Post- PCI/Angiogram (24 hours)  Code Status: Full Code      Clinically Significant Risk Factors                 # Thrombocytopenia: Lowest platelets = 121 in last 2 days, will monitor for bleeding   # Hypertension: Noted on problem list     # Dementia: noted on problem list          # Obesity: Estimated body mass index is 37.77 kg/m  as calculated from the following:    Height as of this encounter: 1.651 m (5' 5\").    Weight as of this encounter: 103 kg (227 lb)., PRESENT ON ADMISSION       # Financial/Environmental Concerns: none         Social Drivers of Health    Tobacco Use: Medium Risk (12/29/2024)    Patient History     Smoking Tobacco Use: Former     Smokeless Tobacco Use: Never    Received from [employer]    Social Connections          Disposition Plan     Medically Ready for Discharge: Anticipated in 5+ Days             Armando Hurley MD  Hospitalist Service  [facility]  Securely message with [employer] (more info)  Text page via [employer] Paging/Directory   ______________________________________________________________________    Interval History   Pain controlled, no complaints this morning.      Physical Exam   Vital Signs: Temp: 98.1  F (36.7  C) Temp src: Oral BP: (!) 155/67 Pulse: 79   Resp: 20 SpO2: 95 % O2 Device: None (Room air) Oxygen Delivery: 2 LPM  Weight: 227 lbs 0 oz    Constitutional: awake, alert, cooperative, no apparent distress, and appears stated age  Hematologic / Lymphatic: no cervical lymphadenopathy  Respiratory: No increased work of breathing, good air exchange, clear to " auscultation bilaterally, no crackles or wheezing  Cardiovascular: Normal apical impulse, regular rate and rhythm, normal S1 and S2, no S3 or S4, III/VI sys murmur  GI: No scars, normal bowel sounds, soft, non-distended, non-tender, no masses palpated, no hepatosplenomegally  Neuropsychiatric: General: normal, calm, normal eye contact, with evident memory impairment    Medical Decision Making       35 MINUTES SPENT BY ME on the date of service doing chart review, history, exam, documentation & further activities per the note.      Data     I have personally reviewed the following data over the past 24 hrs:    6.1  \   10.5 (L)   / 121 (L)     138 103 15.8 /  89   3.8 25 0.78 \       Imaging results reviewed over the past 24 hrs:   Recent Results (from the past 24 hours)   Cardiac Catheterization    Addendum: 2024    Cardiac Catheterization Report    Name:  Sharon De La Vega          MRN: [id number]  : 1945       Procedure date: 2024    Procedure:  Coronary angiography    Pre-op diagnosis:  Severe aortic stenosis    Post-op diagnosis:   Same     Proceduralist:   Farhad Blue MD    Access: 6 [place], right radial  Catheters: JL 3.5, JR4  Hemostasis: TR band    Meds:  Fentanyl 25 mcg  Midazolam 0.5 mg  Heparin 5000 units    Coronary Angiography:    Coronary dominance: Right    LM: 0%  LAD:   P: 0%   M: 0%   D: 20%  D1: 0%, small  D2: 0%  Ramus: 0%  Lcx:   P: 0%   M: 0%    D: 0%  OM1: 0%  OM2: 0%  RCA:   P: 0%   M: 20%   D: 0%  PDA: 0%     Conclusions:     1.  Normal coronary arteries.    Recommendations:     1.  Management per referring physicians.    Please do not hesitate to contact me if you have any questions or concerns. I was present for the procedure in its entirety. Moderate conscious sedation at level 3-4 with fentanyl and midazolam was administered, and I spent continuous face to face time with the patient which encompassed the duration of the procedure.  Please refer to the  sedation flow sheet for additional information.  I have reviewed and agree with the documentation provided in the RN sedation flow sheet as outlined. I concluded sedation and recovery was monitored by the trained independent observer. I attest that I have ordered all medications administered, equipment used, and tests performed during the procedure.        Narrative    Cardiac Catheterization Report    Name:  Sharon De La Vega          MRN: [id number]  : 1945       Procedure date: 2024    Procedure:  Coronary angiography    Pre-op diagnosis:  Severe aortic stenosis    Post-op diagnosis:   Same     Proceduralist:   Farhad Blue MD    Access: 6 [place], right radial  Catheters: JL 3.5, JR4  Hemostasis: TR band    Meds:  Fentanyl 25 mcg  Midazolam 0.5 mg  Heparin 5000 units    Coronary Angiography:    Coronary dominance: Right    LM: 0%  LAD:   P: 0%   M: 0%   D: 20%  D1: 0%, small  D2: 0%  Ramus: 0%  Lcx:   P: 0%   M: 0%    D: 0%  OM1: 0%  OM2: 0%  RCA:   P: 0%   M: 20%   D: 0%  PDA: 0%     Conclusions:     1.  Normal coronary arteries.    Recommendations:     1.  Management per referring physicians.    Please do not hesitate to contact me if you have any questions or   concerns. I was present for the procedure in its entirety. Moderate   conscious sedation at level 3-4 with fentanyl and midazolam was   administered, and I spent continuous face to face time with the patient   which encompassed the duration of the procedure.  Please refer to the   sedation flow sheet for additional information.  I have reviewed and agree   with the documentation provided in the RN sedation flow sheet as outlined.   I concluded sedation and recovery was monitored by the trained independent   observer. I attest that I have ordered all medications administered,   equipment used, and tests performed during the procedure.   Radiologist Consult For Cardiology    Narrative    OVER READ: DETAILED [facility]  EXTRACARDIAC OVER READ OF CARDIAC CT   LOCATION: [facility]  DATE: 12/31/2024    INDICATION: TAVR.  TECHNIQUE: Dose reduction techniques were used.  COMPARISON: None.    FINDINGS:    LIMITED CHEST: A 0.4 cm well-circumscribed, noncalcified, soft tissue nodule within the anterior segment of the left upper lobe (series 10, image 45).  LIMITED MEDIASTINUM: Small hiatal hernia.  LIMITED ABDOMEN/PELVIS: Contrast within the bilateral collecting system, ureters and bladder. Diverticulosis without evidence of diverticulitis. Mild degenerative disc disease throughout the lumbar spine.      Impression    IMPRESSION:    1.  0.4 cm soft tissue nodule within the left upper lobe, would recommend further evaluation with dedicated CT chest.  2.  Please refer to cardiologist's dictation for the cardiac CT report.

## 2025-01-01 NOTE — PLAN OF CARE
"Goal Outcome Evaluation:      Plan of Care Reviewed With: patient    Overall Patient Progress: no change      Problem: Adult Inpatient Plan of Care  Goal: Plan of Care Review  Description: The Plan of Care Review/Shift note should be completed every shift.  The Outcome Evaluation is a brief statement about your assessment that the patient is improving, declining, or no change.  This information will be displayed automatically on your shift  note.  Outcome: Not Progressing  Flowsheets (Taken 1/1/2025 5547)  Plan of Care Reviewed With: patient  Overall Patient Progress: no change  Goal: Patient-Specific Goal (Individualized)  Description: You can add care plan individualizations to a care plan. Examples of Individualization might be:  \"Parent requests to be called daily at 9am for status\", \"I have a hard time hearing out of my right ear\", or \"Do not touch me to wake me up as it startles  me\".  Outcome: Not Progressing  Goal: Absence of Hospital-Acquired Illness or Injury  Outcome: Not Progressing  Intervention: Prevent Skin Injury  Recent Flowsheet Documentation  Taken 1/1/2025 2290 by Christiana Cordova RN  Body Position: upper extremity elevated  Goal: Optimal Comfort and Wellbeing  Outcome: Not Progressing  Goal: Readiness for Transition of Care  Outcome: Not Progressing    Patient is alert to self, but disorientated to situation, time and place.  Frequently asking the same questions over and over.  [place]wick was in most of the day but patient not able to void.  Bladder scan done and results of 473 and 497.  Assist of 2 and gait belt to get patient up to the commode and patient was able to void x 2 and had a small bowel movement.  Sling to left arm while up.  Patient c/o pain left elbow with movement.  Declined pain medication.  Plan for patient is to have TAVR on Friday 1/3 and repair of left humerus fracture sometime after that.       "

## 2025-01-01 NOTE — PROGRESS NOTES
Care Management Follow Up    Length of Stay (days): 2    Expected Discharge Date: 01/06/2025    Anticipated Discharge Plan:   Transitional care (TCU) is recommended for continued therapy and skilled nursing.    Transportation: TBD    PT Recommendations: Transitional Care Facility  OT Recommendations:  (S) Transitional Care Facility     Barriers to Discharge: medical stability, diagnostic workup, TAVR 1/3/15, repair of elbow fracture next week.    Prior Living Situation: house with child(ashly), adult    Discussed  Partnership in Safe Discharge Planning  document with patient/family: No     Handoff Completed: No, handoff not indicated or clinically appropriate    Patient/Spokesperson Updated: No    Additional Information:  Patient lives with her son Chaz who helps her with activities of daily living and all instrumental activities of daily living (IADLs) as needed. Patient uses cane or walker at home if needed. Chaz reports that at baseline patient is repetitive but still recognizes people she knows.   Transitional care (TCU) is recommended for continued therapy and skilled nursing and they prefer [facility] as she has been there in the past. Son will transport if patient can tolerate transfers.     Next Steps: Medical clearance; [employer] authorization and PAS will be needed for next level of care.     Yasmin Harris RN

## 2025-01-02 ENCOUNTER — APPOINTMENT (OUTPATIENT)
Dept: OCCUPATIONAL THERAPY | Facility: HOSPITAL | Age: 80
DRG: 267 | End: 2025-01-02
Attending: INTERNAL MEDICINE
Payer: COMMERCIAL

## 2025-01-02 VITALS
DIASTOLIC BLOOD PRESSURE: 73 MMHG | OXYGEN SATURATION: 97 % | SYSTOLIC BLOOD PRESSURE: 147 MMHG | HEART RATE: 75 BPM | RESPIRATION RATE: 16 BRPM | HEIGHT: 65 IN | TEMPERATURE: 98.1 F | WEIGHT: 227.29 LBS | BODY MASS INDEX: 37.87 KG/M2

## 2025-01-02 DIAGNOSIS — I35.0 SEVERE AORTIC STENOSIS: Primary | ICD-10-CM

## 2025-01-02 LAB
ALBUMIN SERPL BCG-MCNC: 3 G/DL (ref 3.5–5.2)
ALP SERPL-CCNC: 67 U/L (ref 40–150)
ALT SERPL W P-5'-P-CCNC: 20 U/L (ref 0–50)
ANION GAP SERPL CALCULATED.3IONS-SCNC: 6 MMOL/L (ref 7–15)
AST SERPL W P-5'-P-CCNC: 29 U/L (ref 0–45)
ATRIAL RATE - MUSE: 70 BPM
BACTERIA UR CULT: ABNORMAL
BILIRUB DIRECT SERPL-MCNC: <0.2 MG/DL (ref 0–0.3)
BILIRUB SERPL-MCNC: 0.4 MG/DL
BLD PROD TYP BPU: NORMAL
BLD PROD TYP BPU: NORMAL
BLOOD COMPONENT TYPE: NORMAL
BLOOD COMPONENT TYPE: NORMAL
BUN SERPL-MCNC: 16.7 MG/DL (ref 8–23)
CALCIUM SERPL-MCNC: 8.8 MG/DL (ref 8.8–10.4)
CHLORIDE SERPL-SCNC: 108 MMOL/L (ref 98–107)
CODING SYSTEM: NORMAL
CODING SYSTEM: NORMAL
CREAT SERPL-MCNC: 0.84 MG/DL (ref 0.51–0.95)
CROSSMATCH: NORMAL
CROSSMATCH: NORMAL
DIASTOLIC BLOOD PRESSURE - MUSE: NORMAL MMHG
EGFRCR SERPLBLD CKD-EPI 2021: 71 ML/MIN/1.73M2
ERYTHROCYTE [DISTWIDTH] IN BLOOD BY AUTOMATED COUNT: 16 % (ref 10–15)
GLUCOSE SERPL-MCNC: 94 MG/DL (ref 70–99)
HCO3 SERPL-SCNC: 27 MMOL/L (ref 22–29)
HCT VFR BLD AUTO: 30.8 % (ref 35–47)
HGB BLD-MCNC: 10 G/DL (ref 11.7–15.7)
HOLD SPECIMEN: NORMAL
INR PPP: 1.08 (ref 0.85–1.15)
INTERPRETATION ECG - MUSE: NORMAL
ISSUE DATE AND TIME: NORMAL
ISSUE DATE AND TIME: NORMAL
MAGNESIUM SERPL-MCNC: 2.2 MG/DL (ref 1.7–2.3)
MCH RBC QN AUTO: 31.6 PG (ref 26.5–33)
MCHC RBC AUTO-ENTMCNC: 32.5 G/DL (ref 31.5–36.5)
MCV RBC AUTO: 98 FL (ref 78–100)
NT-PROBNP SERPL-MCNC: 1077 PG/ML (ref 0–1800)
P AXIS - MUSE: 65 DEGREES
PHOSPHATE SERPL-MCNC: 2.8 MG/DL (ref 2.5–4.5)
PLATELET # BLD AUTO: 116 10E3/UL (ref 150–450)
POTASSIUM SERPL-SCNC: 3.6 MMOL/L (ref 3.4–5.3)
PR INTERVAL - MUSE: 130 MS
PROT SERPL-MCNC: 5.5 G/DL (ref 6.4–8.3)
QRS DURATION - MUSE: 80 MS
QT - MUSE: 424 MS
QTC - MUSE: 457 MS
R AXIS - MUSE: 1 DEGREES
RBC # BLD AUTO: 3.16 10E6/UL (ref 3.8–5.2)
SODIUM SERPL-SCNC: 141 MMOL/L (ref 135–145)
SYSTOLIC BLOOD PRESSURE - MUSE: NORMAL MMHG
T AXIS - MUSE: 12 DEGREES
UNIT ABO/RH: NORMAL
UNIT ABO/RH: NORMAL
UNIT NUMBER: NORMAL
UNIT NUMBER: NORMAL
UNIT STATUS: NORMAL
UNIT STATUS: NORMAL
UNIT TYPE ISBT: 6200
UNIT TYPE ISBT: 6200
VENTRICULAR RATE- MUSE: 70 BPM
WBC # BLD AUTO: 5.6 10E3/UL (ref 4–11)

## 2025-01-02 PROCEDURE — 36415 COLL VENOUS BLD VENIPUNCTURE: CPT | Performed by: INTERNAL MEDICINE

## 2025-01-02 PROCEDURE — 97535 SELF CARE MNGMENT TRAINING: CPT | Mod: GO

## 2025-01-02 PROCEDURE — 99232 SBSQ HOSP IP/OBS MODERATE 35: CPT | Performed by: STUDENT IN AN ORGANIZED HEALTH CARE EDUCATION/TRAINING PROGRAM

## 2025-01-02 PROCEDURE — 99232 SBSQ HOSP IP/OBS MODERATE 35: CPT | Performed by: INTERNAL MEDICINE

## 2025-01-02 PROCEDURE — 93005 ELECTROCARDIOGRAM TRACING: CPT

## 2025-01-02 PROCEDURE — 85014 HEMATOCRIT: CPT | Performed by: NURSE PRACTITIONER

## 2025-01-02 PROCEDURE — 82435 ASSAY OF BLOOD CHLORIDE: CPT | Performed by: NURSE PRACTITIONER

## 2025-01-02 PROCEDURE — 120N000004 HC R&B MS OVERFLOW

## 2025-01-02 PROCEDURE — 250N000011 HC RX IP 250 OP 636: Performed by: INTERNAL MEDICINE

## 2025-01-02 PROCEDURE — 36415 COLL VENOUS BLD VENIPUNCTURE: CPT | Performed by: NURSE PRACTITIONER

## 2025-01-02 PROCEDURE — 80048 BASIC METABOLIC PNL TOTAL CA: CPT | Performed by: NURSE PRACTITIONER

## 2025-01-02 PROCEDURE — 83735 ASSAY OF MAGNESIUM: CPT | Performed by: NURSE PRACTITIONER

## 2025-01-02 PROCEDURE — 84100 ASSAY OF PHOSPHORUS: CPT | Performed by: NURSE PRACTITIONER

## 2025-01-02 PROCEDURE — 999N000248 HC STATISTIC IV INSERT WITH US BY RN

## 2025-01-02 PROCEDURE — 250N000013 HC RX MED GY IP 250 OP 250 PS 637: Performed by: NURSE PRACTITIONER

## 2025-01-02 PROCEDURE — 85610 PROTHROMBIN TIME: CPT | Performed by: INTERNAL MEDICINE

## 2025-01-02 PROCEDURE — 82248 BILIRUBIN DIRECT: CPT | Performed by: INTERNAL MEDICINE

## 2025-01-02 PROCEDURE — 97129 THER IVNTJ 1ST 15 MIN: CPT | Mod: GO

## 2025-01-02 PROCEDURE — 83880 ASSAY OF NATRIURETIC PEPTIDE: CPT | Performed by: INTERNAL MEDICINE

## 2025-01-02 RX ORDER — LIDOCAINE 40 MG/G
CREAM TOPICAL
Status: DISCONTINUED | OUTPATIENT
Start: 2025-01-02 | End: 2025-01-03 | Stop reason: HOSPADM

## 2025-01-02 RX ORDER — DEXTROSE MONOHYDRATE 25 G/50ML
25-50 INJECTION, SOLUTION INTRAVENOUS
Status: DISCONTINUED | OUTPATIENT
Start: 2025-01-02 | End: 2025-01-03 | Stop reason: HOSPADM

## 2025-01-02 RX ORDER — SODIUM CHLORIDE 9 MG/ML
INJECTION, SOLUTION INTRAVENOUS CONTINUOUS
Status: DISCONTINUED | OUTPATIENT
Start: 2025-01-03 | End: 2025-01-03 | Stop reason: HOSPADM

## 2025-01-02 RX ORDER — ASPIRIN 325 MG
325 TABLET ORAL ONCE
Status: COMPLETED | OUTPATIENT
Start: 2025-01-03 | End: 2025-01-03

## 2025-01-02 RX ORDER — CEFAZOLIN SODIUM/WATER 2 G/20 ML
2 SYRINGE (ML) INTRAVENOUS
Status: DISCONTINUED | OUTPATIENT
Start: 2025-01-03 | End: 2025-01-03 | Stop reason: HOSPADM

## 2025-01-02 RX ORDER — FENTANYL CITRATE 50 UG/ML
25 INJECTION, SOLUTION INTRAMUSCULAR; INTRAVENOUS
Status: DISCONTINUED | OUTPATIENT
Start: 2025-01-03 | End: 2025-01-03 | Stop reason: HOSPADM

## 2025-01-02 RX ORDER — NICOTINE POLACRILEX 4 MG
15-30 LOZENGE BUCCAL
Status: DISCONTINUED | OUTPATIENT
Start: 2025-01-02 | End: 2025-01-03 | Stop reason: HOSPADM

## 2025-01-02 RX ADMIN — CITALOPRAM HYDROBROMIDE 20 MG: 20 TABLET ORAL at 08:01

## 2025-01-02 RX ADMIN — ACETAMINOPHEN 650 MG: 325 TABLET ORAL at 00:40

## 2025-01-02 RX ADMIN — ACETAMINOPHEN 650 MG: 325 TABLET ORAL at 21:04

## 2025-01-02 RX ADMIN — DONEPEZIL HYDROCHLORIDE 10 MG: 5 TABLET, FILM COATED ORAL at 22:21

## 2025-01-02 RX ADMIN — MEMANTINE 10 MG: 10 TABLET ORAL at 08:01

## 2025-01-02 RX ADMIN — ACETAMINOPHEN 650 MG: 325 TABLET ORAL at 08:01

## 2025-01-02 RX ADMIN — CEFTRIAXONE SODIUM 1 G: 1 INJECTION, POWDER, FOR SOLUTION INTRAMUSCULAR; INTRAVENOUS at 08:02

## 2025-01-02 RX ADMIN — LISINOPRIL 20 MG: 20 TABLET ORAL at 08:01

## 2025-01-02 RX ADMIN — ACETAMINOPHEN 650 MG: 325 TABLET ORAL at 13:28

## 2025-01-02 RX ADMIN — LEVOTHYROXINE SODIUM 100 MCG: 100 TABLET ORAL at 08:01

## 2025-01-02 RX ADMIN — SIMVASTATIN 20 MG: 10 TABLET, FILM COATED ORAL at 19:57

## 2025-01-02 RX ADMIN — BUPROPION HYDROCHLORIDE 150 MG: 150 TABLET, FILM COATED, EXTENDED RELEASE ORAL at 08:02

## 2025-01-02 RX ADMIN — MEMANTINE 10 MG: 10 TABLET ORAL at 19:57

## 2025-01-02 ASSESSMENT — ACTIVITIES OF DAILY LIVING (ADL)
ADLS_ACUITY_SCORE: 71
ADLS_ACUITY_SCORE: 73
ADLS_ACUITY_SCORE: 71
ADLS_ACUITY_SCORE: 69
ADLS_ACUITY_SCORE: 71
ADLS_ACUITY_SCORE: 67
ADLS_ACUITY_SCORE: 69
ADLS_ACUITY_SCORE: 67
ADLS_ACUITY_SCORE: 71
ADLS_ACUITY_SCORE: 73
ADLS_ACUITY_SCORE: 71
ADLS_ACUITY_SCORE: 67
ADLS_ACUITY_SCORE: 71
ADLS_ACUITY_SCORE: 71
ADLS_ACUITY_SCORE: 69
ADLS_ACUITY_SCORE: 67

## 2025-01-02 NOTE — PROGRESS NOTES
"Orthopedic Progress Note      Assessment:   Left acute comminuted intra-articular mildly displaced distal humerus fracture, minimally displaced olecranon process fracture, minimally displaced radial head fracture     Plan:   -Cardiology following, TAVR planned on 1/3/25  -Patient will require surgical ORIF of the left elbow. Will plan for surgical fixation of left elbow fracture possibly next week following cardiology intervention.   -Pain control per primary team  -New long arm posterior splint applied to left elbow. Splint to remain on and in place until surgical intervention. Splint to remain clean and dry.   -Sling to remain on at all times besides hygiene cares and skin checks.  -Nonweightbearing left upper extremity  -Medical optimization per primary team.       Thank you for including [facility] in the care of Sharon De La Vega. It has been a pleasure participating in their care.    Subjective:    Patient reports feeling well today. She is laying comfortably in bed. She has a sling in place but no splint on to left upper extremity. She denies pain at rest, only pain if moving arm.  All questions/concerns answered.    Patient's son in the room.       Objective:  /62 (BP Location: Right arm)   Pulse 68   Temp 97.8  F (36.6  C) (Oral)   Resp 18   Ht 1.651 m (5' 5\")   Wt 103.1 kg (227 lb 4.7 oz)   SpO2 97%   BMI 37.82 kg/m        General: patient resting comfortably in bed, awake, alert and oriented. In no acute distress.   Skin: Bruising and swelling over left elbow, no abrasions noted. Skin is warm and well-perfused.   Pulses: Capillary refill normal in all fingers to left upper extremity.   Sensation: intact and equal bilaterally to the distal upper extremities.  ROM: Able to wiggle fingers, do a thumbs up, okay sign, cross fingers  Motor: hand  intact, wrist flexion/extension with resistance intact.        Pertinent Labs   Lab Results: personally reviewed.   Lab Results   Component " Value Date    INR 1.08 01/02/2025    INR 1.15 01/10/2023     Lab Results   Component Value Date    WBC 5.6 01/02/2025    HGB 10.0 (L) 01/02/2025    HCT 30.8 (L) 01/02/2025    MCV 98 01/02/2025     (L) 01/02/2025     Lab Results   Component Value Date     01/02/2025    CO2 27 01/02/2025         Report completed by:  Opal Pedraza PA-C  Date: 01/02/2025  [facility]

## 2025-01-02 NOTE — PLAN OF CARE
Problem: Pain Acute  Goal: Optimal Pain Control and Function  Outcome: Progressing  Intervention: Develop Pain Management Plan  Recent Flowsheet Documentation  Taken 1/2/2025 0801 by Columba Wooten RN  Pain Management Interventions: medication (see MAR)  Intervention: Prevent or Manage Pain  Recent Flowsheet Documentation  Taken 1/2/2025 1200 by Columba Wooten RN  Medication Review/Management: medications reviewed  Taken 1/2/2025 0800 by Columba Wooten RN  Medication Review/Management: medications reviewed   Goal Outcome Evaluation:       Patient alert and oriented to self place and needed reminders of situation. She did remember she came into the hospital because she fell at home. Patient informed by the MD to have the TAVR tomorrow. Cardiology was in to visit with the patient. Patient agreeable. NSR. Left elbow discomfort and arm sling on and tylenol given with some relief. Patient had 2 nieces visiting to day. ECHO ordered.INR 1.08. New saline lock placed this morning. Bladder Scan 429 ml and voided 200 cc. Will bladder scan after lunch. Good appetite. Rocephin IV given as ordered. + UA. Call light in reach and Chair alarm on.

## 2025-01-02 NOTE — PROGRESS NOTES
[facility] HEART CARE   [address], [place] [address]   [email or web address]   OFFICE: [phone]     CARDIOLOGY INPATIENT PROGRESS NOTE     Impression and Plan     Assessment/Plan:  Ms. Sharon De La Vega is a 79 year old female with severe aortic stenosis, alzheimer's dementia, HTN, psoriatic arthritis, HLD, who presented to the hospital with falls.      Falls    - Unclear etiology, worry about severe symptomatic aortic stenosis   - Continue telemetry     Severe aortic stenosis   - Mean gradient 57 mmHg, BETH 0.6 cm2.   - Symptoms of dyspnea on exertion, possible dizziness/falls   - Planning on TAVR on 1/3/25.  Appreciate valve team     L elbow fracture   - Spoke with ortho, planning on operative management, can be delayed until next week    - Plan on TAVR to be done before proceeding with surgery.       Will continue to follow    Primary Cardiologist: Not established, may establish with me.      Subjective     Sharon De La Vega is feeling good.        Review of Systems:  Further review of systems is otherwise negative/noncontributory (based on review of medical record (admission H&P) and 13 point review of systems reviewed. Pertinent positives noted).    Cardiac Diagnostics     ECG: Personally reviewed and interpreted:   NSR  Artifact - likely tremor  Poor R wave     Telemetry (personally reviewed): NSR     Most recent:  Echocardiogram (results reviewed): 12/30/2024  Interpretation Summary     1. Limited echocardiographic study.  2. Normal left ventricular size and systolic performance with a visually  estimated ejection fraction of 60%.  3. There is severe aortic stenosis.  Â  The mean gradient is measured at 57 mmHg with peak velocity of 4.6 m/s.  Calculated valve area 0.60-0.63 cmÂ .  4. There is trace aortic insufficiency.  5. Normal right ventricular size and systolic performance.  6. There is mild left atrial enlargement.     When compared to the prior  real-time echocardiogram dated 7 September 2023,  the mean gradient across the aortic valve has increased from 29 mmHg to 57  mmHg.     Cardiac Cath (results reviewed): 12/31/2024  Coronary dominance: Right     LM: 0%  LAD:              P: 0%              M: 0%              D: 20%  D1: 0%, small  D2: 0%  Ramus: 0%  Lcx:              P: 0%              M: 0%                D: 0%  OM1: 0%  OM2: 0%  RCA:              P: 0%              M: 20%              D: 0%  PDA: 0%                Medical History  Surgical History Family History Social History   Past Medical History:   Diagnosis Date    Anxiety 02/14/2013    Formatting of this note might be different from the original.  CURRENT MEDICATION(S) celexa 40 mg;   Lorazepam prn  Only occ  Stopped no need 11-17. Use PHQ 9 = 6   11-15;      Aortic valve stenosis 04/07/2023    Essential hypertension 10/29/2012    Hypercholesterolemia 10/29/2012    Hypothyroidism 10/29/2012    Major depressive disorder, single episode, moderate (H) 11/28/2022    Formatting of this note might be different from the original.  CURRENT MEDICATION(S) citalopram 40 mg    PHQ 9 =  2  11-11;  phq 9 = 2    12-11;  = 2   5-12; = 2   11-12;  Called her  Doing better  No need to see a psychologist. 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) 06/20/2022    Psoriatic arthritis (H) 10/29/2012     Past Surgical History:   Procedure Laterality Date    APPENDECTOMY  1964    CARPAL TUNNEL RELEASE RT/LT Bilateral 1982    CATARACT EXTRACTION, BILATERAL  1998    CHOLECYSTECTOMY  1964    CV CORONARY ANGIOGRAM N/A 12/31/2024    Procedure: Coronary Angiogram;  Surgeon: Farhad Blue MD;  Location: [facility] CATH LAB CV    HC KNEE ARTHROSCOPY, MED/LAT MENISCUS REPAIR Right 2000    IA EXCISE HAND/FOOT NEUROMA  2004    right foot    TOTAL KNEE ARTHROPLASTY Right 2012    TRABECULECTOMY Right 1998    laser surgery right eye     Family History   Problem  Relation Age of Onset    Lung Cancer Father     Multiple myeloma Brother            Social History     Socioeconomic History    Marital status:      Spouse name: Not on file    Number of children: Not on file    Years of education: Not on file    Highest education level: Not on file   Occupational History    Not on file   Tobacco Use    Smoking status: Former     Current packs/day: 0.00     Types: Cigarettes     Quit date:      Years since quittin.0    Smokeless tobacco: Never   Substance and Sexual Activity    Alcohol use: Not Currently    Drug use: Not on file    Sexual activity: Not on file   Other Topics Concern    Not on file   Social History Narrative    Not on file     Social Drivers of Health     Financial Resource Strain: Low Risk  (2024)    Financial Resource Strain     Within the past 12 months, have you or your family members you live with been unable to get utilities (heat, electricity) when it was really needed?: No   Food Insecurity: Low Risk  (2024)    Food Insecurity     Within the past 12 months, did you worry that your food would run out before you got money to buy more?: No     Within the past 12 months, did the food you bought just not last and you didn t have money to get more?: No   Transportation Needs: Low Risk  (2024)    Transportation Needs     Within the past 12 months, has lack of transportation kept you from medical appointments, getting your medicines, non-medical meetings or appointments, work, or from getting things that you need?: No   Physical Activity: Not on file   Stress: Not on file   Social Connections: Unknown (2023)    [facility]    Social Connections     Frequency of Communication with Friends and Family: Not on file   Interpersonal Safety: Low Risk  (2024)    Interpersonal Safety     Do you feel physically and emotionally safe where you  "currently live?: Yes     Within the past 12 months, have you been hit, slapped, kicked or otherwise physically hurt by someone?: No     Within the past 12 months, have you been humiliated or emotionally abused in other ways by your partner or ex-partner?: No   Housing Stability: Low Risk  (12/30/2024)    Housing Stability     Do you have housing? : Yes     Are you worried about losing your housing?: No             Physical Examination   VITALS: /62 (BP Location: Right arm)   Pulse 68   Temp 97.8  F (36.6  C) (Oral)   Resp 18   Ht 1.651 m (5' 5\")   Wt 103.1 kg (227 lb 4.7 oz)   SpO2 97%   BMI 37.82 kg/m    BMI: Body mass index is 37.82 kg/m .  Wt Readings from Last 3 Encounters:   01/02/25 103.1 kg (227 lb 4.7 oz)   10/03/24 104.9 kg (231 lb 4.8 oz)   10/01/24 105 kg (231 lb 6.4 oz)       Intake/Output Summary (Last 24 hours) at 1/2/2025 1221  Last data filed at 1/2/2025 0900  Gross per 24 hour   Intake 990 ml   Output 1265 ml   Net -275 ml       General: pleasant female. No acute distress.   HENT: external ears normal. Nares patent. Mucous membranes moist.  Neck: No JVD  Lungs: clear to auscultation  COR:  regular rate and rhythm,  IV/VI late peaking murmur , rubs, or gallops  Abd: nondistended, BS present  Extrem: No edema        Lab Results Reviewed    Chemistry/lipid CBC Cardiac Enzymes/BNP/TSH/INR   No results for input(s): \"CHOL\", \"HDL\", \"LDL\", \"TRIG\", \"CHOLHDLRATIO\" in the last 43737 hours.  No results for input(s): \"LDL\" in the last 61948 hours.  Recent Labs   Lab Test 01/02/25  0408      POTASSIUM 3.6   CHLORIDE 108*   CO2 27   GLC 94   BUN 16.7   CR 0.84   GFRESTIMATED 71   RIZWANA 8.8     Recent Labs   Lab Test 01/02/25  0408 01/01/25  0620 12/31/24  0523   CR 0.84 0.78 0.86     Recent Labs   Lab Test 09/21/24  1837   A1C 5.3          Recent Labs   Lab Test 01/02/25  0408   WBC 5.6   HGB 10.0*   HCT 30.8*   MCV 98   *     Recent Labs   Lab Test 01/02/25  0408 01/01/25  0620 " "12/31/24  0523   HGB 10.0* 10.5* 10.4*    No results for input(s): \"TROPONINI\" in the last 47967 hours.  Recent Labs   Lab Test 01/02/25  1106 09/06/23  2139 02/14/23  1712   NTBNPI 1,077 2,868* 171     Recent Labs   Lab Test 12/29/24  1306   TSH 1.63     Recent Labs   Lab Test 01/02/25  1106 01/10/23  1252   INR 1.08 1.15           Current Inpatient Scheduled Medications   Scheduled Meds:  Current Facility-Administered Medications   Medication Dose Route Frequency Provider Last Rate Last Admin    buPROPion (WELLBUTRIN XL) 24 hr tablet 150 mg  150 mg Oral QAM Jean-ClaudetsCarlos savagele MIRZA, CNP   150 mg at 01/02/25 0802    cefTRIAXone (ROCEPHIN) 1 g vial to attach to  mL bag for ADULTS or NS 50 mL bag for PEDS  1 g Intravenous Q24H Armando Hurley MD   1 g at 01/02/25 0802    citalopram (celeXA) tablet 20 mg  20 mg Oral QAM Jitendraiojules Christiana C, CNP   20 mg at 01/02/25 0801    donepezil (ARICEPT) tablet 10 mg  10 mg Oral At Bedtime Carlos Garzale C, CNP   10 mg at 01/01/25 2107    [Held by provider] leflunomide (ARAVA) tablet 10 mg  10 mg Oral QAM Irvinamitsiojules Christiana C, CNP   10 mg at 12/30/24 0806    levothyroxine (SYNTHROID/LEVOTHROID) tablet 100 mcg  100 mcg Oral QAM Kayla Christiana C, CNP   100 mcg at 01/02/25 0801    lisinopril (ZESTRIL) tablet 20 mg  20 mg Oral Daily Kalamitsiotis, Christiana C, CNP   20 mg at 01/02/25 0801    memantine (NAMENDA) tablet 10 mg  10 mg Oral BID Irvinamitsiojules, Christiana C, CNP   10 mg at 01/02/25 0801    simvastatin (ZOCOR) tablet 20 mg  20 mg Oral QPM IrvinamitsioCarlos vicentele C, CNP   20 mg at 01/01/25 2107     Continuous Infusions:  Current Facility-Administered Medications   Medication Dose Route Frequency Provider Last Rate Last Admin       No current outpatient medications on file.          Medications Prior to Admission   Prior to Admission medications    Medication Sig Start Date End Date Taking? Authorizing Provider   acetaminophen (TYLENOL) 325 MG tablet Take 2 " tablets (650 mg) by mouth every 6 hours as needed for mild pain 10/1/23  Yes Sherley Armando APRN CNP   benazepril (LOTENSIN) 20 MG tablet Take 20 mg by mouth daily.   Yes Unknown, Entered By History   buPROPion (WELLBUTRIN XL) 150 MG 24 hr tablet Take 150 mg by mouth every morning. 8/20/24  Yes Reported, Patient   citalopram (CELEXA) 20 MG tablet Take 1 tablet by mouth every morning. 12/15/22  Yes Reported, Patient   cyanocobalamin (VITAMIN B-12) 1000 MCG tablet Take 1,000 mcg by mouth every morning. 12/26/22  Yes Reported, Patient   donepezil (ARICEPT) 10 MG tablet Take 1 tablet (10 mg) by mouth at bedtime 2/21/24  Yes Manuel Osuna MD   folic acid (FOLVITE) 1 MG tablet Take 1 mg by mouth every morning.   Yes Reported, Patient   leflunomide (ARAVA) 10 MG tablet Take 1 tablet by mouth every morning. 2/8/23  Yes Reported, Patient   levothyroxine (SYNTHROID/LEVOTHROID) 100 MCG tablet Take 1 tablet by mouth every morning. 8/20/24  Yes Reported, Patient   memantine (NAMENDA) 10 MG tablet Take 1 tablet (10 mg) by mouth 2 times daily 3/23/24  Yes Manuel Osuna MD   methotrexate 2.5 MG tablet Take 10 mg by mouth once a week On Thursday   Yes Reported, Patient   nystatin (MYCOSTATIN) 643518 UNIT/GM external powder Apply topically 2 times daily as needed. 6/15/23  Yes Reported, Patient   simvastatin (ZOCOR) 20 MG tablet Take 20 mg by mouth every evening. 1/14/23  Yes Reported, Patient   vitamin D3 (CHOLECALCIFEROL) 50 mcg (2000 units) tablet Take 1 tablet by mouth every morning.   Yes Reported, Patient   vitamin E (TOCOPHEROL) 1000 units (450 mg) CAPS capsule Take 1 capsule (1,000 Units) by mouth 2 times daily 2/21/24  Yes Manuel Osuna MD Timothy Shih, DO [facility]  Non-invasive Cardiologist  [facility]

## 2025-01-02 NOTE — PLAN OF CARE
Problem: Adult Inpatient Plan of Care  Goal: Plan of Care Review  Description: The Plan of Care Review/Shift note should be completed every shift.  The Outcome Evaluation is a brief statement about your assessment that the patient is improving, declining, or no change.  This information will be displayed automatically on your shift  note.  Outcome: Progressing     Problem: Pain Acute  Goal: Optimal Pain Control and Function  1/2/2025 1737 by Columba Wooten RN  Outcome: Progressing  1/2/2025 1216 by Columba Wooten RN  Outcome: Progressing  Intervention: Develop Pain Management Plan  Recent Flowsheet Documentation  Taken 1/2/2025 1328 by Columba Wooten RN  Pain Management Interventions: medication (see MAR)  Taken 1/2/2025 0801 by Columba Wooten RN  Pain Management Interventions: medication (see MAR)  Intervention: Prevent or Manage Pain  Recent Flowsheet Documentation  Taken 1/2/2025 1530 by Columba Wooten RN  Medication Review/Management: medications reviewed  Taken 1/2/2025 1200 by Columba Wooten RN  Medication Review/Management: medications reviewed  Taken 1/2/2025 0800 by Columba Wooten RN  Medication Review/Management: medications reviewed   Goal Outcome Evaluation:       Patient NPO after midnight for TAVR. See orders and will pass them on to the next shift for the pre prep. New IV s placed. NSR. Tylenol effective for left elbow discomfort and splint on. Patient aware of TAVR for tomorrow. She is agreeable to have the procedure. Bladder Scanned 24 ml at 1730 pm. Continue to monitor output and bladder scan protocol. Call light in reach. Call light in reach.

## 2025-01-02 NOTE — PROGRESS NOTES
TAVR Pre-Op RN Visit -INPATIENT    Patient in to see RN for Pre-TAVR visit on 1/2/2025. Patient scheduled as urgent add on case on 1/3/25. Patient will be first case for the day, tentative start time of 7 AM. Cath lab will call or arrive on floor when they are ready to bring patient down.     All pre-procedure labs drawn: Order placed today for all required pre procedure labs    EKG obtained: to be obtained today    Labs reviewed: will be reviewed prior to procedure tomorrow.     Medication Instructions: Please only give pertinent meds. Absolutely no beta blockers or short acting insulin AM of procedure. Loading dose of ASA to be given in pre-procedure/CSC.    Patient DOES NOT have an implanted device--  Type of device and : N/A  Implanting/Managing Provider: N/A  Phone number to reach the : N/A    NURSING INSTRUCTIONS FOR PRE PROCEDURE PREP:   No solid foods for patient after 11 PM. Clear liquids freely until 5 AM. NOTHING by mouth at all after 5 AM except sip of water with necessary meds.   Patient should be assisted to bathe/wash up evening before procedure on 1/2.   NO deodorant/lotions/creams/powders.      Patient has advanced directive: no documents on file. Previous code status listed as FULL. Patient bailout plan is not addressed in documentation from 12/31/24 CT surgery consultation- will need to be readdressed tomorrow AM.     Education was given to patient regarding what to expect pre and post procedure.     TAVR and blood consents signed at the time of the appt: NO; will be signed AM of procedure.     Patient alone at the time of appointment. Will call marcelle Guy per patients request to check in with him and answer questions this afternoon.     Orders placed for procedure.       PRE-TAVR DOCUMENTATION  (See consult note from Dr. Torres)    Referring provider: Dr. Lora     Preliminary STS Score: 4%      KCCQ12 (date completed 1/2/25), scanned into chart    5 Meter walk: unable to  complete; attempted x 3 to assist patient out of bed and patient was unable to stand.       PMH: 78 y/o female hx of HTN, HLD, recurrent falls, dementia, anxiety, DJD, psoriatic arthritis. Pt [facility] ED at [facility] after a fall resulting in injury. Ortho team is recommending surgical fixation. During her pre op evaluation patient was found to have critical aortic stenosis.       TTE date 12/30/24  EF 60 %  AV mean gradient: 57 mmHg  AV Peak Deonte: 4.58 m/sec  AV area: 0.6 cm2  AV DIM IND VTI: 32  LV SVi: 32 ml/m2    Comments on valves: aortic valve has severe stenosis with trace insufficiency. Mitral valve has trace insufficiency, tricuspid has same.       Plan: TAVR tomorrow 1/3/25. Right transfemoral access with plans for placement of 23 S3 Zelaya Darian Valve. Moderate sedation with CV RN. Plan to move forward with surgical fixation of UE fx per ortho team recommended timeline following valve replacement.     Eric Blair RN BSN- Valve Clinic Coordinator   [facility]  [facility]  Phone: [phone]  Fax: [phone]

## 2025-01-02 NOTE — PROGRESS NOTES
Ortho placed a splint on her left arm this afternoon. Patient cooperative.Tylenol given prior to splint placement. Family bedside and supportive.

## 2025-01-02 NOTE — PLAN OF CARE
[facility] care 1900 to 0730. A&O x 4. Assist x 2 with a gait belt and walker. Tele is NSR. C/O left upper arm pain, PRN Tylenol given. Room air. Up to bedside commode, BM x1. Patient unable to void. Bladder scan for 506 mL. Straight cath for 600 mL. Patient was anxious and restless about being in the hospital, PRN Seroquel given (see MAR). Patient able to sleep overnight after straight cath. Call light within reach, able to make needs known. Bed alarm on for safety.    Problem: Adult Inpatient Plan of Care  Goal: Absence of Hospital-Acquired Illness or Injury  Intervention: Prevent and Manage VTE (Venous Thromboembolism) Risk  Recent Flowsheet Documentation  Taken 1/2/2025 0415 by Feli Burgos RN  VTE Prevention/Management: SCDs on (sequential compression devices)  Taken 1/2/2025 0035 by Feli Burgos RN  VTE Prevention/Management: SCDs on (sequential compression devices)  Taken 1/1/2025 2107 by Feli Burgos RN  VTE Prevention/Management: SCDs on (sequential compression devices)     Problem: Adult Inpatient Plan of Care  Goal: Optimal Comfort and Wellbeing  Intervention: Monitor Pain and Promote Comfort  Recent Flowsheet Documentation  Taken 1/2/2025 0415 by Feli Burgos RN  Pain Management Interventions: medication (see MAR)  Taken 1/2/2025 0035 by Feli Burgos RN  Pain Management Interventions: medication (see MAR)  Taken 1/1/2025 2107 by Feli Burgos RN  Pain Management Interventions: medication (see MAR)

## 2025-01-02 NOTE — PROGRESS NOTES
[facility]    Medicine Progress Note - Hospitalist Service    Date of Admission:  12/29/2024    Assessment & Plan     Sharon De La Vega is a 79 year old female admitted on 12/29/2024. She was brought back to the ED by ambulance from home for evaluation after a second fall at home     # Acute left comminuted intra-articular mildly displaced distal humerus fracture, minimally displaced olecranon process fracture, minimally displaced radial head fracture.   - Status post splint/sling  - ORIF anticipated early next week after TAVR  - Pain control  - Orthopedic surgery assistance appreciated     # Severe symptomatic aortic valvular stenosis  - TTE (12/29/24):  LVEF 60%, mean gradient 57 mmHg with peak velocity of 4.6 m/s. Calculated valve area 0.60-0.63 cmÂ .  - Coronary angiogram (12/29/24) normal coronary arteries.  - TAVR planned on 1/3/25  - Tele  - Cardiology assistance appreciated.     # Recurrent falls  - Limited historian with dementia  - Aortic valve stenosis possibly contributory  - CT head & C spine unrevealing for acute process  - Imaging findings per #1  - Fall precautions  - PT/OT     # Essential hypertension   - Lisinopril 20mg every day    # Possible UTI  - IV CTX  - Await final UC results     # Hypothyroidism  - Euthyroid, TSH 1.63  - Levothyroxine     # Alzheimer's dementia without behavioral disturbance  - Risk for violent behavior and/or acute delirium  - Donepezil, memantine  - As needed quetiapine and olanzapine     # Depression / Anxiety  -Bupropion, citalopram     # Psoriatic arthritis  - Holding methotrexate, folic acid and leflunomide     # Hyperlipidemia  - Simvastatin  - CK mildly elevated due to MSK trauma (520). Repeat pending.    # Hypokalemia- resolved     # Obesity  -BMI 38.44        Diet: Advance Diet as Tolerated: Regular Diet Adult    DVT Prophylaxis: Pneumatic Compression Devices  Escobar Catheter: Not present  Lines: None     Cardiac Monitoring: ACTIVE order.  "Indication: Post- PCI/Angiogram (24 hours)  Code Status: Full Code      Clinically Significant Risk Factors          # Hyperchloremia: Highest Cl = 108 mmol/L in last 2 days, will monitor as appropriate          # Hypoalbuminemia: Lowest albumin = 3 g/dL at 1/2/2025  4:08 AM, will monitor as appropriate   # Thrombocytopenia: Lowest platelets = 116 in last 2 days, will monitor for bleeding   # Hypertension: Noted on problem list     # Dementia: noted on problem list        # Obesity: Estimated body mass index is 37.82 kg/m  as calculated from the following:    Height as of this encounter: 1.651 m (5' 5\").    Weight as of this encounter: 103.1 kg (227 lb 4.7 oz)., PRESENT ON ADMISSION     # Financial/Environmental Concerns: none         Social Drivers of Health    Tobacco Use: Medium Risk (12/29/2024)    Patient History     Smoking Tobacco Use: Former     Smokeless Tobacco Use: Never    [facility]    Social Connections          Disposition Plan     Medically Ready for Discharge: Anticipated in 2-4 Days             Jace Donato DO, DO  Hospitalist Service  [facility]  Securely message with [employer] (more info)  Text page via [employer] Paging/Directory   ______________________________________________________________________    Interval History     Afebrile.  Events overnight noted.    Physical Exam   Vital Signs: Temp: 97.8  F (36.6  C) Temp src: Oral BP: 132/62 Pulse: 68   Resp: 18 SpO2: 97 % O2 Device: None (Room air)    Weight: 227 lbs 4.71 oz    General appearance - NAD  Eyes - sclera anicteric  Lungs - clear to auscultation, no wheezes  Heart - rrr. No peripheral edema.  Abdomen - soft, nontender, nondistended, BS+  Neurological - alert, follows commands, disorientation/forgetfullness, no agitation.  Skin - no c/c/p    Lab/imaging reviewed  "

## 2025-01-02 NOTE — PROGRESS NOTES
SPIRITUAL HEALTH SERVICES Note     Saw pt Sharon De La Vega and administered Gnosticist sacrament of anointing for the healing of the sick.    Fr. Brayden Webster

## 2025-01-03 ENCOUNTER — APPOINTMENT (OUTPATIENT)
Dept: CARDIOLOGY | Facility: HOSPITAL | Age: 80
End: 2025-01-03
Attending: INTERNAL MEDICINE
Payer: COMMERCIAL

## 2025-01-03 PROBLEM — Z95.2 S/P TAVR (TRANSCATHETER AORTIC VALVE REPLACEMENT): Status: ACTIVE | Noted: 2025-01-03

## 2025-01-03 LAB
ACT BLD: 219 SECONDS (ref 74–150)
ACT BLD: 235 SECONDS (ref 74–150)
ALBUMIN SERPL BCG-MCNC: 3.1 G/DL (ref 3.5–5.2)
ALBUMIN SERPL BCG-MCNC: 3.3 G/DL (ref 3.5–5.2)
ALP SERPL-CCNC: 75 U/L (ref 40–150)
ALP SERPL-CCNC: 75 U/L (ref 40–150)
ALT SERPL W P-5'-P-CCNC: 18 U/L (ref 0–50)
ALT SERPL W P-5'-P-CCNC: 19 U/L (ref 0–50)
ANION GAP SERPL CALCULATED.3IONS-SCNC: 12 MMOL/L (ref 7–15)
ANION GAP SERPL CALCULATED.3IONS-SCNC: 8 MMOL/L (ref 7–15)
AST SERPL W P-5'-P-CCNC: 26 U/L (ref 0–45)
AST SERPL W P-5'-P-CCNC: 26 U/L (ref 0–45)
ATRIAL RATE - MUSE: 77 BPM
BILIRUB DIRECT SERPL-MCNC: <0.2 MG/DL (ref 0–0.3)
BILIRUB SERPL-MCNC: 0.3 MG/DL
BILIRUB SERPL-MCNC: 0.4 MG/DL
BUN SERPL-MCNC: 18.4 MG/DL (ref 8–23)
BUN SERPL-MCNC: 20.4 MG/DL (ref 8–23)
CALCIUM SERPL-MCNC: 8.8 MG/DL (ref 8.8–10.4)
CALCIUM SERPL-MCNC: 8.8 MG/DL (ref 8.8–10.4)
CHLORIDE SERPL-SCNC: 107 MMOL/L (ref 98–107)
CHLORIDE SERPL-SCNC: 107 MMOL/L (ref 98–107)
CREAT SERPL-MCNC: 0.81 MG/DL (ref 0.51–0.95)
CREAT SERPL-MCNC: 0.9 MG/DL (ref 0.51–0.95)
DIASTOLIC BLOOD PRESSURE - MUSE: NORMAL MMHG
EGFRCR SERPLBLD CKD-EPI 2021: 65 ML/MIN/1.73M2
EGFRCR SERPLBLD CKD-EPI 2021: 74 ML/MIN/1.73M2
ERYTHROCYTE [DISTWIDTH] IN BLOOD BY AUTOMATED COUNT: 16.2 % (ref 10–15)
ERYTHROCYTE [DISTWIDTH] IN BLOOD BY AUTOMATED COUNT: 16.4 % (ref 10–15)
GLUCOSE BLDC GLUCOMTR-MCNC: 102 MG/DL (ref 70–99)
GLUCOSE BLDC GLUCOMTR-MCNC: 106 MG/DL (ref 70–99)
GLUCOSE SERPL-MCNC: 101 MG/DL (ref 70–99)
GLUCOSE SERPL-MCNC: 96 MG/DL (ref 70–99)
HCO3 SERPL-SCNC: 22 MMOL/L (ref 22–29)
HCO3 SERPL-SCNC: 26 MMOL/L (ref 22–29)
HCT VFR BLD AUTO: 32.9 % (ref 35–47)
HCT VFR BLD AUTO: 33.1 % (ref 35–47)
HGB BLD-MCNC: 10.7 G/DL (ref 11.7–15.7)
HGB BLD-MCNC: 11 G/DL (ref 11.7–15.7)
INTERPRETATION ECG - MUSE: NORMAL
MAGNESIUM SERPL-MCNC: 2.1 MG/DL (ref 1.7–2.3)
MAGNESIUM SERPL-MCNC: 2.1 MG/DL (ref 1.7–2.3)
MCH RBC QN AUTO: 31.9 PG (ref 26.5–33)
MCH RBC QN AUTO: 32.1 PG (ref 26.5–33)
MCHC RBC AUTO-ENTMCNC: 32.5 G/DL (ref 31.5–36.5)
MCHC RBC AUTO-ENTMCNC: 33.2 G/DL (ref 31.5–36.5)
MCV RBC AUTO: 97 FL (ref 78–100)
MCV RBC AUTO: 98 FL (ref 78–100)
P AXIS - MUSE: 60 DEGREES
PHOSPHATE SERPL-MCNC: 2.7 MG/DL (ref 2.5–4.5)
PLATELET # BLD AUTO: 116 10E3/UL (ref 150–450)
PLATELET # BLD AUTO: 154 10E3/UL (ref 150–450)
POTASSIUM SERPL-SCNC: 3.7 MMOL/L (ref 3.4–5.3)
POTASSIUM SERPL-SCNC: 4.2 MMOL/L (ref 3.4–5.3)
PR INTERVAL - MUSE: 160 MS
PROT SERPL-MCNC: 6 G/DL (ref 6.4–8.3)
PROT SERPL-MCNC: 6 G/DL (ref 6.4–8.3)
QRS DURATION - MUSE: 150 MS
QT - MUSE: 446 MS
QTC - MUSE: 504 MS
R AXIS - MUSE: -48 DEGREES
RBC # BLD AUTO: 3.35 10E6/UL (ref 3.8–5.2)
RBC # BLD AUTO: 3.43 10E6/UL (ref 3.8–5.2)
SODIUM SERPL-SCNC: 141 MMOL/L (ref 135–145)
SODIUM SERPL-SCNC: 141 MMOL/L (ref 135–145)
SYSTOLIC BLOOD PRESSURE - MUSE: NORMAL MMHG
T AXIS - MUSE: 110 DEGREES
VENTRICULAR RATE- MUSE: 77 BPM
WBC # BLD AUTO: 7 10E3/UL (ref 4–11)
WBC # BLD AUTO: 9.1 10E3/UL (ref 4–11)

## 2025-01-03 PROCEDURE — 36415 COLL VENOUS BLD VENIPUNCTURE: CPT | Performed by: INTERNAL MEDICINE

## 2025-01-03 PROCEDURE — P9016 RBC LEUKOCYTES REDUCED: HCPCS | Performed by: INTERNAL MEDICINE

## 2025-01-03 PROCEDURE — 250N000011 HC RX IP 250 OP 636: Performed by: INTERNAL MEDICINE

## 2025-01-03 PROCEDURE — 85347 COAGULATION TIME ACTIVATED: CPT

## 2025-01-03 PROCEDURE — C1889 IMPLANT/INSERT DEVICE, NOC: HCPCS | Performed by: INTERNAL MEDICINE

## 2025-01-03 PROCEDURE — 250N000013 HC RX MED GY IP 250 OP 250 PS 637: Performed by: INTERNAL MEDICINE

## 2025-01-03 PROCEDURE — 33361 REPLACE AORTIC VALVE PERQ: CPT | Mod: 62 | Performed by: SURGERY

## 2025-01-03 PROCEDURE — 83735 ASSAY OF MAGNESIUM: CPT | Performed by: INTERNAL MEDICINE

## 2025-01-03 PROCEDURE — 80048 BASIC METABOLIC PNL TOTAL CA: CPT | Performed by: NURSE PRACTITIONER

## 2025-01-03 PROCEDURE — 99152 MOD SED SAME PHYS/QHP 5/>YRS: CPT | Performed by: INTERNAL MEDICINE

## 2025-01-03 PROCEDURE — 93005 ELECTROCARDIOGRAM TRACING: CPT

## 2025-01-03 PROCEDURE — 83735 ASSAY OF MAGNESIUM: CPT | Performed by: NURSE PRACTITIONER

## 2025-01-03 PROCEDURE — 84132 ASSAY OF SERUM POTASSIUM: CPT | Performed by: INTERNAL MEDICINE

## 2025-01-03 PROCEDURE — 84460 ALANINE AMINO (ALT) (SGPT): CPT | Performed by: INTERNAL MEDICINE

## 2025-01-03 PROCEDURE — 84100 ASSAY OF PHOSPHORUS: CPT | Performed by: NURSE PRACTITIONER

## 2025-01-03 PROCEDURE — 258N000003 HC RX IP 258 OP 636: Performed by: INTERNAL MEDICINE

## 2025-01-03 PROCEDURE — 255N000002 HC RX 255 OP 636: Performed by: INTERNAL MEDICINE

## 2025-01-03 PROCEDURE — 82248 BILIRUBIN DIRECT: CPT | Performed by: INTERNAL MEDICINE

## 2025-01-03 PROCEDURE — 272N000001 HC OR GENERAL SUPPLY STERILE: Performed by: INTERNAL MEDICINE

## 2025-01-03 PROCEDURE — 85018 HEMOGLOBIN: CPT | Performed by: INTERNAL MEDICINE

## 2025-01-03 PROCEDURE — 33361 REPLACE AORTIC VALVE PERQ: CPT | Mod: 62 | Performed by: INTERNAL MEDICINE

## 2025-01-03 PROCEDURE — 36415 COLL VENOUS BLD VENIPUNCTURE: CPT | Performed by: NURSE PRACTITIONER

## 2025-01-03 PROCEDURE — 250N000009 HC RX 250: Performed by: INTERNAL MEDICINE

## 2025-01-03 PROCEDURE — C1733 CATH, EP, OTHR THAN COOL-TIP: HCPCS | Performed by: INTERNAL MEDICINE

## 2025-01-03 PROCEDURE — 93308 TTE F-UP OR LMTD: CPT | Mod: 26 | Performed by: INTERNAL MEDICINE

## 2025-01-03 PROCEDURE — 84132 ASSAY OF SERUM POTASSIUM: CPT | Performed by: NURSE PRACTITIONER

## 2025-01-03 PROCEDURE — 120N000004 HC R&B MS OVERFLOW

## 2025-01-03 PROCEDURE — C1894 INTRO/SHEATH, NON-LASER: HCPCS | Performed by: INTERNAL MEDICINE

## 2025-01-03 PROCEDURE — C1760 CLOSURE DEV, VASC: HCPCS | Performed by: INTERNAL MEDICINE

## 2025-01-03 PROCEDURE — C1769 GUIDE WIRE: HCPCS | Performed by: INTERNAL MEDICINE

## 2025-01-03 PROCEDURE — 93325 DOPPLER ECHO COLOR FLOW MAPG: CPT | Mod: 26 | Performed by: INTERNAL MEDICINE

## 2025-01-03 PROCEDURE — 99232 SBSQ HOSP IP/OBS MODERATE 35: CPT | Performed by: INTERNAL MEDICINE

## 2025-01-03 PROCEDURE — 99153 MOD SED SAME PHYS/QHP EA: CPT | Performed by: INTERNAL MEDICINE

## 2025-01-03 PROCEDURE — 33361 REPLACE AORTIC VALVE PERQ: CPT | Performed by: INTERNAL MEDICINE

## 2025-01-03 PROCEDURE — 93321 DOPPLER ECHO F-UP/LMTD STD: CPT | Mod: 26 | Performed by: INTERNAL MEDICINE

## 2025-01-03 PROCEDURE — 93325 DOPPLER ECHO COLOR FLOW MAPG: CPT

## 2025-01-03 PROCEDURE — 250N000011 HC RX IP 250 OP 636: Mod: JZ | Performed by: INTERNAL MEDICINE

## 2025-01-03 PROCEDURE — 85014 HEMATOCRIT: CPT | Performed by: NURSE PRACTITIONER

## 2025-01-03 PROCEDURE — 250N000013 HC RX MED GY IP 250 OP 250 PS 637: Performed by: NURSE PRACTITIONER

## 2025-01-03 DEVICE — VALVE AORTIC TRANSCATHETER HEART 23MM SAPIEN 3 ULTRA RESILIA: Type: IMPLANTABLE DEVICE | Status: FUNCTIONAL

## 2025-01-03 RX ORDER — NICOTINE POLACRILEX 4 MG
15-30 LOZENGE BUCCAL
Status: ACTIVE | OUTPATIENT
Start: 2025-01-03

## 2025-01-03 RX ORDER — NALOXONE HYDROCHLORIDE 0.4 MG/ML
0.2 INJECTION, SOLUTION INTRAMUSCULAR; INTRAVENOUS; SUBCUTANEOUS
Status: ACTIVE | OUTPATIENT
Start: 2025-01-03

## 2025-01-03 RX ORDER — HYDRALAZINE HYDROCHLORIDE 20 MG/ML
10 INJECTION INTRAMUSCULAR; INTRAVENOUS
Status: DISPENSED | OUTPATIENT
Start: 2025-01-03

## 2025-01-03 RX ORDER — ONDANSETRON 4 MG/1
4 TABLET, ORALLY DISINTEGRATING ORAL EVERY 6 HOURS PRN
Status: ACTIVE | OUTPATIENT
Start: 2025-01-03

## 2025-01-03 RX ORDER — ONDANSETRON 2 MG/ML
4 INJECTION INTRAMUSCULAR; INTRAVENOUS EVERY 6 HOURS PRN
Status: ACTIVE | OUTPATIENT
Start: 2025-01-03

## 2025-01-03 RX ORDER — ASPIRIN 81 MG/1
81 TABLET ORAL DAILY
Status: DISPENSED | OUTPATIENT
Start: 2025-01-04

## 2025-01-03 RX ORDER — NITROGLYCERIN 0.4 MG/1
0.4 TABLET SUBLINGUAL EVERY 5 MIN PRN
Status: ACTIVE | OUTPATIENT
Start: 2025-01-03

## 2025-01-03 RX ORDER — ACETAMINOPHEN 325 MG/1
650 TABLET ORAL EVERY 4 HOURS PRN
Status: DISPENSED | OUTPATIENT
Start: 2025-01-03

## 2025-01-03 RX ORDER — CEFAZOLIN SODIUM 2 G/100ML
INJECTION, SOLUTION INTRAVENOUS CONTINUOUS PRN
Status: COMPLETED | OUTPATIENT
Start: 2025-01-03 | End: 2025-01-03

## 2025-01-03 RX ORDER — LIDOCAINE HYDROCHLORIDE AND EPINEPHRINE 10; 10 MG/ML; UG/ML
INJECTION, SOLUTION INFILTRATION; PERINEURAL
Status: DISCONTINUED | OUTPATIENT
Start: 2025-01-03 | End: 2025-01-03 | Stop reason: HOSPADM

## 2025-01-03 RX ORDER — IODIXANOL 320 MG/ML
INJECTION, SOLUTION INTRAVASCULAR
Status: DISCONTINUED | OUTPATIENT
Start: 2025-01-03 | End: 2025-01-03 | Stop reason: HOSPADM

## 2025-01-03 RX ORDER — DEXTROSE MONOHYDRATE 25 G/50ML
25-50 INJECTION, SOLUTION INTRAVENOUS
Status: ACTIVE | OUTPATIENT
Start: 2025-01-03

## 2025-01-03 RX ORDER — HEPARIN SODIUM 200 [USP'U]/100ML
INJECTION, SOLUTION INTRAVENOUS
Status: DISCONTINUED | OUTPATIENT
Start: 2025-01-03 | End: 2025-01-03 | Stop reason: HOSPADM

## 2025-01-03 RX ORDER — PROTAMINE SULFATE 10 MG/ML
INJECTION, SOLUTION INTRAVENOUS
Status: DISCONTINUED | OUTPATIENT
Start: 2025-01-03 | End: 2025-01-03 | Stop reason: HOSPADM

## 2025-01-03 RX ORDER — OXYCODONE HYDROCHLORIDE 5 MG/1
5 TABLET ORAL EVERY 4 HOURS PRN
Status: DISCONTINUED | OUTPATIENT
Start: 2025-01-03 | End: 2025-01-08

## 2025-01-03 RX ORDER — HEPARIN SODIUM 1000 [USP'U]/ML
INJECTION, SOLUTION INTRAVENOUS; SUBCUTANEOUS
Status: DISCONTINUED | OUTPATIENT
Start: 2025-01-03 | End: 2025-01-03 | Stop reason: HOSPADM

## 2025-01-03 RX ORDER — FENTANYL CITRATE 50 UG/ML
INJECTION, SOLUTION INTRAMUSCULAR; INTRAVENOUS
Status: DISCONTINUED | OUTPATIENT
Start: 2025-01-03 | End: 2025-01-03 | Stop reason: HOSPADM

## 2025-01-03 RX ORDER — NALOXONE HYDROCHLORIDE 0.4 MG/ML
0.4 INJECTION, SOLUTION INTRAMUSCULAR; INTRAVENOUS; SUBCUTANEOUS
Status: ACTIVE | OUTPATIENT
Start: 2025-01-03

## 2025-01-03 RX ORDER — OXYCODONE HYDROCHLORIDE 5 MG/1
10 TABLET ORAL EVERY 4 HOURS PRN
Status: DISCONTINUED | OUTPATIENT
Start: 2025-01-03 | End: 2025-01-08

## 2025-01-03 RX ORDER — SODIUM CHLORIDE 9 MG/ML
INJECTION, SOLUTION INTRAVENOUS CONTINUOUS
Status: ACTIVE | OUTPATIENT
Start: 2025-01-03 | End: 2025-01-03

## 2025-01-03 RX ADMIN — BUPROPION HYDROCHLORIDE 150 MG: 150 TABLET, FILM COATED, EXTENDED RELEASE ORAL at 10:34

## 2025-01-03 RX ADMIN — SODIUM CHLORIDE: 9 INJECTION, SOLUTION INTRAVENOUS at 05:03

## 2025-01-03 RX ADMIN — ACETAMINOPHEN 650 MG: 325 TABLET ORAL at 19:41

## 2025-01-03 RX ADMIN — SIMVASTATIN 20 MG: 10 TABLET, FILM COATED ORAL at 19:42

## 2025-01-03 RX ADMIN — MEMANTINE 10 MG: 10 TABLET ORAL at 19:43

## 2025-01-03 RX ADMIN — ACETAMINOPHEN 650 MG: 325 TABLET ORAL at 05:34

## 2025-01-03 RX ADMIN — ASPIRIN 325 MG: 325 TABLET ORAL at 06:15

## 2025-01-03 RX ADMIN — DONEPEZIL HYDROCHLORIDE 10 MG: 5 TABLET, FILM COATED ORAL at 23:02

## 2025-01-03 RX ADMIN — MEMANTINE 10 MG: 10 TABLET ORAL at 10:33

## 2025-01-03 RX ADMIN — LEVOTHYROXINE SODIUM 100 MCG: 100 TABLET ORAL at 10:33

## 2025-01-03 RX ADMIN — HYDRALAZINE HYDROCHLORIDE 10 MG: 20 INJECTION INTRAMUSCULAR; INTRAVENOUS at 10:52

## 2025-01-03 RX ADMIN — CEFTRIAXONE SODIUM 1 G: 1 INJECTION, POWDER, FOR SOLUTION INTRAMUSCULAR; INTRAVENOUS at 10:34

## 2025-01-03 RX ADMIN — OXYCODONE HYDROCHLORIDE 5 MG: 5 TABLET ORAL at 17:21

## 2025-01-03 RX ADMIN — CITALOPRAM HYDROBROMIDE 20 MG: 20 TABLET ORAL at 10:35

## 2025-01-03 RX ADMIN — ACETAMINOPHEN 650 MG: 325 TABLET ORAL at 15:13

## 2025-01-03 RX ADMIN — HYDRALAZINE HYDROCHLORIDE 10 MG: 20 INJECTION INTRAMUSCULAR; INTRAVENOUS at 11:36

## 2025-01-03 RX ADMIN — LISINOPRIL 20 MG: 20 TABLET ORAL at 10:34

## 2025-01-03 RX ADMIN — SODIUM CHLORIDE: 9 INJECTION, SOLUTION INTRAVENOUS at 11:32

## 2025-01-03 RX ADMIN — ACETAMINOPHEN 650 MG: 325 TABLET ORAL at 10:33

## 2025-01-03 ASSESSMENT — ACTIVITIES OF DAILY LIVING (ADL)
ADLS_ACUITY_SCORE: 67
ADLS_ACUITY_SCORE: 73
ADLS_ACUITY_SCORE: 67
ADLS_ACUITY_SCORE: 69
ADLS_ACUITY_SCORE: 69
ADLS_ACUITY_SCORE: 67
ADLS_ACUITY_SCORE: 67
ADLS_ACUITY_SCORE: 73
ADLS_ACUITY_SCORE: 67
ADLS_ACUITY_SCORE: 67
ADLS_ACUITY_SCORE: 69
ADLS_ACUITY_SCORE: 67
ADLS_ACUITY_SCORE: 67
ADLS_ACUITY_SCORE: 73
ADLS_ACUITY_SCORE: 73
ADLS_ACUITY_SCORE: 67
ADLS_ACUITY_SCORE: 69
ADLS_ACUITY_SCORE: 67
ADLS_ACUITY_SCORE: 73

## 2025-01-03 NOTE — PLAN OF CARE
Goal Outcome Evaluation:         Problem: Cardiac Catheterization (Diagnostic/Interventional)  Goal: Absence of Bleeding  Outcome: Progressing     Patient returned on a cart from [facility] and dressing on right groin C/D/I. Right radial TR band site C/D/I. Gradually removing the air from the TR Band. Hydralazine given for elevated BP and now /69. Will continue to monitor pain since c/o back discomfort and tylenol effective. Patient on bedrest.NSR. CWMS. Neuro checks good. Patient keeps repeating she wants to get up and the family reminds he that she is on bedrest. Grateful the family is here and supportive of the patient. Bed alarm on. Call light in reach.

## 2025-01-03 NOTE — PROGRESS NOTES
Patient ate lunch. Three people to get her up to walk to the chair since very weak legs. Sitting up in the chair watching TV. Right groin dressings C/D/I. TR band site right radial with no bleed just ecchymosis. Followed direction to go from the bed to chair yet needed reminders. Forgetful. Guy the son went home. Bladder scanned and had 300 ml at 1415. Will follow up on output. NSR. BP improved with hydralazine given twice this shift post TAVR. Chair alarm on.

## 2025-01-03 NOTE — PROGRESS NOTES
[facility]    Medicine Progress Note - Hospitalist Service    Date of Admission:  12/29/2024    Assessment & Plan   Sharon De La Vega is a 79 year old female admitted on 12/29/2024. She was brought back to the ED by ambulance from home for evaluation after a second fall at home     # Acute left comminuted intra-articular mildly displaced distal humerus fracture, minimally displaced olecranon process fracture, minimally displaced radial head fracture.   - Status post splint/sling  - ORIF anticipated early next week   - Pain control  - Orthopedic surgery assistance appreciated     # Severe symptomatic aortic valvular stenosis  - TTE (12/29/24):  LVEF 60%, mean gradient 57 mmHg with peak velocity of 4.6 m/s. Calculated valve area 0.60-0.63 cmÂ .  - Coronary angiogram (12/29/24) normal coronary arteries.  - S/P TAVR (1/3/2025) with a 23mm Zelaya Darian Ultra Resilia Valve  - Tele  - Cardiology assistance appreciated.     # Recurrent falls  - Limited historian with dementia  - Aortic valve stenosis possibly contributory  - CT head & C spine unrevealing for acute process  - Imaging findings per #1  - Fall precautions  - PT/OT     # Essential hypertension   - Lisinopril 20mg every day    # Possible UTI  - IV CTX x 3d course complete     # Hypothyroidism  - Euthyroid, TSH 1.63  - Levothyroxine     # Alzheimer's dementia without behavioral disturbance  - Risk for violent behavior and/or acute delirium  - Donepezil, memantine  - As needed quetiapine and olanzapine     # Depression / Anxiety  -Bupropion, citalopram     # Psoriatic arthritis  - Holding methotrexate, folic acid and leflunomide     # Hyperlipidemia  - Simvastatin  - CK mildly elevated due to MSK trauma (520). Repeat pending.    # Hypokalemia- resolved     # Obesity  -BMI 38.44          Diet: Advance Diet as Tolerated: Fully Advanced to diet(s) per Provider order    DVT Prophylaxis: Pneumatic Compression Devices  Escobar Catheter: Not  "present  Lines: None     Cardiac Monitoring: ACTIVE order. Indication: Transcatheter structural interventions (24 hours)  Code Status: Full Code      Clinically Significant Risk Factors          # Hyperchloremia: Highest Cl = 108 mmol/L in last 2 days, will monitor as appropriate          # Hypoalbuminemia: Lowest albumin = 3 g/dL at 1/2/2025  4:08 AM, will monitor as appropriate   # Thrombocytopenia: Lowest platelets = 116 in last 2 days, will monitor for bleeding   # Hypertension: Noted on problem list     # Dementia: noted on problem list        # Obesity: Estimated body mass index is 37.31 kg/m  as calculated from the following:    Height as of this encounter: 1.651 m (5' 5\").    Weight as of this encounter: 101.7 kg (224 lb 3.3 oz)., PRESENT ON ADMISSION     # Financial/Environmental Concerns: none         Social Drivers of Health    Tobacco Use: Medium Risk (12/29/2024)    Patient History     Smoking Tobacco Use: Former     Smokeless Tobacco Use: Never    Received from [facility] [email or web address]    Social Connections          Disposition Plan     Medically Ready for Discharge: Anticipated in 5+ Days             Jace Donato DO, DO  Hospitalist Service  [facility]  Securely message with [employer] (more info)  Text page via [employer] Paging/Directory   ______________________________________________________________________    Interval History     Afebrile. Events overnight noted.     Physical Exam   Vital Signs: Temp: 98.1  F (36.7  C) Temp src: Oral BP: (!) 162/69 Pulse: 71   Resp: 18 SpO2: 100 % O2 Device: None (Room air) Oxygen Delivery: 2 LPM  Weight: 224 lbs 3.33 oz    General appearance - NAD  Lungs - clear to auscultation, no wheezes  Heart - rrr. No peripheral edema.  Abdomen - soft, nontender, nondistended, BS+  Neurological - alert, follows commands, disorientation/forgetfullness, no agitation.  Skin - no c/c/p   "   Lab/imaging reviewed

## 2025-01-03 NOTE — OP NOTE
DATE OF SERVICE: 1/3/2025.     PREOPERATIVE DIAGNOSES:  Severe aortic stenosis.     POSTOPERATIVE DIAGNOSES:  Severe aortic stenosis.     PROCEDURE PERFORMED:  Transfemoral transcatheter aortic valve replacement (23 mm S3 Zelaya valve).     SURGEON:  Chester Oreilly MD, PhD.     ATTENDING CARDIOLOGISTS:  Graeme Torres MD and Jesus Manuel Webster MD.     ESTIMATED BLOOD LOSS:  Minimal.     SPECIMEN:  None.     INDICATIONS FOR PROCEDURE: Ms. Sharon De La Vega is a 79 year-old woman with severe aortic stenosis.  Transcatheter replacement is recommended. The patient understands the risks and benefits of the procedure and wishes to undergo the operation.     OPERATIVE FINDINGS:  There was no significant perivalvular leak after the procedure. The S3 valve mean gradient was normal.     OPERATIVE DESCRIPTION IN DETAIL:  After obtaining informed consent, the patient was brought to the operating room and placed in the supine position on the operating room table.  Appropriate lines and devices for monitoring were placed by the anesthesia team.  The was sedated and prepped and draped before a timeout was performed to confirm the correct patient identity, as well as the procedure to be performed.      Please see the cardiology procedure note for details. The 23 mm Darian S3 valve was deployed successfully through a right transfemoral approach. The deployment was successful and there were no complications. The delivery system was removed and the femoral artery was closed with the Perclose device.    Chester Oreilly MD PhD

## 2025-01-03 NOTE — PROGRESS NOTES
Care Management Follow Up    Length of Stay (days): 4    Expected Discharge Date: 01/06/2025     Concerns to be Addressed:     Care progression - discharge planning  Patient plan of care discussed at interdisciplinary rounds: Yes    Anticipated Discharge Disposition:  Transitional care     Anticipated Discharge Services:  Transitional care  Anticipated Discharge DME:  NA    Patient/family educated on Medicare website which has current facility and service quality ratings:  NA  Education Provided on the Discharge Plan:  Yes per team  Patient/Family in Agreement with the Plan:  Yes    Referrals Placed by CM/SW:  Yes  Private pay costs discussed: Not applicable    Discussed  Partnership in Safe Discharge Planning  document with patient/family: No     Handoff Completed: No, handoff not indicated or clinically appropriate    Additional Information:  Per provider, Dr. Donato, patient just returned from a TAVR procedure. She is not ready. Planned for surgical ORIF of the left elbow next week.    Social Hx:  Assessment: Follow. Live w/son Chaz who assists w/I/ADLs. Use walker/cane.  Last note: 1/3/25  Plan: Plan surgical ORIF of elbow fracture next week.  Needs: TCU recommended; prefers [facility]. Will need Jung Cruz (when ready), PAS.   Hand off: No  Transport: Family: if able to transfer.     Next Steps: RNCM to follow for medical progression, recommendations, and final discharge plan.     Adamaris Lawrence RN

## 2025-01-03 NOTE — PROGRESS NOTES
Hydralazine 10 mg IV given for /73. Will continue to monitor Vitals and groin site and radial site. Keep reminding patient of plan of care.

## 2025-01-03 NOTE — PLAN OF CARE
"Goal Outcome Evaluation:                    Patient is alert to self, disoriented to place, time, situation.  Lukasz wipes done after 0500, linens changed, hair in groin clipped.  Patient NPO since midnight.  Sling on left arm changed for new one.  Tylenol given once for comfort, as patient has soreness in left arm with movement, though no pain at rest.  Sacral mepilex in place.  Very difficult to obtain blood draws.  2 peripheral IVs in right arm, as left arm wrapped in ace wrap due to fracture.  Due to IV placement, blood pressure was necessary to get on lower extremities.     Patient was unable to void, bladder scanned for 373, straight cathed for 400 ml.  NS at 75 ml/hr.      Problem: Adult Inpatient Plan of Care  Goal: Plan of Care Review  Description: The Plan of Care Review/Shift note should be completed every shift.  The Outcome Evaluation is a brief statement about your assessment that the patient is improving, declining, or no change.  This information will be displayed automatically on your shift  note.  Outcome: Progressing  Goal: Patient-Specific Goal (Individualized)  Description: You can add care plan individualizations to a care plan. Examples of Individualization might be:  \"Parent requests to be called daily at 9am for status\", \"I have a hard time hearing out of my right ear\", or \"Do not touch me to wake me up as it startles  me\".  Outcome: Progressing  Goal: Absence of Hospital-Acquired Illness or Injury  Outcome: Progressing  Intervention: Identify and Manage Fall Risk  Recent Flowsheet Documentation  Taken 1/3/2025 0008 by Radha Nicholas, RN  Safety Promotion/Fall Prevention:   supervised activity   safety round/check completed   nonskid shoes/slippers when out of bed   lighting adjusted   increase visualization of patient   clutter free environment maintained  Intervention: Prevent Skin Injury  Recent Flowsheet Documentation  Taken 1/3/2025 0008 by Radha Nicholas, RN  Body Position: " position changed independently  Intervention: Prevent and Manage VTE (Venous Thromboembolism) Risk  Recent Flowsheet Documentation  Taken 1/3/2025 0008 by Radha Nicholas RN  VTE Prevention/Management:   patient refused intervention   SCDs off (sequential compression devices)  Intervention: Prevent Infection  Recent Flowsheet Documentation  Taken 1/3/2025 0008 by Radha Nicholas RN  Infection Prevention:   single patient room provided   rest/sleep promoted  Goal: Optimal Comfort and Wellbeing  Outcome: Progressing  Intervention: Monitor Pain and Promote Comfort  Recent Flowsheet Documentation  Taken 1/3/2025 0534 by Radha Nicholas RN  Pain Management Interventions: medication (see MAR)  Goal: Readiness for Transition of Care  Outcome: Progressing

## 2025-01-03 NOTE — PRE-PROCEDURE
GENERAL PRE-PROCEDURE:   Procedure:  TAVR  Date/Time:  1/3/2025 6:48 AM    Written consent obtained?: Yes    Risks and benefits: Risks, benefits and alternatives were discussed    Consent given by:  Patient  Patient states understanding of procedure being performed: Yes    Patient's understanding of procedure matches consent: Yes    Procedure consent matches procedure scheduled: Yes    Expected level of sedation:  Moderate  Appropriately NPO:  Yes  ASA Class:  4 (severe AS, HTN, HLD, Alzheimers dementia, left humeral frax needing surgery, hypothyroidism, psoriatic arthritis)  Mallampati  :  Grade 1- soft palate, uvula, tonsillar pillars, and posterior pharyngeal wall visible  Lungs:  Lungs clear with good breath sounds bilaterally  Heart:  Systolic murmur and RRR  History & Physical reviewed:  History and physical reviewed and no updates needed  Statement of review:  I have reviewed the lab findings, diagnostic data, medications, and the plan for sedation

## 2025-01-03 NOTE — PROGRESS NOTES
Procedure: TAVR    Anesthesia type: moderate sedation    Valve type: Zelaya LEXX 3 Ultra RESILIA    Valve size: 23 mm    Access used:   1) Valve Delivery Site: right femoral artery  2) Pigtail site: right radial artery  3) Temporary pacing wire: right femoral vein    Implanting physician: Dr. Torres    Surgeon who assisted in case: Dr. Afia Blair PA-C, MPAS  Structural Heart Program  [facility]

## 2025-01-03 NOTE — PLAN OF CARE
Pt alert. Answering questions appropriately. Forgetful and repetitive with communication; per son Guy who is here with patient says this is baseline related to her dementia history. Vss on room air. Pt tolerated procedure well. Right femoral procedural site cdi. Cms +, no signs of bleeding/hematoma. Right radial procedural site cdi. TR band in place. No signs of bleeding/hematoma. CMS +. Pt ready to be transferred back to room p301. Report and bedside handoff given to Columba VELIZ. All belongings remain in inpatient room. Thierry Tovar in room visiting with patient.

## 2025-01-03 NOTE — PLAN OF CARE
Goal Outcome Evaluation:      Plan of Care Reviewed With: patient    Overall Patient Progress: improvingOverall Patient Progress: improving       Problem: Pain Acute  Goal: Optimal Pain Control and Function  Outcome: Progressing  Intervention: Prevent or Manage Pain  Recent Flowsheet Documentation  Taken 1/3/2025 1556 by Kayla Valero RN  Medication Review/Management: medications reviewed     Problem: Cardiac Catheterization (Diagnostic/Interventional)  Goal: Absence of Bleeding  Outcome: Progressing     Problem: Cardiac Catheterization (Diagnostic/Interventional)  Goal: Absence of Vascular Access Complication  Outcome: Progressing  Intervention: Prevent and Manage Access Complications  Recent Flowsheet Documentation  Taken 1/3/2025 1556 by Kayla Valero RN  Bleeding Precautions:   blood pressure closely monitored   monitored for signs of bleeding  Taken 1/3/2025 1554 by Kayla Valero RN  Activity Management: up in chair     Pt disoriented to time and situation, forgetful. Pt SR. TAVR completed today with right wrist and right groin access sites. Both wrist and groin sites are soft, CMS intact. Bruising around right wrist. Slight leakage around groin site, dressing reinforced. Sling and orthosis intact on left arm, pt c/o pain in left arm, prn oxycodone and Tylenol effective. Bladder scanned and had 303 ml at 2145, straight cath effective.     Kayla Valero RN

## 2025-01-03 NOTE — PROGRESS NOTES
Met with patient this AM, went over procedure again, including risks/benefits.  She wishes to be full bailout.      Maida Blair PA-C, MPAS  Structural Heart Program  [facility]

## 2025-01-03 NOTE — PLAN OF CARE
Problem: Fall Injury Risk  Goal: Absence of Fall and Fall-Related Injury  Outcome: Progressing  Intervention: Identify and Manage Contributors  Recent Flowsheet Documentation  Taken 1/2/2025 2001 by Lorene Dukes RN  Medication Review/Management: medications reviewed  Intervention: Promote Injury-Free Environment  Recent Flowsheet Documentation  Taken 1/2/2025 2001 by Lorene Dukes RN  Safety Promotion/Fall Prevention:   nonskid shoes/slippers when out of bed   mobility aid in reach   activity supervised   clutter free environment maintained     Problem: Pain Acute  Goal: Optimal Pain Control and Function  Outcome: Progressing  Intervention: Develop Pain Management Plan  Recent Flowsheet Documentation  Taken 1/2/2025 2001 by Lorene Dukes RN  Pain Management Interventions: medication (see MAR)  Intervention: Prevent or Manage Pain  Recent Flowsheet Documentation  Taken 1/2/2025 2001 by Lorene Dukes RN  Medication Review/Management: medications reviewed   Goal Outcome Evaluation:  Pt is alert & oriented to self & situation, forgetful. Pt is aware that she will be NPO at midnight. Pure wick in place as pt is assist x 2 and as a splint on left hand. Tylenol given for pain rated 4/10. Bed alarm on for safety, call light within reach.

## 2025-01-03 NOTE — PROGRESS NOTES
"Orthopedic Progress Note      Assessment:   Left acute comminuted intra-articular mildly displaced distal humerus fracture, minimally displaced olecranon process fracture, minimally displaced radial head fracture     Plan:   -TAVR today  -Patient will require surgical ORIF of the left elbow. Will plan for surgical fixation of left elbow fracture possibly next week following cardiology intervention. Will need cardiology to indicate when it is okay for the patient to go to the OR following TAVR  -Pain control per primary team  -Splint to remain on and in place until surgical intervention. Splint to remain clean and dry.   -Sling to remain on at all times besides hygiene cares and skin checks.  -Nonweightbearing left upper extremity  -Medical optimization per primary team.       Thank you for including [facility] in the care of Sharon De La Vega. It has been a pleasure participating in their care.    Subjective:    Patient not seen today, chart checked.       Objective:  BP (!) 147/63 (BP Location: Left leg, Patient Position: Semi-Baca's, Cuff Size: Adult Regular)   Pulse 78   Temp 98.1  F (36.7  C) (Oral)   Resp 18   Ht 1.651 m (5' 5\")   Wt 101.7 kg (224 lb 3.3 oz)   SpO2 99%   BMI 37.31 kg/m        General: patient resting comfortably in bed, awake, alert and oriented. In no acute distress.   Skin: Bruising and swelling over left elbow, no abrasions noted. Skin is warm and well-perfused.   Pulses: Capillary refill normal in all fingers to left upper extremity.   Sensation: intact and equal bilaterally to the distal upper extremities.  ROM: Able to wiggle fingers, do a thumbs up, okay sign, cross fingers  Motor: hand  intact, wrist flexion/extension with resistance intact.        Pertinent Labs   Lab Results: personally reviewed.   Lab Results   Component Value Date    INR 1.08 01/02/2025    INR 1.15 01/10/2023     Lab Results   Component Value Date    WBC 9.1 01/03/2025    HGB 10.7 (L) " 01/03/2025    HCT 32.9 (L) 01/03/2025    MCV 98 01/03/2025     (L) 01/03/2025     Lab Results   Component Value Date     01/03/2025    CO2 22 01/03/2025         LYDIA PELLETIER PA-C  Date: 01/03/2025  [facility]

## 2025-01-04 ENCOUNTER — APPOINTMENT (OUTPATIENT)
Dept: CARDIOLOGY | Facility: HOSPITAL | Age: 80
End: 2025-01-04
Attending: INTERNAL MEDICINE
Payer: COMMERCIAL

## 2025-01-04 ENCOUNTER — APPOINTMENT (OUTPATIENT)
Dept: RADIOLOGY | Facility: HOSPITAL | Age: 80
DRG: 267 | End: 2025-01-04
Attending: INTERNAL MEDICINE
Payer: COMMERCIAL

## 2025-01-04 ENCOUNTER — APPOINTMENT (OUTPATIENT)
Dept: OCCUPATIONAL THERAPY | Facility: HOSPITAL | Age: 80
DRG: 267 | End: 2025-01-04
Attending: INTERNAL MEDICINE
Payer: COMMERCIAL

## 2025-01-04 LAB
ALBUMIN SERPL BCG-MCNC: 3.3 G/DL (ref 3.5–5.2)
ALP SERPL-CCNC: 76 U/L (ref 40–150)
ALT SERPL W P-5'-P-CCNC: 15 U/L (ref 0–50)
ANION GAP SERPL CALCULATED.3IONS-SCNC: 9 MMOL/L (ref 7–15)
AST SERPL W P-5'-P-CCNC: 34 U/L (ref 0–45)
BASOPHILS # BLD AUTO: 0.1 10E3/UL (ref 0–0.2)
BASOPHILS NFR BLD AUTO: 1 %
BILIRUB SERPL-MCNC: 0.5 MG/DL
BUN SERPL-MCNC: 18.7 MG/DL (ref 8–23)
CALCIUM SERPL-MCNC: 8.8 MG/DL (ref 8.8–10.4)
CHLORIDE SERPL-SCNC: 106 MMOL/L (ref 98–107)
CREAT SERPL-MCNC: 0.85 MG/DL (ref 0.51–0.95)
EGFRCR SERPLBLD CKD-EPI 2021: 70 ML/MIN/1.73M2
EOSINOPHIL # BLD AUTO: 0.3 10E3/UL (ref 0–0.7)
EOSINOPHIL NFR BLD AUTO: 3 %
ERYTHROCYTE [DISTWIDTH] IN BLOOD BY AUTOMATED COUNT: 16.5 % (ref 10–15)
GLUCOSE SERPL-MCNC: 117 MG/DL (ref 70–99)
HCO3 SERPL-SCNC: 25 MMOL/L (ref 22–29)
HCT VFR BLD AUTO: 31.8 % (ref 35–47)
HGB BLD-MCNC: 10.5 G/DL (ref 11.7–15.7)
IMM GRANULOCYTES # BLD: 0 10E3/UL
IMM GRANULOCYTES NFR BLD: 1 %
LVEF ECHO: NORMAL
LYMPHOCYTES # BLD AUTO: 0.6 10E3/UL (ref 0.8–5.3)
LYMPHOCYTES NFR BLD AUTO: 7 %
MAGNESIUM SERPL-MCNC: 2.1 MG/DL (ref 1.7–2.3)
MCH RBC QN AUTO: 31.7 PG (ref 26.5–33)
MCHC RBC AUTO-ENTMCNC: 33 G/DL (ref 31.5–36.5)
MCV RBC AUTO: 96 FL (ref 78–100)
MONOCYTES # BLD AUTO: 0.9 10E3/UL (ref 0–1.3)
MONOCYTES NFR BLD AUTO: 11 %
NEUTROPHILS # BLD AUTO: 6.7 10E3/UL (ref 1.6–8.3)
NEUTROPHILS NFR BLD AUTO: 79 %
NRBC # BLD AUTO: 0 10E3/UL
NRBC BLD AUTO-RTO: 0 /100
PHOSPHATE SERPL-MCNC: 2.3 MG/DL (ref 2.5–4.5)
PLATELET # BLD AUTO: 116 10E3/UL (ref 150–450)
POTASSIUM SERPL-SCNC: 4 MMOL/L (ref 3.4–5.3)
PROT SERPL-MCNC: 6 G/DL (ref 6.4–8.3)
RBC # BLD AUTO: 3.31 10E6/UL (ref 3.8–5.2)
SODIUM SERPL-SCNC: 140 MMOL/L (ref 135–145)
WBC # BLD AUTO: 8.5 10E3/UL (ref 4–11)

## 2025-01-04 PROCEDURE — 84075 ASSAY ALKALINE PHOSPHATASE: CPT | Performed by: INTERNAL MEDICINE

## 2025-01-04 PROCEDURE — 83735 ASSAY OF MAGNESIUM: CPT | Performed by: INTERNAL MEDICINE

## 2025-01-04 PROCEDURE — 97168 OT RE-EVAL EST PLAN CARE: CPT | Mod: GO

## 2025-01-04 PROCEDURE — 93306 TTE W/DOPPLER COMPLETE: CPT

## 2025-01-04 PROCEDURE — 36415 COLL VENOUS BLD VENIPUNCTURE: CPT | Performed by: INTERNAL MEDICINE

## 2025-01-04 PROCEDURE — 99232 SBSQ HOSP IP/OBS MODERATE 35: CPT | Performed by: STUDENT IN AN ORGANIZED HEALTH CARE EDUCATION/TRAINING PROGRAM

## 2025-01-04 PROCEDURE — 93005 ELECTROCARDIOGRAM TRACING: CPT

## 2025-01-04 PROCEDURE — 84100 ASSAY OF PHOSPHORUS: CPT | Performed by: INTERNAL MEDICINE

## 2025-01-04 PROCEDURE — 97165 OT EVAL LOW COMPLEX 30 MIN: CPT | Mod: GO

## 2025-01-04 PROCEDURE — 85049 AUTOMATED PLATELET COUNT: CPT | Performed by: INTERNAL MEDICINE

## 2025-01-04 PROCEDURE — 120N000004 HC R&B MS OVERFLOW

## 2025-01-04 PROCEDURE — 97535 SELF CARE MNGMENT TRAINING: CPT | Mod: GO

## 2025-01-04 PROCEDURE — 71045 X-RAY EXAM CHEST 1 VIEW: CPT

## 2025-01-04 PROCEDURE — 85004 AUTOMATED DIFF WBC COUNT: CPT | Performed by: INTERNAL MEDICINE

## 2025-01-04 PROCEDURE — 85014 HEMATOCRIT: CPT | Performed by: INTERNAL MEDICINE

## 2025-01-04 PROCEDURE — 84155 ASSAY OF PROTEIN SERUM: CPT | Performed by: INTERNAL MEDICINE

## 2025-01-04 PROCEDURE — 250N000013 HC RX MED GY IP 250 OP 250 PS 637: Performed by: INTERNAL MEDICINE

## 2025-01-04 PROCEDURE — 99232 SBSQ HOSP IP/OBS MODERATE 35: CPT | Performed by: INTERNAL MEDICINE

## 2025-01-04 PROCEDURE — 250N000011 HC RX IP 250 OP 636: Performed by: INTERNAL MEDICINE

## 2025-01-04 PROCEDURE — 93306 TTE W/DOPPLER COMPLETE: CPT | Mod: 26 | Performed by: INTERNAL MEDICINE

## 2025-01-04 RX ADMIN — DONEPEZIL HYDROCHLORIDE 10 MG: 5 TABLET, FILM COATED ORAL at 21:04

## 2025-01-04 RX ADMIN — ACETAMINOPHEN 650 MG: 325 TABLET ORAL at 04:01

## 2025-01-04 RX ADMIN — MEMANTINE 10 MG: 10 TABLET ORAL at 08:50

## 2025-01-04 RX ADMIN — ACETAMINOPHEN 650 MG: 325 TABLET ORAL at 15:23

## 2025-01-04 RX ADMIN — HYDRALAZINE HYDROCHLORIDE 10 MG: 20 INJECTION INTRAMUSCULAR; INTRAVENOUS at 04:01

## 2025-01-04 RX ADMIN — HYDRALAZINE HYDROCHLORIDE 10 MG: 20 INJECTION INTRAMUSCULAR; INTRAVENOUS at 21:03

## 2025-01-04 RX ADMIN — ASPIRIN 81 MG: 81 TABLET, COATED ORAL at 08:49

## 2025-01-04 RX ADMIN — SIMVASTATIN 20 MG: 10 TABLET, FILM COATED ORAL at 21:03

## 2025-01-04 RX ADMIN — HYDRALAZINE HYDROCHLORIDE 10 MG: 20 INJECTION INTRAMUSCULAR; INTRAVENOUS at 15:24

## 2025-01-04 RX ADMIN — ACETAMINOPHEN 650 MG: 325 TABLET ORAL at 09:31

## 2025-01-04 RX ADMIN — CITALOPRAM HYDROBROMIDE 20 MG: 20 TABLET ORAL at 08:50

## 2025-01-04 RX ADMIN — BUPROPION HYDROCHLORIDE 150 MG: 150 TABLET, FILM COATED, EXTENDED RELEASE ORAL at 08:49

## 2025-01-04 RX ADMIN — LEVOTHYROXINE SODIUM 100 MCG: 100 TABLET ORAL at 08:49

## 2025-01-04 RX ADMIN — MEMANTINE 10 MG: 10 TABLET ORAL at 21:04

## 2025-01-04 RX ADMIN — LISINOPRIL 20 MG: 20 TABLET ORAL at 08:49

## 2025-01-04 ASSESSMENT — ACTIVITIES OF DAILY LIVING (ADL)
ADLS_ACUITY_SCORE: 71
ADLS_ACUITY_SCORE: 71
ADLS_ACUITY_SCORE: 73
ADLS_ACUITY_SCORE: 71
ADLS_ACUITY_SCORE: 73
ADLS_ACUITY_SCORE: 73
ADLS_ACUITY_SCORE: 71
ADLS_ACUITY_SCORE: 67
ADLS_ACUITY_SCORE: 73
ADLS_ACUITY_SCORE: 73
ADLS_ACUITY_SCORE: 67
ADLS_ACUITY_SCORE: 71
ADLS_ACUITY_SCORE: 73
ADLS_ACUITY_SCORE: 73
ADLS_ACUITY_SCORE: 67
ADLS_ACUITY_SCORE: 73
ADLS_ACUITY_SCORE: 71
ADLS_ACUITY_SCORE: 73
ADLS_ACUITY_SCORE: 71
ADLS_ACUITY_SCORE: 73
ADLS_ACUITY_SCORE: 71

## 2025-01-04 NOTE — PROGRESS NOTES
[facility] HEART [facility]ARE   [address], [place] [address]   [email or web address]   OFFICE: [phone]     CARDIOLOGY INPATIENT PROGRESS NOTE     Impression and Plan     Assessment/Plan:  Ms. Sharon De La Vega is a 79 year old female with severe aortic stenosis, alzheimer's dementia, HTN, psoriatic arthritis, HLD, who presented to the hospital with falls.      Falls    - Unclear etiology, possible related to severe symptomatic aortic stenosis   - Continue telemetry     Severe aortic stenosis   - Mean gradient 57 mmHg, BETH 0.6 cm2.   - Symptoms of dyspnea on exertion, possible dizziness/falls   - s/p TAVR 23mm Zelaya Darian 3 Ultra 1/3/2025   - Follow up echo     L elbow fracture   - OK to get L elbow repair Monday 1/6   - Should stay on ASA 81mg     Will continue to follow     Primary Cardiologist: Not established, may establish with me.      Subjective     Sharon De La Vega is in more pain related to elbow.  TAVR went well        Review of Systems:  Further review of systems is otherwise negative/noncontributory (based on review of medical record (admission H&P) and 13 point review of systems reviewed. Pertinent positives noted).    Cardiac Diagnostics     ECG: Personally reviewed and interpreted: 1/4/2025  NSR  LAE  Nonspecific STT    Telemetry (personally reviewed): NSR    Most recent:  Echocardiogram (results reviewed): 1/4/2025  ECHOCARDIOGRAM PERFORMED TO AID IN TRANSAORTIC VALVE REPLACEMENT (TAVR).     Pre-TAVR:  1. Normal left ventricular size and systolic performance with a visually  estimated ejection fraction of 60%.  2. There is normal regional wall motion.  3. There is severe aortic stenosis.  Â  The mean gradient is measured at 49 mmHg with peak velocity of 4.8 m/s.  Calculated valve area 0.50-0.51 cmÂ .  4. There is trace aortic insufficiency.  5. Normal right ventricular size and systolic performance.  6. There is mild to moderate left atrial  enlargement.     Post TAVR:  1. Normal left ventricular size and systolic performance with a visually  estimated ejection fraction of 60-65%.  2.There is a bio-prosthetic aortic valve (documented 23 mm Zelaya Darian 3  Ultra Resilia tissue valve).  Â  Normal aortic valve prosthesis metrics with a mean systolic gradient of 6  mmHg and a peak anterograde velocity of 1.7 m/sec.  Â  There is trace aortic insufficiency.  3. There is no significant pericardial effusion.    Echocardiogram (results reviewed): 12/30/2024  Interpretation Summary     1. Limited echocardiographic study.  2. Normal left ventricular size and systolic performance with a visually  estimated ejection fraction of 60%.  3. There is severe aortic stenosis.  Â  The mean gradient is measured at 57 mmHg with peak velocity of 4.6 m/s.  Calculated valve area 0.60-0.63 cmÂ .  4. There is trace aortic insufficiency.  5. Normal right ventricular size and systolic performance.  6. There is mild left atrial enlargement.     When compared to the prior real-time echocardiogram dated 7 September 2023,  the mean gradient across the aortic valve has increased from 29 mmHg to 57  mmHg.  Cardiac Cath (results reviewed): 12/31/2024  Coronary dominance: Right     LM: 0%  LAD:              P: 0%              M: 0%              D: 20%  D1: 0%, small      Medical History  Surgical History Family History Social History   Past Medical History:   Diagnosis Date    Anxiety 02/14/2013    Formatting of this note might be different from the original.  CURRENT MEDICATION(S) celexa 40 mg;   Lorazepam prn  Only occ  Stopped no need 11-17. Use PHQ 9 = 6   11-15;      Aortic valve stenosis 04/07/2023    Essential hypertension 10/29/2012    Hypercholesterolemia 10/29/2012    Hypothyroidism 10/29/2012    Major depressive disorder, single episode, moderate (H) 11/28/2022    Formatting of this note might be different from the original.  CURRENT MEDICATION(S) citalopram 40 mg    PHQ 9 =  2   11-11;  phq 9 = 2    12-11;  = 2   5-12; = 2   11-12;  Called her  Doing better  No need to see a psychologist. 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    Psoriatic arthritis (H) 10/29/2012     Past Surgical History:   Procedure Laterality Date    APPENDECTOMY      CARPAL TUNNEL RELEASE RT/LT Bilateral     CATARACT EXTRACTION, BILATERAL      CHOLECYSTECTOMY  1964    CV CORONARY ANGIOGRAM N/A 2024    Procedure: Coronary Angiogram;  Surgeon: Farhad Blue MD;  Location: [facility] CV    HC KNEE ARTHROSCOPY, MED/LAT MENISCUS REPAIR Right     KS EXCISE HAND/FOOT NEUROMA  2004    right foot    TOTAL KNEE ARTHROPLASTY Right     TRABECULECTOMY Right     laser surgery right eye     Family History   Problem Relation Age of Onset    Lung Cancer Father     Multiple myeloma Brother            Social History     Socioeconomic History    Marital status:      Spouse name: Not on file    Number of children: Not on file    Years of education: Not on file    Highest education level: Not on file   Occupational History    Not on file   Tobacco Use    Smoking status: Former     Current packs/day: 0.00     Types: Cigarettes     Quit date:      Years since quittin.0    Smokeless tobacco: Never   Substance and Sexual Activity    Alcohol use: Not Currently    Drug use: Not on file    Sexual activity: Not on file   Other Topics Concern    Not on file   Social History Narrative    Not on file     Social Drivers of Health     Financial Resource Strain: Low Risk  (2024)    Financial Resource Strain     Within the past 12 months, have you or your family members you live with been unable to get utilities (heat, electricity) when it was really needed?: No   Food Insecurity: Low Risk  (2024)    Food Insecurity     Within the past 12 months, did you worry that your food would run out before you got  "money to buy more?: No     Within the past 12 months, did the food you bought just not last and you didn t have money to get more?: No   Transportation Needs: Low Risk  (12/30/2024)    Transportation Needs     Within the past 12 months, has lack of transportation kept you from medical appointments, getting your medicines, non-medical meetings or appointments, work, or from getting things that you need?: No   Physical Activity: Not on file   Stress: Not on file   Social Connections: Unknown (6/24/2023)    Received from [facility], [facility]    Social Connections     Frequency of Communication with Friends and Family: Not on file   Interpersonal Safety: Low Risk  (12/30/2024)    Interpersonal Safety     Do you feel physically and emotionally safe where you currently live?: Yes     Within the past 12 months, have you been hit, slapped, kicked or otherwise physically hurt by someone?: No     Within the past 12 months, have you been humiliated or emotionally abused in other ways by your partner or ex-partner?: No   Housing Stability: Low Risk  (12/30/2024)    Housing Stability     Do you have housing? : Yes     Are you worried about losing your housing?: No             Physical Examination   VITALS: BP (!) 145/67 (BP Location: Left leg, Patient Position: Chair, Cuff Size: Adult Regular)   Pulse 86   Temp 98.1  F (36.7  C) (Oral)   Resp 18   Ht 1.651 m (5' 5\")   Wt 101.1 kg (222 lb 12.8 oz)   SpO2 93%   BMI 37.08 kg/m    BMI: Body mass index is 37.08 kg/m .  Wt Readings from Last 3 Encounters:   01/04/25 101.1 kg (222 lb 12.8 oz)   10/03/24 104.9 kg (231 lb 4.8 oz)   10/01/24 105 kg (231 lb 6.4 oz)       Intake/Output Summary (Last 24 hours) at 1/4/2025 1111  Last data filed at 1/4/2025 0936  Gross per 24 hour   Intake 1090 ml   Output 250 ml   Net 840 ml       General: pleasant female. No acute distress.   HENT: external ears normal. Nares " "patent. Mucous membranes moist.  Neck: No JVD  Lungs: clear to auscultation  COR:  regular rate and rhythm, No murmurs, rubs, or gallops  Abd: nondistended, BS present  Extrem: No edema          Lab Results Reviewed    Chemistry/lipid CBC Cardiac Enzymes/BNP/TSH/INR   No results for input(s): \"CHOL\", \"HDL\", \"LDL\", \"TRIG\", \"CHOLHDLRATIO\" in the last 24917 hours.  No results for input(s): \"LDL\" in the last 05019 hours.  Recent Labs   Lab Test 01/04/25  0441      POTASSIUM 4.0   CHLORIDE 106   CO2 25   *   BUN 18.7   CR 0.85   GFRESTIMATED 70   RIZWANA 8.8     Recent Labs   Lab Test 01/04/25  0441 01/03/25  0930 01/03/25  0502   CR 0.85 0.81 0.90     Recent Labs   Lab Test 09/21/24  1837   A1C 5.3          Recent Labs   Lab Test 01/04/25  0441   WBC 8.5   HGB 10.5*   HCT 31.8*   MCV 96   *     Recent Labs   Lab Test 01/04/25  0441 01/03/25  0930 01/03/25  0502   HGB 10.5* 10.7* 11.0*    No results for input(s): \"TROPONINI\" in the last 74863 hours.  Recent Labs   Lab Test 01/02/25  1106 09/06/23  2139 02/14/23  1712   NTBNPI 1,077 2,868* 171     Recent Labs   Lab Test 12/29/24  1306   TSH 1.63     Recent Labs   Lab Test 01/02/25  1106 01/10/23  1252   INR 1.08 1.15           Current Inpatient Scheduled Medications   Scheduled Meds:  Current Facility-Administered Medications   Medication Dose Route Frequency Provider Last Rate Last Admin    aspirin EC tablet 81 mg  81 mg Oral Daily Maida Blair PA-C   81 mg at 01/04/25 0849    buPROPion (WELLBUTRIN XL) 24 hr tablet 150 mg  150 mg Oral QAM Maida Blair PA-C   150 mg at 01/04/25 0849    citalopram (celeXA) tablet 20 mg  20 mg Oral WILBERM Maida Blair PA-C   20 mg at 01/04/25 0850    donepezil (ARICEPT) tablet 10 mg  10 mg Oral At Bedtime Maida Blair PA-C   10 mg at 01/03/25 2302    [Held by provider] leflunomide (ARAVA) tablet 10 mg  10 mg Oral [facility] Maida Blair PA-C   10 mg at 12/30/24 0806    levothyroxine (SYNTHROID/LEVOTHROID) " tablet 100 mcg  100 mcg Oral QAM Maida Blair PA-C   100 mcg at 01/04/25 0849    lisinopril (ZESTRIL) tablet 20 mg  20 mg Oral Daily Maida Blair PA-C   20 mg at 01/04/25 0849    memantine (NAMENDA) tablet 10 mg  10 mg Oral BID Maida Blair PA-C   10 mg at 01/04/25 0850    simvastatin (ZOCOR) tablet 20 mg  20 mg Oral QPM Maida Blair PA-C   20 mg at 01/03/25 1942     Continuous Infusions:  Current Facility-Administered Medications   Medication Dose Route Frequency Provider Last Rate Last Admin       No current outpatient medications on file.          Medications Prior to Admission   Prior to Admission medications    Medication Sig Start Date End Date Taking? Authorizing Provider   acetaminophen (TYLENOL) 325 MG tablet Take 2 tablets (650 mg) by mouth every 6 hours as needed for mild pain 10/1/23  Yes Sherley Armando APRN CNP   benazepril (LOTENSIN) 20 MG tablet Take 20 mg by mouth daily.   Yes Unknown, Entered By History   buPROPion (WELLBUTRIN XL) 150 MG 24 hr tablet Take 150 mg by mouth every morning. 8/20/24  Yes Reported, Patient   citalopram (CELEXA) 20 MG tablet Take 1 tablet by mouth every morning. 12/15/22  Yes Reported, Patient   cyanocobalamin (VITAMIN B-12) 1000 MCG tablet Take 1,000 mcg by mouth every morning. 12/26/22  Yes Reported, Patient   donepezil (ARICEPT) 10 MG tablet Take 1 tablet (10 mg) by mouth at bedtime 2/21/24  Yes Manuel Osuna MD   folic acid (FOLVITE) 1 MG tablet Take 1 mg by mouth every morning.   Yes Reported, Patient   leflunomide (ARAVA) 10 MG tablet Take 1 tablet by mouth every morning. 2/8/23  Yes Reported, Patient   levothyroxine (SYNTHROID/LEVOTHROID) 100 MCG tablet Take 1 tablet by mouth every morning. 8/20/24  Yes Reported, Patient   memantine (NAMENDA) 10 MG tablet Take 1 tablet (10 mg) by mouth 2 times daily 3/23/24  Yes Manuel Osuna MD   methotrexate 2.5 MG tablet Take 10 mg by mouth once a week On Thursday   Yes Reported, Patient   nystatin  (MYCOSTATIN) 341842 UNIT/GM external powder Apply topically 2 times daily as needed. 6/15/23  Yes Reported, Patient   simvastatin (ZOCOR) 20 MG tablet Take 20 mg by mouth every evening. 1/14/23  Yes Reported, Patient   vitamin D3 (CHOLECALCIFEROL) 50 mcg (2000 units) tablet Take 1 tablet by mouth every morning.   Yes Reported, Patient   vitamin E (TOCOPHEROL) 1000 units (450 mg) CAPS capsule Take 1 capsule (1,000 Units) by mouth 2 times daily 2/21/24  Yes Manuel Osuna MD Timothy Shih,  [facility]  Non-invasive Cardiologist  [facility]

## 2025-01-04 NOTE — PROGRESS NOTES
Care Management Follow Up    Length of Stay (days): 5    Expected Discharge Date: 01/08/2025    Anticipated Discharge Plan:   transitional care    Transportation: Anticipate medical transport    PT Recommendations: Transitional Care Facility  OT Recommendations:  (S) Transitional Care Facility     Barriers to Discharge: medical stability, placement    Prior Living Situation: house with child(ashly), adult    Discussed  Partnership in Safe Discharge Planning  document with patient/family: NA     Handoff Completed: No, handoff not indicated or clinically appropriate    Patient/Spokesperson Updated: Yes. Who? Son, Guy    Additional Information:  SW reviewed chart. ORIF of elbow fracture anticipated early next week. Will need transitional care after discharge. [facility]family [facility]. Facility unable to accommodate due to memory care needs.     SW called and spoke with pt's son, Guy, over the phone. YOGESH introduced self and CM role. Discussed need for additional TCU choices. Guy verbalizes agreement with referrals being sent to facilities in [facility].     Next Steps: secure TCU placement, Betty DENISE LSW at 11:14 AM on 01/04/25

## 2025-01-04 NOTE — PROGRESS NOTES
Checked neuro's, checked sites and reassured pt.   Gave warm blanket and she appreciated it.   Reminded Guy had already been there when she asked and she said ok thanks.

## 2025-01-04 NOTE — PLAN OF CARE
"  Problem: Adult Inpatient Plan of Care  Goal: Plan of Care Review  Description: The Plan of Care Review/Shift note should be completed every shift.  The Outcome Evaluation is a brief statement about your assessment that the patient is improving, declining, or no change.  This information will be displayed automatically on your shift  note.  Outcome: Progressing  Goal: Patient-Specific Goal (Individualized)  Description: You can add care plan individualizations to a care plan. Examples of Individualization might be:  \"Parent requests to be called daily at 9am for status\", \"I have a hard time hearing out of my right ear\", or \"Do not touch me to wake me up as it startles  me\".  Outcome: Progressing  Goal: Absence of Hospital-Acquired Illness or Injury  Outcome: Progressing  Intervention: Identify and Manage Fall Risk  Recent Flowsheet Documentation  Taken 1/4/2025 1155 by Columba Wooten RN  Safety Promotion/Fall Prevention:   activity supervised   clutter free environment maintained  Taken 1/4/2025 0830 by Columba Wooten RN  Safety Promotion/Fall Prevention:   activity supervised   clutter free environment maintained  Intervention: Prevent Skin Injury  Recent Flowsheet Documentation  Taken 1/4/2025 1155 by Columba Wooten RN  Body Position: weight shifting  Skin Protection: (coccyx) silicone foam dressing in place  Taken 1/4/2025 1025 by Columba Wooten RN  Body Position:   left   right   turned   log-rolled  Taken 1/4/2025 0830 by Columba Wooten RN  Body Position: weight shifting  Skin Protection: (coccyx) silicone foam dressing in place  Intervention: Prevent and Manage VTE (Venous Thromboembolism) Risk  Recent Flowsheet Documentation  Taken 1/4/2025 1155 by Columba Wooten RN  VTE Prevention/Management: SCDs off (sequential compression devices)  Taken 1/4/2025 0830 by Columba Wooten RN  VTE Prevention/Management: SCDs off (sequential compression " devices)  Goal: Optimal Comfort and Wellbeing  Outcome: Progressing  Intervention: Monitor Pain and Promote Comfort  Recent Flowsheet Documentation  Taken 1/4/2025 0830 by Columba Wooten RN  Pain Management Interventions:   emotional support   repositioned  Goal: Readiness for Transition of Care  Outcome: Progressing     Problem: Fall Injury Risk  Goal: Absence of Fall and Fall-Related Injury  Outcome: Progressing  Intervention: Identify and Manage Contributors  Recent Flowsheet Documentation  Taken 1/4/2025 1155 by Columba Wooten RN  Medication Review/Management: medications reviewed  Taken 1/4/2025 0830 by Columba Wooten RN  Medication Review/Management: medications reviewed  Intervention: Promote Injury-Free Environment  Recent Flowsheet Documentation  Taken 1/4/2025 1155 by Columba Wooten RN  Safety Promotion/Fall Prevention:   activity supervised   clutter free environment maintained  Taken 1/4/2025 0830 by Columba Wooten RN  Safety Promotion/Fall Prevention:   activity supervised   clutter free environment maintained     Problem: Pain Acute  Goal: Optimal Pain Control and Function  Outcome: Progressing  Intervention: Develop Pain Management Plan  Recent Flowsheet Documentation  Taken 1/4/2025 0830 by Columba Wooten RN  Pain Management Interventions:   emotional support   repositioned  Intervention: Prevent or Manage Pain  Recent Flowsheet Documentation  Taken 1/4/2025 1155 by Columba Wooten RN  Medication Review/Management: medications reviewed  Taken 1/4/2025 0830 by Columba Wooten RN  Medication Review/Management: medications reviewed     Problem: Cardiac Catheterization (Diagnostic/Interventional)  Goal: Absence of Contrast-Induced Injury  Outcome: Progressing  Goal: Anesthesia/Sedation Recovery  Outcome: Progressing  Intervention: Optimize Anesthesia Recovery  Recent Flowsheet Documentation  Taken 1/4/2025 1155 by Je  Columba F, RN  Safety Promotion/Fall Prevention:   activity supervised   clutter free environment maintained  Reorientation Measures:   clock in view   glasses use encouraged   reorientation provided  Taken 1/4/2025 0830 by Columba Wooten RN  Safety Promotion/Fall Prevention:   activity supervised   clutter free environment maintained  Reorientation Measures:   clock in view   glasses use encouraged   reorientation provided  Goal: Optimal Pain Control and Function  Outcome: Progressing  Intervention: Prevent or Manage Pain  Recent Flowsheet Documentation  Taken 1/4/2025 0830 by Columba Wooten RN  Pain Management Interventions:   emotional support   repositioned   Goal Outcome Evaluation:  The patient is alert and oriented to self, birth date, no time and knows she is here because she fell. Reoriented that she is at [facility] and had a TAVR yesterday. Patient then said she remembered. Right radial and right groin sites soft to touch. Pulses palpable. Neuro's unchanged. She did have right hand and leg tremors prior to TAVR.  All alarms on in chair or in the bed where she rested. Up with therapy and one RN to the [facility] and no void even though the patient said she is done urinating. Bladder scanned 375 ml this morning at 9:30 am. Patient with no urge to void updated MD. Yesterday and the day before she was straight cathed multiple times. Today Escobar placed with good output and no complications. Patient resting comfortably. NSR. Call light in reach.

## 2025-01-04 NOTE — PROGRESS NOTES
"Orthopedic Progress Note      Assessment: Left acute comminuted intra-articular mildly displaced distal humerus fracture, minimally displaced olecranon process fracture, minimally displaced radial head fracture     Plan:   - Cards cleared for ORIF Left Olecranon 1/6  - Weightbearing status: NWB LUE  - Splint in place until surgical management. Keep clean and dry.   - Sling to remain at all times besides hygiene cares and skin checks.   - Anticoagulation:  SCDs, apolinar stockings and early ambulation.  - Pain control: per primary  - Discharge planning: medical optimization per primary team      Subjective:  Pain: Mild  Chest pain, SOB: No  Nausea, Vomiting:  No  Lightheadedness, Dizziness:  No  Neuro:  Patient denies new onset numbness or paresthesias    Patient is asleep upon entering the room. She reports feeling fine and does not have much pain in her elbow or humerus. Denies numbness and tingling. Has been keeping her arm in her sling and elevated. Discussed that she was cleared by cardiology for surgery early next week. Does not have any questions or concerns.     Objective:  BP (!) 144/65 (BP Location: Left leg)   Pulse 74   Temp 98.6  F (37  C) (Oral)   Resp 18   Ht 1.651 m (5' 5\")   Wt 101.1 kg (222 lb 12.8 oz)   SpO2 93%   BMI 37.08 kg/m    The patient is A&Ox3. Appears comfortable.     General: patient resting comfortably in bed, awake, alert and oriented. In no acute distress. Has sling in place with sugar tong splint.   Deferred assessment of wrist and elbow d/t known fracture and splint in place  Skin: Skin is warm and well-perfused.   Pulses: Capillary refill normal in all fingers to left upper extremity.   Sensation: intact and equal bilaterally to the distal upper extremities.  ROM: Able to wiggle fingers, do a thumbs up, okay sign, cross fingers  Motor: hand  intact      Pertinent Labs   Lab Results: personally reviewed.   Lab Results   Component Value Date    INR 1.08 01/02/2025    INR 1.15 " 01/10/2023     Lab Results   Component Value Date    WBC 8.5 01/04/2025    HGB 10.5 (L) 01/04/2025    HCT 31.8 (L) 01/04/2025    MCV 96 01/04/2025     (L) 01/04/2025     Lab Results   Component Value Date     01/04/2025    CO2 25 01/04/2025         Report completed by:  Aida Jackson PA-C/[facility]    Date: 1/4/2025  Time: 11:36 AM

## 2025-01-04 NOTE — PLAN OF CARE
Goal Outcome Evaluation:      Pt. Sites look good no swelling bleeding on my shift.    She did have higher BP over 160/ : in 180 range, at second round and given prn hydralazine for this. Medication was effective and also gave prn acetaminophen which then she relaxed and we were able to turn her and  do emil care.

## 2025-01-04 NOTE — PLAN OF CARE
Problem: Cognitive Impairment  Goal: Optimal Cognitive Function  Outcome: Progressing     Problem: Pain Acute  Goal: Optimal Pain Control and Function  Outcome: Progressing  Intervention: Develop Pain Management Plan  Recent Flowsheet Documentation  Taken 1/4/2025 1523 by Columba Wooten RN  Pain Management Interventions: (tylenol) medication (see MAR)  Taken 1/4/2025 0830 by Columba Wooten RN  Pain Management Interventions:   emotional support   repositioned  Intervention: Prevent or Manage Pain  Recent Flowsheet Documentation  Taken 1/4/2025 1541 by Columba Wooten RN  Medication Review/Management: medications reviewed  Taken 1/4/2025 1155 by Columba Wooten RN  Medication Review/Management: medications reviewed  Taken 1/4/2025 0830 by Columba Wooten RN  Medication Review/Management: medications reviewed   Goal Outcome Evaluation:       Patient alert to self and birth date yet needs frequent reminders of place and time. She look a nap this afternoon and got up for dinner around 1730 pm. Chair alarm on. Tylenol given for left elbow discomfort with some relief and arm sling on over the splint. Fingers are puffy and warm to touch. NSR. Escobar patient. TAVR sites unchanged. Right TR band site unchanged and pulses present. Son Guy was in this evening and very supportive. Answered his questions and he is aware of his mothers frequent request to go back to [place]. Call light in reach.

## 2025-01-04 NOTE — PROGRESS NOTES
[facility]    Medicine Progress Note - Hospitalist Service    Date of Admission:  12/29/2024    Assessment & Plan   Sharon De La Vega is a 79 year old female admitted on 12/29/2024. She was brought back to the ED by ambulance from home for evaluation after a second fall at home     # Acute left comminuted intra-articular mildly displaced distal humerus fracture, minimally displaced olecranon process fracture, minimally displaced radial head fracture.   - Status post splint/sling  - ORIF anticipated early next week   - Pain control  - Orthopedic surgery assistance appreciated     # Severe symptomatic aortic valvular stenosis  - TTE (12/29/24):  LVEF 60%, mean gradient 57 mmHg with peak velocity of 4.6 m/s. Calculated valve area 0.60-0.63 cmÂ .  - Coronary angiogram (12/29/24) normal coronary arteries.  - S/P TAVR (1/3/2025) with a 23mm Zelaya Darian Ultra Resilia Valve  - Tele  - Cardiology assistance appreciated.     # Recurrent falls  - Limited historian with dementia  - Aortic valve stenosis possibly contributory  - CT head & C spine unrevealing for acute process  - Imaging findings per #1  - Fall precautions  - PT/OT     # Essential hypertension   - Lisinopril 20mg every day    # Possible UTI  - IV CTX x 3d course completed     # Hypothyroidism  - Euthyroid, TSH 1.63  - Levothyroxine     # Alzheimer's dementia without behavioral disturbance  - Risk for violent behavior and/or acute delirium  - Donepezil, memantine  - As needed quetiapine and olanzapine     # Depression / Anxiety  -Bupropion, citalopram     # Psoriatic arthritis  - Holding methotrexate, folic acid and leflunomide     # Hyperlipidemia  - Simvastatin  - CK mildly elevated due to MSK trauma (520). Repeat pending.    # Hypokalemia- resolved     # Obesity  -BMI 38.44    # Intermittent urine retention  - voiding protocol w/ SIC prn retention          Diet: Low Saturated Fat Na <2400 mg    DVT Prophylaxis: Pneumatic Compression  "Devices  Escobar Catheter: Not present  Lines: None     Cardiac Monitoring: ACTIVE order. Indication: Transcatheter structural interventions (24 hours)  Code Status: Full Code      Clinically Significant Risk Factors               # Hypoalbuminemia: Lowest albumin = 3 g/dL at 1/2/2025  4:08 AM, will monitor as appropriate   # Thrombocytopenia: Lowest platelets = 116 in last 2 days, will monitor for bleeding   # Hypertension: Noted on problem list     # Dementia: noted on problem list        # Obesity: Estimated body mass index is 37.08 kg/m  as calculated from the following:    Height as of this encounter: 1.651 m (5' 5\").    Weight as of this encounter: 101.1 kg (222 lb 12.8 oz).      # Financial/Environmental Concerns: none         Social Drivers of Health    Tobacco Use: Medium Risk (12/29/2024)    Patient History     Smoking Tobacco Use: Former     Smokeless Tobacco Use: Never    [facility]    Social Connections          Disposition Plan     Medically Ready for Discharge: Anticipated in 2-4 Days             Jace Donato DO, DO  Hospitalist Service  [facility]  Securely message with [employer] (more info)  Text page via [employer] Paging/Directory   ______________________________________________________________________    Interval History     Afebrile. Events overnight noted.     Physical Exam   Vital Signs: Temp: 98.1  F (36.7  C) Temp src: Oral BP: 137/60 Pulse: 92   Resp: 18 SpO2: 94 % O2 Device: None (Room air)    Weight: 222 lbs 12.8 oz    General appearance - NAD  Lungs - clear to auscultation, no wheezes  Heart - rrr. No peripheral edema.  Abdomen - soft, nontender, nondistended, BS+  Neurological - alert, follows commands, disorientation/forgetfullness, no agitation.  Skin - no c/c/p     Lab/imaging reviewed  "

## 2025-01-05 LAB
ATRIAL RATE - MUSE: 91 BPM
ATRIAL RATE - MUSE: 94 BPM
DIASTOLIC BLOOD PRESSURE - MUSE: NORMAL MMHG
DIASTOLIC BLOOD PRESSURE - MUSE: NORMAL MMHG
INTERPRETATION ECG - MUSE: NORMAL
INTERPRETATION ECG - MUSE: NORMAL
MAGNESIUM SERPL-MCNC: 2 MG/DL (ref 1.7–2.3)
P AXIS - MUSE: 51 DEGREES
P AXIS - MUSE: 59 DEGREES
PHOSPHATE SERPL-MCNC: 2.1 MG/DL (ref 2.5–4.5)
PR INTERVAL - MUSE: 130 MS
PR INTERVAL - MUSE: 138 MS
QRS DURATION - MUSE: 80 MS
QRS DURATION - MUSE: 84 MS
QT - MUSE: 360 MS
QT - MUSE: 376 MS
QTC - MUSE: 442 MS
QTC - MUSE: 470 MS
R AXIS - MUSE: 3 DEGREES
R AXIS - MUSE: 8 DEGREES
SYSTOLIC BLOOD PRESSURE - MUSE: NORMAL MMHG
SYSTOLIC BLOOD PRESSURE - MUSE: NORMAL MMHG
T AXIS - MUSE: -21 DEGREES
T AXIS - MUSE: -42 DEGREES
VENTRICULAR RATE- MUSE: 91 BPM
VENTRICULAR RATE- MUSE: 94 BPM

## 2025-01-05 PROCEDURE — 83735 ASSAY OF MAGNESIUM: CPT | Performed by: INTERNAL MEDICINE

## 2025-01-05 PROCEDURE — 250N000011 HC RX IP 250 OP 636: Performed by: INTERNAL MEDICINE

## 2025-01-05 PROCEDURE — 36415 COLL VENOUS BLD VENIPUNCTURE: CPT | Performed by: INTERNAL MEDICINE

## 2025-01-05 PROCEDURE — 84100 ASSAY OF PHOSPHORUS: CPT | Performed by: INTERNAL MEDICINE

## 2025-01-05 PROCEDURE — 250N000013 HC RX MED GY IP 250 OP 250 PS 637: Performed by: INTERNAL MEDICINE

## 2025-01-05 PROCEDURE — 93005 ELECTROCARDIOGRAM TRACING: CPT | Performed by: INTERNAL MEDICINE

## 2025-01-05 PROCEDURE — 99232 SBSQ HOSP IP/OBS MODERATE 35: CPT | Performed by: INTERNAL MEDICINE

## 2025-01-05 PROCEDURE — 99207 PR NO CHARGE LOS: CPT | Performed by: STUDENT IN AN ORGANIZED HEALTH CARE EDUCATION/TRAINING PROGRAM

## 2025-01-05 PROCEDURE — 120N000004 HC R&B MS OVERFLOW

## 2025-01-05 PROCEDURE — 93005 ELECTROCARDIOGRAM TRACING: CPT

## 2025-01-05 RX ORDER — POLYETHYLENE GLYCOL 3350 17 G/17G
17 POWDER, FOR SOLUTION ORAL DAILY
Status: DISPENSED | OUTPATIENT
Start: 2025-01-05

## 2025-01-05 RX ADMIN — POLYETHYLENE GLYCOL 3350 17 G: 17 POWDER, FOR SOLUTION ORAL at 10:30

## 2025-01-05 RX ADMIN — ASPIRIN 81 MG: 81 TABLET, COATED ORAL at 08:07

## 2025-01-05 RX ADMIN — DONEPEZIL HYDROCHLORIDE 10 MG: 5 TABLET, FILM COATED ORAL at 19:36

## 2025-01-05 RX ADMIN — MEMANTINE 10 MG: 10 TABLET ORAL at 08:07

## 2025-01-05 RX ADMIN — ACETAMINOPHEN 650 MG: 325 TABLET ORAL at 08:00

## 2025-01-05 RX ADMIN — POTASSIUM & SODIUM PHOSPHATES POWDER PACK 280-160-250 MG 1 PACKET: 280-160-250 PACK at 12:01

## 2025-01-05 RX ADMIN — MEMANTINE 10 MG: 10 TABLET ORAL at 19:36

## 2025-01-05 RX ADMIN — ACETAMINOPHEN 650 MG: 325 TABLET ORAL at 19:35

## 2025-01-05 RX ADMIN — ACETAMINOPHEN 650 MG: 325 TABLET ORAL at 12:22

## 2025-01-05 RX ADMIN — BUPROPION HYDROCHLORIDE 150 MG: 150 TABLET, FILM COATED, EXTENDED RELEASE ORAL at 08:06

## 2025-01-05 RX ADMIN — LEVOTHYROXINE SODIUM 100 MCG: 100 TABLET ORAL at 08:07

## 2025-01-05 RX ADMIN — POTASSIUM & SODIUM PHOSPHATES POWDER PACK 280-160-250 MG 1 PACKET: 280-160-250 PACK at 09:36

## 2025-01-05 RX ADMIN — MICONAZOLE NITRATE ANTIFUNGAL POWDER: 2 POWDER TOPICAL at 08:10

## 2025-01-05 RX ADMIN — POTASSIUM & SODIUM PHOSPHATES POWDER PACK 280-160-250 MG 1 PACKET: 280-160-250 PACK at 15:23

## 2025-01-05 RX ADMIN — HYDRALAZINE HYDROCHLORIDE 10 MG: 20 INJECTION INTRAMUSCULAR; INTRAVENOUS at 02:12

## 2025-01-05 RX ADMIN — SIMVASTATIN 20 MG: 10 TABLET, FILM COATED ORAL at 19:35

## 2025-01-05 RX ADMIN — HYDRALAZINE HYDROCHLORIDE 10 MG: 20 INJECTION INTRAMUSCULAR; INTRAVENOUS at 04:06

## 2025-01-05 RX ADMIN — CITALOPRAM HYDROBROMIDE 20 MG: 20 TABLET ORAL at 08:07

## 2025-01-05 RX ADMIN — LISINOPRIL 20 MG: 20 TABLET ORAL at 08:06

## 2025-01-05 ASSESSMENT — ACTIVITIES OF DAILY LIVING (ADL)
ADLS_ACUITY_SCORE: 67
ADLS_ACUITY_SCORE: 65
ADLS_ACUITY_SCORE: 65
ADLS_ACUITY_SCORE: 67
ADLS_ACUITY_SCORE: 65
ADLS_ACUITY_SCORE: 67
ADLS_ACUITY_SCORE: 63
ADLS_ACUITY_SCORE: 65
ADLS_ACUITY_SCORE: 63
ADLS_ACUITY_SCORE: 63
ADLS_ACUITY_SCORE: 67
ADLS_ACUITY_SCORE: 63
ADLS_ACUITY_SCORE: 67

## 2025-01-05 NOTE — PLAN OF CARE
Problem: Pain Acute  Goal: Optimal Pain Control and Function  1/5/2025 1727 by Columba Wooten, JOSE ALFREDO  Outcome: Progressing  1/5/2025 1045 by Columba Wooten RN  Outcome: Progressing  Intervention: Develop Pain Management Plan  Recent Flowsheet Documentation  Taken 1/5/2025 1544 by Columba Wooten, RN  Pain Management Interventions: care clustered  Taken 1/5/2025 1222 by Columba Wooten RN  Pain Management Interventions: care clustered  Taken 1/5/2025 1201 by Columba Wooten RN  Pain Management Interventions: care clustered  Taken 1/5/2025 0800 by Columba Wooten RN  Pain Management Interventions:   care clustered   distraction   diversional activity provided   emotional support   environmental changes   pain management plan reviewed with patient/caregiver   pillow support provided   quiet environment facilitated   relaxation techniques promoted   repositioned   rest  Intervention: Prevent or Manage Pain  Recent Flowsheet Documentation  Taken 1/5/2025 1549 by Columba Wooten RN  Bowel Elimination Promotion: commode/bedpan at bedside  Medication Review/Management:   medications reviewed   high-risk medications identified  Taken 1/5/2025 1200 by Columba Wooten RN  Bowel Elimination Promotion: commode/bedpan at bedside  Medication Review/Management:   medications reviewed   high-risk medications identified  Taken 1/5/2025 0800 by Columba Wooten, JOSE ALFREDO  Medication Review/Management:   medications reviewed   high-risk medications identified   Goal Outcome Evaluation:  Patient eating dinner as she sat in a chair. Tylenol keeps her left arm comfortable. Dr. Tamayo put in an order to scheduling for surgery OIRF left arm. NSR. Escobar patient. Call light in reach. Chair alarm on.

## 2025-01-05 NOTE — PROGRESS NOTES
Care Management Follow Up     Length of Stay (days): 6     Expected Discharge Date: 01/08/2025     Anticipated Discharge Plan:   transitional care     Transportation: Anticipate medical transport     PT Recommendations: Transitional Care Facility  OT Recommendations:  (S) Transitional Care Facility     Barriers to Discharge: medical stability, placement     Prior Living Situation: house with child(ashly), adult     Discussed  Partnership in Safe Discharge Planning  document with patient/family: NA      Handoff Completed: No, handoff not indicated or clinically appropriate     Patient/Spokesperson Updated: Yes. Who? Son, Guy     Additional Information:  YOGESH reviewed chart. ORIF of elbow fracture on 1/6. Will need transitional care after discharge. TCU referrals pending. YOGESH submitted for [facility] evicore authorization (request ID: [id number])     Next Steps: secure TCU placement, PAS, follow up on evicore auth       LUDWIN Montilla at 8:53 AM on 1/5/2025

## 2025-01-05 NOTE — PLAN OF CARE
Problem: Cognitive Impairment  Goal: Optimal Cognitive Function  Outcome: Not Progressing     Problem: Cardiac Catheterization (Diagnostic/Interventional)  Goal: Absence of Vascular Access Complication  Intervention: Prevent and Manage Access Complications  Recent Flowsheet Documentation  Taken 1/5/2025 0400 by Phani Tay RN  Bleeding Precautions:   blood pressure closely monitored   monitored for signs of bleeding  Activity Management:   activity adjusted per tolerance   activity encouraged   up in chair   up to bedside commode   ambulated to bathroom  Head of Bed (HOB) Positioning: HOB at 20-30 degrees  Taken 1/5/2025 0000 by Phani Tay RN  Bleeding Precautions:   blood pressure closely monitored   monitored for signs of bleeding  Activity Management:   activity adjusted per tolerance   activity encouraged   up in chair   up to bedside commode   ambulated to bathroom  Head of Bed (HOB) Positioning: HOB at 20-30 degrees  Taken 1/4/2025 2000 by Phani Tay RN  Bleeding Precautions:   blood pressure closely monitored   monitored for signs of bleeding  Activity Management:   activity adjusted per tolerance   activity encouraged   up in chair   up to bedside commode   ambulated to bathroom  Head of Bed (HOB) Positioning: HOB at 20-30 degrees   Goal Outcome Evaluation:    Cardiac:  NSR, rate 80-90's.  Respiratory:  Rate 16-20, non-labored.  Sat's: Maintaining mid 90's at RA.  LS: Diminished in the bases, bilat.  Neuro:  Continues to demonstrate fluctuating mentation, behavior and attitude.  Presentation varied from calm, cooperative and pleasant, to oppositional and verbally aggressive.  Further, changes appearing suddenly and without any initiation.  GI:  Passing flatus.  No BM for HS/NOC.  BS: Present X4 quadrants.  :  Good UOP (see I/O).  Escobar/cath in place.  Pain:  Denies.  Wound site: L arm immobilized in sling.  Angio sites appearing CDI.  Ambulation:  Up with assist  X2.  Labs:  AM labs pending.  Plan:  ORI 1/6/25.  Monitor recovery from TAVR.

## 2025-01-05 NOTE — PROGRESS NOTES
Brief Cardiology Note    Pt s/p TAVR with normal gradients on follow up echo.  OK for elbow surgery on 1/6, continue ASA 81mg.  No further recommendations.  Pt to follow up with me and with valve clinic.    Will sign off    Jak Lora DO [facility]  Non-invasive Cardiologist  [facility]

## 2025-01-05 NOTE — PROGRESS NOTES
"Orthopedic Progress Note      Assessment: Left acute comminuted intra-articular mildly displaced distal humerus fracture, minimally displaced olecranon process fracture, minimally displaced radial head fracture     Plan:   - Cards cleared for ORIF Left elbow 1/7 per Dr. Tamayo  - Weightbearing status: NWB LUE  - Splint in place until surgical management. Keep clean and dry.   - Sling to remain at all times besides hygiene cares and skin checks.   - Anticoagulation:  SCDs, apolinar stockings and early ambulation.  - Pain control: per primary  - Discharge planning: medical optimization per primary team      Subjective:  Pain: Mild  Chest pain, SOB: No  Nausea, Vomiting:  No  Lightheadedness, Dizziness:  No  Neuro:  Patient denies new onset numbness or paresthesias    Patient is doing well today. She has mild pain in her elbow. She is able to recall that she fell which has brought her to the hospital. Confused about the time of year, but able to answer/follow commands. Discussed proceeding with surgery with Dr. Tamayo 1/7. No new concerns or questions.     Objective:  /62 (BP Location: Left leg)   Pulse 87   Temp 98.2  F (36.8  C) (Oral)   Resp 20   Ht 1.651 m (5' 5\")   Wt 102.3 kg (225 lb 8.5 oz)   SpO2 95%   BMI 37.53 kg/m    The patient is A&Ox3. Appears comfortable.     General: patient resting comfortably in bed, awake, alert and oriented. In no acute distress. Has sling in place with sugar tong splint.   Deferred assessment of wrist and elbow d/t known fracture and splint in place  Skin: Skin is warm and well-perfused.   Pulses: Capillary refill normal in all fingers to left upper extremity.   Sensation: intact and equal bilaterally to the distal upper extremities.  ROM: Able to wiggle fingers, do a thumbs up, cross fingers        Pertinent Labs   Lab Results: personally reviewed.   Lab Results   Component Value Date    INR 1.08 01/02/2025    INR 1.15 01/10/2023     Lab Results   Component Value Date    " WBC 8.5 01/04/2025    HGB 10.5 (L) 01/04/2025    HCT 31.8 (L) 01/04/2025    MCV 96 01/04/2025     (L) 01/04/2025     Lab Results   Component Value Date     01/04/2025    CO2 25 01/04/2025         Report completed by:  Aida Jackson PA-C/Dr. Tamayo  [facility]    Date: 1/5/2025 Time: 8:54 AM

## 2025-01-05 NOTE — PROGRESS NOTES
[facility]    Medicine Progress Note - Hospitalist Service    Date of Admission:  12/29/2024    Assessment & Plan   Sharon De La Vega is a 79 year old female admitted on 12/29/2024. She was brought back to the ED by ambulance from home for evaluation after a second fall at home     # Acute left comminuted intra-articular mildly displaced distal humerus fracture, minimally displaced olecranon process fracture, minimally displaced radial head fracture.   - Status post splint/sling  - ORIF Left elbow 1/7 per Dr. Tamayo   - Pain control  - Orthopedic surgery assistance appreciated     # Severe symptomatic aortic valvular stenosis  - TTE (12/29/24):  LVEF 60%, mean gradient 57 mmHg with peak velocity of 4.6 m/s. Calculated valve area 0.60-0.63 cmÂ .  - Coronary angiogram (12/29/24) normal coronary arteries.  - S/P TAVR (1/3/2025) with a 23mm Zelaya Darian Ultra Resilia Valve  - Tele  - Cardiology assistance appreciated.     # Recurrent falls  - Limited historian with dementia  - Aortic valve stenosis possibly contributory  - CT head & C spine unrevealing for acute process  - Imaging findings per #1  - Fall precautions  - PT/OT     # Essential hypertension   - Lisinopril 20mg every day    # Possible UTI  - IV CTX x 3d course completed     # Hypothyroidism  - Euthyroid, TSH 1.63  - Levothyroxine     # Alzheimer's dementia without behavioral disturbance  - Risk for violent behavior and/or acute delirium  - Donepezil, memantine  - As needed quetiapine and olanzapine     # Depression / Anxiety  -Bupropion, citalopram     # Psoriatic arthritis  - Holding methotrexate, folic acid and leflunomide     # Hyperlipidemia  - Simvastatin  - CK mildly elevated due to MSK trauma (520). Repeat pending.    # Hypokalemia- resolved     # Obesity  -BMI 38.44    # Intermittent urine retention  - voiding protocol w/ SIC prn retention          Diet: Low Saturated Fat Na <2400 mg    DVT Prophylaxis: Pneumatic  "Compression Devices  Escobar Catheter: PRESENT, indication: Acute retention or obstruction  Lines: None     Cardiac Monitoring: ACTIVE order. Indication: QTc prolonging medication (48 hours)  Code Status: Full Code      Clinically Significant Risk Factors               # Hypoalbuminemia: Lowest albumin = 3 g/dL at 1/2/2025  4:08 AM, will monitor as appropriate   # Thrombocytopenia: Lowest platelets = 116 in last 2 days, will monitor for bleeding   # Hypertension: Noted on problem list     # Dementia: noted on problem list        # Obesity: Estimated body mass index is 37.53 kg/m  as calculated from the following:    Height as of this encounter: 1.651 m (5' 5\").    Weight as of this encounter: 102.3 kg (225 lb 8.5 oz).      # Financial/Environmental Concerns: none         Social Drivers of Health    Tobacco Use: Medium Risk (12/29/2024)    Patient History     Smoking Tobacco Use: Former     Smokeless Tobacco Use: Never    [facility], [facility]    Social Connections          Disposition Plan     Medically Ready for Discharge: Anticipated in 2-4 Days             Jace Donato DO, DO  Hospitalist Service  [facility]  Securely message with [employer] (more info)  Text page via [facility] Paging/Directory   ______________________________________________________________________    Interval History     Afebrile. No acute events overnight noted.     Physical Exam   Vital Signs: Temp: 98.2  F (36.8  C) Temp src: Oral BP: 135/62 Pulse: 87   Resp: 20 SpO2: 95 % O2 Device: None (Room air)    Weight: 225 lbs 8.49 oz    General appearance - NAD  Lungs - clear to auscultation, no wheezes  Heart - rrr. No peripheral edema.  Abdomen - soft, nontender, nondistended, BS+  Neurological - alert, follows commands, disorientation/forgetfullness, no agitation.  Skin - no c/c/p     Lab/imaging reviewed  "

## 2025-01-06 ENCOUNTER — ANESTHESIA EVENT (OUTPATIENT)
Dept: SURGERY | Facility: HOSPITAL | Age: 80
End: 2025-01-06
Payer: COMMERCIAL

## 2025-01-06 LAB
ALBUMIN SERPL BCG-MCNC: 3.4 G/DL (ref 3.5–5.2)
ANION GAP SERPL CALCULATED.3IONS-SCNC: 11 MMOL/L (ref 7–15)
ATRIAL RATE - MUSE: 81 BPM
BASOPHILS # BLD AUTO: 0 10E3/UL (ref 0–0.2)
BASOPHILS NFR BLD AUTO: 1 %
BUN SERPL-MCNC: 21.5 MG/DL (ref 8–23)
CALCIUM SERPL-MCNC: 9.3 MG/DL (ref 8.8–10.4)
CHLORIDE SERPL-SCNC: 104 MMOL/L (ref 98–107)
CREAT SERPL-MCNC: 0.98 MG/DL (ref 0.51–0.95)
DIASTOLIC BLOOD PRESSURE - MUSE: NORMAL MMHG
EGFRCR SERPLBLD CKD-EPI 2021: 59 ML/MIN/1.73M2
EOSINOPHIL # BLD AUTO: 0.4 10E3/UL (ref 0–0.7)
EOSINOPHIL NFR BLD AUTO: 6 %
ERYTHROCYTE [DISTWIDTH] IN BLOOD BY AUTOMATED COUNT: 16.7 % (ref 10–15)
GLUCOSE SERPL-MCNC: 95 MG/DL (ref 70–99)
HCO3 SERPL-SCNC: 24 MMOL/L (ref 22–29)
HCT VFR BLD AUTO: 36.8 % (ref 35–47)
HGB BLD-MCNC: 11.9 G/DL (ref 11.7–15.7)
IMM GRANULOCYTES # BLD: 0 10E3/UL
IMM GRANULOCYTES NFR BLD: 1 %
INTERPRETATION ECG - MUSE: NORMAL
LYMPHOCYTES # BLD AUTO: 0.8 10E3/UL (ref 0.8–5.3)
LYMPHOCYTES NFR BLD AUTO: 10 %
MAGNESIUM SERPL-MCNC: 2.3 MG/DL (ref 1.7–2.3)
MCH RBC QN AUTO: 31.7 PG (ref 26.5–33)
MCHC RBC AUTO-ENTMCNC: 32.3 G/DL (ref 31.5–36.5)
MCV RBC AUTO: 98 FL (ref 78–100)
MONOCYTES # BLD AUTO: 1 10E3/UL (ref 0–1.3)
MONOCYTES NFR BLD AUTO: 13 %
NEUTROPHILS # BLD AUTO: 5.4 10E3/UL (ref 1.6–8.3)
NEUTROPHILS NFR BLD AUTO: 70 %
NRBC # BLD AUTO: 0 10E3/UL
NRBC BLD AUTO-RTO: 0 /100
P AXIS - MUSE: 62 DEGREES
PHOSPHATE SERPL-MCNC: 2.8 MG/DL (ref 2.5–4.5)
PLATELET # BLD AUTO: 115 10E3/UL (ref 150–450)
POTASSIUM SERPL-SCNC: 4 MMOL/L (ref 3.4–5.3)
PR INTERVAL - MUSE: 144 MS
QRS DURATION - MUSE: 80 MS
QT - MUSE: 406 MS
QTC - MUSE: 471 MS
R AXIS - MUSE: 8 DEGREES
RBC # BLD AUTO: 3.75 10E6/UL (ref 3.8–5.2)
SODIUM SERPL-SCNC: 139 MMOL/L (ref 135–145)
SYSTOLIC BLOOD PRESSURE - MUSE: NORMAL MMHG
T AXIS - MUSE: -35 DEGREES
VENTRICULAR RATE- MUSE: 81 BPM
WBC # BLD AUTO: 7.7 10E3/UL (ref 4–11)

## 2025-01-06 PROCEDURE — 250N000013 HC RX MED GY IP 250 OP 250 PS 637: Performed by: INTERNAL MEDICINE

## 2025-01-06 PROCEDURE — 85025 COMPLETE CBC W/AUTO DIFF WBC: CPT | Performed by: INTERNAL MEDICINE

## 2025-01-06 PROCEDURE — 36415 COLL VENOUS BLD VENIPUNCTURE: CPT | Performed by: INTERNAL MEDICINE

## 2025-01-06 PROCEDURE — 99232 SBSQ HOSP IP/OBS MODERATE 35: CPT | Performed by: STUDENT IN AN ORGANIZED HEALTH CARE EDUCATION/TRAINING PROGRAM

## 2025-01-06 PROCEDURE — 82040 ASSAY OF SERUM ALBUMIN: CPT | Performed by: INTERNAL MEDICINE

## 2025-01-06 PROCEDURE — 93005 ELECTROCARDIOGRAM TRACING: CPT | Performed by: INTERNAL MEDICINE

## 2025-01-06 PROCEDURE — 250N000013 HC RX MED GY IP 250 OP 250 PS 637: Performed by: STUDENT IN AN ORGANIZED HEALTH CARE EDUCATION/TRAINING PROGRAM

## 2025-01-06 PROCEDURE — 250N000011 HC RX IP 250 OP 636: Performed by: INTERNAL MEDICINE

## 2025-01-06 PROCEDURE — 120N000004 HC R&B MS OVERFLOW

## 2025-01-06 PROCEDURE — 83735 ASSAY OF MAGNESIUM: CPT | Performed by: INTERNAL MEDICINE

## 2025-01-06 PROCEDURE — 93005 ELECTROCARDIOGRAM TRACING: CPT

## 2025-01-06 RX ORDER — LISINOPRIL 5 MG/1
10 TABLET ORAL ONCE
Status: COMPLETED | OUTPATIENT
Start: 2025-01-06 | End: 2025-01-06

## 2025-01-06 RX ADMIN — DONEPEZIL HYDROCHLORIDE 10 MG: 5 TABLET, FILM COATED ORAL at 20:54

## 2025-01-06 RX ADMIN — SIMVASTATIN 20 MG: 10 TABLET, FILM COATED ORAL at 19:35

## 2025-01-06 RX ADMIN — HYDRALAZINE HYDROCHLORIDE 10 MG: 20 INJECTION INTRAMUSCULAR; INTRAVENOUS at 07:04

## 2025-01-06 RX ADMIN — ACETAMINOPHEN 650 MG: 325 TABLET ORAL at 19:35

## 2025-01-06 RX ADMIN — LEVOTHYROXINE SODIUM 100 MCG: 100 TABLET ORAL at 09:11

## 2025-01-06 RX ADMIN — LISINOPRIL 10 MG: 5 TABLET ORAL at 12:59

## 2025-01-06 RX ADMIN — MEMANTINE 10 MG: 10 TABLET ORAL at 09:16

## 2025-01-06 RX ADMIN — BUPROPION HYDROCHLORIDE 150 MG: 150 TABLET, FILM COATED, EXTENDED RELEASE ORAL at 09:11

## 2025-01-06 RX ADMIN — POLYETHYLENE GLYCOL 3350 17 G: 17 POWDER, FOR SOLUTION ORAL at 09:11

## 2025-01-06 RX ADMIN — MEMANTINE 10 MG: 10 TABLET ORAL at 19:35

## 2025-01-06 RX ADMIN — CITALOPRAM HYDROBROMIDE 20 MG: 20 TABLET ORAL at 09:16

## 2025-01-06 RX ADMIN — LISINOPRIL 20 MG: 20 TABLET ORAL at 09:12

## 2025-01-06 RX ADMIN — ASPIRIN 81 MG: 81 TABLET, COATED ORAL at 09:12

## 2025-01-06 ASSESSMENT — ACTIVITIES OF DAILY LIVING (ADL)
ADLS_ACUITY_SCORE: 75
ADLS_ACUITY_SCORE: 75
ADLS_ACUITY_SCORE: 63
ADLS_ACUITY_SCORE: 63
ADLS_ACUITY_SCORE: 75
ADLS_ACUITY_SCORE: 73
ADLS_ACUITY_SCORE: 63
ADLS_ACUITY_SCORE: 73
ADLS_ACUITY_SCORE: 75
ADLS_ACUITY_SCORE: 63
ADLS_ACUITY_SCORE: 63
ADLS_ACUITY_SCORE: 75
ADLS_ACUITY_SCORE: 73
ADLS_ACUITY_SCORE: 75
ADLS_ACUITY_SCORE: 75
ADLS_ACUITY_SCORE: 63
ADLS_ACUITY_SCORE: 75
ADLS_ACUITY_SCORE: 75

## 2025-01-06 NOTE — PLAN OF CARE
Problem: Adult Inpatient Plan of Care  Goal: Optimal Comfort and Wellbeing  Outcome: Progressing     Problem: Fall Injury Risk  Goal: Absence of Fall and Fall-Related Injury  Outcome: Progressing   Goal Outcome Evaluation:      Plan of Care Reviewed With: patient    Overall Patient Progress: improvingOverall Patient Progress: improving     Pt is A&Ox3 and forgetful at times, on RA, NSR, and assistx2/3. Mg and K protocol, recheck in the AM. Denies pain. Escobar in place. Pt refused repositioning at times. Son at bedside.     Plan for ORIF tomorrow 1/7, NPO at midnight.     Cari Ramsay RN

## 2025-01-06 NOTE — PROGRESS NOTES
CLINICAL NUTRITION SERVICES    Reviewed nutrition risk factors due to LOS. Patient is NPO today for procedure, but no concerns with oral intake per patient report and review of [employer] (room service meal ordering system) and I/O flowsheet. Patient reports her appetite is good. Reviewed wt hx. No unintentional wt loss. No indication of pressure injury.    Follow Up / Monitoring:   Per policy unless consulted

## 2025-01-06 NOTE — PLAN OF CARE
Problem: Adult Inpatient Plan of Care  Goal: Plan of Care Review  Description: The Plan of Care Review/Shift note should be completed every shift.  The Outcome Evaluation is a brief statement about your assessment that the patient is improving, declining, or no change.  This information will be displayed automatically on your shift  note.  Outcome: Progressing     Problem: Fall Injury Risk  Goal: Absence of Fall and Fall-Related Injury  Outcome: Progressing  Intervention: Identify and Manage Contributors  Recent Flowsheet Documentation  Taken 1/6/2025 0530 by Fior Calderon RN  Medication Review/Management: medications reviewed  Taken 1/6/2025 0002 by Fior Calderon RN  Medication Review/Management: medications reviewed  Intervention: Promote Injury-Free Environment  Recent Flowsheet Documentation  Taken 1/6/2025 0530 by Fior Calderon RN  Safety Promotion/Fall Prevention:   treat underlying cause   toileting scheduled   safety round/check completed   room organization consistent   room near nurse's station   patient and family education   nonskid shoes/slippers when out of bed   mobility aid in reach   lighting adjusted   clutter free environment maintained  Taken 1/6/2025 0002 by Fior Calderon RN  Safety Promotion/Fall Prevention:   treat underlying cause   toileting scheduled   safety round/check completed   room organization consistent   room near nurse's station   patient and family education   nonskid shoes/slippers when out of bed   mobility aid in reach   lighting adjusted   clutter free environment maintained     Problem: Fall Injury Risk  Goal: Absence of Fall and Fall-Related Injury  Intervention: Identify and Manage Contributors  Recent Flowsheet Documentation  Taken 1/6/2025 0530 by Fior Calderon RN  Medication Review/Management: medications reviewed  Taken 1/6/2025 0002 by Fior Calderon RN  Medication Review/Management: medications reviewed     Problem: Fall Injury  Risk  Goal: Absence of Fall and Fall-Related Injury  Intervention: Promote Injury-Free Environment  Recent Flowsheet Documentation  Taken 1/6/2025 0530 by Fior Calderon RN  Safety Promotion/Fall Prevention:   treat underlying cause   toileting scheduled   safety round/check completed   room organization consistent   room near nurse's station   patient and family education   nonskid shoes/slippers when out of bed   mobility aid in reach   lighting adjusted   clutter free environment maintained  Taken 1/6/2025 0002 by Fior Calderon RN  Safety Promotion/Fall Prevention:   treat underlying cause   toileting scheduled   safety round/check completed   room organization consistent   room near nurse's station   patient and family education   nonskid shoes/slippers when out of bed   mobility aid in reach   lighting adjusted   clutter free environment maintained     Problem: Pain Acute  Goal: Optimal Pain Control and Function  Outcome: Progressing  Intervention: Prevent or Manage Pain  Recent Flowsheet Documentation  Taken 1/6/2025 0530 by Fior Calderon RN  Medication Review/Management: medications reviewed  Taken 1/6/2025 0002 by Fior Calderon RN  Medication Review/Management: medications reviewed     Problem: Pain Acute  Goal: Optimal Pain Control and Function  Intervention: Prevent or Manage Pain  Recent Flowsheet Documentation  Taken 1/6/2025 0530 by Fior Calderon RN  Medication Review/Management: medications reviewed  Taken 1/6/2025 0002 by Fior Calderon RN  Medication Review/Management: medications reviewed     Problem: Cognitive Impairment  Goal: Optimal Cognitive Function  Outcome: Progressing  Intervention: Optimize Cognitive Function  Recent Flowsheet Documentation  Taken 1/6/2025 0530 by Fior Calderon RN  Environment Familiarity/Consistency: daily routine followed  Taken 1/6/2025 0002 by Fior Calderon RN  Environment Familiarity/Consistency: daily routine followed      Problem: Cognitive Impairment  Goal: Optimal Cognitive Function  Intervention: Optimize Cognitive Function  Recent Flowsheet Documentation  Taken 1/6/2025 0530 by Fior Calderon, RN  Environment Familiarity/Consistency: daily routine followed  Taken 1/6/2025 0002 by iFor Calderon, RN  Environment Familiarity/Consistency: daily routine followed   Goal Outcome Evaluation:         Patient has intermittent confusion, patient is post TAVR 1/3/25, prior to procedure had a second fall at home, and needs humerus fracture fixed. Patient slept in chair most of shift and did not want to sleep in bed and finally we got her to bed at 0540, au=861/78 at 0530, recheck bp at 3601=101/79, Hydralazine iv 10 mg's given at 0704 and notified oncoming nurse. Patient was incontinent of stool smear, patient cleaned and Miconizol powder applied to rash in groin and panus. Rhythm=NSR with PAC. 02 sats 96% on room air. Chair and bed alarm on for safety.

## 2025-01-06 NOTE — PROGRESS NOTES
"Orthopedic Progress Note      Assessment: Left acute comminuted intra-articular mildly displaced distal humerus fracture, minimally displaced olecranon process fracture, minimally displaced radial head fracture     Plan:   - Cards cleared for ORIF Left elbow 1/7 per Dr. Tamayo  - Weightbearing status: NWB LUE  - Splint in place until surgical management. Keep clean and dry.   - Sling to remain at all times besides hygiene cares and skin checks.   - Anticoagulation:  SCDs, apolinar stockings and early ambulation.  -NPO at midnight tonight  - Pain control: per primary  - Discharge planning: medical optimization per primary team      Subjective:  Pain: Mild  Chest pain, SOB: No  Nausea, Vomiting:  No  Lightheadedness, Dizziness:  No  Neuro:  Patient denies new onset numbness or paresthesias    Patient is doing well today. She has mild pain in her elbow. She is able to recall that she fell which has brought her to the hospital. Confused about the time of year, but able to answer/follow commands. Discussed proceeding with surgery with Dr. Tamayo 1/7. No new concerns or questions.     Objective:  BP (!) 165/69   Pulse 89   Temp 98.3  F (36.8  C) (Oral)   Resp 18   Ht 1.651 m (5' 5\")   Wt 103 kg (227 lb 1.2 oz)   SpO2 96%   BMI 37.79 kg/m    The patient is A&Ox3. Appears comfortable.     General: patient resting comfortably in bed, awake, alert and oriented. In no acute distress. Has sling in place with sugar tong splint.   Deferred assessment of wrist and elbow d/t known fracture and splint in place  Skin: Skin is warm and well-perfused.   Pulses: Capillary refill normal in all fingers to left upper extremity.   Sensation: intact and equal bilaterally to the distal upper extremities.  ROM: Able to wiggle fingers, do a thumbs up, cross fingers        Pertinent Labs   Lab Results: personally reviewed.   Lab Results   Component Value Date    INR 1.08 01/02/2025    INR 1.15 01/10/2023     Lab Results   Component Value " Date    WBC 7.7 01/06/2025    HGB 11.9 01/06/2025    HCT 36.8 01/06/2025    MCV 98 01/06/2025     (L) 01/06/2025     Lab Results   Component Value Date     01/06/2025    CO2 24 01/06/2025         LYDIA PELLETIER PA-C  01/06/2025

## 2025-01-06 NOTE — PROGRESS NOTES
[facility]    Medicine Progress Note - Hospitalist Service    Date of Admission:  12/29/2024    Assessment & Plan   Sharon De La Vega is a 79 year old female admitted on 12/29/2024. She was brought back to the ED by ambulance from home for evaluation after a second fall at home     Acute left comminuted intra-articular mildly displaced distal humerus fracture, minimally displaced olecranon process fracture, minimally displaced radial head fracture.   - Status post splint/sling  - ORIF Left elbow 1/7 per Dr. Tamayo   - NPO pm  - Pain control  - Orthopedic surgery assistance appreciated     Severe symptomatic aortic valvular stenosis  - TTE (12/29/24):  LVEF 60%, mean gradient 57 mmHg with peak velocity of 4.6 m/s. Calculated valve area 0.60-0.63 cmÂ .  - Coronary angiogram (12/29/24) normal coronary arteries.  - S/P TAVR (1/3/2025) with a 23mm Zelaya Darian Ultra Resilia Valve  - Tele  - Cardiology assistance appreciated.     Recurrent falls  - Limited historian with dementia  - Aortic valve stenosis possibly contributory  - CT head & C spine unrevealing for acute process  - Imaging findings per #1  - Fall precautions  - PT/OT     Essential hypertension   - Inc Lisinopril 30mg every day     Possible UTI  - IV CTX x 3d course completed     Hypothyroidism  - Euthyroid, TSH 1.63  - Levothyroxine     Alzheimer's dementia without behavioral disturbance  - Risk for violent behavior and/or acute delirium  - Donepezil, memantine  - As needed quetiapine and olanzapine     Depression / Anxiety  -Bupropion, citalopram     Psoriatic arthritis  - Holding methotrexate, folic acid and leflunomide     Hyperlipidemia  - Simvastatin  - CK mildly elevated due to MSK trauma (520). Repeat pending.     Hypokalemia- resolved     Obesity  -BMI 38.44     Intermittent urine retention  - voiding protocol w/ SIC prn retention          Diet: Low Saturated Fat Na <2400 mg  NPO per Anesthesia Guidelines for  "Procedure/Surgery Except for: Meds    DVT Prophylaxis: Pneumatic Compression Devices  Escobar Catheter: PRESENT, indication: Acute retention or obstruction  Lines: None     Cardiac Monitoring: ACTIVE order. Indication: QTc prolonging medication (48 hours)  Code Status: Full Code      Clinically Significant Risk Factors               # Hypoalbuminemia: Lowest albumin = 3 g/dL at 1/2/2025  4:08 AM, will monitor as appropriate   # Thrombocytopenia: Lowest platelets = 115 in last 2 days, will monitor for bleeding   # Hypertension: Noted on problem list     # Dementia: noted on problem list        # Obesity: Estimated body mass index is 37.79 kg/m  as calculated from the following:    Height as of this encounter: 1.651 m (5' 5\").    Weight as of this encounter: 103 kg (227 lb 1.2 oz).      # Financial/Environmental Concerns: none         Social Drivers of Health    Tobacco Use: Medium Risk (12/29/2024)    Patient History     Smoking Tobacco Use: Former     Smokeless Tobacco Use: Never    Received from [facility]    Social Connections          Disposition Plan     Medically Ready for Discharge: Anticipated in 2-4 Days             Mercy Araya MD  Hospitalist Service  [facility]  Securely message with [employer] (more info)  Text page via [employer] Paging/Directory   ______________________________________________________________________    Interval History   Patient was examined at the bedside, new to me today.  States she is tired and wanted to sleep, denies any complaints today    Physical Exam   Vital Signs: Temp: 99.1  F (37.3  C) Temp src: Oral BP: 109/88 Pulse: 83   Resp: 20 SpO2: 96 % O2 Device: None (Room air)    Weight: 227 lbs 1.18 oz    General appearance - NAD  Lungs - clear to auscultation, no wheezes  Heart - rrr. No peripheral edema.  Abdomen - soft, nontender, nondistended, BS+  Neurological - alert, follows " commands, disorientation/forgetfullness, no agitation.  Skin - no c/c/p    Medical Decision Making       35 MINUTES SPENT BY ME on the date of service doing chart review, history, exam, documentation & further activities per the note.      Data     I have personally reviewed the following data over the past 24 hrs:    7.7  \   11.9   / 115 (L)     139 104 21.5 /  95   4.0 24 0.98 (H) \     ALT: N/A AST: N/A AP: N/A TBILI: N/A   ALB: 3.4 (L) TOT PROTEIN: N/A LIPASE: N/A       Imaging results reviewed over the past 24 hrs:   No results found for this or any previous visit (from the past 24 hours).

## 2025-01-06 NOTE — PROGRESS NOTES
Care Management Follow Up    Length of Stay (days): 7    Expected Discharge Date: 01/08/2025     Concerns to be Addressed:     Care progression - discharge planning  Patient plan of care discussed at interdisciplinary rounds: Yes    Anticipated Discharge Disposition:       Anticipated Discharge Services:    Anticipated Discharge DME:      Patient/family educated on Medicare website which has current facility and service quality ratings:    Education Provided on the Discharge Plan:    Patient/Family in Agreement with the Plan:      Referrals Placed by CM/SW:    Private pay costs discussed: Not applicable    Discussed  Partnership in Safe Discharge Planning  document with patient/family: No     Handoff Completed: No, handoff not indicated or clinically appropriate    Additional Information:  Checked [employer] auth, pending clinical review    Social Hx:  Assessment: Follow. Live w/son Chaz who assists w/I/ADLs. Use walker/cane.  Last note: 1/6/25  Plan: Plan surgical ORIF of elbow fracture on 1/6  Needs: TCU placement, [employer] auth pending, PAS.   Hand off: No  Transport: Family: if able to transfer.     Next Steps: RNCM to follow for medical progression, recommendations, and final discharge plan.     Adamaris Lawrence, RN     0008 checked Samir auth, pending clinical review

## 2025-01-07 ENCOUNTER — APPOINTMENT (OUTPATIENT)
Dept: RADIOLOGY | Facility: HOSPITAL | Age: 80
DRG: 267 | End: 2025-01-07
Attending: ORTHOPAEDIC SURGERY
Payer: COMMERCIAL

## 2025-01-07 ENCOUNTER — ANESTHESIA (OUTPATIENT)
Dept: SURGERY | Facility: HOSPITAL | Age: 80
End: 2025-01-07
Payer: COMMERCIAL

## 2025-01-07 LAB
ABO + RH BLD: NORMAL
ATRIAL RATE - MUSE: 141 BPM
ATRIAL RATE - MUSE: 82 BPM
BLD GP AB SCN SERPL QL: NEGATIVE
CREAT SERPL-MCNC: 0.85 MG/DL (ref 0.51–0.95)
DIASTOLIC BLOOD PRESSURE - MUSE: 61 MMHG
DIASTOLIC BLOOD PRESSURE - MUSE: NORMAL MMHG
EGFRCR SERPLBLD CKD-EPI 2021: 70 ML/MIN/1.73M2
HGB BLD-MCNC: 10.5 G/DL (ref 11.7–15.7)
INTERPRETATION ECG - MUSE: NORMAL
INTERPRETATION ECG - MUSE: NORMAL
LACTATE SERPL-SCNC: 1.1 MMOL/L (ref 0.7–2)
MAGNESIUM SERPL-MCNC: 2.4 MG/DL (ref 1.7–2.3)
P AXIS - MUSE: 83 DEGREES
P AXIS - MUSE: NORMAL DEGREES
PHOSPHATE SERPL-MCNC: 2.9 MG/DL (ref 2.5–4.5)
POTASSIUM SERPL-SCNC: 3.8 MMOL/L (ref 3.4–5.3)
PR INTERVAL - MUSE: 164 MS
PR INTERVAL - MUSE: NORMAL MS
QRS DURATION - MUSE: 80 MS
QRS DURATION - MUSE: 80 MS
QT - MUSE: 382 MS
QT - MUSE: 396 MS
QTC - MUSE: 462 MS
QTC - MUSE: 474 MS
R AXIS - MUSE: -3 DEGREES
R AXIS - MUSE: 8 DEGREES
SPECIMEN EXP DATE BLD: NORMAL
SYSTOLIC BLOOD PRESSURE - MUSE: 134 MMHG
SYSTOLIC BLOOD PRESSURE - MUSE: NORMAL MMHG
T AXIS - MUSE: 14 DEGREES
T AXIS - MUSE: 2 DEGREES
VENTRICULAR RATE- MUSE: 82 BPM
VENTRICULAR RATE- MUSE: 93 BPM

## 2025-01-07 PROCEDURE — 36415 COLL VENOUS BLD VENIPUNCTURE: CPT | Performed by: STUDENT IN AN ORGANIZED HEALTH CARE EDUCATION/TRAINING PROGRAM

## 2025-01-07 PROCEDURE — 999N000182 XR SURGERY CARM FLUORO GREATER THAN 5 MIN

## 2025-01-07 PROCEDURE — C1769 GUIDE WIRE: HCPCS | Performed by: ORTHOPAEDIC SURGERY

## 2025-01-07 PROCEDURE — 258N000003 HC RX IP 258 OP 636: Performed by: STUDENT IN AN ORGANIZED HEALTH CARE EDUCATION/TRAINING PROGRAM

## 2025-01-07 PROCEDURE — 258N000003 HC RX IP 258 OP 636: Performed by: NURSE ANESTHETIST, CERTIFIED REGISTERED

## 2025-01-07 PROCEDURE — 250N000011 HC RX IP 250 OP 636: Performed by: NURSE ANESTHETIST, CERTIFIED REGISTERED

## 2025-01-07 PROCEDURE — 250N000013 HC RX MED GY IP 250 OP 250 PS 637: Performed by: STUDENT IN AN ORGANIZED HEALTH CARE EDUCATION/TRAINING PROGRAM

## 2025-01-07 PROCEDURE — 83605 ASSAY OF LACTIC ACID: CPT | Performed by: INTERNAL MEDICINE

## 2025-01-07 PROCEDURE — 99232 SBSQ HOSP IP/OBS MODERATE 35: CPT | Performed by: STUDENT IN AN ORGANIZED HEALTH CARE EDUCATION/TRAINING PROGRAM

## 2025-01-07 PROCEDURE — 250N000009 HC RX 250: Performed by: ORTHOPAEDIC SURGERY

## 2025-01-07 PROCEDURE — 86850 RBC ANTIBODY SCREEN: CPT | Performed by: NURSE ANESTHETIST, CERTIFIED REGISTERED

## 2025-01-07 PROCEDURE — 250N000009 HC RX 250: Performed by: NURSE ANESTHETIST, CERTIFIED REGISTERED

## 2025-01-07 PROCEDURE — P9045 ALBUMIN (HUMAN), 5%, 250 ML: HCPCS | Performed by: NURSE ANESTHETIST, CERTIFIED REGISTERED

## 2025-01-07 PROCEDURE — 250N000013 HC RX MED GY IP 250 OP 250 PS 637: Performed by: INTERNAL MEDICINE

## 2025-01-07 PROCEDURE — 250N000025 HC SEVOFLURANE, PER MIN: Performed by: ORTHOPAEDIC SURGERY

## 2025-01-07 PROCEDURE — 85018 HEMOGLOBIN: CPT | Performed by: STUDENT IN AN ORGANIZED HEALTH CARE EDUCATION/TRAINING PROGRAM

## 2025-01-07 PROCEDURE — 93005 ELECTROCARDIOGRAM TRACING: CPT

## 2025-01-07 PROCEDURE — 250N000011 HC RX IP 250 OP 636: Performed by: INTERNAL MEDICINE

## 2025-01-07 PROCEDURE — 999N000141 HC STATISTIC PRE-PROCEDURE NURSING ASSESSMENT: Performed by: ORTHOPAEDIC SURGERY

## 2025-01-07 PROCEDURE — 710N000009 HC RECOVERY PHASE 1, LEVEL 1, PER MIN: Performed by: ORTHOPAEDIC SURGERY

## 2025-01-07 PROCEDURE — C1713 ANCHOR/SCREW BN/BN,TIS/BN: HCPCS | Performed by: ORTHOPAEDIC SURGERY

## 2025-01-07 PROCEDURE — 250N000011 HC RX IP 250 OP 636: Performed by: ORTHOPAEDIC SURGERY

## 2025-01-07 PROCEDURE — 84100 ASSAY OF PHOSPHORUS: CPT | Performed by: INTERNAL MEDICINE

## 2025-01-07 PROCEDURE — 250N000011 HC RX IP 250 OP 636: Performed by: ANESTHESIOLOGY

## 2025-01-07 PROCEDURE — 120N000004 HC R&B MS OVERFLOW

## 2025-01-07 PROCEDURE — 86900 BLOOD TYPING SEROLOGIC ABO: CPT | Performed by: NURSE ANESTHETIST, CERTIFIED REGISTERED

## 2025-01-07 PROCEDURE — 82565 ASSAY OF CREATININE: CPT | Performed by: STUDENT IN AN ORGANIZED HEALTH CARE EDUCATION/TRAINING PROGRAM

## 2025-01-07 PROCEDURE — 360N000084 HC SURGERY LEVEL 4 W/ FLUORO, PER MIN: Performed by: ORTHOPAEDIC SURGERY

## 2025-01-07 PROCEDURE — 83735 ASSAY OF MAGNESIUM: CPT | Performed by: INTERNAL MEDICINE

## 2025-01-07 PROCEDURE — 84132 ASSAY OF SERUM POTASSIUM: CPT | Performed by: STUDENT IN AN ORGANIZED HEALTH CARE EDUCATION/TRAINING PROGRAM

## 2025-01-07 PROCEDURE — 272N000001 HC OR GENERAL SUPPLY STERILE: Performed by: ORTHOPAEDIC SURGERY

## 2025-01-07 PROCEDURE — 370N000017 HC ANESTHESIA TECHNICAL FEE, PER MIN: Performed by: ORTHOPAEDIC SURGERY

## 2025-01-07 DEVICE — IMP SCR SYN 3.5X20MM LOCKING W/STARDRIVE SS 212.106: Type: IMPLANTABLE DEVICE | Site: ELBOW | Status: FUNCTIONAL

## 2025-01-07 DEVICE — IMPLANTABLE DEVICE: Type: IMPLANTABLE DEVICE | Site: ELBOW | Status: FUNCTIONAL

## 2025-01-07 DEVICE — SCREW LOCKING ST 2.7X32MM: Type: IMPLANTABLE DEVICE | Site: ELBOW | Status: FUNCTIONAL

## 2025-01-07 DEVICE — IMP SCR SYN CORTEX 3.5X22MM SELF TAP SS 204.822: Type: IMPLANTABLE DEVICE | Site: ELBOW | Status: FUNCTIONAL

## 2025-01-07 DEVICE — IMP SCR SYN 2.7X26MM T8 SD LOCKING SELF-TAP VA 02.211.026: Type: IMPLANTABLE DEVICE | Site: ELBOW | Status: FUNCTIONAL

## 2025-01-07 DEVICE — SCREW LOCKING ST 2.7X28MM: Type: IMPLANTABLE DEVICE | Site: ELBOW | Status: FUNCTIONAL

## 2025-01-07 DEVICE — IMP SCR SYN CORTEX 3.5X18MM SELF TAP SS 204.818: Type: IMPLANTABLE DEVICE | Site: ELBOW | Status: FUNCTIONAL

## 2025-01-07 DEVICE — IMP SCR SYN 2.7X20MM T8 SD LOCKING SELF-TAP VA 02.211.020: Type: IMPLANTABLE DEVICE | Site: ELBOW | Status: FUNCTIONAL

## 2025-01-07 DEVICE — IMP SCR SYN CORTEX 3.5X14MM SELF TAP SS 204.814: Type: IMPLANTABLE DEVICE | Site: ELBOW | Status: FUNCTIONAL

## 2025-01-07 DEVICE — IMP SCR SYN CORTEX 3.5X26MM SELF TAP SS 204.826: Type: IMPLANTABLE DEVICE | Site: ELBOW | Status: FUNCTIONAL

## 2025-01-07 DEVICE — IMP SCR SYN CORTEX 3.5X24MM SELF TAP SS 204.824: Type: IMPLANTABLE DEVICE | Site: ELBOW | Status: FUNCTIONAL

## 2025-01-07 DEVICE — IMP WIRE KIRSCHNER SYN 1.6X150MM 292.16: Type: IMPLANTABLE DEVICE | Site: ELBOW | Status: FUNCTIONAL

## 2025-01-07 DEVICE — SCREW LOCKING ST 2.7X40MM: Type: IMPLANTABLE DEVICE | Site: ELBOW | Status: FUNCTIONAL

## 2025-01-07 DEVICE — IMP SCR SYN 2.7X16MM T8 SD LOCKING SELF-TAP VA 02.211.016: Type: IMPLANTABLE DEVICE | Site: ELBOW | Status: FUNCTIONAL

## 2025-01-07 DEVICE — IMP SCR SYN CORTEX 3.5X34MM SELF TAP SS 204.834: Type: IMPLANTABLE DEVICE | Site: ELBOW | Status: FUNCTIONAL

## 2025-01-07 DEVICE — IMP SCR SYN 2.7X44MM T8 SD LOCKING SELF-TAP VA 02.211.044: Type: IMPLANTABLE DEVICE | Site: ELBOW | Status: FUNCTIONAL

## 2025-01-07 RX ORDER — DEXAMETHASONE SODIUM PHOSPHATE 4 MG/ML
4 INJECTION, SOLUTION INTRA-ARTICULAR; INTRALESIONAL; INTRAMUSCULAR; INTRAVENOUS; SOFT TISSUE
Status: DISCONTINUED | OUTPATIENT
Start: 2025-01-07 | End: 2025-01-07 | Stop reason: HOSPADM

## 2025-01-07 RX ORDER — FENTANYL CITRATE 0.05 MG/ML
INJECTION, SOLUTION INTRAMUSCULAR; INTRAVENOUS PRN
Status: DISCONTINUED | OUTPATIENT
Start: 2025-01-07 | End: 2025-01-07

## 2025-01-07 RX ORDER — OXYCODONE HYDROCHLORIDE 5 MG/1
5 TABLET ORAL
Status: DISCONTINUED | OUTPATIENT
Start: 2025-01-07 | End: 2025-01-07 | Stop reason: HOSPADM

## 2025-01-07 RX ORDER — LIDOCAINE 40 MG/G
CREAM TOPICAL
Status: DISCONTINUED | OUTPATIENT
Start: 2025-01-07 | End: 2025-01-07 | Stop reason: HOSPADM

## 2025-01-07 RX ORDER — CEFAZOLIN SODIUM/WATER 2 G/20 ML
2 SYRINGE (ML) INTRAVENOUS SEE ADMIN INSTRUCTIONS
Status: DISCONTINUED | OUTPATIENT
Start: 2025-01-07 | End: 2025-01-07 | Stop reason: HOSPADM

## 2025-01-07 RX ORDER — NALOXONE HYDROCHLORIDE 0.4 MG/ML
0.1 INJECTION, SOLUTION INTRAMUSCULAR; INTRAVENOUS; SUBCUTANEOUS
Status: DISCONTINUED | OUTPATIENT
Start: 2025-01-07 | End: 2025-01-07 | Stop reason: HOSPADM

## 2025-01-07 RX ORDER — CEFAZOLIN SODIUM 2 G/100ML
2 INJECTION, SOLUTION INTRAVENOUS EVERY 8 HOURS
Status: COMPLETED | OUTPATIENT
Start: 2025-01-08 | End: 2025-01-08

## 2025-01-07 RX ORDER — ONDANSETRON 2 MG/ML
INJECTION INTRAMUSCULAR; INTRAVENOUS PRN
Status: DISCONTINUED | OUTPATIENT
Start: 2025-01-07 | End: 2025-01-07

## 2025-01-07 RX ORDER — ONDANSETRON 4 MG/1
4 TABLET, ORALLY DISINTEGRATING ORAL EVERY 30 MIN PRN
Status: DISCONTINUED | OUTPATIENT
Start: 2025-01-07 | End: 2025-01-07 | Stop reason: HOSPADM

## 2025-01-07 RX ORDER — CEFAZOLIN SODIUM/WATER 2 G/20 ML
2 SYRINGE (ML) INTRAVENOUS
Status: COMPLETED | OUTPATIENT
Start: 2025-01-07 | End: 2025-01-07

## 2025-01-07 RX ORDER — FENTANYL CITRATE 50 UG/ML
25 INJECTION, SOLUTION INTRAMUSCULAR; INTRAVENOUS EVERY 5 MIN PRN
Status: DISCONTINUED | OUTPATIENT
Start: 2025-01-07 | End: 2025-01-07 | Stop reason: HOSPADM

## 2025-01-07 RX ORDER — ONDANSETRON 2 MG/ML
4 INJECTION INTRAMUSCULAR; INTRAVENOUS EVERY 30 MIN PRN
Status: DISCONTINUED | OUTPATIENT
Start: 2025-01-07 | End: 2025-01-07 | Stop reason: HOSPADM

## 2025-01-07 RX ORDER — ROPIVACAINE HYDROCHLORIDE 5 MG/ML
INJECTION, SOLUTION EPIDURAL; INFILTRATION; PERINEURAL
Status: COMPLETED | OUTPATIENT
Start: 2025-01-07 | End: 2025-01-07

## 2025-01-07 RX ORDER — SODIUM CHLORIDE, SODIUM LACTATE, POTASSIUM CHLORIDE, CALCIUM CHLORIDE 600; 310; 30; 20 MG/100ML; MG/100ML; MG/100ML; MG/100ML
INJECTION, SOLUTION INTRAVENOUS CONTINUOUS
Status: DISCONTINUED | OUTPATIENT
Start: 2025-01-07 | End: 2025-01-07 | Stop reason: HOSPADM

## 2025-01-07 RX ORDER — DEXAMETHASONE SODIUM PHOSPHATE 10 MG/ML
INJECTION, SOLUTION INTRAMUSCULAR; INTRAVENOUS PRN
Status: DISCONTINUED | OUTPATIENT
Start: 2025-01-07 | End: 2025-01-07

## 2025-01-07 RX ORDER — SODIUM CHLORIDE, SODIUM LACTATE, POTASSIUM CHLORIDE, CALCIUM CHLORIDE 600; 310; 30; 20 MG/100ML; MG/100ML; MG/100ML; MG/100ML
INJECTION, SOLUTION INTRAVENOUS CONTINUOUS PRN
Status: DISCONTINUED | OUTPATIENT
Start: 2025-01-07 | End: 2025-01-07

## 2025-01-07 RX ORDER — OXYCODONE HYDROCHLORIDE 5 MG/1
10 TABLET ORAL
Status: DISCONTINUED | OUTPATIENT
Start: 2025-01-07 | End: 2025-01-07 | Stop reason: HOSPADM

## 2025-01-07 RX ORDER — SODIUM CHLORIDE, SODIUM LACTATE, POTASSIUM CHLORIDE, CALCIUM CHLORIDE 600; 310; 30; 20 MG/100ML; MG/100ML; MG/100ML; MG/100ML
INJECTION, SOLUTION INTRAVENOUS CONTINUOUS
Status: DISCONTINUED | OUTPATIENT
Start: 2025-01-07 | End: 2025-01-08

## 2025-01-07 RX ORDER — FENTANYL CITRATE 50 UG/ML
50 INJECTION, SOLUTION INTRAMUSCULAR; INTRAVENOUS EVERY 5 MIN PRN
Status: DISCONTINUED | OUTPATIENT
Start: 2025-01-07 | End: 2025-01-07 | Stop reason: HOSPADM

## 2025-01-07 RX ORDER — HYDROMORPHONE HCL IN WATER/PF 6 MG/30 ML
0.4 PATIENT CONTROLLED ANALGESIA SYRINGE INTRAVENOUS EVERY 5 MIN PRN
Status: DISCONTINUED | OUTPATIENT
Start: 2025-01-07 | End: 2025-01-07 | Stop reason: HOSPADM

## 2025-01-07 RX ORDER — METOPROLOL TARTRATE 25 MG/1
25 TABLET, FILM COATED ORAL 2 TIMES DAILY
Status: DISPENSED | OUTPATIENT
Start: 2025-01-07

## 2025-01-07 RX ORDER — PROPOFOL 10 MG/ML
INJECTION, EMULSION INTRAVENOUS PRN
Status: DISCONTINUED | OUTPATIENT
Start: 2025-01-07 | End: 2025-01-07

## 2025-01-07 RX ORDER — HYDROMORPHONE HCL IN WATER/PF 6 MG/30 ML
0.2 PATIENT CONTROLLED ANALGESIA SYRINGE INTRAVENOUS EVERY 5 MIN PRN
Status: DISCONTINUED | OUTPATIENT
Start: 2025-01-07 | End: 2025-01-07 | Stop reason: HOSPADM

## 2025-01-07 RX ORDER — MAGNESIUM HYDROXIDE 1200 MG/15ML
LIQUID ORAL PRN
Status: DISCONTINUED | OUTPATIENT
Start: 2025-01-07 | End: 2025-01-07 | Stop reason: HOSPADM

## 2025-01-07 RX ORDER — LABETALOL HYDROCHLORIDE 5 MG/ML
10 INJECTION, SOLUTION INTRAVENOUS EVERY 6 HOURS PRN
Status: ACTIVE | OUTPATIENT
Start: 2025-01-07

## 2025-01-07 RX ADMIN — PHENYLEPHRINE HYDROCHLORIDE 150 MCG: 10 INJECTION INTRAVENOUS at 15:59

## 2025-01-07 RX ADMIN — DEXAMETHASONE SODIUM PHOSPHATE 5 MG: 10 INJECTION, SOLUTION INTRAMUSCULAR; INTRAVENOUS at 14:08

## 2025-01-07 RX ADMIN — ALBUMIN HUMAN: 0.05 INJECTION, SOLUTION INTRAVENOUS at 16:21

## 2025-01-07 RX ADMIN — PROPOFOL 100 MG: 10 INJECTION, EMULSION INTRAVENOUS at 12:53

## 2025-01-07 RX ADMIN — PHENYLEPHRINE HYDROCHLORIDE 0.3 MCG/KG/MIN: 10 INJECTION INTRAVENOUS at 13:22

## 2025-01-07 RX ADMIN — ONDANSETRON 4 MG: 2 INJECTION INTRAMUSCULAR; INTRAVENOUS at 16:13

## 2025-01-07 RX ADMIN — SUGAMMADEX 200 MG: 100 INJECTION, SOLUTION INTRAVENOUS at 16:42

## 2025-01-07 RX ADMIN — SODIUM CHLORIDE, POTASSIUM CHLORIDE, SODIUM LACTATE AND CALCIUM CHLORIDE: 600; 310; 30; 20 INJECTION, SOLUTION INTRAVENOUS at 10:03

## 2025-01-07 RX ADMIN — BUPROPION HYDROCHLORIDE 150 MG: 150 TABLET, FILM COATED, EXTENDED RELEASE ORAL at 08:31

## 2025-01-07 RX ADMIN — ROCURONIUM BROMIDE 50 MG: 50 INJECTION, SOLUTION INTRAVENOUS at 12:54

## 2025-01-07 RX ADMIN — DONEPEZIL HYDROCHLORIDE 10 MG: 5 TABLET, FILM COATED ORAL at 21:20

## 2025-01-07 RX ADMIN — PHENYLEPHRINE HYDROCHLORIDE 100 MCG: 10 INJECTION INTRAVENOUS at 13:07

## 2025-01-07 RX ADMIN — CITALOPRAM HYDROBROMIDE 20 MG: 20 TABLET ORAL at 08:31

## 2025-01-07 RX ADMIN — METOPROLOL TARTRATE 12.5 MG: 25 TABLET, FILM COATED ORAL at 10:02

## 2025-01-07 RX ADMIN — ROPIVACAINE HYDROCHLORIDE 20 ML: 5 INJECTION, SOLUTION EPIDURAL; INFILTRATION; PERINEURAL at 12:00

## 2025-01-07 RX ADMIN — PROPOFOL 30 MG: 10 INJECTION, EMULSION INTRAVENOUS at 12:00

## 2025-01-07 RX ADMIN — PHENYLEPHRINE HYDROCHLORIDE 200 MCG: 10 INJECTION INTRAVENOUS at 13:14

## 2025-01-07 RX ADMIN — SODIUM CHLORIDE, POTASSIUM CHLORIDE, SODIUM LACTATE AND CALCIUM CHLORIDE: 600; 310; 30; 20 INJECTION, SOLUTION INTRAVENOUS at 13:40

## 2025-01-07 RX ADMIN — SODIUM CHLORIDE, POTASSIUM CHLORIDE, SODIUM LACTATE AND CALCIUM CHLORIDE: 600; 310; 30; 20 INJECTION, SOLUTION INTRAVENOUS at 18:47

## 2025-01-07 RX ADMIN — PHENYLEPHRINE HYDROCHLORIDE 100 MCG: 10 INJECTION INTRAVENOUS at 13:06

## 2025-01-07 RX ADMIN — MEMANTINE 10 MG: 10 TABLET ORAL at 08:31

## 2025-01-07 RX ADMIN — ASPIRIN 81 MG: 81 TABLET, COATED ORAL at 08:29

## 2025-01-07 RX ADMIN — PHENYLEPHRINE HYDROCHLORIDE 150 MCG: 10 INJECTION INTRAVENOUS at 14:35

## 2025-01-07 RX ADMIN — SIMVASTATIN 20 MG: 10 TABLET, FILM COATED ORAL at 21:21

## 2025-01-07 RX ADMIN — ALBUMIN HUMAN: 0.05 INJECTION, SOLUTION INTRAVENOUS at 14:29

## 2025-01-07 RX ADMIN — HYDRALAZINE HYDROCHLORIDE 10 MG: 20 INJECTION INTRAMUSCULAR; INTRAVENOUS at 20:03

## 2025-01-07 RX ADMIN — FENTANYL CITRATE 50 MCG: 0.05 INJECTION, SOLUTION INTRAMUSCULAR; INTRAVENOUS at 13:05

## 2025-01-07 RX ADMIN — ALBUMIN HUMAN: 0.05 INJECTION, SOLUTION INTRAVENOUS at 14:14

## 2025-01-07 RX ADMIN — PHENYLEPHRINE HYDROCHLORIDE 150 MCG: 10 INJECTION INTRAVENOUS at 14:03

## 2025-01-07 RX ADMIN — MEMANTINE 10 MG: 10 TABLET ORAL at 21:20

## 2025-01-07 RX ADMIN — METOPROLOL TARTRATE 25 MG: 25 TABLET, FILM COATED ORAL at 21:21

## 2025-01-07 RX ADMIN — LEVOTHYROXINE SODIUM 100 MCG: 100 TABLET ORAL at 08:30

## 2025-01-07 RX ADMIN — Medication 2 G: at 12:41

## 2025-01-07 RX ADMIN — FENTANYL CITRATE 25 MCG: 0.05 INJECTION, SOLUTION INTRAMUSCULAR; INTRAVENOUS at 16:19

## 2025-01-07 RX ADMIN — LISINOPRIL 30 MG: 5 TABLET ORAL at 08:30

## 2025-01-07 RX ADMIN — MICONAZOLE NITRATE ANTIFUNGAL POWDER: 2 POWDER TOPICAL at 04:11

## 2025-01-07 RX ADMIN — Medication 2 G: at 16:20

## 2025-01-07 ASSESSMENT — ACTIVITIES OF DAILY LIVING (ADL)
ADLS_ACUITY_SCORE: 73
ADLS_ACUITY_SCORE: 75
ADLS_ACUITY_SCORE: 73
ADLS_ACUITY_SCORE: 75
ADLS_ACUITY_SCORE: 73
ADLS_ACUITY_SCORE: 75
ADLS_ACUITY_SCORE: 73
ADLS_ACUITY_SCORE: 73

## 2025-01-07 ASSESSMENT — ENCOUNTER SYMPTOMS: DYSRHYTHMIAS: 1

## 2025-01-07 NOTE — PROGRESS NOTES
Care Management Follow Up    Length of Stay (days): 8    Expected Discharge Date: 01/08/2025     Concerns to be Addressed:     Care progression - discharge planning  Patient plan of care discussed at interdisciplinary rounds: Yes    Anticipated Discharge Disposition:  Rehab rec Transitional care     Anticipated Discharge Services:  Transitional care  Anticipated Discharge DME:  NA    Patient/family educated on Medicare website which has current facility and service quality ratings:  Yes  Education Provided on the Discharge Plan:  yes per team  Patient/Family in Agreement with the Plan:  Yes    Referrals Placed by CM/SW:  Yes  Private pay costs discussed: Not applicable    Discussed  Partnership in Safe Discharge Planning  document with patient/family: No     Handoff Completed: No, handoff not indicated or clinically appropriate    Additional Information:  [id number] checked Samir byers, pending clinical review    Social Hx:  Assessment: Follow. Live w/son Chaz who assists w/I/ADLs. Use walker/cane.  Last note: 1/7/25  Plan: Plan surgical ORIF of elbow fracture on 1/7  Needs: TCU placement, PAS, [employer] auth pending  Hand off: No  Transport: Family: if able to transfer.     Next Steps: RNCM to follow for medical progression, recommendations, and final discharge plan.     Adamaris Lawrence RN

## 2025-01-07 NOTE — ANESTHESIA PROCEDURE NOTES
"Brachial plexus Procedure Note    Pre-Procedure   Staff -        Anesthesiologist:  Mauri Villasenor MD       Performed By: anesthesiologist       Location: pre-op       Procedure Start/Stop Times: 1/7/2025 12:00 PM and 1/7/2025 12:08 AM       Pre-Anesthestic Checklist: patient identified, IV checked, site marked, risks and benefits discussed, informed consent, monitors and equipment checked, pre-op evaluation, at physician/surgeon's request and post-op pain management  Timeout:       Correct Patient: Yes        Correct Procedure: Yes        Correct Site: Yes        Correct Position: Yes        Correct Laterality: Yes        Site Marked: Yes  Procedure Documentation  Procedure: Brachial plexus         Laterality: left       Patient Position: supine       Skin prep: Chloraprep (supraclavicular approach).       Ultrasound guided       1. Ultrasound was used to identify targeted nerve, plexus, vascular marker, or fascial plane and place a needle adjacent to it in real-time.       2. Ultrasound was used to visualize the spread of anesthetic in close proximity to the above referenced structure.       3. A permanent image is entered into the patient's record.       4. The visualized anatomic structures appeared normal.       5. There were no apparent abnormal pathologic findings.    Assessment/Narrative         The placement was negative for: blood aspirated, painful injection and site bleeding       Paresthesias: No.    Medication(s) Administered   Ropivacaine 0.5% PF (Infiltration) - Infiltration   20 mL - 1/7/2025 12:00:00 PM  Medication Administration Time: 1/7/2025 12:00 PM      FOR [facility] ([facility] [place]) ONLY:   Pain Team Contact information: please page the Pain Team Via [employer]. Search \"Pain\". During daytime hours, please page the attending first. At night please page the resident first.      "

## 2025-01-07 NOTE — ANESTHESIA POSTPROCEDURE EVALUATION
Patient: Sharon De La Vega    Procedure: Procedure(s):  OPEN REDUCTION INTERNAL FIXATION, FRACTURE, HUMERUS, DISTAL, WITH ULNA NERVE TRANSPOSITION       Anesthesia Type:  Peripheral Nerve Block, General    Note:  Disposition: Inpatient   Postop Pain Control:    PONV:    Neuro/Psych:    Airway/Respiratory:    CV/Hemodynamics:    Other NRE:    DID A NON-ROUTINE EVENT OCCUR?            Last vitals:  Vitals Value Taken Time   /73 01/07/25 1715   Temp 35.7  C (96.26  F) 01/07/25 1725   Pulse 91 01/07/25 1725   Resp 14 01/07/25 1725   SpO2 100 % 01/07/25 1725   Vitals shown include unfiled device data.    Electronically Signed By: Ta Rosa MD  January 7, 2025  5:27 PM

## 2025-01-07 NOTE — ANESTHESIA PROCEDURE NOTES
Airway       Patient location during procedure: OR       Procedure Start/Stop Times: 1/7/2025 12:56 PM  Staff -        CRNA: Suzette Patel APRN CRNA       Performed By: CRNA  Consent for Airway        Urgency: elective  Indications and Patient Condition       Indications for airway management: emil-procedural       Induction type:intravenous       Mask difficulty assessment: 2 - vent by mask + OA or adjuvant +/- NMBA    Final Airway Details       Final airway type: endotracheal airway       Successful airway: ETT - single and Oral  Endotracheal Airway Details        ETT size (mm): 7.0       Cuffed: yes       Cuff volume (mL): 5       Successful intubation technique: video laryngoscopy       VL Blade Size: Glidescope 4       Grade View of Cords: 1       Adjucts: stylet       Position: Right       Measured from: gums/teeth       Secured at (cm): 20       Bite block used: None    Post intubation assessment        Placement verified by: capnometry, equal breath sounds and chest rise        Number of attempts at approach: 1       Number of other approaches attempted: 0       Secured with: tape       Ease of procedure: easy       Dentition: Intact and Unchanged    Medication(s) Administered   Medication Administration Time: 1/7/2025 12:56 PM

## 2025-01-07 NOTE — ANESTHESIA PREPROCEDURE EVALUATION
Anesthesia Pre-Procedure Evaluation    Patient: Sharon De La Vega   MRN: [id number] : 1945        Procedure : Procedure(s):  OPEN REDUCTION INTERNAL FIXATION, FRACTURE, HUMERUS, DISTAL          Past Medical History:   Diagnosis Date    Anxiety 2013    Formatting of this note might be different from the original.  CURRENT MEDICATION(S) celexa 40 mg;   Lorazepam prn  Only occ  Stopped no need 11-17. Use PHQ 9 = 6   11-15;      Aortic valve stenosis 2023    Essential hypertension 10/29/2012    Hypercholesterolemia 10/29/2012    Hypothyroidism 10/29/2012    Major depressive disorder, single episode, moderate (H) 2022    Formatting of this note might be different from the original.  CURRENT MEDICATION(S) citalopram 40 mg    PHQ 9 =  2  11-11;  phq 9 = 2    12-11;  = 2   5-12; = 2   11-12;  Called her  Doing better  No need to see a psychologist. 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    Psoriatic arthritis (H) 10/29/2012      Past Surgical History:   Procedure Laterality Date    APPENDECTOMY      CARPAL TUNNEL RELEASE RT/LT Bilateral     CATARACT EXTRACTION, BILATERAL      CHOLECYSTECTOMY      CV CORONARY ANGIOGRAM N/A 2024    Procedure: Coronary Angiogram;  Surgeon: Farhad Blue MD;  Location: [facility]    CV TRANSCATHETER AORTIC VALVE REPLACEMENT-FEMORAL APPROACH N/A 1/3/2025    Procedure: Transcatheter Aortic Valve Replacement-Femoral Approach;  Surgeon: Graeme Torres MD;  Location: [facility]    HC KNEE ARTHROSCOPY, MED/LAT MENISCUS REPAIR Right     OR TRANSCATHETER AORTIC VALVE REPLACEMENT, FEMORAL PERCUTANEOUS APPROACH (STANDBY) N/A 1/3/2025    Procedure: OR TRANSCATHETER AORTIC VALVE REPLACEMENT, FEMORAL PERCUTANEOUS APPROACH (STANDBY);  Surgeon: Chester Oreilly MD;  [place] [facility]    MN EXCISE HAND/FOOT NEUROMA       right foot    TOTAL KNEE ARTHROPLASTY Right     TRABECULECTOMY Right     laser surgery right eye      Allergies   Allergen Reactions    Acetaminophen-Codeine GI Disturbance     Indigestion    Celecoxib Unknown    Codeine Camsylate Unknown    Conj Estrog-Medroxyprogest Ace Other (See Comments)     Bleeding    Estrogens, Conjugated Synthetic A Unknown    Nsaids Unknown    Sulfa Antibiotics Unknown      Social History     Tobacco Use    Smoking status: Former     Current packs/day: 0.00     Types: Cigarettes     Quit date:      Years since quittin.0    Smokeless tobacco: Never   Substance Use Topics    Alcohol use: Not Currently      Wt Readings from Last 1 Encounters:   25 100.6 kg (221 lb 12.5 oz)        Anesthesia Evaluation            ROS/MED HX  ENT/Pulmonary:       Neurologic:     (+)   dementia,                             Cardiovascular: Comment: New onset Afib    nterpretation Summary     1. The left ventricle is normal in size. Left ventricular function is  normal.The ejection fraction is > 65%. No regional wall motion abnormalities  noted. There is mild concentric left ventricular hypertrophy.  2. Normal right ventricle size and systolic function.  3. Well seated bio-prosthetic aortic valve (documented 23 mm Zelaya Darian 3  Ultra Resilia tissue valve) with normal aortic valve prosthesis metrics with a  mean systolic gradient of 11 mmHg and a peak anterograde velocity of 2.1  m/sec, AT: 80 msec. DVI: 0.7. No aortic insufficiency.  ______________________________________________________________________________  Left Ventricle  The left ventricle is normal in size. Left ventricular function is normal.The  ejection fraction is > 65%. There is mild concentric left ventricular  hypertrophy. Diastolic function not assessed due to significant mitral annular  calcification. No regional wall motion abnormalities noted.     Right Ventricle  Normal right ventricle size and systolic function.      Atria  The left atrium is mildly dilated. Right atrial size is normal.     Mitral Valve  There is mild to moderate mitral annular calcification. The mitral valve  leaflets are mildly thickened.     Tricuspid Valve  Right ventricular systolic pressure could not be approximated due to  inadequate tricuspid regurgitation.     Aortic Valve  Well seated bio-prosthetic aortic valve (documented 23 mm Zelaya Darian 3  Ultra Resilia tissue valve) with normal aortic valve prosthesis metrics with a  mean systolic gradient of 11 mmHg and a peak anterograde velocity of 2.1  m/sec, AT: 80 msec. DVI: 0.7. No aortic insufficiency.     Pulmonic Valve  The pulmonic valve is not well seen, but is grossly normal. This degree of  valvular regurgitation is within normal limits. There is trace pulmonic  valvular regurgitation.     Vessels  The aorta root is normal. Normal size ascending aorta. IVC diameter <2.1 cm  collapsing >50% with sniff suggests a normal RA pressure of 3 mmHg.     Pericardium  There is no pericardial effusion.      (+)  hypertension- -   -  - -                        dysrhythmias, a-fib,  valvular problems/murmurs type: AS s/p TAVR 1/3/2025.         METS/Exercise Tolerance:     Hematologic:  - neg hematologic  ROS     Musculoskeletal:   (+)     fracture, Fracture location: LUE,         GI/Hepatic:       Renal/Genitourinary:       Endo:     (+)          thyroid problem, hypothyroidism,    Obesity,       Psychiatric/Substance Use:     (+) psychiatric history depression       Infectious Disease:       Malignancy:       Other:            Physical Exam    Airway        Mallampati: II   TM distance: < 3 FB   Neck ROM: full     Respiratory Devices and Support         Dental       (+) Multiple crowns, permanant bridges      Cardiovascular          Rhythm and rate: irregular and normal     Pulmonary   pulmonary exam normal                OUTSIDE LABS:  CBC:   Lab Results   Component Value Date    WBC 7.7 01/06/2025    WBC  "8.5 01/04/2025    HGB 10.5 (L) 01/07/2025    HGB 11.9 01/06/2025    HCT 36.8 01/06/2025    HCT 31.8 (L) 01/04/2025     (L) 01/06/2025     (L) 01/04/2025     BMP:   Lab Results   Component Value Date     01/06/2025     01/04/2025    POTASSIUM 4.0 01/06/2025    POTASSIUM 4.0 01/04/2025    CHLORIDE 104 01/06/2025    CHLORIDE 106 01/04/2025    CO2 24 01/06/2025    CO2 25 01/04/2025    BUN 21.5 01/06/2025    BUN 18.7 01/04/2025    CR 0.85 01/07/2025    CR 0.98 (H) 01/06/2025    GLC 95 01/06/2025     (H) 01/04/2025     COAGS:   Lab Results   Component Value Date    INR 1.08 01/02/2025     POC: No results found for: \"BGM\", \"HCG\", \"HCGS\"  HEPATIC:   Lab Results   Component Value Date    ALBUMIN 3.4 (L) 01/06/2025    PROTTOTAL 6.0 (L) 01/04/2025    ALT 15 01/04/2025    AST 34 01/04/2025    ALKPHOS 76 01/04/2025    BILITOTAL 0.5 01/04/2025    ADWOA 13 09/06/2023     OTHER:   Lab Results   Component Value Date    LACT 1.2 09/21/2024    A1C 5.3 09/21/2024    RIZWANA 9.3 01/06/2025    PHOS 2.9 01/07/2025    MAG 2.4 (H) 01/07/2025    TSH 1.63 12/29/2024    T4 1.02 01/31/2023       Anesthesia Plan    ASA Status:  4       Anesthesia Type: General.     - Airway: LMA   Induction: Intravenous.   Maintenance: Balanced.        Consents    Anesthesia Plan(s) and associated risks, benefits, and realistic alternatives discussed. Questions answered and patient/representative(s) expressed understanding.     - Discussed: Risks, Benefits and Alternatives for BOTH SEDATION and the PROCEDURE were discussed     - Discussed with:  Patient      - Extended Intubation/Ventilatory Support Discussed: No.      - Patient is DNR/DNI Status: No     Use of blood products discussed: Yes.     - Discussed with: Patient.     - Consented: consented to blood products     Postoperative Care    Pain management: Peripheral nerve block (Single Shot).   PONV prophylaxis: Ondansetron (or other 5HT-3), Dexamethasone or Solumedrol, " "Promethazine or metoclopramide     Comments:               Mauri Villasenor MD    I have reviewed the pertinent notes and labs in the chart from the past 30 days and (re)examined the patient.  Any updates or changes from those notes are reflected in this note.           # Hypoalbuminemia: Lowest albumin = 3 g/dL at 1/2/2025  4:08 AM, will monitor as appropriate   # Thrombocytopenia: Lowest platelets = 115 in last 2 days, will monitor for bleeding   # Hypertension: Noted on problem list     # Dementia: noted on problem list        # Obesity: Estimated body mass index is 36.91 kg/m  as calculated from the following:    Height as of this encounter: 1.651 m (5' 5\").    Weight as of this encounter: 100.6 kg (221 lb 12.5 oz).      # Financial/Environmental Concerns: none        "

## 2025-01-07 NOTE — PLAN OF CARE
Problem: Adult Inpatient Plan of Care  Goal: Plan of Care Review  Description: The Plan of Care Review/Shift note should be completed every shift.  The Outcome Evaluation is a brief statement about your assessment that the patient is improving, declining, or no change.  This information will be displayed automatically on your shift  note.  Outcome: Progressing     Problem: Pain Acute  Goal: Optimal Pain Control and Function  Outcome: Progressing  Intervention: Prevent or Manage Pain  Recent Flowsheet Documentation  Taken 1/7/2025 0410 by Fior Calderon RN  Medication Review/Management: medications reviewed  Taken 1/6/2025 2351 by Fior Calderon RN  Medication Review/Management: medications reviewed     Problem: Pain Acute  Goal: Optimal Pain Control and Function  Intervention: Prevent or Manage Pain  Recent Flowsheet Documentation  Taken 1/7/2025 0410 by Fior Calderon RN  Medication Review/Management: medications reviewed  Taken 1/6/2025 2351 by Fior Calderon RN  Medication Review/Management: medications reviewed     Problem: Cardiac Catheterization (Diagnostic/Interventional)  Goal: Absence of Contrast-Induced Injury  Outcome: Progressing  Intervention: Prevent Systemic Contrast Dye Reaction  Recent Flowsheet Documentation  Taken 1/7/2025 0410 by Fior Calderon RN  Stabilization Measures: (left arm splint/sling) other (see comments)  Taken 1/6/2025 2351 by Fior Calderon RN  Stabilization Measures: (left arm splint/sling) other (see comments)     Problem: Cardiac Catheterization (Diagnostic/Interventional)  Goal: Absence of Contrast-Induced Injury  Intervention: Prevent Systemic Contrast Dye Reaction  Recent Flowsheet Documentation  Taken 1/7/2025 0410 by Fior Calderon RN  Stabilization Measures: (left arm splint/sling) other (see comments)  Taken 1/6/2025 2351 by Fior Calderon RN  Stabilization Measures: (left arm splint/sling) other (see comments)   Goal Outcome  Evaluation:       Patient slept 100% of night shift, did not want to be bothered, but needed emil cares and turning done. Patient incontinent of stool, total bed linen changed and new mepilex applied to sacral. Rhythm has been NSR, then converted to a-fib at 0405 controled rate, 02 sat's 94% on room air. Patient has splint and sling on left arm for fractured humorous. Plan is ORIF at 1200pm. Patient has intermittent confusion/dimentia. Needs reminders. NPO.

## 2025-01-07 NOTE — BRIEF OP NOTE
[facility]    Brief Operative Note    Pre-operative diagnosis: Closed fracture of distal end of left humerus, unspecified fracture morphology, initial encounter [S46.244A]  Post-operative diagnosis Same as pre-operative diagnosis    Procedure: OPEN REDUCTION INTERNAL FIXATION, FRACTURE, HUMERUS, DISTAL, WITH ULNA NERVE TRANSPOSITION, Left - Elbow    Surgeon: Surgeons and Role:     * Jace Tamayo MD - Primary     * Gema Reyes PA-C - Assisting  Anesthesia: General with Block   Estimated Blood Loss: 100 mL from 1/7/2025 12:45 PM to 1/7/2025  4:51 PM      Drains: None  Specimens: * No specimens in log *  Findings:   Intra-articular fracture .  Complications: None.      Post OP PLAN  Splint to LUE at all times, do not remove, must remain clean and dry  Non weight bearing LUE  Ancef x 24 hours post-op  Elevate LUE as much as possible.  No elbow or wrist ROM, okay to move fingers  Will need follow up in clinic 14-16 days after surgery for post-op assessment    Implants:   Implant Name Type Inv. Item Serial No.  Lot No. LRB No. Used Action   PLATE OLECRANON L 2H 2.7/3.5X73MM - [id number] Metallic Hardware/[place] PLATE OLECRANON L 2H 2.7/3.5X73MM  [facility]  Left 1 Implanted   PLATE DIST HUM DORSO 3H LT 2.7-3.5X75MM - [id number] Metallic Hardware/[place] PLATE DIST HUM DORSO 3H LT 2.7-3.5X75MM  [facility]  Left 1 Implanted   PLATE DIST HUM DORSO 3H LT 2.7/3.5X75MM - [id number] Metallic Hardware/[place] PLATE DIST HUM DORSO 3H LT 2.7/3.5X75MM  [employer]  Left 1 Wasted   PLATE DIST HUM LATERAL 1H RT 2.7-3.5X69 - [id number] Metallic Hardware/[place] PLATE DIST HUM LATERAL 1H RT 2.7-3.5X69  [employer]  Left 1 Implanted   PLATE DIST HUM LATERAL 1H RT 2.7-3.5X69 - [id number] Metallic Hardware/[place] PLATE DIST HUM LATERAL 1H RT 2.7-3.5X69  [facility]  Left 1 Implanted   SCREW METAPHYSEAL ST 2.7X20MM - [id number] Metallic Hardware/[place] SCREW  METAPHYSEAL ST 2.7X20MM  [facility]  Left 1 Wasted   IMP SCR SYN 2.7X16MM T8 SD LOCKING SELF-TAP VA 02.211.016 - [id number] Metallic Hardware/[place] IMP SCR SYN 2.7X16MM T8 SD LOCKING SELF-TAP VA 02.211.016  [facility]  Left 2 Implanted   IMP SCR SYN 2.7X20MM T8 SD LOCKING SELF-TAP VA 02.211.020 - [id number] Metallic Hardware/[place] IMP SCR SYN 2.7X20MM T8 SD LOCKING SELF-TAP VA 02.211.020  [facility]  Left 1 Implanted   IMP SCR SYN 2.7X26MM T8 SD LOCKING SELF-TAP VA 02.211.026 - [id number] Metallic Hardware/[place] IMP SCR SYN 2.7X26MM T8 SD LOCKING SELF-TAP VA 02.211.026  [facility]  Left 1 Implanted   SCREW LOCKING ST 2.7X28MM - [id number] Metallic Hardware/[place] SCREW LOCKING ST 2.7X28MM  [facility]  Left 1 Implanted   SCREW LOCKING ST 2.7X32MM - [id number] Metallic Hardware/[place] SCREW LOCKING ST 2.7X32MM  [facility]  Left 4 Implanted   IMP SCR SYN 2.7X34MM T8 SD LOCKING SELF-TAP VA 02.211.034 - [id number] Metallic Hardware/[place] IMP SCR SYN 2.7X34MM T8 SD LOCKING SELF-TAP VA 02.211.034  [facility]  Left 1 Wasted   SCREW LOCKING ST 2.7X40MM - [id number] Metallic Hardware/[place] SCREW LOCKING ST 2.7X40MM  [facility]  Left 1 Implanted   IMP SCR SYN 2.7X44MM T8 SD LOCKING SELF-TAP VA 02.211.044 - [id number] Metallic Hardware/[place] IMP SCR SYN 2.7X44MM T8 SD LOCKING SELF-TAP VA 02.211.044  [facility]  Left 2 Implanted   IMP WIRE DESTINY SYN 1.0T494NV 292.16 - [id number]  IMP WIRE DESTINY SYN 1.5F838YB 292.16  [facility]  Left 3 Implanted   IMP SCR SYN 3.5X20MM LOCKING W/STARDRIVE .106 - [id number] Metallic Hardware/[place] IMP SCR SYN 3.5X20MM LOCKING W/STARDRIVE .106  [employer]  Left 2 Implanted   IMP SCR SYN CORTEX 3.5X14MM SELF TAP .814 - [id number] Metallic Hardware/[place] IMP SCR SYN CORTEX 3.5X14MM SELF TAP .814  [employer]  Left 1 Implanted   IMP SCR SYN CORTEX 3.5X18MM SELF TAP .818 - [id number] Metallic  Hardware/[place] IMP SCR SYN CORTEX 3.5X18MM SELF TAP .818  [facility]  Left 1 Implanted   IMP SCR SYN CORTEX 3.5X22MM SELF TAP .822 - [id number] Metallic Hardware/[place] IMP SCR SYN CORTEX 3.5X22MM SELF TAP .822  [facility]  Left 2 Implanted   IMP SCR SYN CORTEX 3.5X24MM SELF TAP .824 - [id number] Metallic Hardware/[place] IMP SCR SYN CORTEX 3.5X24MM SELF TAP .824  [facility]  Left 1 Implanted   IMP SCR SYN CORTEX 3.5X26MM SELF TAP .826 - [id number] Metallic Hardware/[place] IMP SCR SYN CORTEX 3.5X26MM SELF TAP .826  [facility]  Left 1 Implanted   IMP SCR SYN CORTEX 3.5X28MM SELF TAP .828 - [id number] Metallic Hardware/[place] IMP SCR SYN CORTEX 3.5X28MM SELF TAP .828  [facility]  Left 1 Wasted   IMP SCR SYN CORTEX 3.5X34MM SELF TAP .834 - [id number] Metallic Hardware/[place] IMP SCR SYN CORTEX 3.5X34MM SELF TAP .834  [facility]  Left 1 Implanted   IMP SCR SYN 3.5X20MM LOCKING W/STARDRIVE .106 - [id number] Metallic Hardware/[place] IMP SCR SYN 3.5X20MM LOCKING W/STARDRIVE .106  [facility]  Left 2 Implanted

## 2025-01-07 NOTE — CONSULTS
"  Thank you, Dr. Lora, for asking the [facility] Care team to see Sharon De La Vega to evaluate A-fib.      Assessment/Recommendations   Assessment:    Atrial fibrillation with controlled ventricular response, new onset  Aortic stenosis status post TAVR on 1/3/2025, normal auscultation/function  Coronary artery disease, minimal, nonobstructive, stable  Alzheimer type dementia  Recurrent falls with left humerus fractures awaiting surgery    Plan:  Continue metoprolol for rate control-increase dose to 25 mg twice a day  She is stable from cardiac standpoint to undergo anesthesia and surgery    Anticoagulation may be challenging due to frequent falls and multiple comorbidities including dementia.      Clinically Significant Risk Factors               # Hypoalbuminemia: Lowest albumin = 3 g/dL at 1/2/2025  4:08 AM, will monitor as appropriate       # Thrombocytopenia: Lowest platelets = 115 in last 2 days, will monitor for bleeding       # Hypertension: Noted on problem list         # Dementia: noted on problem list              # Obesity: Estimated body mass index is 36.91 kg/m  as calculated from the following:    Height as of this encounter: 1.651 m (5' 5\").    Weight as of this encounter: 100.6 kg (221 lb 12.5 oz).          # Financial/Environmental Concerns: none                   History of Present Illness/Subjective    Ms. Shaorn De La Vega is a 79 year old female who was admitted after falls and fractured her left elbow she was found to have severe aortic stenosis.  She underwent coronary angiogram which showed mild nonobstructive coronary artery disease.  She underwent TAVR on 1/3/2025.  She has had unremarkable recovery until she developed atrial fibrillation this morning.  She has no history of documented atrial fibrillation.  The patient denies any new symptoms including heart palpitation chest pain or shortness of breath.  She is supposed to undergo surgical intervention to left upper arm later " "today.    ECG: Personally reviewed.  Atrial fibrillation with controlled ventricular response.    ECHO (personnaly Reviewed):   1. The left ventricle is normal in size. Left ventricular function is  normal.The ejection fraction is > 65%. No regional wall motion abnormalities  noted. There is mild concentric left ventricular hypertrophy.  2. Normal right ventricle size and systolic function.  3. Well seated bio-prosthetic aortic valve (documented 23 mm Zelaya Darian 3  Ultra Resilia tissue valve) with normal aortic valve prosthesis metrics with a  mean systolic gradient of 11 mmHg and a peak anterograde velocity of 2.1  m/sec, AT: 80 msec. DVI: 0.7. No aortic insufficiency.        Coronary angiogram: Minor nonobstructive plaques     Physical Examination Review of Systems   BP (!) 155/71 (BP Location: Left leg)   Pulse 84   Temp (!) 95.4  F (35.2  C) (Core)   Resp 20   Ht 1.651 m (5' 5\")   Wt 100.6 kg (221 lb 12.5 oz)   SpO2 93%   BMI 36.91 kg/m    Body mass index is 36.91 kg/m .  Wt Readings from Last 3 Encounters:   01/07/25 100.6 kg (221 lb 12.5 oz)   10/03/24 104.9 kg (231 lb 4.8 oz)   10/01/24 105 kg (231 lb 6.4 oz)       Intake/Output Summary (Last 24 hours) at 1/7/2025 1153  Last data filed at 1/7/2025 0931  Gross per 24 hour   Intake 120 ml   Output 850 ml   Net -730 ml     General Appearance:   No acute distress   Head/ENT: Normocephalic, without obvious abnormality. Membranes moist.      EYES:  No scleral icterus   Neck: Supple, symmetrical, trachea midline, no adenopathy, thyroid: not enlarged, symmetric, no carotid bruit or JVD   Chest/Lungs:   lungs are clear to auscultation, respirations unlabored. No tenderness or deformity   Cardiovascular:   irregular rhythm, S1, S2 normal, no murmur, rub or gallop.   Abdomen:  Soft, non-tender, bowel sounds active all four quadrants,  no masses, no organomegaly   Extremities: Left arm sling in place no edema   Skin: Skin color, texture, turgor normal, no " "rashes or lesions.             10 system review of systems completed see history of present illness and inpatient H&P (reviewed) for further details.          Lab Results    Chemistry/lipid CBC Cardiac Enzymes/BNP/TSH/INR   No results for input(s): \"CHOL\", \"HDL\", \"LDL\", \"TRIG\", \"CHOLHDLRATIO\" in the last 25538 hours.  No results for input(s): \"LDL\" in the last 48042 hours.  Recent Labs   Lab Test 01/07/25 0737 01/06/25 0527   NA  --  139   POTASSIUM 3.8 4.0   CHLORIDE  --  104   CO2  --  24   GLC  --  95   BUN  --  21.5   CR 0.85 0.98*   GFRESTIMATED 70 59*   RIZWANA  --  9.3     Recent Labs   Lab Test 01/07/25 0737 01/06/25 0527 01/04/25 0441   CR 0.85 0.98* 0.85     Recent Labs   Lab Test 09/21/24  1837   A1C 5.3          Recent Labs   Lab Test 01/07/25 0737 01/06/25 0527   WBC  --  7.7   HGB 10.5* 11.9   HCT  --  36.8   MCV  --  98   PLT  --  115*     Recent Labs   Lab Test 01/07/25 0737 01/06/25 0527 01/04/25 0441   HGB 10.5* 11.9 10.5*    No results for input(s): \"TROPONINI\" in the last 63921 hours.  Recent Labs   Lab Test 01/02/25  1106 09/06/23  2139 02/14/23  1712   NTBNPI 1,077 2,868* 171     Recent Labs   Lab Test 12/29/24  1306   TSH 1.63     Recent Labs   Lab Test 01/02/25  1106 01/10/23  1252   INR 1.08 1.15        Medical History  Surgical History Family History Social History   Past Medical History:   Diagnosis Date    Anxiety 02/14/2013    Formatting of this note might be different from the original.  CURRENT MEDICATION(S) celexa 40 mg;   Lorazepam prn  Only occ  Stopped no need 11-17. Use PHQ 9 = 6   11-15;      Aortic valve stenosis 04/07/2023    Essential hypertension 10/29/2012    Hypercholesterolemia 10/29/2012    Hypothyroidism 10/29/2012    Major depressive disorder, single episode, moderate (H) 11/28/2022    Formatting of this note might be different from the original.  CURRENT MEDICATION(S) citalopram 40 mg    PHQ 9 =  2  11-11;  phq 9 = 2    12-11;  = 2   5-12; = 2   11-12;  Called " her  Doing better  No need to see a psychologist. 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    Psoriatic arthritis (H) 10/29/2012     Past Surgical History:   Procedure Laterality Date    APPENDECTOMY      CARPAL TUNNEL RELEASE RT/LT Bilateral     CATARACT EXTRACTION, BILATERAL      CHOLECYSTECTOMY      CV CORONARY ANGIOGRAM N/A 2024    Procedure: Coronary Angiogram;  Surgeon: Farhad Blue MD;  Location: [facility]    CV TRANSCATHETER AORTIC VALVE REPLACEMENT-FEMORAL APPROACH N/A 1/3/2025    Procedure: Transcatheter Aortic Valve Replacement-Femoral Approach;  Surgeon: Graeme Torres MD;  Location: [facility]    HC KNEE ARTHROSCOPY, MED/LAT MENISCUS REPAIR Right     OR TRANSCATHETER AORTIC VALVE REPLACEMENT, FEMORAL PERCUTANEOUS APPROACH (STANDBY) N/A 1/3/2025    Procedure: OR TRANSCATHETER AORTIC VALVE REPLACEMENT, FEMORAL PERCUTANEOUS APPROACH (STANDBY);  Surgeon: Chester Oreilly MD;  Location: [facility] CV    [place] EXCISE HAND/FOOT NEUROMA      right foot    TOTAL KNEE ARTHROPLASTY Right     TRABECULECTOMY Right     laser surgery right eye     No premature CAD, SCD,cardiomyopathy   Social History     Socioeconomic History    Marital status:      Spouse name: Not on file    Number of children: Not on file    Years of education: Not on file    Highest education level: Not on file   Occupational History    Not on file   Tobacco Use    Smoking status: Former     Current packs/day: 0.00     Types: Cigarettes     Quit date:      Years since quittin.0    Smokeless tobacco: Never   Substance and Sexual Activity    Alcohol use: Not Currently    Drug use: Not on file    Sexual activity: Not on file   Other Topics Concern    Not on file   Social History Narrative    Not on file     Social Drivers of Health     Financial Resource Strain: Low Risk   (12/30/2024)    Financial Resource Strain     Within the past 12 months, have you or your family members you live with been unable to get utilities (heat, electricity) when it was really needed?: No   Food Insecurity: Low Risk  (12/30/2024)    Food Insecurity     Within the past 12 months, did you worry that your food would run out before you got money to buy more?: No     Within the past 12 months, did the food you bought just not last and you didn t have money to get more?: No   Transportation Needs: Low Risk  (12/30/2024)    Transportation Needs     Within the past 12 months, has lack of transportation kept you from medical appointments, getting your medicines, non-medical meetings or appointments, work, or from getting things that you need?: No   Physical Activity: Not on file   Stress: Not on file   Social Connections: Unknown (6/24/2023)    Received from [facility]    Social Connections     Frequency of Communication with Friends and Family: Not on file   Interpersonal Safety: Low Risk  (12/30/2024)    Interpersonal Safety     Do you feel physically and emotionally safe where you currently live?: Yes     Within the past 12 months, have you been hit, slapped, kicked or otherwise physically hurt by someone?: No     Within the past 12 months, have you been humiliated or emotionally abused in other ways by your partner or ex-partner?: No   Housing Stability: Low Risk  (12/30/2024)    Housing Stability     Do you have housing? : Yes     Are you worried about losing your housing?: No         Medications  Allergies   Current Facility-Administered Medications Ordered in Epic   Medication Dose Route Frequency Provider Last Rate Last Admin    [Auto Hold] acetaminophen (TYLENOL) Suppository 650 mg  650 mg Rectal Q4H PRN Maida Blair PA-C        [Auto Hold] acetaminophen (TYLENOL) tablet 650 mg  650 mg Oral Q4H PRN Maida Blair PA-C    650 mg at 01/06/25 1935    [Auto Hold] aspirin EC tablet 81 mg  81 mg Oral Daily Maida Blair PA-C   81 mg at 01/07/25 0829    [Auto Hold] bisacodyl (DULCOLAX) suppository 10 mg  10 mg Rectal Daily PRN Maida Blair PA-C        [Auto Hold] buPROPion (WELLBUTRIN XL) 24 hr tablet 150 mg  150 mg Oral QAM Maida Blair PA-C   150 mg at 01/07/25 0831    [Auto Hold] citalopram (celeXA) tablet 20 mg  20 mg Oral QAM Maida Blair PA-C   20 mg at 01/07/25 0831    [Auto Hold] glucose gel 15-30 g  15-30 g Oral Q15 Min PRN Maida Blair PA-C        Or    [Auto Hold] dextrose 50 % injection 25-50 mL  25-50 mL Intravenous Q15 Min PRN Maida Blair PA-C        Or    [Auto Hold] glucagon injection 1 mg  1 mg Subcutaneous Q15 Min PRN Maida Blair PA-C        [Auto Hold] donepezil (ARICEPT) tablet 10 mg  10 mg Oral At Bedtime Maida Blair PA-C   10 mg at 01/06/25 2054    HOLD:  Metformin and metformin containing medications if patient received IV contrast with acute kidney injury or severe chronic kidney disease (stage IV or stage V; i.e., eGFR less than 30)   Does not apply HOLD Maida Blair PA-C        [Auto Hold] hydrALAZINE (APRESOLINE) injection 10 mg  10 mg Intravenous Q20 Min PRN Maida Blair PA-C   10 mg at 01/06/25 0704    lactated ringers infusion   Intravenous Continuous Mauri Villasenor MD        lactated ringers infusion   Intravenous Continuous Mauri Villasenor MD        lactated ringers infusion   Intravenous Continuous Mercy Araya MD 50 mL/hr at 01/07/25 1003 New Bag at 01/07/25 1003    [Held by provider] leflunomide (ARAVA) tablet 10 mg  10 mg Oral QAM Maida Blair PA-C   10 mg at 12/30/24 0806    [Auto Hold] levothyroxine (SYNTHROID/LEVOTHROID) tablet 100 mcg  100 mcg Oral Maida Pérez PA-C   100 mcg at 01/07/25 0830    lidocaine (LMX4) cream   Topical Q1H PRN Mauri Villasenor MD        lidocaine (LMX4) cream   Topical Q1H PRN Mauri Villasenor MD         lidocaine 1 % 0.1-1 mL  0.1-1 mL Other Q1H PRN Mauri Villasenor MD        lidocaine 1 % 0.1-1 mL  0.1-1 mL Other Q1H PRN Mauri Villasenor MD        [Auto Hold] lisinopril (ZESTRIL) tablet 30 mg  30 mg Oral Daily Mercy Araya MD   30 mg at 01/07/25 0830    [Auto Hold] melatonin tablet 1 mg  1 mg Oral At Bedtime PRN Maida Blari PA-C   1 mg at 01/01/25 2107    [Auto Hold] memantine (NAMENDA) tablet 10 mg  10 mg Oral BID Maida Blair PA-C   10 mg at 01/07/25 0831    [Auto Hold] metoprolol tartrate (LOPRESSOR) tablet 25 mg  25 mg Oral BID Lester Melchor MD        [Auto Hold] miconazole (MICATIN) 2 % powder   Topical BID PRN Maida Blair PA-C   Given at 01/07/25 0411    [Auto Hold] naloxone (NARCAN) injection 0.2 mg  0.2 mg Intravenous Q2 Min PRN Jace Donato DO        Or    [Auto Hold] naloxone (NARCAN) injection 0.4 mg  0.4 mg Intravenous Q2 Min PRN Jace Donato DO        Or    [Auto Hold] naloxone (NARCAN) injection 0.2 mg  0.2 mg Intramuscular Q2 Min PRN Jace Donato DO        Or    [Auto Hold] naloxone (NARCAN) injection 0.4 mg  0.4 mg Intramuscular Q2 Min PRN Jace Donato DO        [Auto Hold] nitroGLYcerin (NITROSTAT) sublingual tablet 0.4 mg  0.4 mg Sublingual Q5 Min PRN Maida Blair PA-C        [Auto Hold] OLANZapine (zyPREXA) injection 2.5 mg  2.5 mg Intramuscular Q6H PRN Maida Blair PA-C        [Auto Hold] ondansetron (ZOFRAN ODT) ODT tab 4 mg  4 mg Oral Q6H PRN Maida Blair PA-C        Or    [Auto Hold] ondansetron (ZOFRAN) injection 4 mg  4 mg Intravenous Q6H PRN Maida Blair PA-C        [Auto Hold] ondansetron (ZOFRAN ODT) ODT tab 4 mg  4 mg Oral Q6H PRN Maida Blair PA-C        Or    [Auto Hold] ondansetron (ZOFRAN) injection 4 mg  4 mg Intravenous Q6H PRN Maida Blair PA-C        [Auto Hold] oxyCODONE (ROXICODONE) tablet 5 mg  5 mg Oral Q4H PRN Maida Blair PA-C   5 mg at 01/03/25 1721    Or    [Auto Hold]  oxyCODONE (ROXICODONE) tablet 10 mg  10 mg Oral Q4H PRN Maida Blair PA-C        [Auto Hold] polyethylene glycol (MIRALAX) Packet 17 g  17 g Oral Daily Jace Donato,    17 g at 01/06/25 0911    [Auto Hold] prochlorperazine (COMPAZINE) injection 5 mg  5 mg Intravenous Q6H PRN Maida Blair PA-C        Or    [Auto Hold] prochlorperazine (COMPAZINE) tablet 5 mg  5 mg Oral Q6H PRN Maida Blair PA-C        [Auto Hold] QUEtiapine (SEROquel) half-tab 12.5 mg  12.5 mg Oral Q6H PRN Maida Blair PA-C   12.5 mg at 01/01/25 2107    [Auto Hold] senna-docusate (SENOKOT-S/PERICOLACE) 8.6-50 MG per tablet 1 tablet  1 tablet Oral BID PRN Maida Blair PA-C        Or    [Auto Hold] senna-docusate (SENOKOT-S/PERICOLACE) 8.6-50 MG per tablet 2 tablet  2 tablet Oral BID PRN Maida Blair PA-C        [Auto Hold] simvastatin (ZOCOR) tablet 20 mg  20 mg Oral QPM Maida Blair PA-C   20 mg at 01/06/25 1935    sodium chloride (PF) 0.9% PF flush 3 mL  3 mL Intracatheter Q8H Mauri Villasenor MD        sodium chloride (PF) 0.9% PF flush 3 mL  3 mL Intracatheter q1 min prn Mauri Villasenor MD        sodium chloride (PF) 0.9% PF flush 3 mL  3 mL Intracatheter Q8H Mauri Villasenor MD   3 mL at 01/07/25 0411    sodium chloride (PF) 0.9% PF flush 3 mL  3 mL Intracatheter q1 min prn Mauri Villasenor MD         No current Epic-ordered outpatient medications on file.       Allergies   Allergen Reactions    Acetaminophen-Codeine GI Disturbance     Indigestion    Celecoxib Unknown    Codeine Camsylate Unknown    Conj Estrog-Medroxyprogest Ace Other (See Comments)     Bleeding    Estrogens, Conjugated Synthetic A Unknown    Nsaids Unknown    Sulfa Antibiotics Unknown

## 2025-01-07 NOTE — PLAN OF CARE
Problem: Fall Injury Risk  Goal: Absence of Fall and Fall-Related Injury  Outcome: Progressing     Problem: Pain Acute  Goal: Optimal Pain Control and Function  Outcome: Progressing   Goal Outcome Evaluation:       Writer was told at report pt was in a-fib the started at 4 a.m.  Writer paged MD to update.  MD ordered cardiology consult and cardiology was updated that surgery is pending clearance from cardiology.    Pt was short and irritable this morning   Writer convinced pt to take a.m. medications.  Pt denies pain.   Pt went to bed immediatly after taking medications.          Pt had fecal incontinece this a.m.   MD updated.

## 2025-01-07 NOTE — PROGRESS NOTES
[facility]    Medicine Progress Note - Hospitalist Service    Date of Admission:  12/29/2024    Assessment & Plan   Sharon De La Vega is a 79 year old female admitted on 12/29/2024. She was brought back to the ED by ambulance from home for evaluation after a second fall at home     Acute left comminuted intra-articular mildly displaced distal humerus fracture, minimally displaced olecranon process fracture, minimally displaced radial head fracture.   - Status post splint/sling  - ORIF Left elbow 1/7 per Dr. Tamayo - today  - NPO pm  - Pain control  - Orthopedic surgery assistance appreciated    Atrial fibrillation with controlled ventricular response, new onset  - Start Metoprolol 25mg bid  - Needs anticoagulation, but has frequent falls, defer to Cardiology     Severe symptomatic aortic valvular stenosis  - TTE (12/29/24):  LVEF 60%, mean gradient 57 mmHg with peak velocity of 4.6 m/s. Calculated valve area 0.60-0.63 cmÂ .  - Coronary angiogram (12/29/24) normal coronary arteries.  - S/P TAVR (1/3/2025) with a 23mm Zelaya Darian Ultra Resilia Valve  - Tele  - Cardiology assistance appreciated.     Recurrent falls  - Limited historian with dementia  - Aortic valve stenosis possibly contributory  - CT head & C spine unrevealing for acute process  - Imaging findings per #1  - Fall precautions  - PT/OT     Essential hypertension   - On Lisinopril 30mg every day     Possible UTI  - IV CTX x 3d course completed     Hypothyroidism  - Euthyroid, TSH 1.63  - Levothyroxine     Alzheimer's dementia without behavioral disturbance  - Risk for violent behavior and/or acute delirium  - Donepezil, memantine  - As needed quetiapine and olanzapine     Depression / Anxiety  -Bupropion, citalopram     Psoriatic arthritis  - Holding methotrexate, folic acid and leflunomide     Hyperlipidemia  - Simvastatin  - CK mildly elevated due to MSK trauma (520). Repeat pending.     Hypokalemia- resolved    "  Obesity  -BMI 38.44     Intermittent urine retention  - voiding protocol w/ SIC prn retention          Diet: NPO per Anesthesia Guidelines for Procedure/Surgery Except for: Meds    DVT Prophylaxis: Pneumatic Compression Devices  Escobar Catheter: PRESENT, indication: Acute retention or obstruction  Lines: PRESENT             Cardiac Monitoring: ACTIVE order. Indication: QTc prolonging medication (48 hours)  Code Status: Full Code      Clinically Significant Risk Factors               # Hypoalbuminemia: Lowest albumin = 3 g/dL at 1/2/2025  4:08 AM, will monitor as appropriate   # Thrombocytopenia: Lowest platelets = 115 in last 2 days, will monitor for bleeding   # Hypertension: Noted on problem list     # Dementia: noted on problem list        # Obesity: Estimated body mass index is 36.91 kg/m  as calculated from the following:    Height as of this encounter: 1.651 m (5' 5\").    Weight as of this encounter: 100.6 kg (221 lb 12.5 oz).      # Financial/Environmental Concerns: none         Social Drivers of Health    Tobacco Use: Medium Risk (1/7/2025)    Patient History     Smoking Tobacco Use: Former     Smokeless Tobacco Use: Never    [facility] [employer] [employer]    Social Connections          Disposition Plan     Medically Ready for Discharge: Anticipated in 2-4 Days             Mercy Araya MD  Hospitalist Service  [facility]  Securely message with [employer] (more info)  Text page via [employer] Paging/Directory   ______________________________________________________________________    Interval History   Patient was examined at the bedside prior to surgery.  Denies any pain.  Had 2 episodes of diarrhea, will continue to monitor.  Seen by cardiology, for new onset A-fib    Patient taken to the OR for ORIF    Physical Exam   Vital Signs: Temp: (!) 95.4  F (35.2  C) Temp src: Core BP: (!) 157/69 Pulse: 81   Resp: 14 " "SpO2: 99 % O2 Device: Oxymask Oxygen Delivery: 5 LPM  Weight: 221 lbs 12.52 oz    General appearance - NAD  Lungs - clear to auscultation, no wheezes  Heart - rrr. No peripheral edema.  Abdomen - soft, nontender, nondistended, BS+  Neurological - alert, follows commands, disorientation/forgetfullness, no agitation.  Skin - no c/c/p    Medical Decision Making       35 MINUTES SPENT BY ME on the date of service doing chart review, history, exam, documentation & further activities per the note.      Data     I have personally reviewed the following data over the past 24 hrs:    N/A  \   10.5 (L)   / N/A     N/A N/A N/A /  N/A   3.8 N/A 0.85 \       Imaging results reviewed over the past 24 hrs:   Recent Results (from the past 24 hours)   POC US Guidance Needle Placement    Narrative    Ultrasound was performed as guidance to an anesthesia procedure.  Click   \"PACS images\" hyperlink below to view any stored images.  For specific   procedure details, view procedure note authored by anesthesia.     "

## 2025-01-07 NOTE — ANESTHESIA PROCEDURE NOTES
Central Line/PA Catheter Placement    Pre-Procedure   Staff -        Anesthesiologist:  Mauri Villasenor MD       Performed By: anesthesiologist       Location: OR       Pre-Anesthestic Checklist: patient identified, IV checked, site marked, risks and benefits discussed, informed consent, monitors and equipment checked, pre-op evaluation and at physician/surgeon's request  Timeout:       Correct Patient: Yes        Correct Procedure: Yes        Correct Site: Yes        Correct Position: Yes        Correct Laterality: Yes   Line Placement:   This line was placed Post Induction    Procedure   Procedure: central line       Laterality: right       Insertion Site: internal jugular.       Patient Position: Trendelenburg  Sterile Prep        All elements of maximal sterile barrier technique followed       Patient Prep/Sterile Barriers: draped, hand hygiene, gloves , hat , mask , draped, gown, sterile gel and probe cover       Skin prep: Chloraprep  Insertion/Injection        1. Ultrasound was used to evaluate the access site.       2. Vein evaluated via ultrasound for patency/adequacy.       3. Using real-time ultrasound the needle/catheter was observed entering the artery/vein.       4. Permanent image was captured and entered into the patient's record.       5. The visualized structures were anatomically normal.       6. There were no apparent abnormal pathologic findings.       Introducer Type: 9 Fr, 2-lumen MAC        Type: CVC       Catheter Size: 8.5 Fr       Catheter Length: 20       Number of Lumens: triple lumen  Narrative         Secured by: suture       Tegaderm and Biopatch dressing used.       Complications: None apparent,        blood aspirated from all lumens,        Verification method: Placement to be verified post-op

## 2025-01-07 NOTE — ANESTHESIA CARE TRANSFER NOTE
Patient: Sharon De La Vega    Procedure: Procedure(s):  OPEN REDUCTION INTERNAL FIXATION, FRACTURE, HUMERUS, DISTAL, WITH ULNA NERVE TRANSPOSITION       Diagnosis: Closed fracture of distal end of left humerus, unspecified fracture morphology, initial encounter [S42.402A]  Diagnosis Additional Information: No value filed.    Anesthesia Type:   Peripheral Nerve Block, General     Note:    Oropharynx: oropharynx clear of all foreign objects and spontaneously breathing  Level of Consciousness: awake  Oxygen Supplementation: face mask  Level of Supplemental Oxygen (L/min / FiO2): 6  Independent Airway: airway patency satisfactory and stable  Dentition: dentition unchanged  Vital Signs Stable: post-procedure vital signs reviewed and stable  Report to RN Given: handoff report given  Patient transferred to: PACU    Handoff Report: Identifed the Patient, Identified the Reponsible Provider, Reviewed the pertinent medical history, Discussed the surgical course, Reviewed Intra-OP anesthesia mangement and issues during anesthesia, Set expectations for post-procedure period and Allowed opportunity for questions and acknowledgement of understanding      Vitals:  Vitals Value Taken Time   /83 01/07/25 1655   Temp 35.6  C (96.08  F) 01/07/25 1659   Pulse 94 01/07/25 1659   Resp 0 01/07/25 1659   SpO2 99 % 01/07/25 1659   Vitals shown include unfiled device data.    Electronically Signed By: JAMEL August CRNA  January 7, 2025  5:00 PM

## 2025-01-07 NOTE — PLAN OF CARE
Problem: Cognitive Impairment  Goal: Optimal Cognitive Function  Outcome: Progressing     Problem: Fall Injury Risk  Goal: Absence of Fall and Fall-Related Injury  Outcome: Progressing     Problem: Pain Acute  Goal: Optimal Pain Control and Function  Outcome: Progressing       Goal Outcome Evaluation:      Pt bedrest; hoyered pt to sit in chair for meals. Pt is NSR and RA. Reported arthritic pain in wrists and tylenol given. Pt has dementia and forgetful about situation. Sling in place. Pt tremors intermittently. Denied numbness and pain in left arm. Pt repeats herself multiple times through the shift but is pleasant. Pt has frederick and had 2 loose BM using the bedpan. Pt NPO at midnight for ORIF of left elbow.

## 2025-01-08 ENCOUNTER — APPOINTMENT (OUTPATIENT)
Dept: PHYSICAL THERAPY | Facility: HOSPITAL | Age: 80
DRG: 267 | End: 2025-01-08
Attending: STUDENT IN AN ORGANIZED HEALTH CARE EDUCATION/TRAINING PROGRAM
Payer: COMMERCIAL

## 2025-01-08 ENCOUNTER — APPOINTMENT (OUTPATIENT)
Dept: OCCUPATIONAL THERAPY | Facility: HOSPITAL | Age: 80
DRG: 267 | End: 2025-01-08
Attending: STUDENT IN AN ORGANIZED HEALTH CARE EDUCATION/TRAINING PROGRAM
Payer: COMMERCIAL

## 2025-01-08 LAB
ANION GAP SERPL CALCULATED.3IONS-SCNC: 7 MMOL/L (ref 7–15)
ATRIAL RATE - MUSE: 150 BPM
ATRIAL RATE - MUSE: 416 BPM
BUN SERPL-MCNC: 16.2 MG/DL (ref 8–23)
CALCIUM SERPL-MCNC: 8.4 MG/DL (ref 8.8–10.4)
CHLORIDE SERPL-SCNC: 107 MMOL/L (ref 98–107)
CREAT SERPL-MCNC: 0.79 MG/DL (ref 0.51–0.95)
DIASTOLIC BLOOD PRESSURE - MUSE: NORMAL MMHG
DIASTOLIC BLOOD PRESSURE - MUSE: NORMAL MMHG
EGFRCR SERPLBLD CKD-EPI 2021: 76 ML/MIN/1.73M2
ERYTHROCYTE [DISTWIDTH] IN BLOOD BY AUTOMATED COUNT: 16.1 % (ref 10–15)
GLUCOSE SERPL-MCNC: 110 MG/DL (ref 70–99)
HCO3 SERPL-SCNC: 28 MMOL/L (ref 22–29)
HCT VFR BLD AUTO: 27.8 % (ref 35–47)
HGB BLD-MCNC: 8.6 G/DL (ref 11.7–15.7)
HGB BLD-MCNC: 8.9 G/DL (ref 11.7–15.7)
INTERPRETATION ECG - MUSE: NORMAL
INTERPRETATION ECG - MUSE: NORMAL
MAGNESIUM SERPL-MCNC: 2.2 MG/DL (ref 1.7–2.3)
MCH RBC QN AUTO: 31.4 PG (ref 26.5–33)
MCHC RBC AUTO-ENTMCNC: 32 G/DL (ref 31.5–36.5)
MCV RBC AUTO: 98 FL (ref 78–100)
P AXIS - MUSE: NORMAL DEGREES
P AXIS - MUSE: NORMAL DEGREES
PHOSPHATE SERPL-MCNC: 2.5 MG/DL (ref 2.5–4.5)
PLATELET # BLD AUTO: 107 10E3/UL (ref 150–450)
POTASSIUM SERPL-SCNC: 4.5 MMOL/L (ref 3.4–5.3)
PR INTERVAL - MUSE: NORMAL MS
PR INTERVAL - MUSE: NORMAL MS
PROCALCITONIN SERPL IA-MCNC: 0.11 NG/ML
QRS DURATION - MUSE: 80 MS
QRS DURATION - MUSE: 80 MS
QT - MUSE: 340 MS
QT - MUSE: 386 MS
QTC - MUSE: 448 MS
QTC - MUSE: 474 MS
R AXIS - MUSE: 11 DEGREES
R AXIS - MUSE: 12 DEGREES
RBC # BLD AUTO: 2.83 10E6/UL (ref 3.8–5.2)
SODIUM SERPL-SCNC: 142 MMOL/L (ref 135–145)
SYSTOLIC BLOOD PRESSURE - MUSE: NORMAL MMHG
SYSTOLIC BLOOD PRESSURE - MUSE: NORMAL MMHG
T AXIS - MUSE: -40 DEGREES
T AXIS - MUSE: 191 DEGREES
VENTRICULAR RATE- MUSE: 117 BPM
VENTRICULAR RATE- MUSE: 81 BPM
WBC # BLD AUTO: 8.1 10E3/UL (ref 4–11)

## 2025-01-08 PROCEDURE — 250N000013 HC RX MED GY IP 250 OP 250 PS 637: Performed by: ORTHOPAEDIC SURGERY

## 2025-01-08 PROCEDURE — 82565 ASSAY OF CREATININE: CPT | Performed by: ORTHOPAEDIC SURGERY

## 2025-01-08 PROCEDURE — 85027 COMPLETE CBC AUTOMATED: CPT | Performed by: ORTHOPAEDIC SURGERY

## 2025-01-08 PROCEDURE — 80048 BASIC METABOLIC PNL TOTAL CA: CPT | Performed by: ORTHOPAEDIC SURGERY

## 2025-01-08 PROCEDURE — 84100 ASSAY OF PHOSPHORUS: CPT | Performed by: ORTHOPAEDIC SURGERY

## 2025-01-08 PROCEDURE — 120N000004 HC R&B MS OVERFLOW

## 2025-01-08 PROCEDURE — 83735 ASSAY OF MAGNESIUM: CPT | Performed by: ORTHOPAEDIC SURGERY

## 2025-01-08 PROCEDURE — 84145 PROCALCITONIN (PCT): CPT | Performed by: STUDENT IN AN ORGANIZED HEALTH CARE EDUCATION/TRAINING PROGRAM

## 2025-01-08 PROCEDURE — 250N000011 HC RX IP 250 OP 636: Mod: JZ | Performed by: ORTHOPAEDIC SURGERY

## 2025-01-08 PROCEDURE — 85018 HEMOGLOBIN: CPT | Performed by: STUDENT IN AN ORGANIZED HEALTH CARE EDUCATION/TRAINING PROGRAM

## 2025-01-08 PROCEDURE — 82310 ASSAY OF CALCIUM: CPT | Performed by: ORTHOPAEDIC SURGERY

## 2025-01-08 PROCEDURE — 258N000003 HC RX IP 258 OP 636: Performed by: STUDENT IN AN ORGANIZED HEALTH CARE EDUCATION/TRAINING PROGRAM

## 2025-01-08 PROCEDURE — 97530 THERAPEUTIC ACTIVITIES: CPT | Mod: GP

## 2025-01-08 PROCEDURE — 93005 ELECTROCARDIOGRAM TRACING: CPT

## 2025-01-08 PROCEDURE — 36415 COLL VENOUS BLD VENIPUNCTURE: CPT | Performed by: ORTHOPAEDIC SURGERY

## 2025-01-08 PROCEDURE — 97535 SELF CARE MNGMENT TRAINING: CPT | Mod: GO

## 2025-01-08 RX ORDER — SODIUM CHLORIDE, SODIUM LACTATE, POTASSIUM CHLORIDE, CALCIUM CHLORIDE 600; 310; 30; 20 MG/100ML; MG/100ML; MG/100ML; MG/100ML
INJECTION, SOLUTION INTRAVENOUS CONTINUOUS
Status: ACTIVE | OUTPATIENT
Start: 2025-01-08 | End: 2025-01-09

## 2025-01-08 RX ORDER — ACETAMINOPHEN 325 MG/1
650 TABLET ORAL EVERY 4 HOURS PRN
Qty: 100 TABLET | Refills: 0 | Status: SHIPPED | OUTPATIENT
Start: 2025-01-08

## 2025-01-08 RX ORDER — AMOXICILLIN 250 MG
2 CAPSULE ORAL 2 TIMES DAILY PRN
Qty: 30 TABLET | Refills: 0 | Status: SHIPPED | OUTPATIENT
Start: 2025-01-08

## 2025-01-08 RX ORDER — SODIUM CHLORIDE, SODIUM LACTATE, POTASSIUM CHLORIDE, CALCIUM CHLORIDE 600; 310; 30; 20 MG/100ML; MG/100ML; MG/100ML; MG/100ML
INJECTION, SOLUTION INTRAVENOUS CONTINUOUS
Status: DISCONTINUED | OUTPATIENT
Start: 2025-01-08 | End: 2025-01-08

## 2025-01-08 RX ORDER — OXYCODONE HYDROCHLORIDE 5 MG/1
5 TABLET ORAL EVERY 4 HOURS PRN
Status: ACTIVE | OUTPATIENT
Start: 2025-01-08

## 2025-01-08 RX ADMIN — MEMANTINE 10 MG: 10 TABLET ORAL at 20:22

## 2025-01-08 RX ADMIN — LEVOTHYROXINE SODIUM 100 MCG: 100 TABLET ORAL at 08:13

## 2025-01-08 RX ADMIN — ACETAMINOPHEN 650 MG: 325 TABLET ORAL at 16:04

## 2025-01-08 RX ADMIN — SODIUM CHLORIDE, POTASSIUM CHLORIDE, SODIUM LACTATE AND CALCIUM CHLORIDE: 600; 310; 30; 20 INJECTION, SOLUTION INTRAVENOUS at 18:26

## 2025-01-08 RX ADMIN — DONEPEZIL HYDROCHLORIDE 10 MG: 5 TABLET, FILM COATED ORAL at 20:23

## 2025-01-08 RX ADMIN — CEFAZOLIN SODIUM 2 G: 2 INJECTION, SOLUTION INTRAVENOUS at 16:05

## 2025-01-08 RX ADMIN — CEFAZOLIN SODIUM 2 G: 2 INJECTION, SOLUTION INTRAVENOUS at 08:41

## 2025-01-08 RX ADMIN — ASPIRIN 81 MG: 81 TABLET, COATED ORAL at 08:11

## 2025-01-08 RX ADMIN — ACETAMINOPHEN 650 MG: 325 TABLET ORAL at 20:21

## 2025-01-08 RX ADMIN — CITALOPRAM HYDROBROMIDE 20 MG: 20 TABLET ORAL at 08:13

## 2025-01-08 RX ADMIN — Medication 1 MG: at 22:21

## 2025-01-08 RX ADMIN — MEMANTINE 10 MG: 10 TABLET ORAL at 08:13

## 2025-01-08 RX ADMIN — SIMVASTATIN 20 MG: 10 TABLET, FILM COATED ORAL at 20:24

## 2025-01-08 RX ADMIN — BUPROPION HYDROCHLORIDE 150 MG: 150 TABLET, FILM COATED, EXTENDED RELEASE ORAL at 08:11

## 2025-01-08 RX ADMIN — CEFAZOLIN SODIUM 2 G: 2 INJECTION, SOLUTION INTRAVENOUS at 00:19

## 2025-01-08 ASSESSMENT — ACTIVITIES OF DAILY LIVING (ADL)
ADLS_ACUITY_SCORE: 79
ADLS_ACUITY_SCORE: 75
ADLS_ACUITY_SCORE: 79
ADLS_ACUITY_SCORE: 79
ADLS_ACUITY_SCORE: 75
ADLS_ACUITY_SCORE: 79
ADLS_ACUITY_SCORE: 75
ADLS_ACUITY_SCORE: 79
ADLS_ACUITY_SCORE: 75
ADLS_ACUITY_SCORE: 79
ADLS_ACUITY_SCORE: 79
ADLS_ACUITY_SCORE: 75
ADLS_ACUITY_SCORE: 75
ADLS_ACUITY_SCORE: 79
ADLS_ACUITY_SCORE: 75
ADLS_ACUITY_SCORE: 79
ADLS_ACUITY_SCORE: 75
ADLS_ACUITY_SCORE: 79
ADLS_ACUITY_SCORE: 79
ADLS_ACUITY_SCORE: 75
ADLS_ACUITY_SCORE: 79

## 2025-01-08 NOTE — SIGNIFICANT EVENT
Significant Event Note    Time of event: 21:20 January 7, 2025    Description of event:  Paged by RN regarding patient with new onset A-fib and flagging sepsis protocol. Nursing also paged hospitalist but requesting orders for EKG and lactate from house to get process started. Patient seen briefly at bedside in no acute distress, asymptomatic.     Plan:  - EKG and lactate ordered  - Dr. Castellanos taking over orders, appreciated    Discussed with: bedside nurse    Gavino Baca MD

## 2025-01-08 NOTE — PROGRESS NOTES
"Orthopedic Progress Note      Assessment: 1 Day Post-Op  S/P Procedure(s):  OPEN REDUCTION INTERNAL FIXATION, FRACTURE, HUMERUS, DISTAL, WITH ULNA NERVE TRANSPOSITION     Plan:   - Continue PT/OT  - Weightbearing status: NWB LUE. No pushing, pulling, or lifting  - Activity: Up with assist and assistive device until independent.  - Anticoagulation: Chemoprophylaxis per primary if indicated, okay to start today if needed. Currently on ASA 81 daily.  SCDs, apolinar stockings and early ambulation.  - Antibiotics: 24 hours periop  - Pain Management; continue current regimen  - Diet: progress diet as tolerated  - Labs: hgb 8.9, transfuse if <7.0. No indication today  - Dressing: Splint on at all times, keep clean, dry and intact.  Please redirect patient from picking at or removing splint  - Sling for arm support only to be removed for hygiene cares and skin checks  - Elevation: Elevate LUE on pillow to keep above the level of the heart as much as possible  - Follow-up: Outpatient follow up in 2 weeks  - Disposition: Anticipate discharge TCU pending placement      Subjective:  Pain: moderate  Nausea, Vomiting:  No  Lightheadedness, Dizziness:  No  Neuro:  Patient denies new onset numbness or paresthesias  Fever, chills: No  Chest pain: No  SOB: No    Patient reports feeling well today. Patient reports pain is tolerable with current pain regimen. Patient eating and drinking well. Patient voiding and passing gas however no BM. All questions/concerns answered.      Objective:  /49 (BP Location: Right arm)   Pulse 82   Temp 98.3  F (36.8  C) (Oral)   Resp 18   Ht 1.651 m (5' 5\")   Wt 101.1 kg (222 lb 14.2 oz)   SpO2 93%   BMI 37.09 kg/m      The patient is A&Ox3. Appears comfortable, sitting up in bed  Sensation is intact.  Able to thumbs up, make an okay sign, cross fingers.  Fingers warm and well perfused  The incision is covered. Splint C/D/I. Sling in place.       No drain     Pertinent Labs   Lab Results: " personally reviewed.   Lab Results   Component Value Date    INR 1.08 01/02/2025    INR 1.15 01/10/2023     Lab Results   Component Value Date    WBC 8.1 01/08/2025    HGB 8.9 (L) 01/08/2025    HCT 27.8 (L) 01/08/2025    MCV 98 01/08/2025     (L) 01/08/2025     Lab Results   Component Value Date     01/08/2025    CO2 28 01/08/2025         Report completed by:  LYDIA PELLETIER PA-C  Date: 01/08/2025    [facility]

## 2025-01-08 NOTE — PROGRESS NOTES
Care Management Follow Up    Length of Stay (days): 9    Expected Discharge Date: 01/08/2025     Concerns to be Addressed:     Care progression - discharge planning  Patient plan of care discussed at interdisciplinary rounds: Yes    Anticipated Discharge Disposition:  Rehab rec Transitional care     Anticipated Discharge Services:  Transitional care  Anticipated Discharge DME:  NA    Patient/family educated on Medicare website which has current facility and service quality ratings:  Yes  Education Provided on the Discharge Plan:  yes per team  Patient/Family in Agreement with the Plan:  Yes    Referrals Placed by CM/SW:  Yes  Private pay costs discussed: Transportation costs    Discussed  Partnership in Safe Discharge Planning  document with patient/family: Yes, patient's    Handoff Completed: No, handoff not indicated or clinically appropriate    Additional Information:  Late entry: 1/7/25  Rec'd a voicemail from Samir regarding SNF auth request for [id number] stating due to age of care, this was sent to MD to review. Initial request stated patient was to have surgery 1/6/25, will need new PT/OT eval.    Message was sent to Dr. Araya to order new PT/OT eval  Dr. Araya responded will do 1/8/25    Called [facility] and spoke with Marcio to update surgery was done 1/7/25 and new PT/OT will be order for 1/8/25. She states Jamesre will need new PT/OT eval and MD notes.     [id number] rec'd a voicemail, request put on hold. Please call [phone] ext [phone] for peer to peer review or fax updates to [phone] regarding [id number]    Social Hx:  Assessment: Follow. Live w/son Chaz who assists w/I/ADLs. Use walker/cane.  Last note: 1/8/25  Plan: Accepted to [facility] Lisa Morrow approved 1/8/25-1/14/25, PAS completed  Needs: Medical stability  Hand off: No  Transport: [facility]  transport    Next Steps: RNCM to follow for medical progression, recommendations, and final discharge plan.     Adamaris  Melinda, RN     2236 rec'd a message to call Elva at [facility] [phone]  [facility] Alberto [facility], Elva is not available. Updated Alberto patient had the surgery yesterday afternoon. Patient will have PT/OT re-eval today and RNCM will fax the updated eval today when notes are available. Will fax updated MD notes today. Alberto will make note.    Paged [facility] PT to discuss the above. OT will see patient in this morning and PT will see patient this afternoon. They will put in their notes right away to be faxed.     Printed and faxed Orthopedics notes, nursing notes, PT/OT notes and provider notes.               1420 called [facility] and spoke with Betina for a phone assessment. [facility] auth approved  [id number] 1/8/25 - 1/14/25.    PAS completed    Met with patient at bedside to discuss the above. Patient agreed.  Called to update patient's son, Guy. He [facility].   Discussed out of pocket cost of [facility] medical transportation by wheelchair with patient and family member, Guy. Patient/family member agreed with the plan to have transportation arranged by [facility] transport.

## 2025-01-08 NOTE — PLAN OF CARE
Problem: Surgery Nonspecified  Goal: Absence of Bleeding  Outcome: Progressing  Goal: Effective Bowel Elimination  Outcome: Progressing  Goal: Fluid and Electrolyte Balance  Outcome: Progressing  Goal: Blood Glucose Level Within Targeted Range  Outcome: Progressing  Goal: Absence of Infection Signs and Symptoms  Outcome: Not Progressing  Goal: Anesthesia/Sedation Recovery  Outcome: Progressing  Goal: Optimal Pain Control and Function  Outcome: Progressing  Goal: Nausea and Vomiting Relief  Outcome: Progressing  Goal: Effective Urinary Elimination  Outcome: Progressing  Goal: Effective Oxygenation and Ventilation  Outcome: Progressing  Intervention: Optimize Oxygenation and Ventilation  Recent Flowsheet Documentation  Taken 1/7/2025 1836 by HerCarolyn RN  Activity Management: bedrest  Head of Bed (HOB) Positioning: HOB at 30 degrees     Goal Outcome Evaluation:    Pt on bedrest after ORIF procedure. NWB on left arm. Sling in place. Numbness on left side to be expected due to nerve block. Pt warm and cap refill less than 3 seconds. Pt has trace edema. Pt RA tele afib rvr. Crosscover and house notified. Awaiting orders.

## 2025-01-08 NOTE — PROGRESS NOTES
[facility]    Medicine Progress Note - Hospitalist Service    Date of Admission:  12/29/2024    Assessment & Plan   Sharon De La Vega is a 79 year old female admitted on 12/29/2024. She was brought back to the ED by ambulance from home for evaluation after a second fall at home     Acute left comminuted intra-articular mildly displaced distal humerus fracture, minimally displaced olecranon process fracture, minimally displaced radial head fracture.   - Status post splint/sling  - s/p ORIF Left elbow 1/7  - Pain control  - Orthopedic surgery assistance appreciated    Acute drop in Hb  - Hb dropped 10.5 -> 8.9 -> 8.6  - post op estimated blood loss 100ml  - Monitor Hb    Atrial fibrillation with controlled ventricular response, new onset  - Start Metoprolol 25mg bid  - Needs anticoagulation, but has frequent falls, defer to Cardiology  - Consider Watchman procedure outpatient     Severe symptomatic aortic valvular stenosis  - TTE (12/29/24):  LVEF 60%, mean gradient 57 mmHg with peak velocity of 4.6 m/s. Calculated valve area 0.60-0.63 cmÂ .  - Coronary angiogram (12/29/24) normal coronary arteries.  - S/P TAVR (1/3/2025) with a 23mm Zelaya Darian Ultra Resilia Valve  - Tele  - Cardiology assistance appreciated.     Recurrent falls  - Limited historian with dementia  - Aortic valve stenosis possibly contributory  - CT head & C spine unrevealing for acute process  - Imaging findings per #1  - Fall precautions  - PT/OT    Intermittent hypotension  Essential hypertension   - BP intermittently low, suspect if dehydration  - Pro calcitonin negative, with minimal drop in Hb  - urine output low, Cr normal  - LR 500cc, encourage po intake  - Metoprolol with holding parameters  - On Lisinopril 30mg - held     Possible UTI  - IV CTX x 3d course completed     Hypothyroidism  - Euthyroid, TSH 1.63  - Levothyroxine     Alzheimer's dementia without behavioral disturbance  - Risk for violent behavior  "and/or acute delirium  - Donepezil, memantine  - As needed quetiapine and olanzapine     Depression / Anxiety  -Bupropion, citalopram     Psoriatic arthritis  - Holding methotrexate, folic acid and leflunomide     Hyperlipidemia  - Simvastatin  - CK mildly elevated due to MSK trauma (520). Repeat pending.     Hypokalemia- resolved     Obesity  -BMI 38.44     Intermittent urine retention  - Has frederick placed on 01/04, TOV tomorrow    PT/OT rec TCU          Diet: Low Saturated Fat Na <2400 mg  Discharge Instruction - Regular Diet Adult    DVT Prophylaxis: Pneumatic Compression Devices  Frederick Catheter: PRESENT, indication: Acute retention or obstruction  Lines: PRESENT      CVC Triple Lumen Right Internal jugular-Site Assessment: WDL      Cardiac Monitoring: None  Code Status: Full Code      Clinically Significant Risk Factors               # Hypoalbuminemia: Lowest albumin = 3 g/dL at 1/2/2025  4:08 AM, will monitor as appropriate   # Thrombocytopenia: Lowest platelets = 107 in last 2 days, will monitor for bleeding   # Hypertension: Noted on problem list     # Dementia: noted on problem list        # Obesity: Estimated body mass index is 37.09 kg/m  as calculated from the following:    Height as of this encounter: 1.651 m (5' 5\").    Weight as of this encounter: 101.1 kg (222 lb 14.2 oz).      # Financial/Environmental Concerns: none         Social Drivers of Health    Tobacco Use: Medium Risk (1/7/2025)    Patient History     Smoking Tobacco Use: Former     Smokeless Tobacco Use: Never    Received from [facility]    Social Connections          Disposition Plan     Medically Ready for Discharge: Anticipated Tomorrow             Mercy Araya MD  Hospitalist Service  [facility]  Securely message with [employer] (more info)  Text page via [facility] Paging/Directory "   ______________________________________________________________________    Interval History   Patient was examined at the bedside, comfortably sitting in the bed.  States intermittently has LUE pain.  Denies any other complaints with me.    Intermittently hypotensive, suspect if dehydrated.  Will give gentle IV hydration -monitor blood pressure and urine output    Physical Exam   Vital Signs: Temp: 98.2  F (36.8  C) Temp src: Oral BP: 101/51 Pulse: 92   Resp: 18 SpO2: 95 % O2 Device: None (Room air) Oxygen Delivery: 3 LPM  Weight: 222 lbs 14.16 oz    General appearance - NAD  Lungs - clear to auscultation, no wheezes  Heart - rrr. No peripheral edema.  Abdomen - soft, nontender, nondistended, BS+  Neurological - alert, follows commands, disorientation/forgetfullness, no agitation.  Skin - no c/c/p    Medical Decision Making       50 MINUTES SPENT BY ME on the date of service doing chart review, history, exam, documentation & further activities per the note.      Data     I have personally reviewed the following data over the past 24 hrs:    8.1  \   8.6 (L)   / 107 (L)     142 107 16.2 /  110 (H)   4.5 28 0.79 \     Procal: 0.11 CRP: N/A Lactic Acid: 1.1         Imaging results reviewed over the past 24 hrs:   No results found for this or any previous visit (from the past 24 hours).

## 2025-01-08 NOTE — PLAN OF CARE
Problem: Fall Injury Risk  Goal: Absence of Fall and Fall-Related Injury  Outcome: Progressing     Problem: Pain Acute  Goal: Optimal Pain Control and Function  Outcome: Progressing     Problem: Fall Injury Risk  Goal: Absence of Fall and Fall-Related Injury  Outcome: Progressing   Goal Outcome Evaluation:         Pt was calm and pleasant this morning.  Pt bp was 103/49 pt denies symptoms when lying down.  Writer help lisinopril and metoprolol per parameters and updated MD.    Pt/ot reported pt bp 87/54 and 73/39 when sitting on edge of bed.  Pt reports dizziness when sitting.  PT was placed back in bed and symptoms resolved. Bp 101/50 MD updated.     Pt denies pain throughout day.  Pt is drinking and eating well.  Pt is very forgetful about events of day.  Pt could not remember doing therapy or eating breakfast.

## 2025-01-08 NOTE — PROGRESS NOTES
[facility] [address]  [place] [address]   [email or web address]   OFFICE: [phone]     CARDIOLOGY FOLLOW-UP NOTE      Impression and Plan     ASSESSMENT  Persistent atrial fibrillation  New onset following TAVR  Rates well-controlled in the 80s  Not on anticoagulation due to fall risk, on aspirin  HASBLED 1 (Age), CHADSVASC 4 (age, HTN, Female)  Aortic stenosis  S/p TAVR 1/3/2025  Echo 1/4/2025 with well-seated bioprosthetic valve normal gradients.  Hypertension  On lisinopril 30 mg    Other active problems:  Alzheimer's  Recurrent falls, most recent resulting in humerus fracture requiring surgery    PLAN  Continue metoprolol tartrate 25 mg twice daily for rate control   Continue to hold on anticoagulation given significant fall risk and remain on aspirin.  Watchman consideration as an outpatient    Follow up: Ariella Cesar 1/20      Primary Cardiologist: None    Subjective     79-year-old female with a history of hypertension, aortic stenosis, recurrent falls, dementia who presented after a fall from home.  She underwent TAVR for her severe aortic stenosis during admission followed by a surgical intervention of fracture of her humerus from her fall.  She developed atrial fibrillation after her TAVR    Reports feeling well this morning and has no cardiac complaints today.  Is any chest pain, shortness of breath, palpitations, lightheadedness or dizziness.  Has a sore shoulder from her surgery yesterday but otherwise doing well      Cardiac Diagnostics   Telemetry (personally reviewed): A-fib, rates in the 80s    ECG, 1/8/2025: Atrial fibrillation, ventricular rate 81, no ischemic changes    Echocardiogram (results reviewed):   TTE, 1/4/2025  1. The left ventricle is normal in size. Left ventricular function is  normal.The ejection fraction is > 65%. No regional wall motion abnormalities  noted. There is mild concentric left ventricular hypertrophy.  2.  "Normal right ventricle size and systolic function.  3. Well seated bio-prosthetic aortic valve (documented 23 mm Zelaya Darian 3  Ultra Resilia tissue valve) with normal aortic valve prosthesis metrics with a  mean systolic gradient of 11 mmHg and a peak anterograde velocity of 2.1  m/sec, AT: 80 msec. DVI: 0.7. No aortic insufficiency.    TTE, 1/3/2025  Pre-TAVR:  1. Normal left ventricular size and systolic performance with a visually  estimated ejection fraction of 60%.  2. There is normal regional wall motion.  3. There is severe aortic stenosis.  Â  The mean gradient is measured at 49 mmHg with peak velocity of 4.8 m/s.  Calculated valve area 0.50-0.51 cmÂ .  4. There is trace aortic insufficiency.  5. Normal right ventricular size and systolic performance.  6. There is mild to moderate left atrial enlargement.     Post TAVR:  1. Normal left ventricular size and systolic performance with a visually  estimated ejection fraction of 60-65%.  2.There is a bio-prosthetic aortic valve (documented 23 mm Zelaya Darian 3  Ultra Resilia tissue valve).  Â  Normal aortic valve prosthesis metrics with a mean systolic gradient of 6  mmHg and a peak anterograde velocity of 1.7 m/sec.  Â  There is trace aortic insufficiency.  3. There is no significant pericardial effusion.    Cardiac Cath (results reviewed):   12/31/2024  Normal coronary arteries    Physical Examination       /49 (BP Location: Right arm)   Pulse 82   Temp 98.3  F (36.8  C) (Oral)   Resp 18   Ht 1.651 m (5' 5\")   Wt 101.1 kg (222 lb 14.2 oz)   SpO2 93%   BMI 37.09 kg/m          Intake/Output Summary (Last 24 hours) at 1/8/2025 1049  Last data filed at 1/8/2025 0600  Gross per 24 hour   Intake 1950 ml   Output 1250 ml   Net 700 ml       General: Well appearing, in no acute distress. Resting comfortably in hospital bed  Skin: No clubbing, no cyanosis.  Eyes: Extra ocular movements intact  Neck: No JVD  Lungs: Clear to auscultation " bilaterally  Heart: Irregular rhythm, regular rate, PMI not displaced, S1, S2 normal, no S3, no S4, no heaves, no rub and no murmur.  Abdomen: Soft, nontender, bowel sounds normal.  Extremities: No peripheral edema . Grade 2/4 distal pulses bilaterally.  Neuro: Oriented to person, place and time, alert, cooperative, gait coordinated.             Lab Results   Lab Results   Component Value Date    BUN 16.2 01/08/2025     01/08/2025    CO2 28 01/08/2025       Lab Results   Component Value Date    WBC 8.1 01/08/2025    HGB 8.9 (L) 01/08/2025    HCT 27.8 (L) 01/08/2025    MCV 98 01/08/2025     (L) 01/08/2025       Lab Results   Component Value Date    TSH 1.63 12/29/2024    INR 1.08 01/02/2025               Current Inpatient Scheduled Medications   Scheduled Meds:  Current Facility-Administered Medications   Medication Dose Route Frequency Provider Last Rate Last Admin    aspirin EC tablet 81 mg  81 mg Oral Daily Jace Tamayo MD   81 mg at 01/08/25 0811    buPROPion (WELLBUTRIN XL) 24 hr tablet 150 mg  150 mg Oral WILBERM Jace Tamayo MD   150 mg at 01/08/25 0811    ceFAZolin (ANCEF) 2 g in 100 mL D5W intermittent infusion  2 g Intravenous Q8H Jace Tamayo  mL/hr at 01/08/25 0841 2 g at 01/08/25 0841    citalopram (celeXA) tablet 20 mg  20 mg Oral Jace Venegas MD   20 mg at 01/08/25 0813    donepezil (ARICEPT) tablet 10 mg  10 mg Oral At Bedtime Jace Tamayo MD   10 mg at 01/07/25 2120    [Held by provider] leflunomide (ARAVA) tablet 10 mg  10 mg Oral Maida Pérez PA-C   10 mg at 12/30/24 0806    levothyroxine (SYNTHROID/LEVOTHROID) tablet 100 mcg  100 mcg Oral Jace Venegas MD   100 mcg at 01/08/25 0813    lisinopril (ZESTRIL) tablet 30 mg  30 mg Oral Daily Jace Tamayo MD   30 mg at 01/07/25 0830    memantine (NAMENDA) tablet 10 mg  10 mg Oral BID Jace Tamayo MD   10 mg at 01/08/25 0813    metoprolol  tartrate (LOPRESSOR) tablet 25 mg  25 mg Oral BID Jace Tamayo MD   25 mg at 01/07/25 2121    polyethylene glycol (MIRALAX) Packet 17 g  17 g Oral Daily Jace Tamayo MD   17 g at 01/06/25 0911    simvastatin (ZOCOR) tablet 20 mg  20 mg Oral QPM Jace Tamayo MD   20 mg at 01/07/25 2121     Continuous Infusions:  Current Facility-Administered Medications   Medication Dose Route Frequency Provider Last Rate Last Admin            Medications Prior to Admission   Prior to Admission medications    Medication Sig Start Date End Date Taking? Authorizing Provider   acetaminophen (TYLENOL) 325 MG tablet Take 2 tablets (650 mg) by mouth every 4 hours as needed for mild pain or headaches. 1/8/25  Yes Shawn Martines PA-C   benazepril (LOTENSIN) 20 MG tablet Take 20 mg by mouth daily.   Yes Unknown, Entered By History   buPROPion (WELLBUTRIN XL) 150 MG 24 hr tablet Take 150 mg by mouth every morning. 8/20/24  Yes Reported, Patient   citalopram (CELEXA) 20 MG tablet Take 1 tablet by mouth every morning. 12/15/22  Yes Reported, Patient   cyanocobalamin (VITAMIN B-12) 1000 MCG tablet Take 1,000 mcg by mouth every morning. 12/26/22  Yes Reported, Patient   donepezil (ARICEPT) 10 MG tablet Take 1 tablet (10 mg) by mouth at bedtime 2/21/24  Yes Manuel Osuna MD   folic acid (FOLVITE) 1 MG tablet Take 1 mg by mouth every morning.   Yes Reported, Patient   leflunomide (ARAVA) 10 MG tablet Take 1 tablet by mouth every morning. 2/8/23  Yes Reported, Patient   levothyroxine (SYNTHROID/LEVOTHROID) 100 MCG tablet Take 1 tablet by mouth every morning. 8/20/24  Yes Reported, Patient   memantine (NAMENDA) 10 MG tablet Take 1 tablet (10 mg) by mouth 2 times daily 3/23/24  Yes Manuel Osuna MD   methotrexate 2.5 MG tablet Take 10 mg by mouth once a week On Thursday   Yes Reported, Patient   nystatin (MYCOSTATIN) 681365 UNIT/GM external powder Apply topically 2 times daily as needed. 6/15/23  Yes Reported,  Patient   senna-docusate (SENOKOT-S/PERICOLACE) 8.6-50 MG tablet Take 2 tablets by mouth 2 times daily as needed for constipation. 1/8/25  Yes Shawn Martines PA-C   simvastatin (ZOCOR) 20 MG tablet Take 20 mg by mouth every evening. 1/14/23  Yes Reported, Patient   vitamin D3 (CHOLECALCIFEROL) 50 mcg (2000 units) tablet Take 1 tablet by mouth every morning.   Yes Reported, Patient   vitamin E (TOCOPHEROL) 1000 units (450 mg) CAPS capsule Take 1 capsule (1,000 Units) by mouth 2 times daily 2/21/24  Yes Manuel Osuna MD Daniel L. Oress, PA-C

## 2025-01-08 NOTE — PLAN OF CARE
Goal Outcome Evaluation:      Plan of Care Reviewed With: patient    Overall Patient Progress: improvingOverall Patient Progress: improving     Patient denied pain, nausea, dyspnea, or dizziness. Is pleasantly confused, disoriented to place, time and forgetful. Left elbow splint dressing C,D,I , sling in place. Pt, did pu off the dressing over her internal jugular central line. Cleansed thouroughly and new dressing placed. VSS, continue to monitor.

## 2025-01-08 NOTE — OP NOTE
DATE OF SURGERY   1/7/2025    PREOPERATIVE DIAGNOSIS:  Left distal humerus fracture, closed, comminuted, intra-articular    POSTOPERATIVE DIAGNOSIS:  Same    OPERATIVE PROCEDURE:  Left distal humerus open reduction internal fixation  Left olecranon osteotomy  Left ulnar nerve decompression with anterior transposition    SURGEON:  Jace Tamayo M.D.    ASSISTANTS:  Gema Reyes PA-C, assist was required for patient positioning, soft tissue retraction, instrumentation assist, maneuvering of the extremity during the surgical procedure, closure and patient safety.    ANESTHETIC:   General And regional block    HISTORY OF PRESENT ILLNESS/INDICATIONS:  79-year-old female with Alzheimer dementia sustained left-sided distal humerus fracture during a mechanical fall.  Was a closed injury.  She was neurovascular tact.  She is admitted to the hospital.  She was found to have aortic stenosis and underwent TAVR TAVR procedure.  Following this she was cleared for surgery for her left elbow.  I had a long discussion with her son Guy regarding her left elbow injury and treatment options.  We discussed nonoperative and surgical management options.  The risk, benefits, expected recovery course of each were thoroughly outlined.  Decision was made to move forward with left distal humerus open reduction internal fixation.    The risks, benefits, alternatives, convalescence and personnel of a left distal humerus open reduction internal fixation were discussed with the patient in detail. These included but were not limited to nonunion/malunion, wound dehiscence, skin breakdown, heterotopic ossification, synostosis, posttraumatic osteoarthritis, infection, bleeding, nerve injury, stiffness, and need for more surgery in the future. All questions were answered in detail and the patient elected to proceed.    PROCEDURE:  Patient was greeted in preoperative hold.  Consent was obtained.  Surgical site was marked.  Regional block was  provided by my anesthesia colleagues.  She was brought to the operating room.  General anesthesia was administered.  Anesthesia team placed a central line for improved access.  Following this she was placed into a right lateral decubitus position with all bony prominences well-padded.  The left upper extremity was then prepped and draped in standard sterile fashion.  Surgical timeout was performed.    Left upper extremity was elevated.  It was exsanguinated with Esmarch.  Posterior incision was made over the elbow curving slightly laterally around the tip of the olecranon.  Full-thickness skin flaps were elevated medially and laterally.  The triceps and olecranon were exposed.  The ulnar nerve was found along the medial border of the triceps.  It was then carefully released and decompressed from the medial intermuscular septum and the triceps distally through the retrocondylar groove and into the FCU muscle.  It was then carefully mobilized.  The space directly superficial to the common flexor tendon fascia was developed.  The ulnar nerve was mobilized and moved anterior to the medial epicondyle and was protected for the remainder of the case.    Medial and lateral para-tricipital windows were created.  They were connected to the greater sigmoid notch of the proximal ulna.  Synthes variable angle olecranon plate was placed on the bone and preliminary drill holes were placed.  Oscillating saw was then used to perform olecranon osteotomy followed by an osteotome.  The proximal olecranon was then reflected proximally with the triceps via the windows.  This gave complete exposure to the distal humerus and articular surface.  Preliminary callus and hematoma were evacuated.  There were 2 large articular blocks, larger medial one of the smaller lateral and.  Medial column did not have any comminution.  The lateral column had some slight comminution.    Fractures were debrided thoroughly and fracture margins exposed.   Medial and lateral articular fragments were reduced and clamped anatomically.  3.5 mm cortical screw was placed from medial to lateral across the intra-articular split while applying compression, excellent fixation was achieved.  Next the articular block was reduced to the shaft utilizing the medial column read.  It was temporarily pinned with K wires.  A Synthes variable angle medial column plate was selected and placed down to bone.  3.5 mm cortical screw was placed into the shaft.  2.7 mm variable angle screws were then placed into the medial articular fragment.  Another 3.5 mm cortical screw was placed in the shaft as well as one 2.7 mm locking screw.  This resulted in excellent fixation of the medial column.  On the lateral side a posterior lateral plate with lateral extension was used.  He was secured to the shaft with 3.5 mm cortical screws.  2.7 mm variable angle screws were placed into the articular fragment.  Further 3.5 mm cortical screw and 3.5 mm locking screw were placed into the shaft completing the lateral fixation.    Multiplanar images were obtained which showed excellent reduction of the distal humerus fracture and hardware position.    The wound was thoroughly irrigated.  The olecranon osteotomy was reduced.  The olecranon was then fixed utilizing Synthes variable angle 2.7/3.5 proximal ulna plate.  Plate was positioned and utilizing previous drill holes the plate was secured to the bone utilizing compression across the osteotomy site.  2.7 mm variable angle screws were placed in the proximal fragment.  3.5 mm locking screws were placed in the shaft.  Excellent fixation was achieved.  Multiplanar images showed excellent reduction of the osteotomy and hardware position.    The wound was thoroughly irrigated.  The tourniquet was let down.  Hemostasis was achieved with cautery.  Medial and lateral.  Tricipital windows were reapproximated with #1 Vicryl.  Immediately care was taken to cover the plate  with soft tissue.  The ulnar nerve was left transposed anterior to the medial epicondyle.  The elbow joint had full passive range of motion with no crepitus.  The ulnar nerve remained anterior to the medial epicondyle and was nice and stable within its soft tissue bed.  The wound was thoroughly irrigated.  The skin was closed with 3-0 Vicryl followed by staples.  A well-padded posterior long-arm splint was placed in standard fashion.    Unless otherwise noted above no complications, issues, or other perioperative problems occurred during the care of this patient.    POST OP PLAN:  She will return to the floor  Continue Ancef 2 g every 8 hours for 24 hours after surgery  Splint at all times.  Keep clean and dry.  Elevate left upper extremity is much as possible  Nonweightbearing.  No pushing pulling or lifting with left upper extremity  Given compliance issues we will plan to keep in her splint at all times.  She will be seen in 2 weeks for postoperative assessment.  Will plan to obtain new x-rays at that point in time  Multimodal pain control    MEL Tamayo MD

## 2025-01-09 ENCOUNTER — APPOINTMENT (OUTPATIENT)
Dept: ULTRASOUND IMAGING | Facility: HOSPITAL | Age: 80
DRG: 267 | End: 2025-01-09
Attending: STUDENT IN AN ORGANIZED HEALTH CARE EDUCATION/TRAINING PROGRAM
Payer: COMMERCIAL

## 2025-01-09 VITALS
DIASTOLIC BLOOD PRESSURE: 67 MMHG | BODY MASS INDEX: 37.13 KG/M2 | HEIGHT: 65 IN | OXYGEN SATURATION: 94 % | TEMPERATURE: 98.3 F | SYSTOLIC BLOOD PRESSURE: 139 MMHG | HEART RATE: 85 BPM | WEIGHT: 222.88 LBS | RESPIRATION RATE: 20 BRPM

## 2025-01-09 LAB
ANION GAP SERPL CALCULATED.3IONS-SCNC: 8 MMOL/L (ref 7–15)
ATRIAL RATE - MUSE: 394 BPM
BUN SERPL-MCNC: 24.2 MG/DL (ref 8–23)
CALCIUM SERPL-MCNC: 8.5 MG/DL (ref 8.8–10.4)
CHLORIDE SERPL-SCNC: 106 MMOL/L (ref 98–107)
CREAT SERPL-MCNC: 1.16 MG/DL (ref 0.51–0.95)
DIASTOLIC BLOOD PRESSURE - MUSE: NORMAL MMHG
EGFRCR SERPLBLD CKD-EPI 2021: 48 ML/MIN/1.73M2
ERYTHROCYTE [DISTWIDTH] IN BLOOD BY AUTOMATED COUNT: 16 % (ref 10–15)
GLUCOSE SERPL-MCNC: 107 MG/DL (ref 70–99)
HCO3 SERPL-SCNC: 26 MMOL/L (ref 22–29)
HCT VFR BLD AUTO: 24.6 % (ref 35–47)
HGB BLD-MCNC: 8.1 G/DL (ref 11.7–15.7)
INTERPRETATION ECG - MUSE: NORMAL
LACTATE SERPL-SCNC: 0.9 MMOL/L (ref 0.7–2)
MAGNESIUM SERPL-MCNC: 2.1 MG/DL (ref 1.7–2.3)
MCH RBC QN AUTO: 31.8 PG (ref 26.5–33)
MCHC RBC AUTO-ENTMCNC: 32.9 G/DL (ref 31.5–36.5)
MCV RBC AUTO: 97 FL (ref 78–100)
P AXIS - MUSE: NORMAL DEGREES
PHOSPHATE SERPL-MCNC: 2.6 MG/DL (ref 2.5–4.5)
PLATELET # BLD AUTO: 98 10E3/UL (ref 150–450)
POTASSIUM SERPL-SCNC: 3.8 MMOL/L (ref 3.4–5.3)
PR INTERVAL - MUSE: NORMAL MS
QRS DURATION - MUSE: 82 MS
QT - MUSE: 382 MS
QTC - MUSE: 446 MS
R AXIS - MUSE: 14 DEGREES
RBC # BLD AUTO: 2.55 10E6/UL (ref 3.8–5.2)
SODIUM SERPL-SCNC: 140 MMOL/L (ref 135–145)
SYSTOLIC BLOOD PRESSURE - MUSE: NORMAL MMHG
T AXIS - MUSE: 75 DEGREES
VENTRICULAR RATE- MUSE: 82 BPM
WBC # BLD AUTO: 8.4 10E3/UL (ref 4–11)

## 2025-01-09 PROCEDURE — 85014 HEMATOCRIT: CPT | Performed by: STUDENT IN AN ORGANIZED HEALTH CARE EDUCATION/TRAINING PROGRAM

## 2025-01-09 PROCEDURE — 80048 BASIC METABOLIC PNL TOTAL CA: CPT | Performed by: STUDENT IN AN ORGANIZED HEALTH CARE EDUCATION/TRAINING PROGRAM

## 2025-01-09 PROCEDURE — 76770 US EXAM ABDO BACK WALL COMP: CPT

## 2025-01-09 PROCEDURE — 84100 ASSAY OF PHOSPHORUS: CPT | Performed by: ORTHOPAEDIC SURGERY

## 2025-01-09 PROCEDURE — 250N000013 HC RX MED GY IP 250 OP 250 PS 637: Performed by: ORTHOPAEDIC SURGERY

## 2025-01-09 PROCEDURE — 120N000004 HC R&B MS OVERFLOW

## 2025-01-09 PROCEDURE — 83735 ASSAY OF MAGNESIUM: CPT | Performed by: ORTHOPAEDIC SURGERY

## 2025-01-09 PROCEDURE — 83605 ASSAY OF LACTIC ACID: CPT | Performed by: STUDENT IN AN ORGANIZED HEALTH CARE EDUCATION/TRAINING PROGRAM

## 2025-01-09 PROCEDURE — 258N000003 HC RX IP 258 OP 636: Performed by: STUDENT IN AN ORGANIZED HEALTH CARE EDUCATION/TRAINING PROGRAM

## 2025-01-09 PROCEDURE — 82435 ASSAY OF BLOOD CHLORIDE: CPT | Performed by: STUDENT IN AN ORGANIZED HEALTH CARE EDUCATION/TRAINING PROGRAM

## 2025-01-09 PROCEDURE — 36415 COLL VENOUS BLD VENIPUNCTURE: CPT | Performed by: STUDENT IN AN ORGANIZED HEALTH CARE EDUCATION/TRAINING PROGRAM

## 2025-01-09 RX ORDER — SODIUM CHLORIDE, SODIUM LACTATE, POTASSIUM CHLORIDE, CALCIUM CHLORIDE 600; 310; 30; 20 MG/100ML; MG/100ML; MG/100ML; MG/100ML
INJECTION, SOLUTION INTRAVENOUS CONTINUOUS
Status: ACTIVE | OUTPATIENT
Start: 2025-01-09 | End: 2025-01-10

## 2025-01-09 RX ADMIN — SODIUM CHLORIDE, POTASSIUM CHLORIDE, SODIUM LACTATE AND CALCIUM CHLORIDE: 600; 310; 30; 20 INJECTION, SOLUTION INTRAVENOUS at 17:29

## 2025-01-09 RX ADMIN — CITALOPRAM HYDROBROMIDE 20 MG: 20 TABLET ORAL at 09:02

## 2025-01-09 RX ADMIN — METOPROLOL TARTRATE 25 MG: 25 TABLET, FILM COATED ORAL at 21:25

## 2025-01-09 RX ADMIN — LEVOTHYROXINE SODIUM 100 MCG: 100 TABLET ORAL at 09:01

## 2025-01-09 RX ADMIN — MEMANTINE 10 MG: 10 TABLET ORAL at 21:25

## 2025-01-09 RX ADMIN — BUPROPION HYDROCHLORIDE 150 MG: 150 TABLET, FILM COATED, EXTENDED RELEASE ORAL at 09:01

## 2025-01-09 RX ADMIN — MEMANTINE 10 MG: 10 TABLET ORAL at 09:01

## 2025-01-09 RX ADMIN — ASPIRIN 81 MG: 81 TABLET, COATED ORAL at 09:00

## 2025-01-09 RX ADMIN — ACETAMINOPHEN 650 MG: 325 TABLET ORAL at 08:59

## 2025-01-09 RX ADMIN — DONEPEZIL HYDROCHLORIDE 10 MG: 5 TABLET, FILM COATED ORAL at 21:25

## 2025-01-09 RX ADMIN — SIMVASTATIN 20 MG: 10 TABLET, FILM COATED ORAL at 21:24

## 2025-01-09 RX ADMIN — ACETAMINOPHEN 650 MG: 325 TABLET ORAL at 00:29

## 2025-01-09 RX ADMIN — ACETAMINOPHEN 650 MG: 325 TABLET ORAL at 16:04

## 2025-01-09 RX ADMIN — METOPROLOL TARTRATE 25 MG: 25 TABLET, FILM COATED ORAL at 08:59

## 2025-01-09 ASSESSMENT — ACTIVITIES OF DAILY LIVING (ADL)
ADLS_ACUITY_SCORE: 79

## 2025-01-09 NOTE — PLAN OF CARE
Problem: Adult Inpatient Plan of Care  Goal: Optimal Comfort and Wellbeing  Outcome: Progressing  Intervention: Monitor Pain and Promote Comfort  Recent Flowsheet Documentation  Taken 1/8/2025 3887 by Kayla Valero, RN  Pain Management Interventions: medication (see MAR)     Problem: Surgery Nonspecified  Goal: Effective Urinary Elimination  Outcome: Progressing   Goal Outcome Evaluation:      Plan of Care Reviewed With: patient    Overall Patient Progress: improvingOverall Patient Progress: improving     Pt disoriented to place and time, confused using repetitive statements, in Afib. C/o pain in left arm 3-5/10, PRN Tylenol effective. Pt picked off CVC dressing, dressing changed. Later in the evening, the pt pulled out the CVC, provider notified, peripheral IV inserted. Sepsis protocol flagged, provider aware, stable BP, afebrile. Metoprolol held per parameters, SBP around 100. Mag and phos protocol rechecks tomorrow AM.     Kayla Valero, RN

## 2025-01-09 NOTE — PROGRESS NOTES
Care Management Follow Up    Length of Stay (days): 10    Expected Discharge Date: 01/10/2025     Concerns to be Addressed:     Care progression - discharge planning  Patient plan of care discussed at interdisciplinary rounds: Yes    Anticipated Discharge Disposition:  Rehab rec Transitional care     Anticipated Discharge Services:  Transitional care  Anticipated Discharge DME:  NA    Patient/family educated on Medicare website which has current facility and service quality ratings:  Yes  Education Provided on the Discharge Plan:  yes per team  Patient/Family in Agreement with the Plan:  Yes    Referrals Placed by CM/SW:  Yes  Private pay costs discussed: Transportation costs    Discussed  Partnership in Safe Discharge Planning  document with patient/family: Yes, patient's sonGuy    Handoff Completed: No, handoff not indicated or clinically appropriate    Additional Information:  1123 rec'd a message from [place] with [facility], able to accept patient tomorrow between [phone].    Called to set up a [employer] WC transport between [phone] for 1/10/25.  Met with patient at bedside to update on the above and discuss transportation.  Discussed out of pocket cost of [facility] medical transportation by wheelchair with patient and or family member, Guy. Patient/family member agreed with the plan to have transportation arranged by [facility] transport.      Call to update patient's son, Guy, and he is in agreement.    Called [employer] to set up a WC transport between [phone] on 1/10/25    Social Hx:  Assessment: Follow. Live w/son Chaz who assists w/I/ADLs. Use walker/cane.  Last note: 1/9/25  Plan: Accepted to [facility] Morrow approved 1/8/25-1/14/25, PAS completed  Needs: Medical stability  Hand off: No  Transport: [facility] WC transport between [phone] on 1/10/25    Next Steps: RNCM to follow for medical progression, recommendations, and final  discharge plan.     Adamaris Lawrence RN

## 2025-01-09 NOTE — PLAN OF CARE
"Goal Outcome Evaluation:      Plan of Care Reviewed With: patient    Overall Patient Progress: improvingOverall Patient Progress: improving     Pt. Awake late, at midnight was upset, angry and irritable. Stating the SCD's were bothering her. Removed the SCD's and pt. Mood improved and she was redirectable. Given prn tylenol for mild aching in left arm. Pt. Very tired at 1691-1091 when EKG tech in to run a 12-lead EKG. Pt. stating \"just leave me alone\". VSS, on room air 93%, continues in a.fib. continue to monitor.       "

## 2025-01-09 NOTE — PROGRESS NOTES
[facility] HEART CARE [address]  [place] [address]   [email or web address]   OFFICE: [phone]     CARDIOLOGY FOLLOW-UP NOTE      Impression and Plan     ASSESSMENT  Persistent atrial fibrillation  New onset following TAVR  Rates well-controlled in the 80s - 90s  Not on anticoagulation due to fall risk,  recent Hgb drop after shoulder surgery this admission, on aspirin  HASBLED 1 (Age), CHADSVASC 4 (age, HTN, Female)  Aortic stenosis  S/p TAVR 1/3/2025  Echo 1/4/2025 with well-seated bioprosthetic valve normal gradients.  Hypertension  On lisinopril 30 mg    Other active problems:  Alzheimer's  Recurrent falls, most recent resulting in humerus fracture requiring surgery    PLAN  Continue metoprolol tartrate 25 mg twice daily for rate control   Continue to hold on anticoagulation given significant fall risk and remain on aspirin.  Watchman consideration as an outpatient    Cardiology will sign off, please call with any further questions. Thank you for allowing us to partake in the care of this patient.     Follow up: Ariella Cesar 1/20,  Will also request EP follow up for AF (ordered)      Primary Cardiologist: None    Subjective     79-year-old female with a history of hypertension, aortic stenosis, recurrent falls, dementia who presented after a fall from home.  She underwent TAVR for her severe aortic stenosis during admission followed by a surgical intervention of fracture of her humerus from her fall.  She developed atrial fibrillation after her TAVR    Feeling well this morning has no cardiac complaints.      Cardiac Diagnostics   Telemetry (personally reviewed): A-fib, rates in the 80s -90s    ECG, 1/8/2025: Atrial fibrillation, ventricular rate 81, no ischemic changes    Echocardiogram (results reviewed):   TTE, 1/4/2025  1. The left ventricle is normal in size. Left ventricular function is  normal.The ejection fraction is > 65%. No regional wall motion  "abnormalities  noted. There is mild concentric left ventricular hypertrophy.  2. Normal right ventricle size and systolic function.  3. Well seated bio-prosthetic aortic valve (documented 23 mm Zelaya Darian 3  Ultra Resilia tissue valve) with normal aortic valve prosthesis metrics with a  mean systolic gradient of 11 mmHg and a peak anterograde velocity of 2.1  m/sec, AT: 80 msec. DVI: 0.7. No aortic insufficiency.    TTE, 1/3/2025  Pre-TAVR:  1. Normal left ventricular size and systolic performance with a visually  estimated ejection fraction of 60%.  2. There is normal regional wall motion.  3. There is severe aortic stenosis.  Â  The mean gradient is measured at 49 mmHg with peak velocity of 4.8 m/s.  Calculated valve area 0.50-0.51 cmÂ .  4. There is trace aortic insufficiency.  5. Normal right ventricular size and systolic performance.  6. There is mild to moderate left atrial enlargement.     Post TAVR:  1. Normal left ventricular size and systolic performance with a visually  estimated ejection fraction of 60-65%.  2.There is a bio-prosthetic aortic valve (documented 23 mm Zelaya Darian 3  Ultra Resilia tissue valve).  Â  Normal aortic valve prosthesis metrics with a mean systolic gradient of 6  mmHg and a peak anterograde velocity of 1.7 m/sec.  Â  There is trace aortic insufficiency.  3. There is no significant pericardial effusion.    Cardiac Cath (results reviewed):   12/31/2024  Normal coronary arteries    Physical Examination       /64 (BP Location: Right arm)   Pulse 98   Temp 97.3  F (36.3  C) (Oral)   Resp 16   Ht 1.651 m (5' 5\")   Wt 101.1 kg (222 lb 14.2 oz)   SpO2 93%   BMI 37.09 kg/m            Intake/Output Summary (Last 24 hours) at 1/9/2025 0985  Last data filed at 1/9/2025 0906  Gross per 24 hour   Intake 1404 ml   Output 1725 ml   Net -321 ml       General: Well appearing, in no acute distress. Resting comfortably in hospital bed  Skin: No clubbing, no cyanosis.  Eyes: Extra " ocular movements intact  Neck: No JVD  Lungs: Clear to auscultation bilaterally  Heart: Irregular rhythm, regular rate, PMI not displaced, S1, S2 normal, no S3, no S4, no heaves, no rub and no murmur.  Abdomen: Soft, nontender, bowel sounds normal.  Extremities: No peripheral edema . Grade 2/4 distal pulses bilaterally.  Neuro: Oriented to person, place and time, alert, cooperative, gait coordinated.             Lab Results   Lab Results   Component Value Date    BUN 24.2 (H) 01/09/2025     01/09/2025    CO2 26 01/09/2025       Lab Results   Component Value Date    WBC 8.4 01/09/2025    HGB 8.1 (L) 01/09/2025    HCT 24.6 (L) 01/09/2025    MCV 97 01/09/2025    PLT 98 (L) 01/09/2025       Lab Results   Component Value Date    TSH 1.63 12/29/2024    INR 1.08 01/02/2025               Current Inpatient Scheduled Medications   Scheduled Meds:  Current Facility-Administered Medications   Medication Dose Route Frequency Provider Last Rate Last Admin    aspirin EC tablet 81 mg  81 mg Oral Daily Jace Tamayo MD   81 mg at 01/09/25 0900    buPROPion (WELLBUTRIN XL) 24 hr tablet 150 mg  150 mg Oral Jace Venegas MD   150 mg at 01/09/25 0901    citalopram (celeXA) tablet 20 mg  20 mg Oral Jace Venegas MD   20 mg at 01/09/25 0902    donepezil (ARICEPT) tablet 10 mg  10 mg Oral At Bedtime Jace Tamayo MD   10 mg at 01/08/25 2023    [Held by provider] leflunomide (ARAVA) tablet 10 mg  10 mg Oral Maida Pérez PA-C   10 mg at 12/30/24 0806    levothyroxine (SYNTHROID/LEVOTHROID) tablet 100 mcg  100 mcg Oral Jace Venegas MD   100 mcg at 01/09/25 0901    [Held by provider] lisinopril (ZESTRIL) tablet 30 mg  30 mg Oral Daily Jace Tamayo MD   30 mg at 01/07/25 0830    memantine (NAMENDA) tablet 10 mg  10 mg Oral BID Jace Tamayo MD   10 mg at 01/09/25 0901    metoprolol tartrate (LOPRESSOR) tablet 25 mg  25 mg Oral BID Jace Tamayo,  MD   25 mg at 01/09/25 0859    polyethylene glycol (MIRALAX) Packet 17 g  17 g Oral Daily Jace Tamayo MD   17 g at 01/06/25 0911    simvastatin (ZOCOR) tablet 20 mg  20 mg Oral QPM Jace Tamayo MD   20 mg at 01/08/25 2024     Continuous Infusions:  Current Facility-Administered Medications   Medication Dose Route Frequency Provider Last Rate Last Admin            Medications Prior to Admission   Prior to Admission medications    Medication Sig Start Date End Date Taking? Authorizing Provider   acetaminophen (TYLENOL) 325 MG tablet Take 2 tablets (650 mg) by mouth every 4 hours as needed for mild pain or headaches. 1/8/25  Yes Shawn Martines PA-C   benazepril (LOTENSIN) 20 MG tablet Take 20 mg by mouth daily.   Yes Unknown, Entered By History   buPROPion (WELLBUTRIN XL) 150 MG 24 hr tablet Take 150 mg by mouth every morning. 8/20/24  Yes Reported, Patient   citalopram (CELEXA) 20 MG tablet Take 1 tablet by mouth every morning. 12/15/22  Yes Reported, Patient   cyanocobalamin (VITAMIN B-12) 1000 MCG tablet Take 1,000 mcg by mouth every morning. 12/26/22  Yes Reported, Patient   donepezil (ARICEPT) 10 MG tablet Take 1 tablet (10 mg) by mouth at bedtime 2/21/24  Yes Manuel Osuna MD   folic acid (FOLVITE) 1 MG tablet Take 1 mg by mouth every morning.   Yes Reported, Patient   leflunomide (ARAVA) 10 MG tablet Take 1 tablet by mouth every morning. 2/8/23  Yes Reported, Patient   levothyroxine (SYNTHROID/LEVOTHROID) 100 MCG tablet Take 1 tablet by mouth every morning. 8/20/24  Yes Reported, Patient   memantine (NAMENDA) 10 MG tablet Take 1 tablet (10 mg) by mouth 2 times daily 3/23/24  Yes Maunel Osuna MD   methotrexate 2.5 MG tablet Take 10 mg by mouth once a week On Thursday   Yes Reported, Patient   nystatin (MYCOSTATIN) 710568 UNIT/GM external powder Apply topically 2 times daily as needed. 6/15/23  Yes Reported, Patient   senna-docusate (SENOKOT-S/PERICOLACE) 8.6-50 MG tablet Take 2  tablets by mouth 2 times daily as needed for constipation. 1/8/25  Yes Shawn Matrines PA-C   simvastatin (ZOCOR) 20 MG tablet Take 20 mg by mouth every evening. 1/14/23  Yes Reported, Patient   vitamin D3 (CHOLECALCIFEROL) 50 mcg (2000 units) tablet Take 1 tablet by mouth every morning.   Yes Reported, Patient   vitamin E (TOCOPHEROL) 1000 units (450 mg) CAPS capsule Take 1 capsule (1,000 Units) by mouth 2 times daily 2/21/24  Yes Manuel Osuna MD Daniel L. Oress PAKierstenC

## 2025-01-09 NOTE — PROGRESS NOTES
"Orthopedic Progress Note      Assessment: 2 Days Post-Op  S/P Procedure(s):  OPEN REDUCTION INTERNAL FIXATION, FRACTURE, HUMERUS, DISTAL, WITH ULNA NERVE TRANSPOSITION     Plan:   - Continue PT/OT  - Weightbearing status: NWB LUE. No pushing, pulling, or lifting  - Activity: Up with assist and assistive device until independent.  - Anticoagulation: Chemoprophylaxis per primary if indicated, okay to start today if needed. Currently on ASA 81 daily.  SCDs, apolinar stockings and early ambulation.  - Pain Management; continue current regimen  - Diet: progress diet as tolerated  - Labs: hgb 8.1, transfuse if <7.0. No indication today  - Dressing: Splint on at all times, keep clean, dry and intact.  Please redirect patient from picking at or removing splint  - Sling for arm support only to be removed for hygiene cares and skin checks  - Elevation: Elevate LUE on pillow to keep above the level of the heart as much as possible  - Follow-up: Outpatient follow up in 2 weeks  - Disposition: Anticipate discharge TCU pending placement      Subjective:  Pain: moderate  Nausea, Vomiting:  No  Lightheadedness, Dizziness:  No  Neuro:  Patient denies new onset numbness or paresthesias  Fever, chills: No  Chest pain: No  SOB: No    Patient reports feeling well today. Patient reports pain is tolerable with current pain regimen. Patient eating and drinking well. Patient voiding and passing gas however no BM. All questions/concerns answered.      Objective:  /54 (BP Location: Right arm, Patient Position: Semi-Baca's, Cuff Size: Adult Regular)   Pulse 83   Temp 97.3  F (36.3  C) (Oral)   Resp 16   Ht 1.651 m (5' 5\")   Wt 101.1 kg (222 lb 14.2 oz)   SpO2 95%   BMI 37.09 kg/m      The patient is A&Ox3. Appears comfortable, sitting up in bed  Sensation is intact.  Able to thumbs up, make an okay sign, cross fingers.  Fingers warm and well perfused  The incision is covered. Splint C/D/I. Sling in place.       No drain     Pertinent " Labs   Lab Results: personally reviewed.   Lab Results   Component Value Date    INR 1.08 01/02/2025    INR 1.15 01/10/2023     Lab Results   Component Value Date    WBC 8.4 01/09/2025    HGB 8.1 (L) 01/09/2025    HCT 24.6 (L) 01/09/2025    MCV 97 01/09/2025    PLT 98 (L) 01/09/2025     Lab Results   Component Value Date     01/09/2025    CO2 26 01/09/2025         Report completed by:  LYDIA PELLETIER PA-C  Date: 01/09/2025    [facility]

## 2025-01-09 NOTE — PROGRESS NOTES
[facility]    Medicine Progress Note - Hospitalist Service    Date of Admission:  12/29/2024    Assessment & Plan   Sharon De La Vega is a 79 year old female admitted on 12/29/2024. She was brought back to the ED by ambulance from home for evaluation after a second fall at home     Acute left comminuted intra-articular mildly displaced distal humerus fracture, minimally displaced olecranon process fracture, minimally displaced radial head fracture.   - Status post splint/sling  - s/p ORIF Left elbow 1/7  - Pain control  - Orthopedic surgery assistance appreciated    Acute drop in Hb  - Hb dropped 10.5 -> 8.9 -> 8.6 -> 8.1  - post op estimated blood loss 100ml  - No evidence of bleeding  - Monitor Hb    HUMA  - suspect pre-renal  - Cr inc from 0.79 to 1.16  - US renal negative for obstruction, UO inaccurately charted as bag was emptied this morning  - IV fluids, Hold Lisinopril  - Monitor Cr    Atrial fibrillation with controlled ventricular response, new onset  - Start Metoprolol 25mg bid  - Needs anticoagulation, but has frequent falls, on asa  - Consider Watchman procedure outpatient     Severe symptomatic aortic valvular stenosis  - TTE (12/29/24):  LVEF 60%, mean gradient 57 mmHg with peak velocity of 4.6 m/s. Calculated valve area 0.60-0.63 cmÂ .  - Coronary angiogram (12/29/24) normal coronary arteries.  - S/P TAVR (1/3/2025) with a 23mm Zelaya Darian Ultra Resilia Valve  - Cardiology assistance appreciated.     Recurrent falls  - Limited historian with dementia  - Aortic valve stenosis possibly contributory  - CT head & C spine unrevealing for acute process  - Imaging findings per #1  - Fall precautions  - PT/OT    Intermittent hypotension  Essential hypertension   - BP intermittently low, suspect if dehydration  - Pro calcitonin negative, with minimal drop in Hb  - On Metoprolol with holding parameters  - On Lisinopril 30mg - held     Possible UTI  - IV CTX x 3d course completed    "  Hypothyroidism  - Euthyroid, TSH 1.63  - Levothyroxine     Alzheimer's dementia without behavioral disturbance  - Risk for violent behavior and/or acute delirium  - Donepezil, memantine  - As needed quetiapine and olanzapine     Depression / Anxiety  -Bupropion, citalopram     Psoriatic arthritis  - Holding methotrexate, folic acid and leflunomide     Hyperlipidemia  - Simvastatin  - CK mildly elevated due to MSK trauma (520). Repeat pending.     Hypokalemia- resolved     Obesity  -BMI 38.44     Intermittent urine retention  - Has frederick placed on 01/04, TOV tomorrow - 01/09    PT/OT rec TCU          Diet: Low Saturated Fat Na <2400 mg  Discharge Instruction - Regular Diet Adult    DVT Prophylaxis: Pneumatic Compression Devices  Frederick Catheter: PRESENT, indication: Acute retention or obstruction  Lines: None     Cardiac Monitoring: None  Code Status: Full Code      Clinically Significant Risk Factors               # Hypoalbuminemia: Lowest albumin = 3 g/dL at 1/2/2025  4:08 AM, will monitor as appropriate   # Thrombocytopenia: Lowest platelets = 98 in last 2 days, will monitor for bleeding   # Hypertension: Noted on problem list     # Dementia: noted on problem list        # Obesity: Estimated body mass index is 37.09 kg/m  as calculated from the following:    Height as of this encounter: 1.651 m (5' 5\").    Weight as of this encounter: 101.1 kg (222 lb 14.2 oz).      # Financial/Environmental Concerns: none         Social Drivers of Health    Tobacco Use: Medium Risk (1/7/2025)    Patient History     Smoking Tobacco Use: Former     Smokeless Tobacco Use: Never    [facility]    Social Connections          Disposition Plan     Medically Ready for Discharge: Anticipated Tomorrow             Mercy Araya MD  Hospitalist Service  [facility]  Securely message with [employer] (more info)  Text page via " [employer] Paging/Directory   ______________________________________________________________________    Interval History   Patient was examined at the bedside, comfortably lying in the bed.  States she has some pain in her upper extremities, but controlled and also mentions she always has pain due to arthritis.  Denies any other complaints    Discussed ongoing clinical course with Son Guy at the bedside and answered all questions      Physical Exam   Vital Signs: Temp: 98.9  F (37.2  C) Temp src: Oral BP: 127/62 Pulse: 90   Resp: 20 SpO2: 96 % O2 Device: None (Room air)    Weight: 222 lbs 14.16 oz    General appearance - NAD  Lungs - clear to auscultation, no wheezes  Heart - rrr. No peripheral edema.  Abdomen - soft, nontender, nondistended, BS+  Neurological - alert, follows commands, disorientation/forgetfullness, no agitation.  Skin - no c/c/p    Medical Decision Making       50 MINUTES SPENT BY ME on the date of service doing chart review, history, exam, documentation & further activities per the note.      Data     I have personally reviewed the following data over the past 24 hrs:    8.4  \   8.1 (L)   / 98 (L)     140 106 24.2 (H) /  107 (H)   3.8 26 1.16 (H) \     Procal: N/A CRP: N/A Lactic Acid: 0.9         Imaging results reviewed over the past 24 hrs:   Recent Results (from the past 24 hours)   US Renal Complete Non-Vascular    Narrative    EXAM: US RENAL COMPLETE NON-VASCULAR  LOCATION: [facility]  DATE: 1/9/2025    INDICATION: Acute kidney injury  COMPARISON: None.  TECHNIQUE: Routine Bilateral Renal and Bladder Ultrasound.    FINDINGS:    RIGHT KIDNEY: 10 x 5 x 4 cm. Normal size and parenchymal echogenicity with no mass or hydronephrosis.     LEFT KIDNEY: 10 x 4 x 4 cm. Normal size and parenchymal echogenicity with no mass or hydronephrosis.     BLADDER: Normal.      Impression    IMPRESSION:  1.  Normal kidney ultrasound.

## 2025-01-09 NOTE — PLAN OF CARE
Problem: Fall Injury Risk  Goal: Absence of Fall and Fall-Related Injury  Outcome: Progressing     Problem: Pain Acute  Goal: Optimal Pain Control and Function  Outcome: Progressing  Intervention: Develop Pain Management Plan  Recent Flowsheet Documentation  Taken 1/9/2025 2066 by Tito Boss RN  Pain Management Interventions: medication (see MAR)   Goal Outcome Evaluation:         Pt was irritable this a.m.  pt was left to sleep.  PT was heard yelling out.  Writer talked with pt who reported pain in lue.  Writer gave prn apap along with a.m. pills.  Upon re assessment pt was friendly and cooperative.  Writer ordered breakfast for pt and helped feed her.  Pt is drinking water when staff if assisting.   Writer called therapy and asked them to re approach pt.  Therapy reported and said pt has refused therapy x2 and upset.     BP was checked several times and is WDL this a.m.   Pt has frederick that had large output on a.m. assessment.  Pt had US kidney in room.      Writer ordered lunch for pt and started to help feed her  Pt was able to take over and was eating well.

## 2025-01-10 ENCOUNTER — MEDICAL CORRESPONDENCE (OUTPATIENT)
Dept: HEALTH INFORMATION MANAGEMENT | Facility: CLINIC | Age: 80
End: 2025-01-10

## 2025-01-10 NOTE — PLAN OF CARE
Problem: Fall Injury Risk  Goal: Absence of Fall and Fall-Related Injury  Outcome: Progressing  Intervention: Promote Injury-Free Environment  Recent Flowsheet Documentation  Taken 1/9/2025 1535 by Her, Carolyn DISLA RN  Safety Promotion/Fall Prevention:   activity supervised   clutter free environment maintained   lighting adjusted   nonskid shoes/slippers when out of bed   patient and family education   room near nurse's station     Problem: Dysrhythmia  Goal: Normalized Cardiac Rhythm  Outcome: Progressing     Problem: Cognitive Impairment  Goal: Optimal Cognitive Function  Outcome: Progressing     Goal Outcome Evaluation:    Pt tele a.fib controlled (rates in 80s). Pt is RA and reported pain. PRN tylenol given with improvement. Niece at bedside and pt appetite good. Pt needs to be reoriented to situation. LR running 60ml/hr and to be discontinued tomorrow morning 1/10.    Pt to discharge to TCU tomorrow evening.

## 2025-01-12 ENCOUNTER — PATIENT OUTREACH (OUTPATIENT)
Dept: CARE COORDINATION | Facility: CLINIC | Age: 80
End: 2025-01-12
Payer: COMMERCIAL

## 2025-01-12 NOTE — PROGRESS NOTES
[facility]    Background: Transitional Care Management program identified per system criteria and reviewed by [facility] [place] team for possible outreach.    Assessment: Upon chart review, [facility] Team member will not proceed with patient outreach related to this episode of Transitional Care Management program due to reason below:    Patient has discharged to a Memory Care, Long-term Care, Assisted Living or Group Home where patient is receiving on-site support with their daily cares, including support with hospital follow up plan.    Plan: Transitional Care Management episode addressed appropriately per reason noted above.      LINDSEY Colón  [facility] [place], [facility]    *Connected Care Resource Team does NOT follow patient ongoing. Referrals are identified based on internal discharge reports and the outreach is to ensure patient has an understanding of their discharge instructions.

## 2025-01-13 ENCOUNTER — TRANSITIONAL CARE UNIT VISIT (OUTPATIENT)
Dept: GERIATRICS | Facility: CLINIC | Age: 80
End: 2025-01-13
Payer: COMMERCIAL

## 2025-01-13 ENCOUNTER — DOCUMENTATION ONLY (OUTPATIENT)
Dept: GERIATRICS | Facility: CLINIC | Age: 80
End: 2025-01-13
Payer: COMMERCIAL

## 2025-01-13 VITALS
HEART RATE: 88 BPM | BODY MASS INDEX: 41.29 KG/M2 | SYSTOLIC BLOOD PRESSURE: 123 MMHG | RESPIRATION RATE: 16 BRPM | DIASTOLIC BLOOD PRESSURE: 64 MMHG | HEIGHT: 63 IN | OXYGEN SATURATION: 96 % | TEMPERATURE: 98.2 F | WEIGHT: 233 LBS

## 2025-01-13 DIAGNOSIS — Z95.2 S/P TAVR (TRANSCATHETER AORTIC VALVE REPLACEMENT): Primary | ICD-10-CM

## 2025-01-13 DIAGNOSIS — S42.402A CLOSED FRACTURE OF DISTAL END OF LEFT HUMERUS, UNSPECIFIED FRACTURE MORPHOLOGY, INITIAL ENCOUNTER: ICD-10-CM

## 2025-01-13 DIAGNOSIS — N18.30 STAGE 3 CHRONIC KIDNEY DISEASE, UNSPECIFIED WHETHER STAGE 3A OR 3B CKD (H): ICD-10-CM

## 2025-01-13 DIAGNOSIS — G30.9 MAJOR NEUROCOGNITIVE DISORDER DUE TO ALZHEIMER'S DISEASE (H): ICD-10-CM

## 2025-01-13 DIAGNOSIS — I10 ESSENTIAL HYPERTENSION: ICD-10-CM

## 2025-01-13 DIAGNOSIS — F02.80 MAJOR NEUROCOGNITIVE DISORDER DUE TO ALZHEIMER'S DISEASE (H): ICD-10-CM

## 2025-01-13 DIAGNOSIS — G30.0 EARLY ONSET ALZHEIMER'S DEMENTIA WITHOUT BEHAVIORAL DISTURBANCE, PSYCHOTIC DISTURBANCE, MOOD DISTURBANCE, OR ANXIETY, UNSPECIFIED DEMENTIA SEVERITY (H): ICD-10-CM

## 2025-01-13 DIAGNOSIS — R53.1 GENERAL WEAKNESS: ICD-10-CM

## 2025-01-13 DIAGNOSIS — G20.A1 PARKINSON'S DISEASE, UNSPECIFIED WHETHER DYSKINESIA PRESENT, UNSPECIFIED WHETHER MANIFESTATIONS FLUCTUATE (H): ICD-10-CM

## 2025-01-13 DIAGNOSIS — N30.00 ACUTE CYSTITIS WITHOUT HEMATURIA: ICD-10-CM

## 2025-01-13 DIAGNOSIS — F02.80 EARLY ONSET ALZHEIMER'S DEMENTIA WITHOUT BEHAVIORAL DISTURBANCE, PSYCHOTIC DISTURBANCE, MOOD DISTURBANCE, OR ANXIETY, UNSPECIFIED DEMENTIA SEVERITY (H): ICD-10-CM

## 2025-01-13 DIAGNOSIS — E66.01 OBESITY, MORBID (H): ICD-10-CM

## 2025-01-13 DIAGNOSIS — I35.0 SEVERE AORTIC STENOSIS: ICD-10-CM

## 2025-01-13 DIAGNOSIS — I50.20 HEART FAILURE WITH REDUCED EJECTION FRACTION (H): ICD-10-CM

## 2025-01-13 DIAGNOSIS — F32.1 MAJOR DEPRESSIVE DISORDER, SINGLE EPISODE, MODERATE (H): ICD-10-CM

## 2025-01-13 DIAGNOSIS — R29.6 MULTIPLE FALLS: ICD-10-CM

## 2025-01-13 DIAGNOSIS — L40.50 PSORIATIC ARTHRITIS (H): ICD-10-CM

## 2025-01-13 NOTE — LETTER
1/13/2025      Sharon De La Vega  [address] [place] [address]        [facility] GERIATRICS    PRIMARY CARE PROVIDER AND CLINIC:  Jaylen Young MD, [address] [facility] [address] [facility] [address] / [facility] [address]  Chief Complaint   Patient presents with     Hospital F/U      [place] Medical Record Number:  [id number]  Place of Service where encounter took place:  [facility] ([facility]) [[id number]]    Sharon De La Vega  is a 79 year old  (1945), admitted to the above facility from  [facility]. Hospital stay 12/29/2024 through 1/10/2024..     Patient seen today for initial NP visit in TCU:    1. S/P TAVR (transcatheter aortic valve replacement)    2. Parkinson's disease, unspecified whether dyskinesia present, unspecified whether manifestations fluctuate (H)    3. Heart failure with reduced ejection fraction (H)    4. Major depressive disorder, single episode, moderate (H)    5. Obesity, morbid (H)    6. Stage 3 chronic kidney disease, unspecified whether stage 3a or 3b CKD (H)    7. Psoriatic arthritis (H)    8. Major neurocognitive disorder due to Alzheimer's disease (H)    9. Acute cystitis without hematuria    10. Essential hypertension    11. Early onset Alzheimer's dementia without behavioral disturbance, psychotic disturbance, mood disturbance, or anxiety, unspecified dementia severity (H)    12. Multiple falls    13. General weakness    14. Severe aortic stenosis    15. Closed fracture of distal end of left humerus, unspecified fracture morphology, initial encounter          HPI:    Pain overall controlled. Sling in place to left arm.   History of dementia. Limited recall. Recurrent falls.   Denies HA, chest pain, palpitations, dizziness. HR controlled. On ASA and Metop. BP stable so far in TCU. No troubles breathing, no SOB, no Concerns with urination-frederick was removed prior to TCU, no constipation. Uses prunes. Eating and drinking okay.  Sleeping okay.   Lives at home with son and daughter in law, ambulates independently.      CODE STATUS/ADVANCE DIRECTIVES DISCUSSION:  Prior  CPR/Full code   ALLERGIES:   Allergies   Allergen Reactions     Acetaminophen-Codeine GI Disturbance     Indigestion     Celecoxib Unknown     Codeine Camsylate Unknown     Conj Estrog-Medroxyprogest Ace Other (See Comments)     Bleeding     Estrogens, Conjugated Synthetic A Unknown     Nsaids Unknown     Sulfa Antibiotics Unknown      PAST MEDICAL HISTORY:   Past Medical History:   Diagnosis Date     Anxiety 02/14/2013    Formatting of this note might be different from the original.  CURRENT MEDICATION(S) celexa 40 mg;   Lorazepam prn  Only occ  Stopped no need 11-17. Use PHQ 9 = 6   11-15;       Aortic valve stenosis 04/07/2023     Essential hypertension 10/29/2012     Hypercholesterolemia 10/29/2012     Hypothyroidism 10/29/2012     Major depressive disorder, single episode, moderate (H) 11/28/2022    Formatting of this note might be different from the original.  CURRENT MEDICATION(S) citalopram 40 mg    PHQ 9 =  2  11-11;  phq 9 = 2    12-11;  = 2   5-12; = 2   11-12;  Called her  Doing better  No need to see a psychologist. 1-14  PHQ 9 = 4   8-14;  = 3   12-14; OK 11-17;       Major neurocognitive disorder due to Alzheimer's disease (H) 06/20/2022     Psoriatic arthritis (H) 10/29/2012      PAST SURGICAL HISTORY:   has a past surgical history that includes Cholecystectomy (1964); appendectomy (1964); carpal tunnel release rt/lt (Bilateral, 1982); Cataract Extraction, Bilateral (1998); EXCISE HAND/FOOT NEUROMA (2004); KNEE SCOPE,MED OR LAT MENIS REPAIR (Right, 2000); Total Knee Arthroplasty (Right, 2012); Trabeculectomy (Right, 1998); Coronary Angiogram (N/A, 12/31/2024); Transcatheter Aortic Valve Replacement-Femoral Approach (N/A, 1/3/2025); Or Transcatheter Aortic Valve Replacement, Femoral Percutaneous Approach (Standby) (N/A, 1/3/2025); Open reduction internal  fixation humerus distal (Left, 1/7/2025); and Transposition ulnar nerve (elbow) (Left, 1/7/2025).  FAMILY HISTORY: family history includes Lung Cancer in her father; Multiple myeloma in her brother.  SOCIAL HISTORY:   reports that she quit smoking about 38 years ago. Her smoking use included cigarettes. She has never used smokeless tobacco. She reports that she does not currently use alcohol.  Patient's living condition: lives with family,       Post Discharge Medication Reconciliation Status:   MED REC REQUIRED  Post Medication Reconciliation Status: discharge medications reconciled, continue medications without change       Current Outpatient Medications   Medication Sig Dispense Refill     acetaminophen (TYLENOL) 325 MG tablet Take 2 tablets (650 mg) by mouth every 4 hours as needed for mild pain or headaches. 100 tablet 0     aspirin 81 MG EC tablet Take 1 tablet (81 mg) by mouth daily.       buPROPion (WELLBUTRIN XL) 150 MG 24 hr tablet Take 150 mg by mouth every morning.       citalopram (CELEXA) 20 MG tablet Take 1 tablet by mouth every morning.       cyanocobalamin (VITAMIN B-12) 1000 MCG tablet Take 1,000 mcg by mouth every morning.       donepezil (ARICEPT) 10 MG tablet Take 1 tablet (10 mg) by mouth at bedtime 90 tablet 3     folic acid (FOLVITE) 1 MG tablet Take 1 mg by mouth every morning.       leflunomide (ARAVA) 10 MG tablet Take 1 tablet by mouth every morning.       levothyroxine (SYNTHROID/LEVOTHROID) 100 MCG tablet Take 1 tablet by mouth every morning.       lisinopril (ZESTRIL) 10 MG tablet Take 1 tablet (10 mg) by mouth daily.       memantine (NAMENDA) 10 MG tablet Take 1 tablet (10 mg) by mouth 2 times daily 180 tablet 3     methotrexate 2.5 MG tablet Take 10 mg by mouth once a week On Thursday       metoprolol tartrate (LOPRESSOR) 25 MG tablet Take 1 tablet (25 mg) by mouth 2 times daily.       nystatin (MYCOSTATIN) 576134 UNIT/GM external powder Apply topically 2 times daily as needed.        "oxyCODONE (ROXICODONE) 5 MG tablet Take 0.5 tablets (2.5 mg) by mouth every 4 hours as needed for moderate to severe pain. 5 tablet 0     QUEtiapine (SEROQUEL) 25 MG tablet Take 0.5 tablets (12.5 mg) by mouth every 6 hours as needed (Agitation).       senna-docusate (SENOKOT-S/PERICOLACE) 8.6-50 MG tablet Take 2 tablets by mouth 2 times daily as needed for constipation. 30 tablet 0     simvastatin (ZOCOR) 20 MG tablet Take 20 mg by mouth every evening.       vitamin D3 (CHOLECALCIFEROL) 50 mcg (2000 units) tablet Take 1 tablet by mouth every morning.       vitamin E (TOCOPHEROL) 1000 units (450 mg) CAPS capsule Take 1 capsule (1,000 Units) by mouth 2 times daily 60 capsule 11     No current facility-administered medications for this visit.       ROS:  10 point ROS of systems including Constitutional, Eyes, Respiratory, Cardiovascular, Gastroenterology, Genitourinary, Integumentary, Musculoskeletal, Psychiatric were all negative except for pertinent positives noted in my HPI.    Vitals:  /64   Pulse 88   Temp 98.2  F (36.8  C)   Resp 16   Ht 1.6 m (5' 3\")   Wt 105.7 kg (233 lb)   SpO2 96%   BMI 41.27 kg/m    Exam:  GENERAL APPEARANCE:  Alert, in no distress, oriented, cooperative  ENT:  Mouth and posterior oropharynx normal, moist mucous membranes  EYES:  EOM, conjunctivae, lids, pupils and irises normal  NECK:  No adenopathy,masses or thyromegaly  RESP:  respiratory effort and palpation of chest normal, lungs clear to auscultation , no respiratory distress  CV:  Palpation and auscultation of heart done , regular rate and rhythm, no murmur, rub, or gallop, no edema to LE  GI/ABDOMEN:  normal bowel sounds, soft, nontender, no hepatosplenomegaly or other masses  M/S:   moves extremities spontaneously. Left arm in sling and wrapped   SKIN:  no rashes to exposed skin.  NEURO:   intact   PSYCH:  oriented X 3, normal insight, judgement and memory, affect and mood normal,       Lab/Diagnostic data:  Recent labs " in EPIC reviewed by me today.     Last Comprehensive Metabolic Panel:  Lab Results   Component Value Date     01/10/2025    POTASSIUM 4.3 01/10/2025    CHLORIDE 105 01/10/2025    CO2 25 01/10/2025    ANIONGAP 7 01/10/2025     (H) 01/10/2025    BUN 17.3 01/10/2025    CR 0.79 01/10/2025    GFRESTIMATED 76 01/10/2025    RIZWANA 8.4 (L) 01/10/2025       Lab Results   Component Value Date    WBC 8.6 01/10/2025     Lab Results   Component Value Date    RBC 2.78 01/10/2025     Lab Results   Component Value Date    HGB 8.7 01/10/2025     Lab Results   Component Value Date    HCT 27.2 01/10/2025     Lab Results   Component Value Date    MCV 98 01/10/2025     Lab Results   Component Value Date    MCH 31.3 01/10/2025     Lab Results   Component Value Date    MCHC 32.0 01/10/2025     Lab Results   Component Value Date    RDW 16.0 01/10/2025     Lab Results   Component Value Date     01/10/2025       ASSESSMENT/PLAN:  Acute left comminuted intra-articular mildly displaced distal humerus fracture, minimally displaced olecranon process fracture, minimally displaced radial head fracture.   - Status post splint/sling  - s/p ORIF Left elbow 1/7  - Pain controlled with PRN tylenol and Oxycodone, wean off as able  - Follow up with Ortho  Recheck labs 1/15     Acute drop in Hb  - Hb dropped 10.5 -> 8.9 -> 8.6 -> 8.1, stable ~ 8.5  - post op estimated blood loss 100ml  - No evidence of bleeding. Repeat Hgb on 1/15     HUMA - resolved  - suspect pre-renal  - s/p IV fluids  Recheck labs ordered      Atrial fibrillation with controlled ventricular response, new onset  - Start Metoprolol 25mg bid  - Needs anticoagulation, but has frequent falls, on asa  - Consider Watchman procedure outpatient  Currently rate controlled.      Severe symptomatic aortic valvular stenosis  - TTE (12/29/24):  LVEF 60%, mean gradient 57 mmHg with peak velocity of 4.6 m/s. Calculated valve area 0.60-0.63 cmÂ .  - Coronary angiogram (12/29/24) normal  coronary arteries.  - S/P TAVR (1/3/2025) with a 23mm Zelaya Darian Ultra Resilia Valve  - Follow up with Cardiology     Recurrent falls  - Limited historian with dementia  - Aortic valve stenosis possibly contributory  - CT head & C spine unrevealing for acute process  - PT/OT     Intermittent hypotension  Essential hypertension   - BP intermittently low, suspect if dehydration  - Pro calcitonin negative, with minimal drop in Hb  - On Metoprolol with holding parameters  - reduced dose of Lisinopril 10mg: , 144 125 today.      Possible UTI  - Iabx completed  Reports no concerns today. Escobar was removed inpatient.      Alzheimer's dementia without behavioral disturbance  - Risk for violent behavior and/or acute delirium  - Donepezil, memantine  - As needed quetiapine, hasn't required any in last few days  Monitor, recheck labs ordered   PT/OT     Hypokalemia- resolved     Intermittent urine retention  - Escobar catheter removed 01/10, reports no concerns. Completed abx for UTI.  Monitor      Electronically signed by:  Katie العلي CNP       Total time greater than 55 minutes reviewing chart in EPIC and PCC, medications, labs, vitals. Discussing with social work, physical therapy, occupational therapy and nursing.                   Sincerely,        Katie العلي CNP    Electronically signed

## 2025-01-13 NOTE — PROGRESS NOTES
[facility] GERIATRICS    PRIMARY CARE PROVIDER AND CLINIC:  Jaylen Young MD, [address] / [facility] [address]  Chief Complaint   Patient presents with    Hospital F/U      [place] Medical Record Number:  [id number]  Place of Service where encounter took place:  [facility] ([facility]) [[id number]]    Sharon De La Vega  is a 79 year old  (1945), admitted to the above facility from  [facility]. Hospital stay 12/29/2024 through 1/10/2024..     Patient seen today for initial NP visit in TCU:    1. S/P TAVR (transcatheter aortic valve replacement)    2. Parkinson's disease, unspecified whether dyskinesia present, unspecified whether manifestations fluctuate (H)    3. Heart failure with reduced ejection fraction (H)    4. Major depressive disorder, single episode, moderate (H)    5. Obesity, morbid (H)    6. Stage 3 chronic kidney disease, unspecified whether stage 3a or 3b CKD (H)    7. Psoriatic arthritis (H)    8. Major neurocognitive disorder due to Alzheimer's disease (H)    9. Acute cystitis without hematuria    10. Essential hypertension    11. Early onset Alzheimer's dementia without behavioral disturbance, psychotic disturbance, mood disturbance, or anxiety, unspecified dementia severity (H)    12. Multiple falls    13. General weakness    14. Severe aortic stenosis    15. Closed fracture of distal end of left humerus, unspecified fracture morphology, initial encounter          HPI:    Pain overall controlled. Sling in place to left arm.   History of dementia. Limited recall. Recurrent falls.   Denies HA, chest pain, palpitations, dizziness. HR controlled. On ASA and Metop. BP stable so far in TCU. No troubles breathing, no SOB, no Concerns with urination-frederick was removed prior to TCU, no constipation. Uses prunes. Eating and drinking okay. Sleeping okay.   Lives at home with son and daughter in law, ambulates  independently.      CODE STATUS/ADVANCE DIRECTIVES DISCUSSION:  Prior  CPR/Full code   ALLERGIES:   Allergies   Allergen Reactions    Acetaminophen-Codeine GI Disturbance     Indigestion    Celecoxib Unknown    Codeine Camsylate Unknown    Conj Estrog-Medroxyprogest Ace Other (See Comments)     Bleeding    Estrogens, Conjugated Synthetic A Unknown    Nsaids Unknown    Sulfa Antibiotics Unknown      PAST MEDICAL HISTORY:   Past Medical History:   Diagnosis Date    Anxiety 02/14/2013    Formatting of this note might be different from the original.  CURRENT MEDICATION(S) celexa 40 mg;   Lorazepam prn  Only occ  Stopped no need 11-17. Use PHQ 9 = 6   11-15;      Aortic valve stenosis 04/07/2023    Essential hypertension 10/29/2012    Hypercholesterolemia 10/29/2012    Hypothyroidism 10/29/2012    Major depressive disorder, single episode, moderate (H) 11/28/2022    Formatting of this note might be different from the original.  CURRENT MEDICATION(S) citalopram 40 mg    PHQ 9 =  2  11-11;  phq 9 = 2    12-11;  = 2   5-12; = 2   11-12;  Called her  Doing better  No need to see a psychologist. 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) 06/20/2022    Psoriatic arthritis (H) 10/29/2012      PAST SURGICAL HISTORY:   has a past surgical history that includes Cholecystectomy (1964); appendectomy (1964); carpal tunnel release rt/lt (Bilateral, 1982); Cataract Extraction, Bilateral (1998); EXCISE HAND/FOOT NEUROMA (2004); KNEE SCOPE,MED OR LAT MENIS REPAIR (Right, 2000); Total Knee Arthroplasty (Right, 2012); Trabeculectomy (Right, 1998); Coronary Angiogram (N/A, 12/31/2024); Transcatheter Aortic Valve Replacement-Femoral Approach (N/A, 1/3/2025); Or Transcatheter Aortic Valve Replacement, Femoral Percutaneous Approach (Standby) (N/A, 1/3/2025); Open reduction internal fixation humerus distal (Left, 1/7/2025); and Transposition ulnar nerve (elbow) (Left,  1/7/2025).  FAMILY HISTORY: family history includes Lung Cancer in her father; Multiple myeloma in her brother.  SOCIAL HISTORY:   reports that she quit smoking about 38 years ago. Her smoking use included cigarettes. She has never used smokeless tobacco. She reports that she does not currently use alcohol.  Patient's living condition: lives with family,       Post Discharge Medication Reconciliation Status:   MED REC REQUIRED  Post Medication Reconciliation Status: discharge medications reconciled, continue medications without change       Current Outpatient Medications   Medication Sig Dispense Refill    acetaminophen (TYLENOL) 325 MG tablet Take 2 tablets (650 mg) by mouth every 4 hours as needed for mild pain or headaches. 100 tablet 0    aspirin 81 MG EC tablet Take 1 tablet (81 mg) by mouth daily.      buPROPion (WELLBUTRIN XL) 150 MG 24 hr tablet Take 150 mg by mouth every morning.      citalopram (CELEXA) 20 MG tablet Take 1 tablet by mouth every morning.      cyanocobalamin (VITAMIN B-12) 1000 MCG tablet Take 1,000 mcg by mouth every morning.      donepezil (ARICEPT) 10 MG tablet Take 1 tablet (10 mg) by mouth at bedtime 90 tablet 3    folic acid (FOLVITE) 1 MG tablet Take 1 mg by mouth every morning.      leflunomide (ARAVA) 10 MG tablet Take 1 tablet by mouth every morning.      levothyroxine (SYNTHROID/LEVOTHROID) 100 MCG tablet Take 1 tablet by mouth every morning.      lisinopril (ZESTRIL) 10 MG tablet Take 1 tablet (10 mg) by mouth daily.      memantine (NAMENDA) 10 MG tablet Take 1 tablet (10 mg) by mouth 2 times daily 180 tablet 3    methotrexate 2.5 MG tablet Take 10 mg by mouth once a week On Thursday      metoprolol tartrate (LOPRESSOR) 25 MG tablet Take 1 tablet (25 mg) by mouth 2 times daily.      nystatin (MYCOSTATIN) 220313 UNIT/GM external powder Apply topically 2 times daily as needed.      oxyCODONE (ROXICODONE) 5 MG tablet Take 0.5 tablets (2.5 mg) by mouth every 4 hours as needed for  "moderate to severe pain. 5 tablet 0    QUEtiapine (SEROQUEL) 25 MG tablet Take 0.5 tablets (12.5 mg) by mouth every 6 hours as needed (Agitation).      senna-docusate (SENOKOT-S/PERICOLACE) 8.6-50 MG tablet Take 2 tablets by mouth 2 times daily as needed for constipation. 30 tablet 0    simvastatin (ZOCOR) 20 MG tablet Take 20 mg by mouth every evening.      vitamin D3 (CHOLECALCIFEROL) 50 mcg (2000 units) tablet Take 1 tablet by mouth every morning.      vitamin E (TOCOPHEROL) 1000 units (450 mg) CAPS capsule Take 1 capsule (1,000 Units) by mouth 2 times daily 60 capsule 11     No current facility-administered medications for this visit.       ROS:  10 point ROS of systems including Constitutional, Eyes, Respiratory, Cardiovascular, Gastroenterology, Genitourinary, Integumentary, Musculoskeletal, Psychiatric were all negative except for pertinent positives noted in my HPI.    Vitals:  /64   Pulse 88   Temp 98.2  F (36.8  C)   Resp 16   Ht 1.6 m (5' 3\")   Wt 105.7 kg (233 lb)   SpO2 96%   BMI 41.27 kg/m    Exam:  GENERAL APPEARANCE:  Alert, in no distress, oriented, cooperative  ENT:  Mouth and posterior oropharynx normal, moist mucous membranes  EYES:  EOM, conjunctivae, lids, pupils and irises normal  NECK:  No adenopathy,masses or thyromegaly  RESP:  respiratory effort and palpation of chest normal, lungs clear to auscultation , no respiratory distress  CV:  Palpation and auscultation of heart done , regular rate and rhythm, no murmur, rub, or gallop, no edema to LE  GI/ABDOMEN:  normal bowel sounds, soft, nontender, no hepatosplenomegaly or other masses  M/S:   moves extremities spontaneously. Left arm in sling and wrapped   SKIN:  no rashes to exposed skin.  NEURO:   intact   PSYCH:  oriented X 3, normal insight, judgement and memory, affect and mood normal,       Lab/Diagnostic data:  Recent labs in [facility] reviewed by me today.     Last Comprehensive Metabolic Panel:  Lab Results   Component Value " Date     01/10/2025    POTASSIUM 4.3 01/10/2025    CHLORIDE 105 01/10/2025    CO2 25 01/10/2025    ANIONGAP 7 01/10/2025     (H) 01/10/2025    BUN 17.3 01/10/2025    CR 0.79 01/10/2025    GFRESTIMATED 76 01/10/2025    RIZWANA 8.4 (L) 01/10/2025       Lab Results   Component Value Date    WBC 8.6 01/10/2025     Lab Results   Component Value Date    RBC 2.78 01/10/2025     Lab Results   Component Value Date    HGB 8.7 01/10/2025     Lab Results   Component Value Date    HCT 27.2 01/10/2025     Lab Results   Component Value Date    MCV 98 01/10/2025     Lab Results   Component Value Date    MCH 31.3 01/10/2025     Lab Results   Component Value Date    MCHC 32.0 01/10/2025     Lab Results   Component Value Date    RDW 16.0 01/10/2025     Lab Results   Component Value Date     01/10/2025       ASSESSMENT/PLAN:  Acute left comminuted intra-articular mildly displaced distal humerus fracture, minimally displaced olecranon process fracture, minimally displaced radial head fracture.   - Status post splint/sling  - s/p ORIF Left elbow 1/7  - Pain controlled with PRN tylenol and Oxycodone, wean off as able  - Follow up with Ortho  Recheck labs 1/15     Acute drop in Hb  - Hb dropped 10.5 -> 8.9 -> 8.6 -> 8.1, stable ~ 8.5  - post op estimated blood loss 100ml  - No evidence of bleeding. Repeat Hgb on 1/15     HUMA - resolved  - suspect pre-renal  - s/p IV fluids  Recheck labs ordered      Atrial fibrillation with controlled ventricular response, new onset  - Start Metoprolol 25mg bid  - Needs anticoagulation, but has frequent falls, on asa  - Consider Watchman procedure outpatient  Currently rate controlled.      Severe symptomatic aortic valvular stenosis  - TTE (12/29/24):  LVEF 60%, mean gradient 57 mmHg with peak velocity of 4.6 m/s. Calculated valve area 0.60-0.63 cmÂ .  - Coronary angiogram (12/29/24) normal coronary arteries.  - S/P TAVR (1/3/2025) with a 23mm Zelaya Darian Ultra Resilia Valve  - Follow  up with Cardiology     Recurrent falls  - Limited historian with dementia  - Aortic valve stenosis possibly contributory  - CT head & C spine unrevealing for acute process  - PT/OT     Intermittent hypotension  Essential hypertension   - BP intermittently low, suspect if dehydration  - Pro calcitonin negative, with minimal drop in Hb  - On Metoprolol with holding parameters  - reduced dose of Lisinopril 10mg: , 144 125 today.      Possible UTI  - Iabx completed  Reports no concerns today. Escobar was removed inpatient.      Alzheimer's dementia without behavioral disturbance  - Risk for violent behavior and/or acute delirium  - Donepezil, memantine  - As needed quetiapine, hasn't required any in last few days  Monitor, recheck labs ordered   PT/OT     Hypokalemia- resolved     Intermittent urine retention  - Escobar catheter removed 01/10, reports no concerns. Completed abx for UTI.  Monitor      Electronically signed by:  Katie العلي CNP       Total time greater than 55 minutes reviewing chart in EPIC and PCC, medications, labs, vitals. Discussing with social work, physical therapy, occupational therapy and nursing.

## 2025-01-15 ENCOUNTER — LAB REQUISITION (OUTPATIENT)
Dept: LAB | Facility: CLINIC | Age: 80
End: 2025-01-15
Payer: COMMERCIAL

## 2025-01-15 DIAGNOSIS — S42.402D UNSPECIFIED FRACTURE OF LOWER END OF LEFT HUMERUS, SUBSEQUENT ENCOUNTER FOR FRACTURE WITH ROUTINE HEALING: ICD-10-CM

## 2025-01-15 DIAGNOSIS — I35.0 NONRHEUMATIC AORTIC (VALVE) STENOSIS: ICD-10-CM

## 2025-01-16 LAB
CREAT SERPL-MCNC: 0.88 MG/DL (ref 0.51–0.95)
EGFRCR SERPLBLD CKD-EPI 2021: 67 ML/MIN/1.73M2
HGB BLD-MCNC: 8.3 G/DL (ref 11.7–15.7)
PHOSPHATE SERPL-MCNC: 2.9 MG/DL (ref 2.5–4.5)

## 2025-01-16 PROCEDURE — 36415 COLL VENOUS BLD VENIPUNCTURE: CPT | Performed by: NURSE PRACTITIONER

## 2025-01-16 PROCEDURE — 82565 ASSAY OF CREATININE: CPT | Performed by: NURSE PRACTITIONER

## 2025-01-16 PROCEDURE — P9604 ONE-WAY ALLOW PRORATED TRIP: HCPCS | Performed by: NURSE PRACTITIONER

## 2025-01-16 PROCEDURE — 84100 ASSAY OF PHOSPHORUS: CPT | Performed by: NURSE PRACTITIONER

## 2025-01-16 PROCEDURE — 85018 HEMOGLOBIN: CPT | Performed by: NURSE PRACTITIONER

## 2025-01-18 DIAGNOSIS — F02.80 MAJOR NEUROCOGNITIVE DISORDER DUE TO ALZHEIMER'S DISEASE (H): ICD-10-CM

## 2025-01-18 DIAGNOSIS — G30.9 MAJOR NEUROCOGNITIVE DISORDER DUE TO ALZHEIMER'S DISEASE (H): ICD-10-CM

## 2025-01-20 ENCOUNTER — OFFICE VISIT (OUTPATIENT)
Dept: CARDIOLOGY | Facility: CLINIC | Age: 80
End: 2025-01-20
Payer: COMMERCIAL

## 2025-01-20 ENCOUNTER — TRANSITIONAL CARE UNIT VISIT (OUTPATIENT)
Dept: GERIATRICS | Facility: CLINIC | Age: 80
End: 2025-01-20
Payer: COMMERCIAL

## 2025-01-20 VITALS
HEART RATE: 56 BPM | RESPIRATION RATE: 16 BRPM | SYSTOLIC BLOOD PRESSURE: 149 MMHG | OXYGEN SATURATION: 96 % | DIASTOLIC BLOOD PRESSURE: 81 MMHG

## 2025-01-20 VITALS
TEMPERATURE: 98.2 F | OXYGEN SATURATION: 96 % | SYSTOLIC BLOOD PRESSURE: 149 MMHG | HEART RATE: 56 BPM | RESPIRATION RATE: 16 BRPM | DIASTOLIC BLOOD PRESSURE: 81 MMHG

## 2025-01-20 DIAGNOSIS — F32.1 MAJOR DEPRESSIVE DISORDER, SINGLE EPISODE, MODERATE (H): ICD-10-CM

## 2025-01-20 DIAGNOSIS — I50.20 HEART FAILURE WITH REDUCED EJECTION FRACTION (H): ICD-10-CM

## 2025-01-20 DIAGNOSIS — I48.0 PAROXYSMAL ATRIAL FIBRILLATION (H): ICD-10-CM

## 2025-01-20 DIAGNOSIS — N18.30 STAGE 3 CHRONIC KIDNEY DISEASE, UNSPECIFIED WHETHER STAGE 3A OR 3B CKD (H): ICD-10-CM

## 2025-01-20 DIAGNOSIS — I10 ESSENTIAL HYPERTENSION: ICD-10-CM

## 2025-01-20 DIAGNOSIS — F02.80 EARLY ONSET ALZHEIMER'S DEMENTIA WITHOUT BEHAVIORAL DISTURBANCE, PSYCHOTIC DISTURBANCE, MOOD DISTURBANCE, OR ANXIETY, UNSPECIFIED DEMENTIA SEVERITY (H): ICD-10-CM

## 2025-01-20 DIAGNOSIS — G20.A1 PARKINSON'S DISEASE, UNSPECIFIED WHETHER DYSKINESIA PRESENT, UNSPECIFIED WHETHER MANIFESTATIONS FLUCTUATE (H): ICD-10-CM

## 2025-01-20 DIAGNOSIS — G30.0 EARLY ONSET ALZHEIMER'S DEMENTIA WITHOUT BEHAVIORAL DISTURBANCE, PSYCHOTIC DISTURBANCE, MOOD DISTURBANCE, OR ANXIETY, UNSPECIFIED DEMENTIA SEVERITY (H): ICD-10-CM

## 2025-01-20 DIAGNOSIS — Z95.2 S/P TAVR (TRANSCATHETER AORTIC VALVE REPLACEMENT): Primary | ICD-10-CM

## 2025-01-20 DIAGNOSIS — I50.32 CHRONIC HEART FAILURE WITH PRESERVED EJECTION FRACTION (H): ICD-10-CM

## 2025-01-20 DIAGNOSIS — R29.6 RECURRENT FALLS: ICD-10-CM

## 2025-01-20 PROCEDURE — 99310 SBSQ NF CARE HIGH MDM 45: CPT | Performed by: NURSE PRACTITIONER

## 2025-01-20 PROCEDURE — 99215 OFFICE O/P EST HI 40 MIN: CPT | Performed by: PHYSICIAN ASSISTANT

## 2025-01-20 RX ORDER — MULTIVIT WITH MINERALS/LUTEIN
1000 TABLET ORAL 2 TIMES DAILY
Qty: 180 CAPSULE | Refills: 0 | Status: SHIPPED | OUTPATIENT
Start: 2025-01-20

## 2025-01-20 NOTE — PROGRESS NOTES
Conjuntivae and eyelids appear normal, Sclerae : White without injection HEART CARE ENCOUNTER NOTE       [facility]  [phone]    Assessment/Recommendations   Assessment:  Severe aortic stenosis status post TAVR on 1/3/2025 using a right TF approach with a 23 mm Zelaya LEXX 3 ultra Resilia valve  Chronic heart failure with preserved ejection fraction, NYHA class III -compensated  Hypertension - initial blood pressure elevated, improved on re-check  Paroxysmal atrial fibrillation    - I have personally reviewed this patient's chart and have spoken with the patient about the treatment options, including RONIT device.  She has a HKI3PM3-YMRj score of at least 4 (age >75, hypertension, female gender), HAS-BLED 2 (age and bleeding predisposition) She is not a good candidate for long-term anticoagulation due to recurrent falls and anemia.     -  Would consider starting anticoagulation 1 week prior to the Watchman procedure. Alternatively, she could be considered for off label-implant. Given recurrent falls, would consider placing patient on DAPT with aspirin 81 mg daily indefinitely and Plavix 75 mg daily for 6 months post-procedure.  She will have a ALIS or CTA approximately 3 months and 1 year post-implant.  She understands that the risks of the procedure are <2% and include, but are not limited to device embolization, air embolism, myocardial perforation, device thrombosis, ASD, stroke, or death.  We discussed expected recovery and follow-up.      Recurrent falls   Humerus fracture - s/p ORIF on 1/6/2025  Acute normocytic anemia - most recent hgb 8.3    Plan:  Continue aspirin 81 mg daily  Continue lisinopril 10 mg daily, metoprolol tartrate 25 mg daily   1 month echocardiogram is scheduled for February 17  1 month follow-up visit is scheduled for February 21  She and her son will further consider Watchman procedure and follow-up with our team if interested.    Thank you for the opportunity to participate in the care of  CINDY Ceferino. It's been a pleasure working  with her.  Please do not hesitate to call with any questions or concerns.       History of Present Illness/Subjective    I had the opportunity to see Sharon De La Vega at the [facility] for follow-up after TAVR.  She is accompanied by her son for today's visit.    She has a past medical history significant for recurrent falls, aortic stenosis, hypertension, hyperlipidemia, hypothyroidism, Alzheimer's dementia, psoriatic arthritis on methotrexate and leflunomide    She was admitted to the hospital end of December for recurrent falls and subsequent left humeral fracture. She was found to have severe-critical aortic stenosis and underwent successful transcatheter aortic valve replacement on 1/3/2025.  She underwent ORIF of her distal humerus on 1/7/2025.  She was also found to have atrial fibrillation during her hospitalization and was started on metoprolol.  Anticoagulation was not started due to her frequent falls.  She is currently residing in a TCU.  She overall reports feeling okay since being discharged.  She has not had any falls since being discharged from the hospital.  She denies shortness of breath, chest pain, orthopnea, PND, dizziness, lightheadedness.  She reports a good appetite and her weight has been stable.  She denies abdominal fullness/bloating or nausea/vomiting.  She currently is mostly wheelchair-bound at the TCU and requires assistance with transfers.  She has follow-up with Ortho later this week.  ______________________  Echo: 1/4/2025  Interpretation Summary     1. The left ventricle is normal in size. Left ventricular function is  normal.The ejection fraction is > 65%. No regional wall motion abnormalities  noted. There is mild concentric left ventricular hypertrophy.  2. Normal right ventricle size and systolic function.  3. Well seated bio-prosthetic aortic valve (documented 23 mm Zelaya Darian 3  Ultra Resilia tissue valve) with normal aortic valve prosthesis metrics with  a  mean systolic gradient of 11 mmHg and a peak anterograde velocity of 2.1  m/sec, AT: 80 msec. DVI: 0.7. No aortic insufficiency.    EKG (personally reviewed and interpreted):  Atrial fibrillation, ventricular rate 80     Physical Examination Review of Systems   Vitals: BP (!) 149/81 (BP Location: Right arm, Patient Position: Sitting, Cuff Size: Adult Large)   Pulse 56   Resp 16   SpO2 96%   BMI= There is no height or weight on file to calculate BMI.  Wt Readings from Last 3 Encounters:   01/13/25 105.7 kg (233 lb)   01/10/25 106.1 kg (233 lb 14.5 oz)   10/03/24 104.9 kg (231 lb 4.8 oz)       General Appearance:   Alert, cooperative and in no acute distress. In wheelchair   ENT/Mouth: membranes moist, no oral lesions or bleeding gums.      EYES:  no scleral icterus, normal conjunctivae   Neck:   Thyroid not visualized   Chest/Lungs:   lungs are clear to auscultation, no rales or wheezing   Cardiovascular:   Regular. Normal first and second heart sounds with faint murmur present. No rubs or gallops; trace edema bilaterally    Abdomen:  Soft and nontender.   Extremities: no cyanosis or clubbing.  Puncture site is soft and without hematoma   Skin: no xanthelasma, warm.    Neurologic: no tremors   Psychiatric: Normal mood and affect       Please refer above for cardiac ROS details.      Medical History  Surgical History Family History Social History   Past Medical History:   Diagnosis Date    Anxiety 02/14/2013    Formatting of this note might be different from the original.  CURRENT MEDICATION(S) celexa 40 mg;   Lorazepam prn  Only occ  Stopped no need 11-17. Use PHQ 9 = 6   11-15;      Aortic valve stenosis 04/07/2023    Essential hypertension 10/29/2012    Hypercholesterolemia 10/29/2012    Hypothyroidism 10/29/2012    Major depressive disorder, single episode, moderate (H) 11/28/2022    Formatting of this note might be different from the original.  CURRENT MEDICATION(S) citalopram 40 mg    PHQ 9 =  2  11-11;   phq 9 = 2    12-11;  = 2   5-12; = 2   11-12;  Called her  Doing better  No need to see a psychologist. 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) 06/20/2022    Psoriatic arthritis (H) 10/29/2012     Past Surgical History:   Procedure Laterality Date    APPENDECTOMY  1964    CARPAL TUNNEL RELEASE RT/LT Bilateral 1982    CATARACT EXTRACTION, BILATERAL  1998    CHOLECYSTECTOMY  1964    CV CORONARY ANGIOGRAM N/A 12/31/2024    Procedure: Coronary Angiogram;  Surgeon: Farhad Blue MD;  Location: [facility]    CV TRANSCATHETER AORTIC VALVE REPLACEMENT-FEMORAL APPROACH N/A 1/3/2025    Procedure: Transcatheter Aortic Valve Replacement-Femoral Approach;  Surgeon: Graeme Torres MD;  Location: [facility]    HC KNEE ARTHROSCOPY, MED/LAT MENISCUS REPAIR Right 2000    OPEN REDUCTION INTERNAL FIXATION HUMERUS DISTAL Left 1/7/2025    Procedure: OPEN REDUCTION INTERNAL FIXATION, FRACTURE, HUMERUS, DISTAL, WITH;  Surgeon: Jace Tamayo MD;  [place] [facility] [place]    OR TRANSCATHETER AORTIC VALVE REPLACEMENT, FEMORAL PERCUTANEOUS APPROACH (STANDBY) N/A 1/3/2025    Procedure: OR TRANSCATHETER AORTIC VALVE REPLACEMENT, FEMORAL PERCUTANEOUS APPROACH (STANDBY);  Surgeon: Chester Oreilly MD;  Location: [facility]    [place] EXCISE HAND/FOOT NEUROMA  2004    right foot    TOTAL KNEE ARTHROPLASTY Right 2012    TRABECULECTOMY Right 1998    laser surgery right eye    TRANSPOSITION ULNAR NERVE (ELBOW) Left 1/7/2025    Procedure: ULNAR NERVE TRANSPOSITION;  Surgeon: Jace Tamayo MD;  [place] [facility] [place]     Family History   Problem Relation Age of Onset    Lung Cancer Father     Multiple myeloma Brother     Social History     Socioeconomic History    Marital status:      Spouse name: Not on file    Number of children: Not on file    Years of education: Not on file    Highest education level: Not on  file   Occupational History    Not on file   Tobacco Use    Smoking status: Former     Current packs/day: 0.00     Types: Cigarettes     Quit date:      Years since quittin.0    Smokeless tobacco: Never   Substance and Sexual Activity    Alcohol use: Not Currently    Drug use: Not on file    Sexual activity: Not on file   Other Topics Concern    Not on file   Social History Narrative    Not on file     Social Drivers of Health     Financial Resource Strain: Low Risk  (2024)    Financial Resource Strain     Within the past 12 months, have you or your family members you live with been unable to get utilities (heat, electricity) when it was really needed?: No   Food Insecurity: Low Risk  (2024)    Food Insecurity     Within the past 12 months, did you worry that your food would run out before you got money to buy more?: No     Within the past 12 months, did the food you bought just not last and you didn t have money to get more?: No   Transportation Needs: Low Risk  (2024)    Transportation Needs     Within the past 12 months, has lack of transportation kept you from medical appointments, getting your medicines, non-medical meetings or appointments, work, or from getting things that you need?: No   Physical Activity: Not on file   Stress: Not on file   Social Connections: Unknown (2023)    Received [facility], [facility]    Social Connections     Frequency of Communication with Friends and Family: Not on file   Interpersonal Safety: Low Risk  (2024)    Interpersonal Safety     Do you feel physically and emotionally safe where you currently live?: Yes     Within the past 12 months, have you been hit, slapped, kicked or otherwise physically hurt by someone?: No     Within the past 12 months, have you been humiliated or emotionally abused in other ways by your partner or ex-partner?: No   Housing Stability: Low  Risk  (12/30/2024)    Housing Stability     Do you have housing? : Yes     Are you worried about losing your housing?: No          Medications  Allergies   Current Outpatient Medications   Medication Sig Dispense Refill    acetaminophen (TYLENOL) 325 MG tablet Take 2 tablets (650 mg) by mouth every 4 hours as needed for mild pain or headaches. 100 tablet 0    aspirin 81 MG EC tablet Take 1 tablet (81 mg) by mouth daily.      buPROPion (WELLBUTRIN XL) 150 MG 24 hr tablet Take 150 mg by mouth every morning.      citalopram (CELEXA) 20 MG tablet Take 1 tablet by mouth every morning.      cyanocobalamin (VITAMIN B-12) 1000 MCG tablet Take 1,000 mcg by mouth every morning.      donepezil (ARICEPT) 10 MG tablet Take 1 tablet (10 mg) by mouth at bedtime 90 tablet 3    folic acid (FOLVITE) 1 MG tablet Take 1 mg by mouth every morning.      leflunomide (ARAVA) 10 MG tablet Take 1 tablet by mouth every morning.      levothyroxine (SYNTHROID/LEVOTHROID) 100 MCG tablet Take 1 tablet by mouth every morning.      lisinopril (ZESTRIL) 10 MG tablet Take 1 tablet (10 mg) by mouth daily.      memantine (NAMENDA) 10 MG tablet Take 1 tablet (10 mg) by mouth 2 times daily 180 tablet 3    methotrexate 2.5 MG tablet Take 10 mg by mouth once a week On Thursday      metoprolol tartrate (LOPRESSOR) 25 MG tablet Take 1 tablet (25 mg) by mouth 2 times daily.      nystatin (MYCOSTATIN) 003063 UNIT/GM external powder Apply topically 2 times daily as needed.      oxyCODONE (ROXICODONE) 5 MG tablet Take 0.5 tablets (2.5 mg) by mouth every 4 hours as needed for moderate to severe pain. 5 tablet 0    QUEtiapine (SEROQUEL) 25 MG tablet Take 0.5 tablets (12.5 mg) by mouth every 6 hours as needed (Agitation).      senna-docusate (SENOKOT-S/PERICOLACE) 8.6-50 MG tablet Take 2 tablets by mouth 2 times daily as needed for constipation. 30 tablet 0    simvastatin (ZOCOR) 20 MG tablet Take 20 mg by mouth every evening.      vitamin D3 (CHOLECALCIFEROL) 50  "mcg (2000 units) tablet Take 1 tablet by mouth every morning.      vitamin E (TOCOPHEROL) 1000 units (450 mg) CAPS capsule Take 1 capsule (1,000 Units) by mouth 2 times daily 60 capsule 11    Allergies   Allergen Reactions    Acetaminophen-Codeine GI Disturbance     Indigestion    Celecoxib Unknown    Codeine Camsylate Unknown    Conj Estrog-Medroxyprogest Ace Other (See Comments)     Bleeding    Estrogens, Conjugated Synthetic A Unknown    Nsaids Unknown    Sulfa Antibiotics Unknown         Lab Results    Chemistry/lipid CBC Cardiac Enzymes/BNP/TSH/INR   No results for input(s): \"CHOL\", \"HDL\", \"LDL\", \"TRIG\", \"CHOLHDLRATIO\" in the last 93436 hours.  No results for input(s): \"LDL\" in the last 19576 hours.  Recent Labs   Lab Test 01/16/25  0749 01/10/25  0441   NA  --  137   POTASSIUM  --  4.3   CHLORIDE  --  105   CO2  --  25   GLC  --  106*   BUN  --  17.3   CR 0.88 0.79   GFRESTIMATED 67 76   RIZWANA  --  8.4*     Recent Labs   Lab Test 01/16/25  0749 01/10/25  0441 01/09/25  0431   CR 0.88 0.79 1.16*     Recent Labs   Lab Test 09/21/24  1837   A1C 5.3    Recent Labs   Lab Test 01/16/25  0749 01/10/25  0441   WBC  --  8.6   HGB 8.3* 8.7*   HCT  --  27.2*   MCV  --  98   PLT  --  113*     Recent Labs   Lab Test 01/16/25  0749 01/10/25  0441 01/09/25  0431   HGB 8.3* 8.7* 8.1*    No results for input(s): \"TROPONINI\" in the last 18812 hours.  Recent Labs   Lab Test 01/02/25  1106 09/06/23  2139 02/14/23  1712   NTBNPI 1,077 2,868* 171     Recent Labs   Lab Test 12/29/24  1306   TSH 1.63     Recent Labs   Lab Test 01/02/25  1106 01/10/23  1252   INR 1.08 1.15        50 minutes spent on the date of encounter doing chart review, review of outside records, review of test results, interpretation with above tests, patient visit, documentation, and discussion with family.      This note has been dictated using voice recognition software. Any grammatical or context distortions are unintentional and inherent to the " software.    Ariella Cesar PA-C  Structural Heart Program  [facility]

## 2025-01-20 NOTE — LETTER
1/20/2025      Sharon De La Vega  [address] [place] [address]        [facility] GERIATRICS    PRIMARY CARE PROVIDER AND CLINIC:  Jaylen Young MD, [address] / [facility] [address]  Chief Complaint   Patient presents with     MIKE      [place] Medical Record Number:  [id number]  Place of Service where encounter took place:  [facility] ([facility]) [[id number]]    Sharon De La Vega  is a 79 year old  (1945), admitted to the above facility from  [facility]. Hospital stay 12/29/2024 through 1/10/2024..     Patient seen today for follow up NP visit in TCU:    1. S/P TAVR (transcatheter aortic valve replacement)    2. Parkinson's disease, unspecified whether dyskinesia present, unspecified whether manifestations fluctuate (H)    3. Heart failure with reduced ejection fraction (H)    4. Major depressive disorder, single episode, moderate (H)    5. Stage 3 chronic kidney disease, unspecified whether stage 3a or 3b CKD (H)    6. Essential hypertension    7. Early onset Alzheimer's dementia without behavioral disturbance, psychotic disturbance, mood disturbance, or anxiety, unspecified dementia severity (H)          HPI:    Pain overall controlled. Sling/splint in place to left arm/hand. Reports pinky finger is numb. Follow up with Ortho 1/23  History of dementia. Limited recall. Recurrent falls.   Denies HA, chest pain, palpitations, dizziness. HR controlled. No fevers, chills. On ASA and Metop. No troubles breathing, no SOB, no Concerns with urination-frederick was removed prior to TCU, no constipation. Uses prunes. Eating and drinking okay. Sleeping okay.   Had Cards follow up today, no medication changes. Hgb stable at 8.3  Lives at home with son and daughter in law, ambulates independently.      CODE STATUS/ADVANCE DIRECTIVES DISCUSSION:  Full Code  CPR/Full code   ALLERGIES:   Allergies   Allergen Reactions      Acetaminophen-Codeine GI Disturbance     Indigestion     Celecoxib Unknown     Codeine Camsylate Unknown     Conj Estrog-Medroxyprogest Ace Other (See Comments)     Bleeding     Estrogens, Conjugated Synthetic A Unknown     Nsaids Unknown     Sulfa Antibiotics Unknown      PAST MEDICAL HISTORY:   Past Medical History:   Diagnosis Date     Anxiety 02/14/2013    Formatting of this note might be different from the original.  CURRENT MEDICATION(S) celexa 40 mg;   Lorazepam prn  Only occ  Stopped no need 11-17. Use PHQ 9 = 6   11-15;       Aortic valve stenosis 04/07/2023     Essential hypertension 10/29/2012     Hypercholesterolemia 10/29/2012     Hypothyroidism 10/29/2012     Major depressive disorder, single episode, moderate (H) 11/28/2022    Formatting of this note might be different from the original.  CURRENT MEDICATION(S) citalopram 40 mg    PHQ 9 =  2  11-11;  phq 9 = 2    12-11;  = 2   5-12; = 2   11-12;  Called her  Doing better  No need to see a psychologist. 1-14  PHQ 9 = 4   8-14;  = 3   12-14; OK 11-17;       Major neurocognitive disorder due to Alzheimer's disease (H) 06/20/2022     Psoriatic arthritis (H) 10/29/2012      PAST SURGICAL HISTORY:   has a past surgical history that includes Cholecystectomy (1964); appendectomy (1964); carpal tunnel release rt/lt (Bilateral, 1982); Cataract Extraction, Bilateral (1998); EXCISE HAND/FOOT NEUROMA (2004); KNEE SCOPE,MED OR LAT MENIS REPAIR (Right, 2000); Total Knee Arthroplasty (Right, 2012); Trabeculectomy (Right, 1998); Coronary Angiogram (N/A, 12/31/2024); Transcatheter Aortic Valve Replacement-Femoral Approach (N/A, 1/3/2025); Or Transcatheter Aortic Valve Replacement, Femoral Percutaneous Approach (Stand[place]) (N/A, 1/3/2025); Open reduction internal fixation humerus distal (Left, 1/7/2025); and Transposition ulnar nerve (elbow) (Left, 1/7/2025).  FAMILY HISTORY: family history includes Lung Cancer in her father; Multiple myeloma in her brother.  SOCIAL  HISTORY:   reports that she quit smoking about 38 years ago. Her smoking use included cigarettes. She has never used smokeless tobacco. She reports that she does not currently use alcohol.  Patient's living condition: lives with family,       Post Discharge Medication Reconciliation Status:   MED REC REQUIRED  Post Medication Reconciliation Status: discharge medications reconciled, continue medications without change       Current Outpatient Medications   Medication Sig Dispense Refill     acetaminophen (TYLENOL) 325 MG tablet Take 2 tablets (650 mg) by mouth every 4 hours as needed for mild pain or headaches. 100 tablet 0     aspirin 81 MG EC tablet Take 1 tablet (81 mg) by mouth daily.       buPROPion (WELLBUTRIN XL) 150 MG 24 hr tablet Take 150 mg by mouth every morning.       citalopram (CELEXA) 20 MG tablet Take 1 tablet by mouth every morning.       cyanocobalamin (VITAMIN B-12) 1000 MCG tablet Take 1,000 mcg by mouth every morning.       donepezil (ARICEPT) 10 MG tablet Take 1 tablet (10 mg) by mouth at bedtime 90 tablet 3     folic acid (FOLVITE) 1 MG tablet Take 1 mg by mouth every morning.       leflunomide (ARAVA) 10 MG tablet Take 1 tablet by mouth every morning.       levothyroxine (SYNTHROID/LEVOTHROID) 100 MCG tablet Take 1 tablet by mouth every morning.       lisinopril (ZESTRIL) 10 MG tablet Take 1 tablet (10 mg) by mouth daily.       memantine (NAMENDA) 10 MG tablet Take 1 tablet (10 mg) by mouth 2 times daily 180 tablet 3     methotrexate 2.5 MG tablet Take 10 mg by mouth once a week On Thursday       metoprolol tartrate (LOPRESSOR) 25 MG tablet Take 1 tablet (25 mg) by mouth 2 times daily.       nystatin (MYCOSTATIN) 254454 UNIT/GM external powder Apply topically 2 times daily as needed.       oxyCODONE (ROXICODONE) 5 MG tablet Take 0.5 tablets (2.5 mg) by mouth every 4 hours as needed for moderate to severe pain. 5 tablet 0     QUEtiapine (SEROQUEL) 25 MG tablet Take 0.5 tablets (12.5 mg) by  mouth every 6 hours as needed (Agitation).       senna-docusate (SENOKOT-S/PERICOLACE) 8.6-50 MG tablet Take 2 tablets by mouth 2 times daily as needed for constipation. 30 tablet 0     simvastatin (ZOCOR) 20 MG tablet Take 20 mg by mouth every evening.       vitamin D3 (CHOLECALCIFEROL) 50 mcg (2000 units) tablet Take 1 tablet by mouth every morning.       vitamin E (TOCOPHEROL) 1000 units (450 mg) CAPS capsule Take 1 capsule (1,000 Units) by mouth 2 times daily 60 capsule 11     No current facility-administered medications for this visit.       ROS:  10 point ROS of systems including Constitutional, Eyes, Respiratory, Cardiovascular, Gastroenterology, Genitourinary, Integumentary, Musculoskeletal, Psychiatric were all negative except for pertinent positives noted in my HPI.    Vitals:  BP (!) 149/81   Pulse 56   Temp 98.2  F (36.8  C)   Resp 16   SpO2 96%   Exam:  GENERAL APPEARANCE:  Alert, in no distress, oriented, cooperative  ENT:  Mouth and posterior oropharynx normal, moist mucous membranes  EYES:  EOM, conjunctivae, lids, pupils and irises normal  NECK:  No adenopathy,masses or thyromegaly  RESP:  respiratory effort and palpation of chest normal, lungs clear to auscultation , no respiratory distress  CV:  Palpation and auscultation of heart done , regular rate and rhythm, no murmur, rub, or gallop, no edema to LE  GI/ABDOMEN:  normal bowel sounds, soft, nontender, no hepatosplenomegaly or other masses  M/S:   moves extremities spontaneously. Left arm in sling and wrapped Able to wiggle all fingers   SKIN:  no rashes to exposed skin.  NEURO:   intact   PSYCH:  oriented X 3, normal insight, judgement and memory, affect and mood normal,       Lab/Diagnostic data:  Recent labs in [facility] reviewed by me today.   Hemoglobin   Date Value Ref Range Status   01/16/2025 8.3 (L) 11.7 - 15.7 g/dL Final     Creatinine   Date Value Ref Range Status   01/16/2025 0.88 0.51 - 0.95 mg/dL Final       Last Comprehensive  Metabolic Panel:  Lab Results   Component Value Date     01/10/2025    POTASSIUM 4.3 01/10/2025    CHLORIDE 105 01/10/2025    CO2 25 01/10/2025    ANIONGAP 7 01/10/2025     (H) 01/10/2025    BUN 17.3 01/10/2025    CR 0.88 01/16/2025    GFRESTIMATED 67 01/16/2025    RIZWANA 8.4 (L) 01/10/2025       Lab Results   Component Value Date    WBC 8.6 01/10/2025     Lab Results   Component Value Date    RBC 2.78 01/10/2025     Lab Results   Component Value Date    HGB 8.7 01/10/2025     Lab Results   Component Value Date    HCT 27.2 01/10/2025     Lab Results   Component Value Date    MCV 98 01/10/2025     Lab Results   Component Value Date    MCH 31.3 01/10/2025     Lab Results   Component Value Date    MCHC 32.0 01/10/2025     Lab Results   Component Value Date    RDW 16.0 01/10/2025     Lab Results   Component Value Date     01/10/2025       ASSESSMENT/PLAN:  Acute left comminuted intra-articular mildly displaced distal humerus fracture, minimally displaced olecranon process fracture, minimally displaced radial head fracture.   - Status post splint/sling  - s/p ORIF Left elbow 1/7  - Pain controlled with PRN tylenol and Oxycodone, wean off as able  - Follow up with Ortho 1/23  Recheck labs stable as above     Acute drop in Hb  - Hb dropped 10.5 -> 8.9 -> 8.6 -> 8.1, stable ~ 8.5  - post op estimated blood loss 100ml  - No evidence of bleeding. Repeat Hgb stable      HUMA - resolved  - suspect pre-renal  - s/p IV fluids  Creat stable 0.88     Atrial fibrillation with controlled ventricular response, new onset  - Start Metoprolol 25mg bid  - Needs anticoagulation, but has frequent falls, on asa  - Consider Watchman procedure outpatient  Currently rate controlled.      Severe symptomatic aortic valvular stenosis  - TTE (12/29/24):  LVEF 60%, mean gradient 57 mmHg with peak velocity of 4.6 m/s. Calculated valve area 0.60-0.63 cmÂ .  - Coronary angiogram (12/29/24) normal coronary arteries.  - S/P TAVR  (1/3/2025) with a 23mm Zelaya Draian Ultra Resilia Valve  - Follow up with Cardiology today 1/20 with no medication changes  Echo scheduled for 2/4 with another follow up 2/21     Recurrent falls  - Limited historian with dementia  - Aortic valve stenosis possibly contributory  - CT head & C spine unrevealing for acute process  - PT/OT     Intermittent hypotension  Essential hypertension   - BP intermittently low, suspect if dehydration  - Pro calcitonin negative, with minimal drop in Hb  - On Metoprolol with holding parameters  - reduced dose of Lisinopril 10mg: , 144 125 , 152 today.      Possible UTI  - Iabx completed  Reports no concerns today. Escobar was removed inpatient.      Alzheimer's dementia without behavioral disturbance  - Risk for violent behavior and/or acute delirium  - Donepezil, memantine  - As needed quetiapine, hasn't required any in last few days  Monitor, recheck labs ordered   PT/OT     Hypokalemia- resolved     Intermittent urine retention  - Escobar catheter removed 01/10, reports no concerns. Completed abx for UTI.  Monitor                Electronically signed by:  Katie العلي CNP       Total time greater than 45 minutes reviewing chart in EPIC and PCC, medications, labs, vitals. Discussing with social work, physical therapy, occupational therapy and nursing.                   Sincerely,        Katie العلي CNP    Electronically signed

## 2025-01-20 NOTE — LETTER
1/20/2025    Jaylen Young MD  [address]  [facility] [address]    RE: Sharon De La Vega       Dear Colleague,     I had the pleasure of seeing Sharon De La Vega in the [facility].  HEART CARE ENCOUNTER NOTE       [facility]  [phone]    Assessment/Recommendations   Assessment:  Severe aortic stenosis status post TAVR on 1/3/2025 using a right TF approach with a 23 mm Zelaya LEXX 3 ultra Resilia valve  Chronic heart failure with preserved ejection fraction, NYHA class III -compensated  Hypertension - initial blood pressure elevated, improved on re-check  Paroxysmal atrial fibrillation    - I have personally reviewed this patient's chart and have spoken with the patient about the treatment options, including RONIT device.  She has a [id number] score of at least 4 (age >75, hypertension, female gender), HAS-BLED 2 (age and bleeding predisposition) She is not a good candidate for long-term anticoagulation due to recurrent falls and anemia.     -  Would consider starting anticoagulation 1 week prior to the Watchman procedure. Alternatively, she could be considered for off label-implant. Given recurrent falls, would consider placing patient on DAPT with aspirin 81 mg daily indefinitely and Plavix 75 mg daily for 6 months post-procedure.  She will have a ALIS or CTA approximately 3 months and 1 year post-implant.  She understands that the risks of the procedure are <2% and include, but are not limited to device embolization, air embolism, myocardial perforation, device thrombosis, ASD, stroke, or death.  We discussed expected recovery and follow-up.      Recurrent falls   Humerus fracture - s/p ORIF on 1/6/2025  Acute normocytic anemia - most recent hgb 8.3    Plan:  Continue aspirin 81 mg daily  Continue lisinopril 10 mg daily, metoprolol tartrate 25 mg daily   1 month echocardiogram is scheduled for February 17  1 month follow-up visit is  scheduled for February 21  She and her son will further consider Watchman procedure and follow-up with our team if interested.    Thank you for the opportunity to participate in the care of Sharon De La Vega. It's been a pleasure working with her.  Please do not hesitate to call with any questions or concerns.       History of Present Illness/Subjective    I had the opportunity to see Sharon De L aVega at the [facility] for follow-up after TAVR.  She is accompanied by her son for today's visit.    She has a past medical history significant for recurrent falls, aortic stenosis, hypertension, hyperlipidemia, hypothyroidism, Alzheimer's dementia, psoriatic arthritis on methotrexate and leflunomide    She was admitted to the hospital end of December for recurrent falls and subsequent left humeral fracture. She was found to have severe-critical aortic stenosis and underwent successful transcatheter aortic valve replacement on 1/3/2025.  She underwent ORIF of her distal humerus on 1/7/2025.  She was also found to have atrial fibrillation during her hospitalization and was started on metoprolol.  Anticoagulation was not started due to her frequent falls.  She is currently residing in a TCU.  She overall reports feeling okay since being discharged.  She has not had any falls since being discharged from the hospital.  She denies shortness of breath, chest pain, orthopnea, PND, dizziness, lightheadedness.  She reports a good appetite and her weight has been stable.  She denies abdominal fullness/bloating or nausea/vomiting.  She currently is mostly wheelchair-bound at the TCU and requires assistance with transfers.  She has follow-up with Ortho later this week.  ______________________  Echo: 1/4/2025  Interpretation Summary     1. The left ventricle is normal in size. Left ventricular function is  normal.The ejection fraction is > 65%. No regional wall motion abnormalities  noted. There is mild concentric  left ventricular hypertrophy.  2. Normal right ventricle size and systolic function.  3. Well seated bio-prosthetic aortic valve (documented 23 mm Zelaya Darian 3  Ultra Resilia tissue valve) with normal aortic valve prosthesis metrics with a  mean systolic gradient of 11 mmHg and a peak anterograde velocity of 2.1  m/sec, AT: 80 msec. DVI: 0.7. No aortic insufficiency.    EKG (personally reviewed and interpreted):  Atrial fibrillation, ventricular rate 80     Physical Examination Review of Systems   Vitals: BP (!) 149/81 (BP Location: Right arm, Patient Position: Sitting, Cuff Size: Adult Large)   Pulse 56   Resp 16   SpO2 96%   BMI= There is no height or weight on file to calculate BMI.  Wt Readings from Last 3 Encounters:   01/13/25 105.7 kg (233 lb)   01/10/25 106.1 kg (233 lb 14.5 oz)   10/03/24 104.9 kg (231 lb 4.8 oz)       General Appearance:   Alert, cooperative and in no acute distress. In wheelchair   ENT/Mouth: membranes moist, no oral lesions or bleeding gums.      EYES:  no scleral icterus, normal conjunctivae   Neck:   Thyroid not visualized   Chest/Lungs:   lungs are clear to auscultation, no rales or wheezing   Cardiovascular:   Regular. Normal first and second heart sounds with faint murmur present. No rubs or gallops; trace edema bilaterally    Abdomen:  Soft and nontender.   Extremities: no cyanosis or clubbing.  Puncture site is soft and without hematoma   Skin: no xanthelasma, warm.    Neurologic: no tremors   Psychiatric: Normal mood and affect       Please refer above for cardiac ROS details.      Medical History  Surgical History Family History Social History   Past Medical History:   Diagnosis Date     Anxiety 02/14/2013    Formatting of this note might be different from the original.  CURRENT MEDICATION(S) celexa 40 mg;   Lorazepam prn  Only occ  Stopped no need 11-17. Use PHQ 9 = 6   11-15;       Aortic valve stenosis 04/07/2023     Essential hypertension 10/29/2012      Hypercholesterolemia 10/29/2012     Hypothyroidism 10/29/2012     Major depressive disorder, single episode, moderate (H) 11/28/2022    Formatting of this note might be different from the original.  CURRENT MEDICATION(S) citalopram 40 mg    PHQ 9 =  2  11-11;  phq 9 = 2    12-11;  = 2   5-12; = 2   11-12;  Called her  Doing better  No need to see a psychologist. 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 LAB CV     HC KNEE ARTHROSCOPY, MED/LAT MENISCUS REPAIR Right 2000     OPEN REDUCTION INTERNAL FIXATION HUMERUS DISTAL Left 1/7/2025    Procedure: OPEN REDUCTION INTERNAL FIXATION, FRACTURE, HUMERUS, DISTAL, WITH;  Surgeon: Jace Tamayo MD;  Location: [facility] OR     OR TRANSCATHETER AORTIC VALVE REPLACEMENT, FEMORAL PERCUTANEOUS APPROACH (STANDBY) N/A 1/3/2025    Procedure: OR TRANSCATHETER AORTIC VALVE REPLACEMENT, FEMORAL PERCUTANEOUS APPROACH (STANDBY);  Surgeon: Chester Oreilly MD;  Location: [facility] CV     NH EXCISE HAND/FOOT NEUROMA  2004    right foot     TOTAL KNEE ARTHROPLASTY Right 2012     TRABECULECTOMY Right 1998    laser surgery right eye     TRANSPOSITION ULNAR NERVE (ELBOW) Left 1/7/2025    Procedure: ULNAR NERVE TRANSPOSITION;  Surgeon: Jace Tamayo MD;  Location: [facility] OR     Family History   Problem  Relation Age of Onset     Lung Cancer Father      Multiple myeloma Brother     Social History     Socioeconomic History     Marital status:      Spouse name: Not on file     Number of children: Not on file     Years of education: Not on file     Highest education level: Not on file   Occupational History     Not on file   Tobacco Use     Smoking status: Former     Current packs/day: 0.00     Types: Cigarettes     Quit date:      Years since quittin.0     Smokeless tobacco: Never   Substance and Sexual Activity     Alcohol use: Not Currently     Drug use: Not on file     Sexual activity: Not on file   Other Topics Concern     Not on file   Social History Narrative     Not on file     Social Drivers of Health     Financial Resource Strain: Low Risk  (2024)    Financial Resource Strain      Within the past 12 months, have you or your family members you live with been unable to get utilities (heat, electricity) when it was really needed?: No   Food Insecurity: Low Risk  (2024)    Food Insecurity      Within the past 12 months, did you worry that your food would run out before you got money to buy more?: No      Within the past 12 months, did the food you bought just not last and you didn t have money to get more?: No   Transportation Needs: Low Risk  (2024)    Transportation Needs      Within the past 12 months, has lack of transportation kept you from medical appointments, getting your medicines, non-medical meetings or appointments, work, or from getting things that you need?: No   Physical Activity: Not on file   Stress: Not on file   Social Connections: Unknown (2023)    Received [facility]    Social Connections      Frequency of Communication with Friends and Family: Not on file   Interpersonal Safety: Low Risk  (2024)    Interpersonal Safety      Do you feel physically and emotionally  safe where you currently live?: Yes      Within the past 12 months, have you been hit, slapped, kicked or otherwise physically hurt by someone?: No      Within the past 12 months, have you been humiliated or emotionally abused in other ways by your partner or ex-partner?: No   Housing Stability: Low Risk  (12/30/2024)    Housing Stability      Do you have housing? : Yes      Are you worried about losing your housing?: No          Medications  Allergies   Current Outpatient Medications   Medication Sig Dispense Refill     acetaminophen (TYLENOL) 325 MG tablet Take 2 tablets (650 mg) by mouth every 4 hours as needed for mild pain or headaches. 100 tablet 0     aspirin 81 MG EC tablet Take 1 tablet (81 mg) by mouth daily.       buPROPion (WELLBUTRIN XL) 150 MG 24 hr tablet Take 150 mg by mouth every morning.       citalopram (CELEXA) 20 MG tablet Take 1 tablet by mouth every morning.       cyanocobalamin (VITAMIN B-12) 1000 MCG tablet Take 1,000 mcg by mouth every morning.       donepezil (ARICEPT) 10 MG tablet Take 1 tablet (10 mg) by mouth at bedtime 90 tablet 3     folic acid (FOLVITE) 1 MG tablet Take 1 mg by mouth every morning.       leflunomide (ARAVA) 10 MG tablet Take 1 tablet by mouth every morning.       levothyroxine (SYNTHROID/LEVOTHROID) 100 MCG tablet Take 1 tablet by mouth every morning.       lisinopril (ZESTRIL) 10 MG tablet Take 1 tablet (10 mg) by mouth daily.       memantine (NAMENDA) 10 MG tablet Take 1 tablet (10 mg) by mouth 2 times daily 180 tablet 3     methotrexate 2.5 MG tablet Take 10 mg by mouth once a week On Thursday       metoprolol tartrate (LOPRESSOR) 25 MG tablet Take 1 tablet (25 mg) by mouth 2 times daily.       nystatin (MYCOSTATIN) 729480 UNIT/GM external powder Apply topically 2 times daily as needed.       oxyCODONE (ROXICODONE) 5 MG tablet Take 0.5 tablets (2.5 mg) by mouth every 4 hours as needed for moderate to severe pain. 5 tablet 0     QUEtiapine (SEROQUEL) 25 MG tablet  "Take 0.5 tablets (12.5 mg) by mouth every 6 hours as needed (Agitation).       senna-docusate (SENOKOT-S/PERICOLACE) 8.6-50 MG tablet Take 2 tablets by mouth 2 times daily as needed for constipation. 30 tablet 0     simvastatin (ZOCOR) 20 MG tablet Take 20 mg by mouth every evening.       vitamin D3 (CHOLECALCIFEROL) 50 mcg (2000 units) tablet Take 1 tablet by mouth every morning.       vitamin E (TOCOPHEROL) 1000 units (450 mg) CAPS capsule Take 1 capsule (1,000 Units) by mouth 2 times daily 60 capsule 11    Allergies   Allergen Reactions     Acetaminophen-Codeine GI Disturbance     Indigestion     Celecoxib Unknown     Codeine Camsylate Unknown     Conj Estrog-Medroxyprogest Ace Other (See Comments)     Bleeding     Estrogens, Conjugated Synthetic A Unknown     Nsaids Unknown     Sulfa Antibiotics Unknown         Lab Results    Chemistry/lipid CBC Cardiac Enzymes/BNP/TSH/INR   No results for input(s): \"CHOL\", \"HDL\", \"LDL\", \"TRIG\", \"CHOLHDLRATIO\" in the last 23350 hours.  No results for input(s): \"LDL\" in the last 07689 hours.  Recent Labs   Lab Test 01/16/25  0749 01/10/25  0441   NA  --  137   POTASSIUM  --  4.3   CHLORIDE  --  105   CO2  --  25   GLC  --  106*   BUN  --  17.3   CR 0.88 0.79   GFRESTIMATED 67 76   RIZWANA  --  8.4*     Recent Labs   Lab Test 01/16/25  0749 01/10/25  0441 01/09/25  0431   CR 0.88 0.79 1.16*     Recent Labs   Lab Test 09/21/24  1837   A1C 5.3    Recent Labs   Lab Test 01/16/25  0749 01/10/25  0441   WBC  --  8.6   HGB 8.3* 8.7*   HCT  --  27.2*   MCV  --  98   PLT  --  113*     Recent Labs   Lab Test 01/16/25  0749 01/10/25  0441 01/09/25  0431   HGB 8.3* 8.7* 8.1*    No results for input(s): \"TROPONINI\" in the last 68739 hours.  Recent Labs   Lab Test 01/02/25  1106 09/06/23  2139 02/14/23  1712   NTBNPI 1,077 2,868* 171     Recent Labs   Lab Test 12/29/24  1306   TSH 1.63     Recent Labs   Lab Test 01/02/25  1106 01/10/23  1252   INR 1.08 1.15        50 minutes spent on the date of " encounter doing chart review, review of outside records, review of test results, interpretation with above tests, patient visit, documentation, and discussion with family.      This note has been dictated using voice recognition software. Any grammatical or context distortions are unintentional and inherent to the software.    Ariella Cesar PA-C  Structural Heart Program  [facility]               Thank you for allowing me to participate in the care of your patient.      Sincerely,     Ariella Cesar PA-C     [facility]  cc:   Ariella Cesar PA-C  [facility]  [address]  [place] [address]

## 2025-01-20 NOTE — TELEPHONE ENCOUNTER
Refill request for: vitamin E (TOCOPHEROL) 1000 units (450 mg) CAPS capsule    Directions: Take 1 capsule (1,000 Units) by mouth 2 times daily     LOV: 2/21/24  NOV: 2/26/25    Sending to Dr. Hastings as Dr. Osuna is out of office    90 day supply with 0 refills Medication T'd for review and signature  Skye LOZADA CMA on 1/20/2025 at 4:24 PM  [facility]

## 2025-01-20 NOTE — PROGRESS NOTES
[facility] GERIATRICS    PRIMARY CARE PROVIDER AND CLINIC:  Jaylen Young MD, [address] / [facility] [address]  Chief Complaint   Patient presents with    MIKE      [place] Medical Record Number:  [id number]  Place of Service where encounter took place:  [facility] ([facility]) [[id number]]    Sharon De La Vega  is a 79 year old  (1945), admitted to the above facility from  [facility]. Hospital stay 12/29/2024 through 1/10/2024..     Patient seen today for follow up NP visit in TCU:    1. S/P TAVR (transcatheter aortic valve replacement)    2. Parkinson's disease, unspecified whether dyskinesia present, unspecified whether manifestations fluctuate (H)    3. Heart failure with reduced ejection fraction (H)    4. Major depressive disorder, single episode, moderate (H)    5. Stage 3 chronic kidney disease, unspecified whether stage 3a or 3b CKD (H)    6. Essential hypertension    7. Early onset Alzheimer's dementia without behavioral disturbance, psychotic disturbance, mood disturbance, or anxiety, unspecified dementia severity (H)          HPI:    Pain overall controlled. Sling/splint in place to left arm/hand. Reports pinky finger is numb. Follow up with Ortho 1/23  History of dementia. Limited recall. Recurrent falls.   Denies HA, chest pain, palpitations, dizziness. HR controlled. No fevers, chills. On ASA and Metop. No troubles breathing, no SOB, no Concerns with urination-frederick was removed prior to TCU, no constipation. Uses prunes. Eating and drinking okay. Sleeping okay.   Had Cards follow up today, no medication changes. Hgb stable at 8.3  Lives at home with son and daughter in law, ambulates independently.      CODE STATUS/ADVANCE DIRECTIVES DISCUSSION:  Full Code  CPR/Full code   ALLERGIES:   Allergies   Allergen Reactions    Acetaminophen-Codeine GI Disturbance     Indigestion    Celecoxib Unknown    Codeine Camsylate  Unknown    Conj Estrog-Medroxyprogest Ace Other (See Comments)     Bleeding    Estrogens, Conjugated Synthetic A Unknown    Nsaids Unknown    Sulfa Antibiotics Unknown      PAST MEDICAL HISTORY:   Past Medical History:   Diagnosis Date    Anxiety 02/14/2013    Formatting of this note might be different from the original.  CURRENT MEDICATION(S) celexa 40 mg;   Lorazepam prn  Only occ  Stopped no need 11-17. Use PHQ 9 = 6   11-15;      Aortic valve stenosis 04/07/2023    Essential hypertension 10/29/2012    Hypercholesterolemia 10/29/2012    Hypothyroidism 10/29/2012    Major depressive disorder, single episode, moderate (H) 11/28/2022    Formatting of this note might be different from the original.  CURRENT MEDICATION(S) citalopram 40 mg    PHQ 9 =  2  11-11;  phq 9 = 2    12-11;  = 2   5-12; = 2   11-12;  Called her  Doing better  No need to see a psychologist. 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) 06/20/2022    Psoriatic arthritis (H) 10/29/2012      PAST SURGICAL HISTORY:   has a past surgical history that includes Cholecystectomy (1964); appendectomy (1964); carpal tunnel release rt/lt (Bilateral, 1982); Cataract Extraction, Bilateral (1998); EXCISE HAND/FOOT NEUROMA (2004); KNEE SCOPE,MED OR LAT MENIS REPAIR (Right, 2000); Total Knee Arthroplasty (Right, 2012); Trabeculectomy (Right, 1998); Coronary Angiogram (N/A, 12/31/2024); Transcatheter Aortic Valve Replacement-Femoral Approach (N/A, 1/3/2025); Or Transcatheter Aortic Valve Replacement, Femoral Percutaneous Approach (Standby) (N/A, 1/3/2025); Open reduction internal fixation humerus distal (Left, 1/7/2025); and Transposition ulnar nerve (elbow) (Left, 1/7/2025).  FAMILY HISTORY: family history includes Lung Cancer in her father; Multiple myeloma in her brother.  SOCIAL HISTORY:   reports that she quit smoking about 38 years ago. Her smoking use included cigarettes. She has never  used smokeless tobacco. She reports that she does not currently use alcohol.  Patient's living condition: lives with family,       Post Discharge Medication Reconciliation Status:   MED REC REQUIRED  Post Medication Reconciliation Status: discharge medications reconciled, continue medications without change       Current Outpatient Medications   Medication Sig Dispense Refill    acetaminophen (TYLENOL) 325 MG tablet Take 2 tablets (650 mg) by mouth every 4 hours as needed for mild pain or headaches. 100 tablet 0    aspirin 81 MG EC tablet Take 1 tablet (81 mg) by mouth daily.      buPROPion (WELLBUTRIN XL) 150 MG 24 hr tablet Take 150 mg by mouth every morning.      citalopram (CELEXA) 20 MG tablet Take 1 tablet by mouth every morning.      cyanocobalamin (VITAMIN B-12) 1000 MCG tablet Take 1,000 mcg by mouth every morning.      donepezil (ARICEPT) 10 MG tablet Take 1 tablet (10 mg) by mouth at bedtime 90 tablet 3    folic acid (FOLVITE) 1 MG tablet Take 1 mg by mouth every morning.      leflunomide (ARAVA) 10 MG tablet Take 1 tablet by mouth every morning.      levothyroxine (SYNTHROID/LEVOTHROID) 100 MCG tablet Take 1 tablet by mouth every morning.      lisinopril (ZESTRIL) 10 MG tablet Take 1 tablet (10 mg) by mouth daily.      memantine (NAMENDA) 10 MG tablet Take 1 tablet (10 mg) by mouth 2 times daily 180 tablet 3    methotrexate 2.5 MG tablet Take 10 mg by mouth once a week On Thursday      metoprolol tartrate (LOPRESSOR) 25 MG tablet Take 1 tablet (25 mg) by mouth 2 times daily.      nystatin (MYCOSTATIN) 858083 UNIT/GM external powder Apply topically 2 times daily as needed.      oxyCODONE (ROXICODONE) 5 MG tablet Take 0.5 tablets (2.5 mg) by mouth every 4 hours as needed for moderate to severe pain. 5 tablet 0    QUEtiapine (SEROQUEL) 25 MG tablet Take 0.5 tablets (12.5 mg) by mouth every 6 hours as needed (Agitation).      senna-docusate (SENOKOT-S/PERICOLACE) 8.6-50 MG tablet Take 2 tablets by mouth 2  times daily as needed for constipation. 30 tablet 0    simvastatin (ZOCOR) 20 MG tablet Take 20 mg by mouth every evening.      vitamin D3 (CHOLECALCIFEROL) 50 mcg (2000 units) tablet Take 1 tablet by mouth every morning.      vitamin E (TOCOPHEROL) 1000 units (450 mg) CAPS capsule Take 1 capsule (1,000 Units) by mouth 2 times daily 60 capsule 11     No current facility-administered medications for this visit.       ROS:  10 point ROS of systems including Constitutional, Eyes, Respiratory, Cardiovascular, Gastroenterology, Genitourinary, Integumentary, Musculoskeletal, Psychiatric were all negative except for pertinent positives noted in my HPI.    Vitals:  BP (!) 149/81   Pulse 56   Temp 98.2  F (36.8  C)   Resp 16   SpO2 96%   Exam:  GENERAL APPEARANCE:  Alert, in no distress, oriented, cooperative  ENT:  Mouth and posterior oropharynx normal, moist mucous membranes  EYES:  EOM, conjunctivae, lids, pupils and irises normal  NECK:  No adenopathy,masses or thyromegaly  RESP:  respiratory effort and palpation of chest normal, lungs clear to auscultation , no respiratory distress  CV:  Palpation and auscultation of heart done , regular rate and rhythm, no murmur, rub, or gallop, no edema to LE  GI/ABDOMEN:  normal bowel sounds, soft, nontender, no hepatosplenomegaly or other masses  M/S:   moves extremities spontaneously. Left arm in sling and wrapped Able to wiggle all fingers   SKIN:  no rashes to exposed skin.  NEURO:   intact   PSYCH:  oriented X 3, normal insight, judgement and memory, affect and mood normal,       Lab/Diagnostic data:  Recent labs in [facility] reviewed by me today.   Hemoglobin   Date Value Ref Range Status   01/16/2025 8.3 (L) 11.7 - 15.7 g/dL Final     Creatinine   Date Value Ref Range Status   01/16/2025 0.88 0.51 - 0.95 mg/dL Final       Last Comprehensive Metabolic Panel:  Lab Results   Component Value Date     01/10/2025    POTASSIUM 4.3 01/10/2025    CHLORIDE 105 01/10/2025    CO2 25  01/10/2025    ANIONGAP 7 01/10/2025     (H) 01/10/2025    BUN 17.3 01/10/2025    CR 0.88 01/16/2025    GFRESTIMATED 67 01/16/2025    RIZWANA 8.4 (L) 01/10/2025       Lab Results   Component Value Date    WBC 8.6 01/10/2025     Lab Results   Component Value Date    RBC 2.78 01/10/2025     Lab Results   Component Value Date    HGB 8.7 01/10/2025     Lab Results   Component Value Date    HCT 27.2 01/10/2025     Lab Results   Component Value Date    MCV 98 01/10/2025     Lab Results   Component Value Date    MCH 31.3 01/10/2025     Lab Results   Component Value Date    MCHC 32.0 01/10/2025     Lab Results   Component Value Date    RDW 16.0 01/10/2025     Lab Results   Component Value Date     01/10/2025       ASSESSMENT/PLAN:  Acute left comminuted intra-articular mildly displaced distal humerus fracture, minimally displaced olecranon process fracture, minimally displaced radial head fracture.   - Status post splint/sling  - s/p ORIF Left elbow 1/7  - Pain controlled with PRN tylenol and Oxycodone, wean off as able  - Follow up with Ortho 1/23  Recheck labs stable as above     Acute drop in Hb  - Hb dropped 10.5 -> 8.9 -> 8.6 -> 8.1, stable ~ 8.5  - post op estimated blood loss 100ml  - No evidence of bleeding. Repeat Hgb stable      HUMA - resolved  - suspect pre-renal  - s/p IV fluids  Creat stable 0.88     Atrial fibrillation with controlled ventricular response, new onset  - Start Metoprolol 25mg bid  - Needs anticoagulation, but has frequent falls, on asa  - Consider Watchman procedure outpatient  Currently rate controlled.      Severe symptomatic aortic valvular stenosis  - TTE (12/29/24):  LVEF 60%, mean gradient 57 mmHg with peak velocity of 4.6 m/s. Calculated valve area 0.60-0.63 cmÂ .  - Coronary angiogram (12/29/24) normal coronary arteries.  - S/P TAVR (1/3/2025) with a 23mm Zelaya Darian Ultra Resilia Valve  - Follow up with Cardiology today 1/20 with no medication changes  Echo scheduled for 2/4  with another follow up 2/21     Recurrent falls  - Limited historian with dementia  - Aortic valve stenosis possibly contributory  - CT head & C spine unrevealing for acute process  - PT/OT     Intermittent hypotension  Essential hypertension   - BP intermittently low, suspect if dehydration  - Pro calcitonin negative, with minimal drop in Hb  - On Metoprolol with holding parameters  - reduced dose of Lisinopril 10mg: , 144 125 , 152 today.      Possible UTI  - Iabx completed  Reports no concerns today. Escobar was removed inpatient.      Alzheimer's dementia without behavioral disturbance  - Risk for violent behavior and/or acute delirium  - Donepezil, memantine  - As needed quetiapine, hasn't required any in last few days  Monitor, recheck labs ordered   PT/OT     Hypokalemia- resolved     Intermittent urine retention  - Escobar catheter removed 01/10, reports no concerns. Completed abx for UTI.  Monitor                Electronically signed by:  Katie العلي CNP       Total time greater than 45 minutes reviewing chart in EPIC and PCC, medications, labs, vitals. Discussing with social work, physical therapy, occupational therapy and nursing.

## 2025-01-20 NOTE — PATIENT INSTRUCTIONS
Sharon De La Vega,    It was a pleasure to see you today in the clinic regarding your follow-up visit.     My recommendations after this visit include:     - no medication changes today   - consider Watchman device - you can Ksenia or Saba Salazar (Watchman nurses) if you are interested in moving forward with the procedure   - trial compression stockings   - monitor blood pressure on a regular basis, if persistently elevated may need to start another medication or increase your current medications  - follow-up with ortho as scheduled   - echocardiogram is scheduled for Feb 17  - 1 month follow-up TAVR visit Feb 21    **Remember you will need prophylactic antibiotics for all dental visits in the future.  You can get the Rx from your dentist, your PCP or from us.  If possible, still refrain from going to the dentist until 6 months or more after your valve replacement      If you have questions or concerns, please call using the numbers below:    Valve Clinic Phone   [phone]    After Hours/Scheduling  [phone]    Otherwise you can dial the nurse directly at:    Eirc Blair RN  [phone]                JOSE ALFREDO Zhao RN  [phone]    Ariella Cesar PA-C  Structural Heart Program  [facility]

## 2025-01-27 ENCOUNTER — TELEPHONE (OUTPATIENT)
Dept: CARDIOLOGY | Facility: CLINIC | Age: 80
End: 2025-01-27

## 2025-01-27 ENCOUNTER — TRANSITIONAL CARE UNIT VISIT (OUTPATIENT)
Dept: GERIATRICS | Facility: CLINIC | Age: 80
End: 2025-01-27
Payer: COMMERCIAL

## 2025-01-27 DIAGNOSIS — S42.402A CLOSED FRACTURE OF DISTAL END OF LEFT HUMERUS, UNSPECIFIED FRACTURE MORPHOLOGY, INITIAL ENCOUNTER: Primary | ICD-10-CM

## 2025-01-27 DIAGNOSIS — F02.80 EARLY ONSET ALZHEIMER'S DEMENTIA WITHOUT BEHAVIORAL DISTURBANCE, PSYCHOTIC DISTURBANCE, MOOD DISTURBANCE, OR ANXIETY, UNSPECIFIED DEMENTIA SEVERITY (H): ICD-10-CM

## 2025-01-27 DIAGNOSIS — F32.1 MAJOR DEPRESSIVE DISORDER, SINGLE EPISODE, MODERATE (H): ICD-10-CM

## 2025-01-27 DIAGNOSIS — G30.0 EARLY ONSET ALZHEIMER'S DEMENTIA WITHOUT BEHAVIORAL DISTURBANCE, PSYCHOTIC DISTURBANCE, MOOD DISTURBANCE, OR ANXIETY, UNSPECIFIED DEMENTIA SEVERITY (H): ICD-10-CM

## 2025-01-27 DIAGNOSIS — G20.A1 PARKINSON'S DISEASE, UNSPECIFIED WHETHER DYSKINESIA PRESENT, UNSPECIFIED WHETHER MANIFESTATIONS FLUCTUATE (H): ICD-10-CM

## 2025-01-27 DIAGNOSIS — N30.00 ACUTE CYSTITIS WITHOUT HEMATURIA: ICD-10-CM

## 2025-01-27 DIAGNOSIS — I50.20 HEART FAILURE WITH REDUCED EJECTION FRACTION (H): ICD-10-CM

## 2025-01-27 DIAGNOSIS — G30.9 MAJOR NEUROCOGNITIVE DISORDER DUE TO ALZHEIMER'S DISEASE (H): ICD-10-CM

## 2025-01-27 DIAGNOSIS — N18.30 STAGE 3 CHRONIC KIDNEY DISEASE, UNSPECIFIED WHETHER STAGE 3A OR 3B CKD (H): ICD-10-CM

## 2025-01-27 DIAGNOSIS — F02.80 MAJOR NEUROCOGNITIVE DISORDER DUE TO ALZHEIMER'S DISEASE (H): ICD-10-CM

## 2025-01-27 DIAGNOSIS — I10 ESSENTIAL HYPERTENSION: ICD-10-CM

## 2025-01-27 DIAGNOSIS — Z95.2 S/P TAVR (TRANSCATHETER AORTIC VALVE REPLACEMENT): ICD-10-CM

## 2025-01-27 DIAGNOSIS — E66.01 OBESITY, MORBID (H): ICD-10-CM

## 2025-01-27 PROCEDURE — 99310 SBSQ NF CARE HIGH MDM 45: CPT | Performed by: NURSE PRACTITIONER

## 2025-01-27 NOTE — PROGRESS NOTES
[facility] GERIATRICS    PRIMARY CARE PROVIDER AND CLINIC:  Jaylen Young MD, [address] / [facility] [address]  No chief complaint on file.     [place] Medical Record Number:  [id number]  Place of Service where encounter took place:  [facility] [place] ([facility]) [[id number]]    Sharon De La Vega  is a 79 year old  (1945), admitted to the above facility from  [facility]. Hospital stay 12/29/2024 through 1/10/2024..     Patient seen today for follow up NP visit in TCU:    1. Closed fracture of distal end of left humerus, unspecified fracture morphology, initial encounter    2. Parkinson's disease, unspecified whether dyskinesia present, unspecified whether manifestations fluctuate (H)    3. Stage 3 chronic kidney disease, unspecified whether stage 3a or 3b CKD (H)    4. Early onset Alzheimer's dementia without behavioral disturbance, psychotic disturbance, mood disturbance, or anxiety, unspecified dementia severity (H)    5. Major neurocognitive disorder due to Alzheimer's disease (H)    6. Acute cystitis without hematuria    7. Obesity, morbid (H)    8. S/P TAVR (transcatheter aortic valve replacement)    9. Heart failure with reduced ejection fraction (H)    10. Major depressive disorder, single episode, moderate (H)    11. Essential hypertension          HPI:    Pain overall controlled. Had follow up with Ortho, Now with cast to left arm/hand.   History of dementia. Limited recall. Recurrent falls.   Denies HA, chest pain, palpitations, dizziness. BP elevated this morning, prior to medications. No fevers, chills. On ASA and Metop. No troubles breathing, no SOB, no Concerns with urination, no constipation.Eating and drinking okay. Sleeping okay. Participating in therapies.    Hgb stable at 8.3  Lives at home with son and daughter in law, ambulates independently.      CODE STATUS/ADVANCE DIRECTIVES DISCUSSION:  Full Code  CPR/Full code    ALLERGIES:   Allergies   Allergen Reactions    Acetaminophen-Codeine GI Disturbance     Indigestion    Celecoxib Unknown    Codeine Camsylate Unknown    Conj Estrog-Medroxyprogest Ace Other (See Comments)     Bleeding    Estrogens, Conjugated Synthetic A Unknown    Nsaids Unknown    Sulfa Antibiotics Unknown      PAST MEDICAL HISTORY:   Past Medical History:   Diagnosis Date    Anxiety 02/14/2013    Formatting of this note might be different from the original.  CURRENT MEDICATION(S) celexa 40 mg;   Lorazepam prn  Only occ  Stopped no need 11-17. Use PHQ 9 = 6   11-15;      Aortic valve stenosis 04/07/2023    Essential hypertension 10/29/2012    Hypercholesterolemia 10/29/2012    Hypothyroidism 10/29/2012    Major depressive disorder, single episode, moderate (H) 11/28/2022    Formatting of this note might be different from the original.  CURRENT MEDICATION(S) citalopram 40 mg    PHQ 9 =  2  11-11;  phq 9 = 2    12-11;  = 2   5-12; = 2   11-12;  Called her  Doing better  No need to see a psychologist. 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) 06/20/2022    Psoriatic arthritis (H) 10/29/2012      PAST SURGICAL HISTORY:   has a past surgical history that includes Cholecystectomy (1964); appendectomy (1964); carpal tunnel release rt/lt (Bilateral, 1982); Cataract Extraction, Bilateral (1998); EXCISE HAND/FOOT NEUROMA (2004); KNEE SCOPE,MED OR LAT MENIS REPAIR (Right, 2000); Total Knee Arthroplasty (Right, 2012); Trabeculectomy (Right, 1998); Coronary Angiogram (N/A, 12/31/2024); Transcatheter Aortic Valve Replacement-Femoral Approach (N/A, 1/3/2025); Or Transcatheter Aortic Valve Replacement, Femoral Percutaneous Approach (Standby) (N/A, 1/3/2025); Open reduction internal fixation humerus distal (Left, 1/7/2025); and Transposition ulnar nerve (elbow) (Left, 1/7/2025).  FAMILY HISTORY: family history includes Lung Cancer in her father; Multiple  myeloma in her brother.  SOCIAL HISTORY:   reports that she quit smoking about 38 years ago. Her smoking use included cigarettes. She has never used smokeless tobacco. She reports that she does not currently use alcohol.  Patient's living condition: lives with family,       Post Discharge Medication Reconciliation Status:   MED REC REQUIRED  Post Medication Reconciliation Status: discharge medications reconciled, continue medications without change       Current Outpatient Medications   Medication Sig Dispense Refill    acetaminophen (TYLENOL) 325 MG tablet Take 2 tablets (650 mg) by mouth every 4 hours as needed for mild pain or headaches. 100 tablet 0    aspirin 81 MG EC tablet Take 1 tablet (81 mg) by mouth daily.      buPROPion (WELLBUTRIN XL) 150 MG 24 hr tablet Take 150 mg by mouth every morning.      citalopram (CELEXA) 20 MG tablet Take 1 tablet by mouth every morning.      cyanocobalamin (VITAMIN B-12) 1000 MCG tablet Take 1,000 mcg by mouth every morning.      donepezil (ARICEPT) 10 MG tablet Take 1 tablet (10 mg) by mouth at bedtime 90 tablet 3    folic acid (FOLVITE) 1 MG tablet Take 1 mg by mouth every morning.      leflunomide (ARAVA) 10 MG tablet Take 1 tablet by mouth every morning.      levothyroxine (SYNTHROID/LEVOTHROID) 100 MCG tablet Take 1 tablet by mouth every morning.      lisinopril (ZESTRIL) 10 MG tablet Take 1 tablet (10 mg) by mouth daily.      memantine (NAMENDA) 10 MG tablet Take 1 tablet (10 mg) by mouth 2 times daily 180 tablet 3    methotrexate 2.5 MG tablet Take 10 mg by mouth once a week On Thursday      metoprolol tartrate (LOPRESSOR) 25 MG tablet Take 1 tablet (25 mg) by mouth 2 times daily.      nystatin (MYCOSTATIN) 297438 UNIT/GM external powder Apply topically 2 times daily as needed.      QUEtiapine (SEROQUEL) 25 MG tablet Take 0.5 tablets (12.5 mg) by mouth every 6 hours as needed (Agitation).      senna-docusate (SENOKOT-S/PERICOLACE) 8.6-50 MG tablet Take 2 tablets by  mouth 2 times daily as needed for constipation. 30 tablet 0    simvastatin (ZOCOR) 20 MG tablet Take 20 mg by mouth every evening.      vitamin D3 (CHOLECALCIFEROL) 50 mcg (2000 units) tablet Take 1 tablet by mouth every morning.      vitamin E (TOCOPHEROL) 1000 units (450 mg) CAPS capsule TAKE 1 CAPSULE BY MOUTH TWICE A  capsule 0     No current facility-administered medications for this visit.       ROS:  10 point ROS of systems including Constitutional, Eyes, Respiratory, Cardiovascular, Gastroenterology, Genitourinary, Integumentary, Musculoskeletal, Psychiatric were all negative except for pertinent positives noted in my HPI.    Vitals:  There were no vitals taken for this visit.  Exam:  GENERAL APPEARANCE:  Alert, in no distress, oriented, cooperative  ENT:  Mouth and posterior oropharynx normal, moist mucous membranes  EYES:  EOM, conjunctivae, lids, pupils and irises normal  NECK:  No adenopathy,masses or thyromegaly  RESP:  respiratory effort and palpation of chest normal, lungs clear to auscultation , no respiratory distress  CV:  Palpation and auscultation of heart done , regular rate and rhythm, no murmur, rub, or gallop, no edema to LE  GI/ABDOMEN:  normal bowel sounds, soft, nontender, no hepatosplenomegaly or other masses  M/S:   moves extremities spontaneously. Left arm in sling and wrapped Able to wiggle all fingers   SKIN:  no rashes to exposed skin.  NEURO:   intact   PSYCH:  oriented X 3, normal insight, judgement and memory, affect and mood normal,       Lab/Diagnostic data:  Recent labs in [facility] reviewed by me today.   Hemoglobin   Date Value Ref Range Status   01/16/2025 8.3 (L) 11.7 - 15.7 g/dL Final     Creatinine   Date Value Ref Range Status   01/16/2025 0.88 0.51 - 0.95 mg/dL Final       Last Comprehensive Metabolic Panel:  Lab Results   Component Value Date     01/10/2025    POTASSIUM 4.3 01/10/2025    CHLORIDE 105 01/10/2025    CO2 25 01/10/2025    ANIONGAP 7 01/10/2025      (H) 01/10/2025    BUN 17.3 01/10/2025    CR 0.88 01/16/2025    GFRESTIMATED 67 01/16/2025    RIZWANA 8.4 (L) 01/10/2025       Lab Results   Component Value Date    WBC 8.6 01/10/2025     Lab Results   Component Value Date    RBC 2.78 01/10/2025     Lab Results   Component Value Date    HGB 8.7 01/10/2025     Lab Results   Component Value Date    HCT 27.2 01/10/2025     Lab Results   Component Value Date    MCV 98 01/10/2025     Lab Results   Component Value Date    MCH 31.3 01/10/2025     Lab Results   Component Value Date    MCHC 32.0 01/10/2025     Lab Results   Component Value Date    RDW 16.0 01/10/2025     Lab Results   Component Value Date     01/10/2025       ASSESSMENT/PLAN:  Acute left comminuted intra-articular mildly displaced distal humerus fracture, minimally displaced olecranon process fracture, minimally displaced radial head fracture.   - Status post splint/sling  - s/p ORIF Left elbow 1/7  - Pain controlled with PRN tylenol and Oxycodone  - Follow up with Ortho 1/23-Had had placed.   Recheck labs stable as above  Discontinue Oxycodone.      Acute drop in Hb  - Hb dropped 10.5 -> 8.9 -> 8.6 -> 8.1, stable ~ 8.5  - post op estimated blood loss 100ml  - No evidence of bleeding. Repeat Hgb stable      HUMA - resolved  - suspect pre-renal  - s/p IV fluids  Creat stable 0.88     Atrial fibrillation with controlled ventricular response, new onset  - Metoprolol 25mg bid  - Needs anticoagulation, but has frequent falls, on asa  - Consider Watchman procedure outpatient  Currently rate controlled in TCU     Severe symptomatic aortic valvular stenosis  - TTE (12/29/24):  LVEF 60%, mean gradient 57 mmHg with peak velocity of 4.6 m/s. Calculated valve area 0.60-0.63 cmÂ .  - Coronary angiogram (12/29/24) normal coronary arteries.  - S/P TAVR (1/3/2025) with a 23mm Zelaya Darian Ultra Resilia Valve  - Follow up with Cardiology  Echo scheduled for 2/4 with another follow up 2/21     Recurrent falls  -  Limited historian with dementia  - Aortic valve stenosis possibly contributory  - CT head & C spine unrevealing for acute process  - PT/OT     Intermittent hypotension  Essential hypertension   - BP intermittently low, suspect if dehydration  - Pro calcitonin negative, with minimal drop in Hb  - On Metoprolol with holding parameters  - reduced dose of Lisinopril 10mg: , 144 125 , 152     Possible UTI  - Iabx completed  Reports no concerns today. Escobar was removed inpatient.      Alzheimer's dementia without behavioral disturbance  - Risk for violent behavior and/or acute delirium  - Donepezil, memantine  - As needed quetiapine, hasn't required any in last few days  Monitor, recheck labs ordered   PT/OT     Hypokalemia- resolved     Intermittent urine retention  - Escobar catheter removed 01/10, reports no concerns. Completed abx for UTI.  Monitor                Electronically signed by:  Katie العلي CNP       Total time greater than 45 minutes reviewing chart in EPIC and PCC, medications, labs, vitals. Discussing with social work, physical therapy, occupational therapy and nursing.

## 2025-01-27 NOTE — TELEPHONE ENCOUNTER
If patient opts to proceed with LAAC, she will still require SDM for LAAC. She is scheduled to see Dr. De Los Santos on 2/10/2025 for consideration of rhythm management. She is not currently on OAC per below. Will follow-up with patient on completion of appt with Dr. De Los Santos. [facility]    ----- Message from Ariella Cesar sent at 1/20/2025  2:19 PM CST -----  Hi,   is s/p TAVR earlier this month. She was found to have atrial fibrillation during her hospitalization.  She has not been started on anticoagulation due to recurrent falls.  We discussed the Watchman procedure today and the patient and her son will consider whether this is something that they would be interested in moving forward with.  If she is interested in pursuing the Watchman procedure I would recommend starting anticoagulation 1 week prior to the procedure and then consider transitioning to DAPT immediately postprocedure given her recurrent falls.    Thank you,  Ariella

## 2025-02-02 VITALS
HEIGHT: 63 IN | OXYGEN SATURATION: 98 % | DIASTOLIC BLOOD PRESSURE: 72 MMHG | BODY MASS INDEX: 40.75 KG/M2 | WEIGHT: 230 LBS | TEMPERATURE: 98.4 F | RESPIRATION RATE: 18 BRPM | HEART RATE: 62 BPM | SYSTOLIC BLOOD PRESSURE: 164 MMHG

## 2025-02-03 ENCOUNTER — TRANSITIONAL CARE UNIT VISIT (OUTPATIENT)
Dept: GERIATRICS | Facility: CLINIC | Age: 80
End: 2025-02-03
Payer: COMMERCIAL

## 2025-02-03 DIAGNOSIS — I50.20 HEART FAILURE WITH REDUCED EJECTION FRACTION (H): ICD-10-CM

## 2025-02-03 DIAGNOSIS — N30.00 ACUTE CYSTITIS WITHOUT HEMATURIA: ICD-10-CM

## 2025-02-03 DIAGNOSIS — I15.9 SECONDARY HYPERTENSION: ICD-10-CM

## 2025-02-03 DIAGNOSIS — I10 ESSENTIAL HYPERTENSION: ICD-10-CM

## 2025-02-03 DIAGNOSIS — L40.50 PSORIATIC ARTHRITIS (H): ICD-10-CM

## 2025-02-03 DIAGNOSIS — G20.A1 PARKINSON'S DISEASE, UNSPECIFIED WHETHER DYSKINESIA PRESENT, UNSPECIFIED WHETHER MANIFESTATIONS FLUCTUATE (H): ICD-10-CM

## 2025-02-03 DIAGNOSIS — F02.80 EARLY ONSET ALZHEIMER'S DEMENTIA WITHOUT BEHAVIORAL DISTURBANCE, PSYCHOTIC DISTURBANCE, MOOD DISTURBANCE, OR ANXIETY, UNSPECIFIED DEMENTIA SEVERITY (H): ICD-10-CM

## 2025-02-03 DIAGNOSIS — E66.01 OBESITY, MORBID (H): ICD-10-CM

## 2025-02-03 DIAGNOSIS — Z95.2 S/P TAVR (TRANSCATHETER AORTIC VALVE REPLACEMENT): ICD-10-CM

## 2025-02-03 DIAGNOSIS — S42.402A CLOSED FRACTURE OF DISTAL END OF LEFT HUMERUS, UNSPECIFIED FRACTURE MORPHOLOGY, INITIAL ENCOUNTER: Primary | ICD-10-CM

## 2025-02-03 DIAGNOSIS — G20.A1 PARKINSON'S DISEASE WITHOUT DYSKINESIA, UNSPECIFIED WHETHER MANIFESTATIONS FLUCTUATE (H): ICD-10-CM

## 2025-02-03 DIAGNOSIS — G30.0 EARLY ONSET ALZHEIMER'S DEMENTIA WITHOUT BEHAVIORAL DISTURBANCE, PSYCHOTIC DISTURBANCE, MOOD DISTURBANCE, OR ANXIETY, UNSPECIFIED DEMENTIA SEVERITY (H): ICD-10-CM

## 2025-02-03 DIAGNOSIS — N18.30 STAGE 3 CHRONIC KIDNEY DISEASE, UNSPECIFIED WHETHER STAGE 3A OR 3B CKD (H): ICD-10-CM

## 2025-02-03 DIAGNOSIS — F32.1 MAJOR DEPRESSIVE DISORDER, SINGLE EPISODE, MODERATE (H): ICD-10-CM

## 2025-02-03 PROCEDURE — 99310 SBSQ NF CARE HIGH MDM 45: CPT | Performed by: NURSE PRACTITIONER

## 2025-02-03 RX ORDER — LISINOPRIL 10 MG/1
30 TABLET ORAL DAILY
COMMUNITY
Start: 2025-02-03

## 2025-02-03 NOTE — PROGRESS NOTES
[facility] GERIATRICS    PRIMARY CARE PROVIDER AND CLINIC:  Jaylen Young MD, [address] / [facility] [address]  No chief complaint on file.     [place] Medical Record Number:  [id number]  Place of Service where encounter took place:  [facility] ([facility]) [[id number]]    Sharon De La Vega  is a 79 year old  (1945), admitted to the above facility from  [facility]. Hospital stay 12/29/2024 through 1/10/2024..     Patient seen today for follow up NP visit in TCU:    1. Closed fracture of distal end of left humerus, unspecified fracture morphology, initial encounter    2. Psoriatic arthritis (H)    3. Parkinson's disease without dyskinesia, unspecified whether manifestations fluctuate (H)    4. Parkinson's disease, unspecified whether dyskinesia present, unspecified whether manifestations fluctuate (H)    5. S/P TAVR (transcatheter aortic valve replacement)    6. Essential hypertension    7. Acute cystitis without hematuria    8. Stage 3 chronic kidney disease, unspecified whether stage 3a or 3b CKD (H)    9. Early onset Alzheimer's dementia without behavioral disturbance, psychotic disturbance, mood disturbance, or anxiety, unspecified dementia severity (H)    10. Obesity, morbid (H)    11. Major depressive disorder, single episode, moderate (H)    12. Heart failure with reduced ejection fraction (H)    13. Secondary hypertension          HPI:    Pain overall controlled. Had follow up with Ortho, Now with cast to left arm/hand.   History of dementia. Limited recall. Recurrent falls.   Denies HA, chest pain, palpitations, dizziness. BP elevated this morning- lisinopril was decreased in hospital. Will increase today. No fevers, chills. On ASA and Metop. No troubles breathing, no SOB, no Concerns with urination, no constipation.Eating and drinking okay. Sleeping okay. Participating in therapies.    Hgb stable at 8.3  Lives at home with son  and daughter in law, ambulates independently.      CODE STATUS/ADVANCE DIRECTIVES DISCUSSION:  Full Code  CPR/Full code   ALLERGIES:   Allergies   Allergen Reactions    Acetaminophen-Codeine GI Disturbance     Indigestion    Celecoxib Unknown    Codeine Camsylate Unknown    Conj Estrog-Medroxyprogest Ace Other (See Comments)     Bleeding    Estrogens, Conjugated Synthetic A Unknown    Nsaids Unknown    Sulfa Antibiotics Unknown      PAST MEDICAL HISTORY:   Past Medical History:   Diagnosis Date    Anxiety 02/14/2013    Formatting of this note might be different from the original.  CURRENT MEDICATION(S) celexa 40 mg;   Lorazepam prn  Only occ  Stopped no need 11-17. Use PHQ 9 = 6   11-15;      Aortic valve stenosis 04/07/2023    Essential hypertension 10/29/2012    Hypercholesterolemia 10/29/2012    Hypothyroidism 10/29/2012    Major depressive disorder, single episode, moderate (H) 11/28/2022    Formatting of this note might be different from the original.  CURRENT MEDICATION(S) citalopram 40 mg    PHQ 9 =  2  11-11;  phq 9 = 2    12-11;  = 2   5-12; = 2   11-12;  Called her  Doing better  No need to see a psychologist. 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) 06/20/2022    Psoriatic arthritis (H) 10/29/2012      PAST SURGICAL HISTORY:   has a past surgical history that includes Cholecystectomy (1964); appendectomy (1964); carpal tunnel release rt/lt (Bilateral, 1982); Cataract Extraction, Bilateral (1998); EXCISE HAND/FOOT NEUROMA (2004); KNEE SCOPE,MED OR LAT MENIS REPAIR (Right, 2000); Total Knee Arthroplasty (Right, 2012); Trabeculectomy (Right, 1998); Coronary Angiogram (N/A, 12/31/2024); Transcatheter Aortic Valve Replacement-Femoral Approach (N/A, 1/3/2025); Or Transcatheter Aortic Valve Replacement, Femoral Percutaneous Approach (Standby) (N/A, 1/3/2025); Open reduction internal fixation humerus distal (Left, 1/7/2025); and  Transposition ulnar nerve (elbow) (Left, 1/7/2025).  FAMILY HISTORY: family history includes Lung Cancer in her father; Multiple myeloma in her brother.  SOCIAL HISTORY:   reports that she quit smoking about 38 years ago. Her smoking use included cigarettes. She has never used smokeless tobacco. She reports that she does not currently use alcohol.  Patient's living condition: lives with family,       Post Discharge Medication Reconciliation Status:   MED REC REQUIRED  Post Medication Reconciliation Status: discharge medications reconciled, continue medications without change       Current Outpatient Medications   Medication Sig Dispense Refill    lisinopril (ZESTRIL) 10 MG tablet Take 3 tablets (30 mg) by mouth daily.      acetaminophen (TYLENOL) 325 MG tablet Take 2 tablets (650 mg) by mouth every 4 hours as needed for mild pain or headaches. 100 tablet 0    aspirin 81 MG EC tablet Take 1 tablet (81 mg) by mouth daily.      buPROPion (WELLBUTRIN XL) 150 MG 24 hr tablet Take 150 mg by mouth every morning.      citalopram (CELEXA) 20 MG tablet Take 1 tablet by mouth every morning.      cyanocobalamin (VITAMIN B-12) 1000 MCG tablet Take 1,000 mcg by mouth every morning.      donepezil (ARICEPT) 10 MG tablet Take 1 tablet (10 mg) by mouth at bedtime 90 tablet 3    folic acid (FOLVITE) 1 MG tablet Take 1 mg by mouth every morning.      leflunomide (ARAVA) 10 MG tablet Take 1 tablet by mouth every morning.      levothyroxine (SYNTHROID/LEVOTHROID) 100 MCG tablet Take 1 tablet by mouth every morning.      memantine (NAMENDA) 10 MG tablet Take 1 tablet (10 mg) by mouth 2 times daily 180 tablet 3    methotrexate 2.5 MG tablet Take 10 mg by mouth once a week On Thursday      metoprolol tartrate (LOPRESSOR) 25 MG tablet Take 1 tablet (25 mg) by mouth 2 times daily.      nystatin (MYCOSTATIN) 093868 UNIT/GM external powder Apply topically 2 times daily as needed.      QUEtiapine (SEROQUEL) 25 MG tablet Take 0.5 tablets (12.5  "mg) by mouth every 6 hours as needed (Agitation).      senna-docusate (SENOKOT-S/PERICOLACE) 8.6-50 MG tablet Take 2 tablets by mouth 2 times daily as needed for constipation. 30 tablet 0    simvastatin (ZOCOR) 20 MG tablet Take 20 mg by mouth every evening.      vitamin D3 (CHOLECALCIFEROL) 50 mcg (2000 units) tablet Take 1 tablet by mouth every morning.      vitamin E (TOCOPHEROL) 1000 units (450 mg) CAPS capsule TAKE 1 CAPSULE BY MOUTH TWICE A  capsule 0     No current facility-administered medications for this visit.       ROS:  10 point ROS of systems including Constitutional, Eyes, Respiratory, Cardiovascular, Gastroenterology, Genitourinary, Integumentary, Musculoskeletal, Psychiatric were all negative except for pertinent positives noted in my HPI.    Vitals:  BP (!) 164/72   Pulse 62   Temp 98.4  F (36.9  C)   Resp 18   Ht 1.6 m (5' 3\")   Wt 104.3 kg (230 lb)   SpO2 98%   BMI 40.74 kg/m    Exam:  GENERAL APPEARANCE:  Alert, in no distress, oriented, cooperative. Laying in bed   ENT:  Mouth and posterior oropharynx normal, moist mucous membranes  EYES:  EOM, conjunctivae, lids, pupils and irises normal  NECK:  No adenopathy,masses or thyromegaly  RESP:  respiratory effort and palpation of chest normal, lungs clear to auscultation , no respiratory distress  CV:  Palpation and auscultation of heart done , regular rate and rhythm, no murmur, rub, or gallop, no edema to LE  GI/ABDOMEN:  normal bowel sounds, soft, nontender, no hepatosplenomegaly or other masses  M/S:   moves extremities spontaneously. Left arm in cast, Able to wiggle all fingers   SKIN:  no rashes to exposed skin.  NEURO:   intact   PSYCH:  oriented X 2, pleasantly confused,  affect and mood normal,       Lab/Diagnostic data:  Recent labs in [facility] reviewed by me today.   Hemoglobin   Date Value Ref Range Status   01/16/2025 8.3 (L) 11.7 - 15.7 g/dL Final     Creatinine   Date Value Ref Range Status   01/16/2025 0.88 0.51 - 0.95 " mg/dL Final       Last Comprehensive Metabolic Panel:  Lab Results   Component Value Date     01/10/2025    POTASSIUM 4.3 01/10/2025    CHLORIDE 105 01/10/2025    CO2 25 01/10/2025    ANIONGAP 7 01/10/2025     (H) 01/10/2025    BUN 17.3 01/10/2025    CR 0.88 01/16/2025    GFRESTIMATED 67 01/16/2025    RIZWANA 8.4 (L) 01/10/2025       Lab Results   Component Value Date    WBC 8.6 01/10/2025     Lab Results   Component Value Date    RBC 2.78 01/10/2025     Lab Results   Component Value Date    HGB 8.7 01/10/2025     Lab Results   Component Value Date    HCT 27.2 01/10/2025     Lab Results   Component Value Date    MCV 98 01/10/2025     Lab Results   Component Value Date    MCH 31.3 01/10/2025     Lab Results   Component Value Date    MCHC 32.0 01/10/2025     Lab Results   Component Value Date    RDW 16.0 01/10/2025     Lab Results   Component Value Date     01/10/2025       ASSESSMENT/PLAN:  Acute left comminuted intra-articular mildly displaced distal humerus fracture, minimally displaced olecranon process fracture, minimally displaced radial head fracture.   - Status post splint/sling  - s/p ORIF Left elbow 1/7  - Pain controlled with PRN tylenol   - Follow up with Ortho 1/23-Had had placed.   Recheck labs stable as above  Discontinue Oxycodone.      Acute drop in Hb  - Hb dropped 10.5 -> 8.9 -> 8.6 -> 8.1, stable ~ 8.5  - post op estimated blood loss 100ml  - No evidence of bleeding. Repeat Hgb stable      HUMA - resolved  - suspect pre-renal  - s/p IV fluids  Creat stable 0.88     Atrial fibrillation with controlled ventricular response, new onset  - Metoprolol 25mg bid  - Needs anticoagulation, but has frequent falls, on asa  - Consider Watchman procedure outpatient  Currently rate controlled in TCU     Severe symptomatic aortic valvular stenosis  - TTE (12/29/24):  LVEF 60%, mean gradient 57 mmHg with peak velocity of 4.6 m/s. Calculated valve area 0.60-0.63 cmÂ .  - Coronary angiogram (12/29/24)  normal coronary arteries.  - S/P TAVR (1/3/2025) with a 23mm Zelaya Darian Ultra Resilia Valve  - Follow up with Cardiology  Echo scheduled for 2/4 with another follow up 2/21     Recurrent falls  - Limited historian with dementia  - Aortic valve stenosis possibly contributory  - CT head & C spine unrevealing for acute process  - PT/OT     Intermittent hypotension  Essential hypertension   - Lisinopril was decreased inpatient.   - On Metoprolol with holding parameters  - now with elevated BP's, increase lisinopril back to 30mg every day.      Possible UTI  - Iabx completed  Reports no concerns today. Escobar was removed inpatient.      Alzheimer's dementia without behavioral disturbance  - Risk for violent behavior and/or acute delirium  - Donepezil, memantine  - As needed quetiapine, hasn't required any in last few days  Monitor, recheck labs ordered   PT/OT     Hypokalemia- resolved     Intermittent urine retention  - Escobar catheter removed 01/10, reports no concerns. Completed abx for UTI.  Monitor    Psoriatic arthritis  Noted, history. No concerns today  Defer to PCP              Electronically signed by:  Katie العلي CNP       Total time greater than 45 minutes reviewing chart in EPIC and PCC, medications, labs, vitals. Discussing with social work, physical therapy, occupational therapy and nursing.

## 2025-02-04 ENCOUNTER — TRANSITIONAL CARE UNIT VISIT (OUTPATIENT)
Dept: GERIATRICS | Facility: CLINIC | Age: 80
End: 2025-02-04
Payer: COMMERCIAL

## 2025-02-04 VITALS
BODY MASS INDEX: 40.75 KG/M2 | RESPIRATION RATE: 18 BRPM | HEART RATE: 62 BPM | OXYGEN SATURATION: 98 % | SYSTOLIC BLOOD PRESSURE: 164 MMHG | TEMPERATURE: 98.4 F | DIASTOLIC BLOOD PRESSURE: 72 MMHG | WEIGHT: 230 LBS | HEIGHT: 63 IN

## 2025-02-04 DIAGNOSIS — S42.402A CLOSED FRACTURE OF DISTAL END OF LEFT HUMERUS, UNSPECIFIED FRACTURE MORPHOLOGY, INITIAL ENCOUNTER: Primary | ICD-10-CM

## 2025-02-04 DIAGNOSIS — I48.0 PAROXYSMAL ATRIAL FIBRILLATION (H): ICD-10-CM

## 2025-02-04 DIAGNOSIS — I35.0 SEVERE AORTIC STENOSIS: ICD-10-CM

## 2025-02-04 DIAGNOSIS — I10 ESSENTIAL HYPERTENSION: ICD-10-CM

## 2025-02-04 DIAGNOSIS — E03.9 HYPOTHYROIDISM, UNSPECIFIED TYPE: ICD-10-CM

## 2025-02-04 DIAGNOSIS — L40.50 PSORIATIC ARTHRITIS (H): ICD-10-CM

## 2025-02-04 PROCEDURE — 99305 1ST NF CARE MODERATE MDM 35: CPT | Performed by: FAMILY MEDICINE

## 2025-02-04 NOTE — LETTER
2/4/2025      Sharon De La Vega  [address] [place] [address]        No notes on file      Sincerely,        Linda Barkley MD    Electronically signed

## 2025-02-08 ENCOUNTER — LAB REQUISITION (OUTPATIENT)
Dept: LAB | Facility: CLINIC | Age: 80
End: 2025-02-08

## 2025-02-08 PROCEDURE — 87507 IADNA-DNA/RNA PROBE TQ 12-25: CPT | Performed by: FAMILY MEDICINE

## 2025-02-09 LAB

## 2025-02-10 ENCOUNTER — OFFICE VISIT (OUTPATIENT)
Dept: CARDIOLOGY | Facility: CLINIC | Age: 80
End: 2025-02-10
Payer: COMMERCIAL

## 2025-02-10 VITALS
RESPIRATION RATE: 18 BRPM | HEART RATE: 54 BPM | HEIGHT: 63 IN | SYSTOLIC BLOOD PRESSURE: 140 MMHG | DIASTOLIC BLOOD PRESSURE: 65 MMHG | BODY MASS INDEX: 40.53 KG/M2

## 2025-02-10 DIAGNOSIS — I48.19 PERSISTENT ATRIAL FIBRILLATION (H): Primary | ICD-10-CM

## 2025-02-10 LAB
ATRIAL RATE - MUSE: 54 BPM
DIASTOLIC BLOOD PRESSURE - MUSE: NORMAL MMHG
INTERPRETATION ECG - MUSE: NORMAL
P AXIS - MUSE: 49 DEGREES
PR INTERVAL - MUSE: 148 MS
QRS DURATION - MUSE: 80 MS
QT - MUSE: 456 MS
QTC - MUSE: 432 MS
R AXIS - MUSE: -2 DEGREES
SYSTOLIC BLOOD PRESSURE - MUSE: NORMAL MMHG
T AXIS - MUSE: 66 DEGREES
VENTRICULAR RATE- MUSE: 54 BPM

## 2025-02-10 PROCEDURE — 99205 OFFICE O/P NEW HI 60 MIN: CPT | Performed by: INTERNAL MEDICINE

## 2025-02-10 PROCEDURE — 93000 ELECTROCARDIOGRAM COMPLETE: CPT | Performed by: INTERNAL MEDICINE

## 2025-02-10 NOTE — LETTER
2/10/2025    Jaylen Young MD  [address]  [facility] [address]    RE: Sharon De La Vega       Dear Colleague,     I had the pleasure of seeing Sharon De La Vega in the [facility].     [facility]  Cardiac Electrophysiology  [address]  [place] [address]   Office: [phone]  Fax: [phone]     Patient: Sharon De La Vega   : 1945       CHIEF COMPLAINT/REASON FOR VISIT  Persistent atrial fibrillation      Assessment/Recommendations     Stage 3B/Persistent atrial fibrillation - asymptomatic.  Associated with increased risk of stroke, heart failure and mortality  [id number]  We reviewed atrial fibrillation with attention to managing stroke risk via anticoagulation or LAAO.  We reviewed Watchman implantation, rates of successful left atrial appendage occlusion, risks (including bleeding, vascular injury, tamponade, stroke, device embolization) and recovery expectations.  We also reviewed that short term use of anticoagulation would be needed for a time before and after Watchman deployment.    Her decision maker(s) did not accompany her to her appointment today.  - she and family were encouraged to further consider the options for atrial fibrillation stroke risk prevention - blood thinner use vs Watchman device  - her decision makers should accompany her to appointments  - continue metoprolol 25mg twice daily  - considerations above can be re-visit at her 2025 follow-up visit           History of Present Illness   Sharon De La Vega is a 79 year old female with persistent atrial fibrillation, S3 TAVR 1/3/2025 for aortic stenosis, chronic diastolic heart failure, HTN, anemia, recurrent falls, Alzheimer dementia, psoriatic arthritis referred by Conner Breen PA-C for consultation regarding atrial fibrillation.    Mrs. De La Vega's atrial fibrillation history is as summarized below:  Symptoms: none  Symptom onset  "date: n/a  Diagnosis date: 1/7/2025 (TAVR 1/3/2025)  Admissions/ER visits: none for AF - AF diagnosed during 12/29/2024-1/10/2025 admission - anticoagulation deferred given frequent falls  Prior medical therapies: no I, III AADs  Prior DCCVs: none  Prior ablations: none  Percutaneous left atrial appendage occlusion: none    She notes feeling fairly well.  She had left arm surgery recently.  She resides at a care facility.  She is seen today with her niece - her son and decision maker was unable to attend.       Physical Examination  Review of Systems   VITALS: BP (!) 140/65 (BP Location: Left arm, Patient Position: Sitting, Cuff Size: Adult Large)   Pulse 54   Resp 18   Ht 1.6 m (5' 3\")   BMI 40.53 kg/m      Wt Readings from Last 3 Encounters:   02/07/25 103.8 kg (228 lb 12.8 oz)   02/04/25 104.3 kg (230 lb)   02/02/25 104.3 kg (230 lb)     CONSTITUTIONAL: well nourished, comfortable, no distress  EYES:  Conjunctivae pink, sclerae clear.    E/N/T:  Oral mucosa pink  RESPIRATORY:  Respiratory effort is normal  CARDIOVASCULAR:  regular, normal S1 and S2  GASTROINTESTINAL:  Abdomen without masses or tenderness  EXTREMITIES:  No clubbing or cyanosis.    MUSCULOSKELETAL:  Overall grossly normal muscle strength  SKIN:  Overall, skin warm and dry, no lesions.  NEURO/PSYCH:  Oriented x 3 with normal affect.   Constitutional:  No weight loss or loss of appetite    Eyes:  No difficulty with vision, no double vision, no dry eyes  ENT:  No sore throat, difficulty swallowing; changes in hearing or tinnitus  Cardiovascular: As detailed above  Respiratory:  No cough  Musculoskeletal  No joint pain, muscle aches  Hematologic: No easy bruising, excessive bleeding tendency   Gastrointestinal:  No jaundice, abdominal pain or abdominal bloating  Genitourinary: No changes in urinary habits, no trouble urinating    Psychiatric: No anxiety or depression      Medical History  Surgical History   Past Medical History:   Diagnosis Date "     Anxiety 02/14/2013    Formatting of this note might be different from the original.  CURRENT MEDICATION(S) celexa 40 mg;   Lorazepam prn  Only occ  Stopped no need 11-17. Use PHQ 9 = 6   11-15;       Aortic valve stenosis 04/07/2023     Essential hypertension 10/29/2012     Hypercholesterolemia 10/29/2012     Hypothyroidism 10/29/2012     Major depressive disorder, single episode, moderate (H) 11/28/2022    Formatting of this note might be different from the original.  CURRENT MEDICATION(S) citalopram 40 mg    PHQ 9 =  2  11-11;  phq 9 = 2    12-11;  = 2   5-12; = 2   11-12;  Called her  Doing better  No need to see a psychologist. 1-14  PHQ 9 = 4   8-14;  = 3   12-14; OK 11-17;       Major neurocognitive disorder due to Alzheimer's disease (H) 06/20/2022     Psoriatic arthritis (H) 10/29/2012    Past Surgical History:   Procedure Laterality Date     APPENDECTOMY  1964     CARPAL TUNNEL RELEASE RT/LT Bilateral 1982     CATARACT EXTRACTION, BILATERAL  1998     CHOLECYSTECTOMY  1964     CV CORONARY ANGIOGRAM N/A 12/31/2024    Procedure: Coronary Angiogram;  Surgeon: Farhad Blue MD;  Location: [facility]     CV TRANSCATHETER AORTIC VALVE REPLACEMENT-FEMORAL APPROACH N/A 1/3/2025    Procedure: Transcatheter Aortic Valve Replacement-Femoral Approach;  Surgeon: Graeme Torres MD;  Location: [facility]     HC KNEE ARTHROSCOPY, MED/LAT MENISCUS REPAIR Right 2000     OPEN REDUCTION INTERNAL FIXATION HUMERUS DISTAL Left 1/7/2025    Procedure: OPEN REDUCTION INTERNAL FIXATION, FRACTURE, HUMERUS, DISTAL, WITH;  Surgeon: Jace Tamayo MD;  Location[facility] OR     OR TRANSCATHETER AORTIC VALVE REPLACEMENT, FEMORAL PERCUTANEOUS APPROACH (STANDBY) N/A 1/3/2025    Procedure: OR TRANSCATHETER AORTIC VALVE REPLACEMENT, FEMORAL PERCUTANEOUS APPROACH (STANDBY);  Surgeon: Chester Oreilly MD;  Location: [facility] CV     [place] EXCISE HAND/FOOT NEUROMA  2004    right foot      TOTAL KNEE ARTHROPLASTY Right      TRABECULECTOMY Right     laser surgery right eye     TRANSPOSITION ULNAR NERVE (ELBOW) Left 2025    Procedure: ULNAR NERVE TRANSPOSITION;  Surgeon: Jace Tamayo MD;  Location: [facility] OR         Family History Social History   Family History   Problem Relation Age of Onset     Lung Cancer Father      Multiple myeloma Brother         Social History     Tobacco Use     Smoking status: Former     Current packs/day: 0.00     Types: Cigarettes     Quit date:      Years since quittin.1     Smokeless tobacco: Never   Substance Use Topics     Alcohol use: Not Currently         Medications  Allergies     Current Outpatient Medications:      acetaminophen (TYLENOL) 325 MG tablet, Take 2 tablets (650 mg) by mouth every 4 hours as needed for mild pain or headaches., Disp: 100 tablet, Rfl: 0     aspirin 81 MG EC tablet, Take 1 tablet (81 mg) by mouth daily., Disp: , Rfl:      buPROPion (WELLBUTRIN XL) 150 MG 24 hr tablet, Take 150 mg by mouth every morning., Disp: , Rfl:      citalopram (CELEXA) 20 MG tablet, Take 1 tablet by mouth every morning., Disp: , Rfl:      cyanocobalamin (VITAMIN B-12) 1000 MCG tablet, Take 1,000 mcg by mouth every morning., Disp: , Rfl:      donepezil (ARICEPT) 10 MG tablet, Take 1 tablet (10 mg) by mouth at bedtime, Disp: 90 tablet, Rfl: 3     folic acid (FOLVITE) 1 MG tablet, Take 1 mg by mouth every morning., Disp: , Rfl:      leflunomide (ARAVA) 10 MG tablet, Take 1 tablet by mouth every morning., Disp: , Rfl:      levothyroxine (SYNTHROID/LEVOTHROID) 100 MCG tablet, Take 1 tablet by mouth every morning., Disp: , Rfl:      lisinopril (ZESTRIL) 10 MG tablet, Take 3 tablets (30 mg) by mouth daily., Disp: , Rfl:      memantine (NAMENDA) 10 MG tablet, Take 1 tablet (10 mg) by mouth 2 times daily, Disp: 180 tablet, Rfl: 3     methotrexate 2.5 MG tablet, Take 10 mg by mouth once a week On Thursday, Disp: , Rfl:      metoprolol  "tartrate (LOPRESSOR) 25 MG tablet, Take 1 tablet (25 mg) by mouth 2 times daily., Disp: , Rfl:      nystatin (MYCOSTATIN) 612350 UNIT/GM external powder, Apply topically 2 times daily as needed., Disp: , Rfl:      QUEtiapine (SEROQUEL) 25 MG tablet, Take 0.5 tablets (12.5 mg) by mouth every 6 hours as needed (Agitation)., Disp: , Rfl:      senna-docusate (SENOKOT-S/PERICOLACE) 8.6-50 MG tablet, Take 2 tablets by mouth 2 times daily as needed for constipation., Disp: 30 tablet, Rfl: 0     simvastatin (ZOCOR) 20 MG tablet, Take 20 mg by mouth every evening., Disp: , Rfl:      vitamin D3 (CHOLECALCIFEROL) 50 mcg (2000 units) tablet, Take 1 tablet by mouth every morning., Disp: , Rfl:      vitamin E (TOCOPHEROL) 1000 units (450 mg) CAPS capsule, TAKE 1 CAPSULE BY MOUTH TWICE A DAY, Disp: 180 capsule, Rfl: 0     Allergies   Allergen Reactions     Acetaminophen-Codeine GI Disturbance     Indigestion     Celecoxib Unknown     Codeine Camsylate Unknown     Conj Estrog-Medroxyprogest Ace Other (See Comments)     Bleeding     Estrogens, Conjugated Synthetic A Unknown     Nsaids Unknown     Sulfa Antibiotics Unknown          Lab Results    Chemistry CBC Cardiac Enzymes/BNP/TSH/INR   Recent Labs   Lab Test 01/16/25  0749 01/10/25  0441   NA  --  137   POTASSIUM  --  4.3   CHLORIDE  --  105   CO2  --  25   GLC  --  106*   BUN  --  17.3   CR 0.88 0.79   GFRESTIMATED 67 76   RIZWANA  --  8.4*     Recent Labs   Lab Test 01/16/25  0749 01/10/25  0441 01/09/25  0431   CR 0.88 0.79 1.16*          Recent Labs   Lab Test 01/16/25  0749 01/10/25  0441   WBC  --  8.6   HGB 8.3* 8.7*   HCT  --  27.2*   MCV  --  98   PLT  --  113*     Recent Labs   Lab Test 01/16/25  0749 01/10/25  0441 01/09/25  0431   HGB 8.3* 8.7* 8.1*    No results for input(s): \"TROPONINI\" in the last 42736 hours.  Recent Labs   Lab Test 01/02/25  1106 09/06/23  2139 02/14/23  1712   NTBNPI 1,077 2,868* 171     Recent Labs   Lab Test 12/29/24  1306   TSH 1.63     Recent " Labs   Lab Test 01/02/25  1106 01/10/23  1252   INR 1.08 1.15         Data Review    ECGs (tracings independently reviewed)  2/10/2025 - SB 54bpm  1/10/2025 - AF, 80bpm, NSST/T changes, QTC 415ms  1/9/2025 - AF, 82bpm  1/6/2025 - SR 81bpm    1/4/2025 TTE  1. The left ventricle is normal in size. Left ventricular function is  normal.The ejection fraction is > 65%. No regional wall motion abnormalities  noted. There is mild concentric left ventricular hypertrophy.  2. Normal right ventricle size and systolic function.  3. Well seated bio-prosthetic aortic valve (documented 23 mm Zelaya Darian 3  Ultra Resilia tissue valve) with normal aortic valve prosthesis metrics with a  mean systolic gradient of 11 mmHg and a peak anterograde velocity of 2.1  m/sec, AT: 80 msec. DVI: 0.7. No aortic insufficiency.       55 minutes was spent reviewing the chart and in direct discussion with the patient.    Cc: Conner Breen PA-C, Ariella Cesar PA-C, Jaylen Young MD Amila Dilusha William, MD  2/10/2025  10:28 AM        Thank you for allowing me to participate in the care of your patient.      Sincerely,     Lavern De Los Santos MD     [facility]  cc:   Conner Breen PA-C  [address]  [place] [address]

## 2025-02-10 NOTE — PROGRESS NOTES
[facility]  Cardiac Electrophysiology  [address]  [place] [address]   Office: [phone]  Fax: [phone]     Patient: Sharon De La Vega   : 1945       CHIEF COMPLAINT/REASON FOR VISIT  Persistent atrial fibrillation      Assessment/Recommendations     Stage 3B/Persistent atrial fibrillation - asymptomatic.  Associated with increased risk of stroke, heart failure and mortality  [id number]  We reviewed atrial fibrillation with attention to managing stroke risk via anticoagulation or LAAO.  We reviewed Watchman implantation, rates of successful left atrial appendage occlusion, risks (including bleeding, vascular injury, tamponade, stroke, device embolization) and recovery expectations.  We also reviewed that short term use of anticoagulation would be needed for a time before and after Watchman deployment.    Her decision maker(s) did not accompany her to her appointment today.  - she and family were encouraged to further consider the options for atrial fibrillation stroke risk prevention - blood thinner use vs Watchman device  - her decision makers should accompany her to appointments  - continue metoprolol 25mg twice daily  - considerations above can be re-visit at her 2025 follow-up visit           History of Present Illness   Sharon De La Vega is a 79 year old female with persistent atrial fibrillation, S3 TAVR 1/3/2025 for aortic stenosis, chronic diastolic heart failure, HTN, anemia, recurrent falls, Alzheimer dementia, psoriatic arthritis referred by Conner Breen PA-C for consultation regarding atrial fibrillation.    Mrs. De La Vega's atrial fibrillation history is as summarized below:  Symptoms: none  Symptom onset date: n/a  Diagnosis date: 2025 (TAVR 1/3/2025)  Admissions/ER visits: none for AF - AF diagnosed during 2024-1/10/2025 admission - anticoagulation deferred given frequent falls  Prior medical therapies: no I, III  "AADs  Prior DCCVs: none  Prior ablations: none  Percutaneous left atrial appendage occlusion: none    She notes feeling fairly well.  She had left arm surgery recently.  She resides at a care facility.  She is seen today with her niece - her son and decision maker was unable to attend.       Physical Examination  Review of Systems   VITALS: BP (!) 140/65 (BP Location: Left arm, Patient Position: Sitting, Cuff Size: Adult Large)   Pulse 54   Resp 18   Ht 1.6 m (5' 3\")   BMI 40.53 kg/m      Wt Readings from Last 3 Encounters:   02/07/25 103.8 kg (228 lb 12.8 oz)   02/04/25 104.3 kg (230 lb)   02/02/25 104.3 kg (230 lb)     CONSTITUTIONAL: well nourished, comfortable, no distress  EYES:  Conjunctivae pink, sclerae clear.    E/N/T:  Oral mucosa pink  RESPIRATORY:  Respiratory effort is normal  CARDIOVASCULAR:  regular, normal S1 and S2  GASTROINTESTINAL:  Abdomen without masses or tenderness  EXTREMITIES:  No clubbing or cyanosis.    MUSCULOSKELETAL:  Overall grossly normal muscle strength  SKIN:  Overall, skin warm and dry, no lesions.  NEURO/PSYCH:  Oriented x 3 with normal affect.   Constitutional:  No weight loss or loss of appetite    Eyes:  No difficulty with vision, no double vision, no dry eyes  ENT:  No sore throat, difficulty swallowing; changes in hearing or tinnitus  Cardiovascular: As detailed above  Respiratory:  No cough  Musculoskeletal  No joint pain, muscle aches  Hematologic: No easy bruising, excessive bleeding tendency   Gastrointestinal:  No jaundice, abdominal pain or abdominal bloating  Genitourinary: No changes in urinary habits, no trouble urinating    Psychiatric: No anxiety or depression      Medical History  Surgical History   Past Medical History:   Diagnosis Date    Anxiety 02/14/2013    Formatting of this note might be different from the original.  CURRENT MEDICATION(S) celexa 40 mg;   Lorazepam prn  Only occ  Stopped no need 11-17. Use PHQ 9 = 6   11-15;      Aortic valve stenosis " 04/07/2023    Essential hypertension 10/29/2012    Hypercholesterolemia 10/29/2012    Hypothyroidism 10/29/2012    Major depressive disorder, single episode, moderate (H) 11/28/2022    Formatting of this note might be different from the original.  CURRENT MEDICATION(S) citalopram 40 mg    PHQ 9 =  2  11-11;  phq 9 = 2    12-11;  = 2   5-12; = 2   11-12;  Called her  Doing better  No need to see a psychologist. 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) 06/20/2022    Psoriatic arthritis (H) 10/29/2012    Past Surgical History:   Procedure Laterality Date    APPENDECTOMY  1964    CARPAL TUNNEL RELEASE RT/LT Bilateral 1982    CATARACT EXTRACTION, BILATERAL  1998    CHOLECYSTECTOMY  1964    CV CORONARY ANGIOGRAM N/A 12/31/2024    Procedure: Coronary Angiogram;  Surgeon: Farhad Blue MD;  Location: [facility] CV    CV TRANSCATHETER AORTIC VALVE REPLACEMENT-FEMORAL APPROACH N/A 1/3/2025    Procedure: Transcatheter Aortic Valve Replacement-Femoral Approach;  Surgeon: Graeme Torres MD;  Location: [facility] CV    HC KNEE ARTHROSCOPY, MED/LAT MENISCUS REPAIR Right 2000    OPEN REDUCTION INTERNAL FIXATION HUMERUS DISTAL Left 1/7/2025    Procedure: OPEN REDUCTION INTERNAL FIXATION, FRACTURE, HUMERUS, DISTAL, WITH;  Surgeon: Jace Tamayo MD;  Location: [facility] OR    OR TRANSCATHETER AORTIC VALVE REPLACEMENT, FEMORAL PERCUTANEOUS APPROACH (STANDBY) N/A 1/3/2025    Procedure: OR TRANSCATHETER AORTIC VALVE REPLACEMENT, FEMORAL PERCUTANEOUS APPROACH (STANDBY);  Surgeon: Chester Oreilly MD;  Location: [facility] CV    TX EXCISE HAND/FOOT NEUROMA  2004    right foot    TOTAL KNEE ARTHROPLASTY Right 2012    TRABECULECTOMY Right 1998    laser surgery right eye    TRANSPOSITION ULNAR NERVE (ELBOW) Left 1/7/2025    Procedure: ULNAR NERVE TRANSPOSITION;  Surgeon: Jace Tamayo MD;  Location: [facility]  OR         Family History Social History   Family History   Problem Relation Age of Onset    Lung Cancer Father     Multiple myeloma Brother         Social History     Tobacco Use    Smoking status: Former     Current packs/day: 0.00     Types: Cigarettes     Quit date:      Years since quittin.1    Smokeless tobacco: Never   Substance Use Topics    Alcohol use: Not Currently         Medications  Allergies     Current Outpatient Medications:     acetaminophen (TYLENOL) 325 MG tablet, Take 2 tablets (650 mg) by mouth every 4 hours as needed for mild pain or headaches., Disp: 100 tablet, Rfl: 0    aspirin 81 MG EC tablet, Take 1 tablet (81 mg) by mouth daily., Disp: , Rfl:     buPROPion (WELLBUTRIN XL) 150 MG 24 hr tablet, Take 150 mg by mouth every morning., Disp: , Rfl:     citalopram (CELEXA) 20 MG tablet, Take 1 tablet by mouth every morning., Disp: , Rfl:     cyanocobalamin (VITAMIN B-12) 1000 MCG tablet, Take 1,000 mcg by mouth every morning., Disp: , Rfl:     donepezil (ARICEPT) 10 MG tablet, Take 1 tablet (10 mg) by mouth at bedtime, Disp: 90 tablet, Rfl: 3    folic acid (FOLVITE) 1 MG tablet, Take 1 mg by mouth every morning., Disp: , Rfl:     leflunomide (ARAVA) 10 MG tablet, Take 1 tablet by mouth every morning., Disp: , Rfl:     levothyroxine (SYNTHROID/LEVOTHROID) 100 MCG tablet, Take 1 tablet by mouth every morning., Disp: , Rfl:     lisinopril (ZESTRIL) 10 MG tablet, Take 3 tablets (30 mg) by mouth daily., Disp: , Rfl:     memantine (NAMENDA) 10 MG tablet, Take 1 tablet (10 mg) by mouth 2 times daily, Disp: 180 tablet, Rfl: 3    methotrexate 2.5 MG tablet, Take 10 mg by mouth once a week On Thursday, Disp: , Rfl:     metoprolol tartrate (LOPRESSOR) 25 MG tablet, Take 1 tablet (25 mg) by mouth 2 times daily., Disp: , Rfl:     nystatin (MYCOSTATIN) 597108 UNIT/GM external powder, Apply topically 2 times daily as needed., Disp: , Rfl:     QUEtiapine (SEROQUEL) 25 MG tablet, Take 0.5 tablets (12.5  "mg) by mouth every 6 hours as needed (Agitation)., Disp: , Rfl:     senna-docusate (SENOKOT-S/PERICOLACE) 8.6-50 MG tablet, Take 2 tablets by mouth 2 times daily as needed for constipation., Disp: 30 tablet, Rfl: 0    simvastatin (ZOCOR) 20 MG tablet, Take 20 mg by mouth every evening., Disp: , Rfl:     vitamin D3 (CHOLECALCIFEROL) 50 mcg (2000 units) tablet, Take 1 tablet by mouth every morning., Disp: , Rfl:     vitamin E (TOCOPHEROL) 1000 units (450 mg) CAPS capsule, TAKE 1 CAPSULE BY MOUTH TWICE A DAY, Disp: 180 capsule, Rfl: 0     Allergies   Allergen Reactions    Acetaminophen-Codeine GI Disturbance     Indigestion    Celecoxib Unknown    Codeine Camsylate Unknown    Conj Estrog-Medroxyprogest Ace Other (See Comments)     Bleeding    Estrogens, Conjugated Synthetic A Unknown    Nsaids Unknown    Sulfa Antibiotics Unknown          Lab Results    Chemistry CBC Cardiac Enzymes/BNP/TSH/INR   Recent Labs   Lab Test 01/16/25  0749 01/10/25  0441   NA  --  137   POTASSIUM  --  4.3   CHLORIDE  --  105   CO2  --  25   GLC  --  106*   BUN  --  17.3   CR 0.88 0.79   GFRESTIMATED 67 76   RIZWANA  --  8.4*     Recent Labs   Lab Test 01/16/25  0749 01/10/25  0441 01/09/25  0431   CR 0.88 0.79 1.16*          Recent Labs   Lab Test 01/16/25  0749 01/10/25  0441   WBC  --  8.6   HGB 8.3* 8.7*   HCT  --  27.2*   MCV  --  98   PLT  --  113*     Recent Labs   Lab Test 01/16/25  0749 01/10/25  0441 01/09/25  0431   HGB 8.3* 8.7* 8.1*    No results for input(s): \"TROPONINI\" in the last 76009 hours.  Recent Labs   Lab Test 01/02/25  1106 09/06/23  2139 02/14/23  1712   NTBNPI 1,077 2,868* 171     Recent Labs   Lab Test 12/29/24  1306   TSH 1.63     Recent Labs   Lab Test 01/02/25  1106 01/10/23  1252   INR 1.08 1.15         Data Review    ECGs (tracings independently reviewed)  2/10/2025 - SB 54bpm  1/10/2025 - AF, 80bpm, NSST/T changes, QTC 415ms  1/9/2025 - AF, 82bpm  1/6/2025 - SR 81bpm    1/4/2025 TTE  1. The left ventricle is " normal in size. Left ventricular function is  normal.The ejection fraction is > 65%. No regional wall motion abnormalities  noted. There is mild concentric left ventricular hypertrophy.  2. Normal right ventricle size and systolic function.  3. Well seated bio-prosthetic aortic valve (documented 23 mm Zelaya Darian 3  Ultra Resilia tissue valve) with normal aortic valve prosthesis metrics with a  mean systolic gradient of 11 mmHg and a peak anterograde velocity of 2.1  m/sec, AT: 80 msec. DVI: 0.7. No aortic insufficiency.       55 minutes was spent reviewing the chart and in direct discussion with the patient.    Cc: Conner Breen PA-C, Ariella Cesar PA-C, Jaylen Young MD Amila Dilusha William, MD  2/10/2025  10:28 AM

## 2025-02-10 NOTE — PATIENT INSTRUCTIONS
[facility]  Cardiac Electrophysiology  [address]  [place] [address]   Office: [phone]  Fax: [phone]     Thank you for seeing us in clinic today - it is a pleasure to be a part of your care team.  Below is a summary of our plan from today's visit.       You have atrial fibrillation.  We reviewed atrial fibrillation with attention to long term stroke risk prevention (blood thinner vs Watchman device).  We reviewed Watchman implantation, rates of successful left atrial appendage occlusion, risks and recovery expectations.  We also reviewed that short term use of anticoagulation would be needed for a time before and after Watchman deployment.      We will plan for the following:  - further consider the options for atrial fibrillation stroke risk prevention - blood thinner use vs Watchman device  - your decision makers should accompany you to appointments     Please do not hesitate to be in touch with our office at [phone] with any questions that may arise.       Thank you for trusting us with your care,    Lavern De Los Santos MD  Clinical Cardiac Electrophysiology  [facility]  [address]  [place] [address]   Office: [phone]  Fax: [phone]        ATRIAL FIBRILLATION: Patient Information    What is atrial fibrillation?  Atrial fibrillation (AF, A-fib) is a common heart rhythm problem (arrhythmia) occurring within the upper chambers of the heart (the atria).  In normal rhythm, the upper and lower chambers of the heart are electrically driven to contract in a coordinated sequence.  In atrial fibrillation, the atria lose their ability to contract because of rapid and chaotic electrical activity.  The lower chambers of the heart (the ventricles) continue to pump blood throughout the body, though with irregular and often faster rate due to the chaotic activity within the atria.        How do I know if I have  atrial fibrillation?   Some people may feel their heart beating faster, harder, or irregularly while in atrial fibrillation.  Others may be lightheaded, fatigued, feel weak or tired or become more short of breath especially with activities.  Some patients have no symptoms at all.  Atrial fibrillation may be found due to an irregular pulse or on an electrocardiogram (ECG). Atrial fibrillation can start and stop on its own, and episodes can last from seconds to several months.      How common is atrial fibrillation?   An estimated 3-6 million people in the [place] have atrial fibrillation.  Atrial fibrillation is a common heart rhythm problem for older persons, affecting as estimated 12-15% of people over the age of 65 years of age.    What causes atrial fibrillation?   Age is the most important risk factor for atrial fibrillation.  Atrial fibrillation is more common in people with other heart disease, high blood pressure, diabetes, obesity, sleep apnea and in people who regularly consume alcohol.  Surgery, lung disease, or thyroid problems can lead to atrial fibrillation.  Atrial fibrillation has multiple possible causes, and in most cases a single cause cannot be found.  Atrial fibrillation is a progressive condition, usually starting with at an early stage with short and infrequent episodes.  In later stages of disease, more frequent and longer lasting episodes of atrial fibrillation occur, ultimately culminating in episodes which do not spontaneously terminate.  Generally, more enlargement and scarring within the upper chambers of the heart is observed as atrial fibrillation progresses from early to late-stage disease.    How is atrial fibrillation diagnosed and evaluated?    Because of its start-stop nature, atrial fibrillation can be challenging to diagnose.  Atrial fibrillation is most commonly diagnosed via cardiac rhythm recordings - either an ECG or wearable cardiac rhythm monitor.  For patients with  pacemakers, defibrillators or implantable loop recorders, atrial fibrillation may be recorded via these devices.  Recently, commercially available devices (eg. Apple Watch, Toppr device, certain KonnectAgain devices, others) can allow patients to take 30 second cardiac rhythm recordings which may document atrial fibrillation.  Once atrial fibrillation is diagnosed, additional tests include blood tests and an echocardiogram.  The echocardiogram uses ultrasound to look at your heart to assess your cardiac function and evaluate for other heart disease.  Additional evaluation may include CT or MRI studies.    Is atrial fibrillation dangerous?   Atrial fibrillation is not usually a life-threatening arrhythmia.  The most serious consequences of atrial fibrillation including stroke and worsening of overall cardiac function.  While in atrial fibrillation, the upper cardiac chambers do not contract normally, resulting in slower blood flow and increased risk of clot formation.  If this blood clot becomes detached from the heart a stroke can occur.  Unfortunately, stroke can be the first sign of atrial fibrillation for some people.  With a stroke, you may notice abnormal sensation, weakness on one side of the body or face, changes in your vision or speech.  If you have any of these signs, you should contact EMS and be evaluated in an emergency room as soon as possible.      How is atrial fibrillation treated?     Several treatment options exist for suppressing atrial fibrillation - however, it is not an easily curable arrhythmia.  The first goal in managing atrial fibrillation is to minimize stroke risk.  The second goal is to improve symptoms associated with atrial fibrillation.  Finally, in patients with reduced cardiac function, maintaining normal rhythm can help improve cardiac function.      Blood thinners are used to reduce the risk of stroke in patients with high estimated stroke risk related to atrial fibrillation.  For  patients at higher risk of bleeding related to blood thinner use, implantable devices may be an option to reduce stroke risk without the need for long term blood thinner use.      Atrial fibrillation can be managed via two strategies: rate control and rhythm control.  In a rate control strategy, continued atrial fibrillation is accepted and medications (eg. beta-blockers or calcium channel blockers) are used to control the lower chamber rate.  In a rhythm control strategy, anti-arrhythmic medications or catheter ablation are used to maintain normal cardiac rhythm and slow disease progression by suppressing atrial fibrillation.  A procedure called a cardioversion, in which an electric shock is delivered through patches placed on the chest wall while under deep sedation, can be performed to temporarily restore normal cardiac rhythm, though does not address the chance of atrial fibrillation recurrence.  Treatments are more effective for earlier rather than later stage atrial fibrillation.  Lifestyle modifications (maintaining a healthy weight, aerobic exercise, diagnosing and treating sleep apnea, and minimizing alcohol intake) are important elements of atrial fibrillation rhythm control.     What is catheter ablation for atrial fibrillation?  Cardiac catheter ablation is a commonly performed, minimally invasive procedure performed by a cardiac electrophysiologist to treat many different cardiac rhythm abnormalities.  During catheter ablation, long, thin, flexible tubes are advanced into the heart via small sheaths inserted into the femoral veins and thermal energy (either heating or cooling) is applied within the heart to disrupt abnormal electrical activity.  Atrial fibrillation ablation is performed under general anesthesia, with procedures generally taking approximately 2-3 hours.  Patients are typically observed for 3-5 hours after the ablation, and in most cases can be discharged home the same day.  Atrial  fibrillation ablation is associated with better outcomes (mortality, cardiovascular hospitalizations, atrial arrhythmia recurrences) compared to antiarrhythmic drug therapy.  However, atrial fibrillation recurrences are not uncommon, and repeat catheter ablation may be offered.  Your electrophysiology team can review atrial fibrillation ablation, anticipated success rates, risks, and recovery expectations with you.    What are anti-arrhythmic medications?  Anti-arrhythmic medications are specialized drugs which alter cardiac electrical functioning to suppress arrhythmias.  There are several anti-arrhythmic medications available, each with its own success rate and side effects.  Some anti-arrhythmic medications are less effective though safer to use, others are more effective though have serious potential toxicities.  Atrial fibrillation recurrences are common and may require dose adjustment or change in antiarrhythmic therapy.  Your electrophysiology team will carefully consider which medication would be the best and safest for your particular case.      Can I live a normal life?    The goal of atrial fibrillation management is for patients to live normal lives without being limited by symptoms related to atrial fibrillation.    Are any additional educational resources available?  There are a number of excellent atrial fibrillation education resources available to you online.  A few options you may wish to review include:  hrsonline.org/guide-atrial-fibrillation  afibmatters.org  getsmartaboutafib.com  stopaf.com    What comes next?    Consider your management options and let us know how we can help in your decision process.  Please take medications as they have been prescribed.  You should also get any tests that may have been ordered for you.      When to Call Your Doctor or seek emergency care:  Call your doctor or seek emergency care if you have any significant changes with the  following:  Weakness  Dizziness  Fainting  Fatigue  Shortness of breath  Chest pain with increased activity  If you are concerned that your heart rate is too fast or too slow  Bleeding that does not stop in 10 minutes  Coughing or throwing up blood  Bloody diarrhea or bleeding hemorrhoids  Dark-colored urine or black stool  Allergic reactions:  Rash  Itching  Swelling  Trouble breathing or swallowing      Please call the [facility] at [phone] if you have concerns about your symptoms, your medicines, or your follow-up appointments.

## 2025-02-11 DIAGNOSIS — G30.9 MAJOR NEUROCOGNITIVE DISORDER DUE TO ALZHEIMER'S DISEASE (H): ICD-10-CM

## 2025-02-11 DIAGNOSIS — F02.80 MAJOR NEUROCOGNITIVE DISORDER DUE TO ALZHEIMER'S DISEASE (H): ICD-10-CM

## 2025-02-11 RX ORDER — MEMANTINE HYDROCHLORIDE 10 MG/1
10 TABLET ORAL 2 TIMES DAILY
Qty: 180 TABLET | Refills: 0 | Status: SHIPPED | OUTPATIENT
Start: 2025-02-11

## 2025-02-11 NOTE — TELEPHONE ENCOUNTER
Refill request for: memantine (NAMENDA) 10 MG tablet    Directions: Take 1 tablet (10 mg) by mouth 2 times daily     LOV: 2/21/24  NOV: Not scheduled- [employer] message sent to patient     90 day supply with 0 refills Medication T'd for review and signature  Skye LOZADA CMA on 2/11/2025 at 9:38 AM  [facility]

## 2025-02-12 ENCOUNTER — TRANSITIONAL CARE UNIT VISIT (OUTPATIENT)
Dept: GERIATRICS | Facility: CLINIC | Age: 80
End: 2025-02-12
Payer: COMMERCIAL

## 2025-02-12 VITALS
TEMPERATURE: 98 F | WEIGHT: 229 LBS | HEIGHT: 63 IN | HEART RATE: 58 BPM | SYSTOLIC BLOOD PRESSURE: 128 MMHG | DIASTOLIC BLOOD PRESSURE: 66 MMHG | RESPIRATION RATE: 16 BRPM | BODY MASS INDEX: 40.57 KG/M2 | OXYGEN SATURATION: 96 %

## 2025-02-12 DIAGNOSIS — Z95.2 S/P TAVR (TRANSCATHETER AORTIC VALVE REPLACEMENT): ICD-10-CM

## 2025-02-12 DIAGNOSIS — G20.A1 PARKINSON'S DISEASE, UNSPECIFIED WHETHER DYSKINESIA PRESENT, UNSPECIFIED WHETHER MANIFESTATIONS FLUCTUATE (H): ICD-10-CM

## 2025-02-12 DIAGNOSIS — E66.01 OBESITY, MORBID (H): ICD-10-CM

## 2025-02-12 DIAGNOSIS — S42.402A CLOSED FRACTURE OF DISTAL END OF LEFT HUMERUS, UNSPECIFIED FRACTURE MORPHOLOGY, INITIAL ENCOUNTER: ICD-10-CM

## 2025-02-12 DIAGNOSIS — R19.5 LOOSE STOOLS: Primary | ICD-10-CM

## 2025-02-12 DIAGNOSIS — I10 ESSENTIAL HYPERTENSION: ICD-10-CM

## 2025-02-12 DIAGNOSIS — F32.1 MAJOR DEPRESSIVE DISORDER, SINGLE EPISODE, MODERATE (H): ICD-10-CM

## 2025-02-12 DIAGNOSIS — I48.19 PERSISTENT ATRIAL FIBRILLATION (H): ICD-10-CM

## 2025-02-12 DIAGNOSIS — N18.30 STAGE 3 CHRONIC KIDNEY DISEASE, UNSPECIFIED WHETHER STAGE 3A OR 3B CKD (H): ICD-10-CM

## 2025-02-12 NOTE — LETTER
2/12/2025      Sharon De La Vega  [address] [place] [address]        [facility] GERIATRICS    PRIMARY CARE PROVIDER AND CLINIC:  Jaylen Young MD, [address] [facility] [address] / [facility] [address]  Chief Complaint   Patient presents with     RECHECK      [place] Medical Record Number:  [id number]  Place of Service where encounter took place:  [facility] ([facility]) [[id number]]    Sharon De La Vega  is a 79 year old  (1945), admitted to the above facility from  [facility]. Hospital stay 12/29/2024 through 1/10/2024..     Patient seen today for follow up NP visit in TCU:    1. Loose stools    2. Parkinson's disease, unspecified whether dyskinesia present, unspecified whether manifestations fluctuate (H)    3. Stage 3 chronic kidney disease, unspecified whether stage 3a or 3b CKD (H)    4. Closed fracture of distal end of left humerus, unspecified fracture morphology, initial encounter    5. S/P TAVR (transcatheter aortic valve replacement)    6. Major depressive disorder, single episode, moderate (H)    7. Obesity, morbid (H)    8. Essential hypertension    9. Persistent atrial fibrillation (H)          HPI:    + Norovirus, outbreak in facility, patients with similar symptoms.   Loose stools resolving, offers no concerns today.   Pain overall controlled. Had follow up with Ortho, Now with cast to left arm/hand.   History of dementia. Limited recall. Recurrent falls.   Denies HA, chest pain, palpitations, dizziness. No fevers, chills. On ASA and Metop. No troubles breathing, no SOB, no Concerns with urination, no constipation.   Eating and drinking okay. Sleeping okay. Participating in therapies.    Hgb stable at 8.3  Lives at home with son and daughter in law, ambulates independently.      CODE STATUS/ADVANCE DIRECTIVES DISCUSSION:  Full Code  CPR/Full code   ALLERGIES:   Allergies   Allergen Reactions     Acetaminophen-Codeine GI  Disturbance     Indigestion     Celecoxib Unknown     Codeine Camsylate Unknown     Conj Estrog-Medroxyprogest Ace Other (See Comments)     Bleeding     Estrogens, Conjugated Synthetic A Unknown     Nsaids Unknown     Sulfa Antibiotics Unknown      PAST MEDICAL HISTORY:   Past Medical History:   Diagnosis Date     Anxiety 02/14/2013    Formatting of this note might be different from the original.  CURRENT MEDICATION(S) celexa 40 mg;   Lorazepam prn  Only occ  Stopped no need 11-17. Use PHQ 9 = 6   11-15;       Aortic valve stenosis 04/07/2023     Essential hypertension 10/29/2012     Hypercholesterolemia 10/29/2012     Hypothyroidism 10/29/2012     Major depressive disorder, single episode, moderate (H) 11/28/2022    Formatting of this note might be different from the original.  CURRENT MEDICATION(S) citalopram 40 mg    PHQ 9 =  2  11-11;  phq 9 = 2    12-11;  = 2   5-12; = 2   11-12;  Called her  Doing better  No need to see a psychologist. 1-14  PHQ 9 = 4   8-14;  = 3   12-14; OK 11-17;       Major neurocognitive disorder due to Alzheimer's disease (H) 06/20/2022     Psoriatic arthritis (H) 10/29/2012      PAST SURGICAL HISTORY:   has a past surgical history that includes Cholecystectomy (1964); appendectomy (1964); carpal tunnel release rt/lt (Bilateral, 1982); Cataract Extraction, Bilateral (1998); EXCISE HAND/FOOT NEUROMA (2004); KNEE SCOPE,MED OR LAT MENIS REPAIR (Right, 2000); Total Knee Arthroplasty (Right, 2012); Trabeculectomy (Right, 1998); Coronary Angiogram (N/A, 12/31/2024); Transcatheter Aortic Valve Replacement-Femoral Approach (N/A, 1/3/2025); Or Transcatheter Aortic Valve Replacement, Femoral Percutaneous Approach (Standby) (N/A, 1/3/2025); Open reduction internal fixation humerus distal (Left, 1/7/2025); and Transposition ulnar nerve (elbow) (Left, 1/7/2025).  FAMILY HISTORY: family history includes Lung Cancer in her father; Multiple myeloma in her brother.  SOCIAL HISTORY:   reports that she  quit smoking about 38 years ago. Her smoking use included cigarettes. She has never used smokeless tobacco. She reports that she does not currently use alcohol.  Patient's living condition: lives with family,       Post Discharge Medication Reconciliation Status:   MED REC REQUIRED  Post Medication Reconciliation Status: discharge medications reconciled, continue medications without change       Current Outpatient Medications   Medication Sig Dispense Refill     acetaminophen (TYLENOL) 325 MG tablet Take 2 tablets (650 mg) by mouth every 4 hours as needed for mild pain or headaches. 100 tablet 0     aspirin 81 MG EC tablet Take 1 tablet (81 mg) by mouth daily.       buPROPion (WELLBUTRIN XL) 150 MG 24 hr tablet Take 150 mg by mouth every morning.       citalopram (CELEXA) 20 MG tablet Take 1 tablet by mouth every morning.       cyanocobalamin (VITAMIN B-12) 1000 MCG tablet Take 1,000 mcg by mouth every morning.       donepezil (ARICEPT) 10 MG tablet Take 1 tablet (10 mg) by mouth at bedtime 90 tablet 3     folic acid (FOLVITE) 1 MG tablet Take 1 mg by mouth every morning.       leflunomide (ARAVA) 10 MG tablet Take 1 tablet by mouth every morning.       levothyroxine (SYNTHROID/LEVOTHROID) 100 MCG tablet Take 1 tablet by mouth every morning.       lisinopril (ZESTRIL) 10 MG tablet Take 3 tablets (30 mg) by mouth daily.       memantine (NAMENDA) 10 MG tablet TAKE 1 TABLET BY MOUTH TWICE A  tablet 0     methotrexate 2.5 MG tablet Take 10 mg by mouth once a week On Thursday       metoprolol tartrate (LOPRESSOR) 25 MG tablet Take 1 tablet (25 mg) by mouth 2 times daily.       nystatin (MYCOSTATIN) 914902 UNIT/GM external powder Apply topically 2 times daily as needed.       QUEtiapine (SEROQUEL) 25 MG tablet Take 0.5 tablets (12.5 mg) by mouth every 6 hours as needed (Agitation).       senna-docusate (SENOKOT-S/PERICOLACE) 8.6-50 MG tablet Take 2 tablets by mouth 2 times daily as needed for constipation. 30  "tablet 0     simvastatin (ZOCOR) 20 MG tablet Take 20 mg by mouth every evening.       vitamin D3 (CHOLECALCIFEROL) 50 mcg (2000 units) tablet Take 1 tablet by mouth every morning.       vitamin E (TOCOPHEROL) 1000 units (450 mg) CAPS capsule TAKE 1 CAPSULE BY MOUTH TWICE A  capsule 0     No current facility-administered medications for this visit.       ROS:  10 point ROS of systems including Constitutional, Eyes, Respiratory, Cardiovascular, Gastroenterology, Genitourinary, Integumentary, Musculoskeletal, Psychiatric were all negative except for pertinent positives noted in my HPI.    Vitals:  /66   Pulse 58   Temp 98  F (36.7  C)   Resp 16   Ht 1.6 m (5' 3\")   Wt 103.9 kg (229 lb)   SpO2 96%   BMI 40.57 kg/m    Exam:  GENERAL APPEARANCE:  Alert, in no distress, oriented, cooperative. Laying in bed   ENT:  Mouth and posterior oropharynx normal, moist mucous membranes  EYES:  EOM, conjunctivae, lids, pupils and irises normal  NECK:  No adenopathy,masses or thyromegaly  RESP:  respiratory effort and palpation of chest normal, lungs clear to auscultation , no respiratory distress  CV:  Palpation and auscultation of heart done , regular rate and rhythm, no murmur, rub, or gallop, no edema to LE  GI/ABDOMEN:  normal bowel sounds, soft, nontender, no hepatosplenomegaly or other masses  M/S:   moves extremities spontaneously. Left arm in cast, Able to wiggle all fingers   SKIN:  no rashes to exposed skin.  NEURO:   intact   PSYCH:  oriented X 2, pleasantly confused,  affect and mood normal,       Lab/Diagnostic data:  Recent labs in [facility] reviewed by me today.       Cryptosporidium species  Negative Negative     Giardia lamblia  Negative Negative    Norovirus Gl/Gll  Negative Positive Abnormal    Comment: Per  notice, the Norovirus positive target may be a false positive result; please correlate with clinical findings. The virus may persist for many weeks to months.    Rotavirus A  Negative " Negative         Hemoglobin   Date Value Ref Range Status   01/16/2025 8.3 (L) 11.7 - 15.7 g/dL Final     Creatinine   Date Value Ref Range Status   01/16/2025 0.88 0.51 - 0.95 mg/dL Final       Last Comprehensive Metabolic Panel:  Lab Results   Component Value Date     01/10/2025    POTASSIUM 4.3 01/10/2025    CHLORIDE 105 01/10/2025    CO2 25 01/10/2025    ANIONGAP 7 01/10/2025     (H) 01/10/2025    BUN 17.3 01/10/2025    CR 0.88 01/16/2025    GFRESTIMATED 67 01/16/2025    RIZWANA 8.4 (L) 01/10/2025       Lab Results   Component Value Date    WBC 8.6 01/10/2025     Lab Results   Component Value Date    RBC 2.78 01/10/2025     Lab Results   Component Value Date    HGB 8.7 01/10/2025     Lab Results   Component Value Date    HCT 27.2 01/10/2025     Lab Results   Component Value Date    MCV 98 01/10/2025     Lab Results   Component Value Date    MCH 31.3 01/10/2025     Lab Results   Component Value Date    MCHC 32.0 01/10/2025     Lab Results   Component Value Date    RDW 16.0 01/10/2025     Lab Results   Component Value Date     01/10/2025       ASSESSMENT/PLAN:      Loose stools  +Norovirus: other patients in TCU with similar symptoms. She is improving today. No further emesis or loose stools  GI rest, fluids, tylenol PRN if fever.   Already on contact precautions.  Monitor       Acute left comminuted intra-articular mildly displaced distal humerus fracture, minimally displaced olecranon process fracture, minimally displaced radial head fracture.   - Status post splint/sling  - s/p ORIF Left elbow 1/7  - Pain controlled with PRN tylenol   - Follow up with Ortho 1/23-Had had placed.   Recheck labs stable as above  Discontinue Oxycodone.      Acute drop in Hb  - Hb dropped 10.5 -> 8.9 -> 8.6 -> 8.1, stable ~ 8.5  - post op estimated blood loss 100ml  - No evidence of bleeding. Repeat Hgb stable      HUMA - resolved  - suspect pre-renal  - s/p IV fluids  Creat stable 0.88     Atrial fibrillation with  controlled ventricular response, new onset  - Metoprolol 25mg bid  - Needs anticoagulation, but has frequent falls, on asa  - Consider Watchman procedure outpatient  Currently rate controlled in TCU     Severe symptomatic aortic valvular stenosis  - TTE (12/29/24):  LVEF 60%, mean gradient 57 mmHg with peak velocity of 4.6 m/s. Calculated valve area 0.60-0.63 cmÂ .  - Coronary angiogram (12/29/24) normal coronary arteries.  - S/P TAVR (1/3/2025) with a 23mm Zelaya Darian Ultra Resilia Valve  - Follow up with Cardiology  Echo scheduled for 2/4 with another follow up 2/21     Recurrent falls  - Limited historian with dementia  - Aortic valve stenosis possibly contributory  - CT head & C spine unrevealing for acute process  - PT/OT     Intermittent hypotension  Essential hypertension   - Lisinopril was decreased inpatient.   - On Metoprolol with holding parameters  - now with elevated BP's, increase lisinopril back to 30mg every day.      Possible UTI  - Iabx completed  Reports no concerns today. Escobar was removed inpatient.      Alzheimer's dementia without behavioral disturbance  - Risk for violent behavior and/or acute delirium  - Donepezil, memantine  - As needed quetiapine, hasn't required any in last few days  Monitor, recheck labs ordered   PT/OT     Hypokalemia- resolved     Intermittent urine retention  - Escobar catheter removed 01/10, reports no concerns. Completed abx for UTI.  Monitor    Psoriatic arthritis  Noted, history. No concerns today  Defer to PCP              Electronically signed by:  Katie العلي CNP       Total time greater than 36 minutes reviewing chart in EPIC and PCC, medications, labs, vitals. Discussing with social work, physical therapy, occupational therapy and nursing.                   Sincerely,        Katie العلي CNP    Electronically signed

## 2025-02-12 NOTE — PROGRESS NOTES
[facility] GERIATRICS    PRIMARY CARE PROVIDER AND CLINIC:  Jaylen Young MD, [address] / [facility] [address]  Chief Complaint   Patient presents with    MARY GRACEECK      [place] Medical Record Number:  [id number]  Place of Service where encounter took place:  [facility] ([facility]) [[id number]]    Sharon De La Vega  is a 79 year old  (1945), admitted to the above facility from  [facility]. Hospital stay 12/29/2024 through 1/10/2024..     Patient seen today for follow up NP visit in TCU:    1. Loose stools    2. Parkinson's disease, unspecified whether dyskinesia present, unspecified whether manifestations fluctuate (H)    3. Stage 3 chronic kidney disease, unspecified whether stage 3a or 3b CKD (H)    4. Closed fracture of distal end of left humerus, unspecified fracture morphology, initial encounter    5. S/P TAVR (transcatheter aortic valve replacement)    6. Major depressive disorder, single episode, moderate (H)    7. Obesity, morbid (H)    8. Essential hypertension    9. Persistent atrial fibrillation (H)          HPI:    + Norovirus, outbreak in facility, patients with similar symptoms.   Loose stools resolving, offers no concerns today.   Pain overall controlled. Had follow up with Ortho, Now with cast to left arm/hand.   History of dementia. Limited recall. Recurrent falls.   Denies HA, chest pain, palpitations, dizziness. No fevers, chills. On ASA and Metop. No troubles breathing, no SOB, no Concerns with urination, no constipation.   Eating and drinking okay. Sleeping okay. Participating in therapies.    Hgb stable at 8.3  Lives at home with son and daughter in law, ambulates independently.      CODE STATUS/ADVANCE DIRECTIVES DISCUSSION:  Full Code  CPR/Full code   ALLERGIES:   Allergies   Allergen Reactions    Acetaminophen-Codeine GI Disturbance     Indigestion    Celecoxib Unknown    Codeine Camsylate Unknown    Conj  Estrog-Medroxyprogest Ace Other (See Comments)     Bleeding    Estrogens, Conjugated Synthetic A Unknown    Nsaids Unknown    Sulfa Antibiotics Unknown      PAST MEDICAL HISTORY:   Past Medical History:   Diagnosis Date    Anxiety 02/14/2013    Formatting of this note might be different from the original.  CURRENT MEDICATION(S) celexa 40 mg;   Lorazepam prn  Only occ  Stopped no need 11-17. Use PHQ 9 = 6   11-15;      Aortic valve stenosis 04/07/2023    Essential hypertension 10/29/2012    Hypercholesterolemia 10/29/2012    Hypothyroidism 10/29/2012    Major depressive disorder, single episode, moderate (H) 11/28/2022    Formatting of this note might be different from the original.  CURRENT MEDICATION(S) citalopram 40 mg    PHQ 9 =  2  11-11;  phq 9 = 2    12-11;  = 2   5-12; = 2   11-12;  Called her  Doing better  No need to see a psychologist. 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) 06/20/2022    Psoriatic arthritis (H) 10/29/2012      PAST SURGICAL HISTORY:   has a past surgical history that includes Cholecystectomy (1964); appendectomy (1964); carpal tunnel release rt/lt (Bilateral, 1982); Cataract Extraction, Bilateral (1998); EXCISE HAND/FOOT NEUROMA (2004); KNEE SCOPE,MED OR LAT MENIS REPAIR (Right, 2000); Total Knee Arthroplasty (Right, 2012); Trabeculectomy (Right, 1998); Coronary Angiogram (N/A, 12/31/2024); Transcatheter Aortic Valve Replacement-Femoral Approach (N/A, 1/3/2025); Or Transcatheter Aortic Valve Replacement, Femoral Percutaneous Approach (Standby) (N/A, 1/3/2025); Open reduction internal fixation humerus distal (Left, 1/7/2025); and Transposition ulnar nerve (elbow) (Left, 1/7/2025).  FAMILY HISTORY: family history includes Lung Cancer in her father; Multiple myeloma in her brother.  SOCIAL HISTORY:   reports that she quit smoking about 38 years ago. Her smoking use included cigarettes. She has never used smokeless  tobacco. She reports that she does not currently use alcohol.  Patient's living condition: lives with family,       Post Discharge Medication Reconciliation Status:   MED REC REQUIRED  Post Medication Reconciliation Status: discharge medications reconciled, continue medications without change       Current Outpatient Medications   Medication Sig Dispense Refill    acetaminophen (TYLENOL) 325 MG tablet Take 2 tablets (650 mg) by mouth every 4 hours as needed for mild pain or headaches. 100 tablet 0    aspirin 81 MG EC tablet Take 1 tablet (81 mg) by mouth daily.      buPROPion (WELLBUTRIN XL) 150 MG 24 hr tablet Take 150 mg by mouth every morning.      citalopram (CELEXA) 20 MG tablet Take 1 tablet by mouth every morning.      cyanocobalamin (VITAMIN B-12) 1000 MCG tablet Take 1,000 mcg by mouth every morning.      donepezil (ARICEPT) 10 MG tablet Take 1 tablet (10 mg) by mouth at bedtime 90 tablet 3    folic acid (FOLVITE) 1 MG tablet Take 1 mg by mouth every morning.      leflunomide (ARAVA) 10 MG tablet Take 1 tablet by mouth every morning.      levothyroxine (SYNTHROID/LEVOTHROID) 100 MCG tablet Take 1 tablet by mouth every morning.      lisinopril (ZESTRIL) 10 MG tablet Take 3 tablets (30 mg) by mouth daily.      memantine (NAMENDA) 10 MG tablet TAKE 1 TABLET BY MOUTH TWICE A  tablet 0    methotrexate 2.5 MG tablet Take 10 mg by mouth once a week On Thursday      metoprolol tartrate (LOPRESSOR) 25 MG tablet Take 1 tablet (25 mg) by mouth 2 times daily.      nystatin (MYCOSTATIN) 884185 UNIT/GM external powder Apply topically 2 times daily as needed.      QUEtiapine (SEROQUEL) 25 MG tablet Take 0.5 tablets (12.5 mg) by mouth every 6 hours as needed (Agitation).      senna-docusate (SENOKOT-S/PERICOLACE) 8.6-50 MG tablet Take 2 tablets by mouth 2 times daily as needed for constipation. 30 tablet 0    simvastatin (ZOCOR) 20 MG tablet Take 20 mg by mouth every evening.      vitamin D3 (CHOLECALCIFEROL) 50 mcg  "(2000 units) tablet Take 1 tablet by mouth every morning.      vitamin E (TOCOPHEROL) 1000 units (450 mg) CAPS capsule TAKE 1 CAPSULE BY MOUTH TWICE A  capsule 0     No current facility-administered medications for this visit.       ROS:  10 point ROS of systems including Constitutional, Eyes, Respiratory, Cardiovascular, Gastroenterology, Genitourinary, Integumentary, Musculoskeletal, Psychiatric were all negative except for pertinent positives noted in my HPI.    Vitals:  /66   Pulse 58   Temp 98  F (36.7  C)   Resp 16   Ht 1.6 m (5' 3\")   Wt 103.9 kg (229 lb)   SpO2 96%   BMI 40.57 kg/m    Exam:  GENERAL APPEARANCE:  Alert, in no distress, oriented, cooperative. Laying in bed   ENT:  Mouth and posterior oropharynx normal, moist mucous membranes  EYES:  EOM, conjunctivae, lids, pupils and irises normal  NECK:  No adenopathy,masses or thyromegaly  RESP:  respiratory effort and palpation of chest normal, lungs clear to auscultation , no respiratory distress  CV:  Palpation and auscultation of heart done , regular rate and rhythm, no murmur, rub, or gallop, no edema to LE  GI/ABDOMEN:  normal bowel sounds, soft, nontender, no hepatosplenomegaly or other masses  M/S:   moves extremities spontaneously. Left arm in cast, Able to wiggle all fingers   SKIN:  no rashes to exposed skin.  NEURO:   intact   PSYCH:  oriented X 2, pleasantly confused,  affect and mood normal,       Lab/Diagnostic data:  Recent labs in [facility] reviewed by me today.       Cryptosporidium species  Negative Negative     Giardia lamblia  Negative Negative    Norovirus Gl/Gll  Negative Positive Abnormal    Comment: Per  notice, the Norovirus positive target may be a false positive result; please correlate with clinical findings. The virus may persist for many weeks to months.    Rotavirus A  Negative Negative         Hemoglobin   Date Value Ref Range Status   01/16/2025 8.3 (L) 11.7 - 15.7 g/dL Final     Creatinine   Date " Value Ref Range Status   01/16/2025 0.88 0.51 - 0.95 mg/dL Final       Last Comprehensive Metabolic Panel:  Lab Results   Component Value Date     01/10/2025    POTASSIUM 4.3 01/10/2025    CHLORIDE 105 01/10/2025    CO2 25 01/10/2025    ANIONGAP 7 01/10/2025     (H) 01/10/2025    BUN 17.3 01/10/2025    CR 0.88 01/16/2025    GFRESTIMATED 67 01/16/2025    RIZWANA 8.4 (L) 01/10/2025       Lab Results   Component Value Date    WBC 8.6 01/10/2025     Lab Results   Component Value Date    RBC 2.78 01/10/2025     Lab Results   Component Value Date    HGB 8.7 01/10/2025     Lab Results   Component Value Date    HCT 27.2 01/10/2025     Lab Results   Component Value Date    MCV 98 01/10/2025     Lab Results   Component Value Date    MCH 31.3 01/10/2025     Lab Results   Component Value Date    MCHC 32.0 01/10/2025     Lab Results   Component Value Date    RDW 16.0 01/10/2025     Lab Results   Component Value Date     01/10/2025       ASSESSMENT/PLAN:      Loose stools  +Norovirus: other patients in TCU with similar symptoms. She is improving today. No further emesis or loose stools  GI rest, fluids, tylenol PRN if fever.   Already on contact precautions.  Monitor       Acute left comminuted intra-articular mildly displaced distal humerus fracture, minimally displaced olecranon process fracture, minimally displaced radial head fracture.   - Status post splint/sling  - s/p ORIF Left elbow 1/7  - Pain controlled with PRN tylenol   - Follow up with Ortho 1/23-Had had placed.   Recheck labs stable as above  Discontinue Oxycodone.      Acute drop in Hb  - Hb dropped 10.5 -> 8.9 -> 8.6 -> 8.1, stable ~ 8.5  - post op estimated blood loss 100ml  - No evidence of bleeding. Repeat Hgb stable      HUMA - resolved  - suspect pre-renal  - s/p IV fluids  Creat stable 0.88     Atrial fibrillation with controlled ventricular response, new onset  - Metoprolol 25mg bid  - Needs anticoagulation, but has frequent falls, on asa  -  Consider Watchman procedure outpatient  Currently rate controlled in TCU     Severe symptomatic aortic valvular stenosis  - TTE (12/29/24):  LVEF 60%, mean gradient 57 mmHg with peak velocity of 4.6 m/s. Calculated valve area 0.60-0.63 cmÂ .  - Coronary angiogram (12/29/24) normal coronary arteries.  - S/P TAVR (1/3/2025) with a 23mm Zelaya Darian Ultra Resilia Valve  - Follow up with Cardiology  Echo scheduled for 2/4 with another follow up 2/21     Recurrent falls  - Limited historian with dementia  - Aortic valve stenosis possibly contributory  - CT head & C spine unrevealing for acute process  - PT/OT     Intermittent hypotension  Essential hypertension   - Lisinopril was decreased inpatient.   - On Metoprolol with holding parameters  - now with elevated BP's, increase lisinopril back to 30mg every day.      Possible UTI  - Iabx completed  Reports no concerns today. Escobar was removed inpatient.      Alzheimer's dementia without behavioral disturbance  - Risk for violent behavior and/or acute delirium  - Donepezil, memantine  - As needed quetiapine, hasn't required any in last few days  Monitor, recheck labs ordered   PT/OT     Hypokalemia- resolved     Intermittent urine retention  - Escobar catheter removed 01/10, reports no concerns. Completed abx for UTI.  Monitor    Psoriatic arthritis  Noted, history. No concerns today  Defer to PCP              Electronically signed by:  Katie العلي CNP       Total time greater than 36 minutes reviewing chart in EPIC and PCC, medications, labs, vitals. Discussing with social work, physical therapy, occupational therapy and nursing.

## 2025-02-17 ENCOUNTER — HOSPITAL ENCOUNTER (OUTPATIENT)
Dept: CARDIOLOGY | Facility: HOSPITAL | Age: 80
Discharge: HOME | End: 2025-02-17
Attending: INTERNAL MEDICINE | Admitting: INTERNAL MEDICINE
Payer: COMMERCIAL

## 2025-02-17 DIAGNOSIS — I35.0 SEVERE AORTIC STENOSIS: ICD-10-CM

## 2025-02-17 LAB — LVEF ECHO: NORMAL

## 2025-02-17 PROCEDURE — 255N000002 HC RX 255 OP 636: Performed by: INTERNAL MEDICINE

## 2025-02-17 PROCEDURE — 93306 TTE W/DOPPLER COMPLETE: CPT | Mod: 26 | Performed by: INTERNAL MEDICINE

## 2025-02-17 PROCEDURE — C8929 TTE W OR WO FOL WCON,DOPPLER: HCPCS

## 2025-02-17 RX ADMIN — PERFLUTREN 2 ML: 6.52 INJECTION, SUSPENSION INTRAVENOUS at 14:10

## 2025-02-19 ENCOUNTER — TRANSITIONAL CARE UNIT VISIT (OUTPATIENT)
Dept: GERIATRICS | Facility: CLINIC | Age: 80
End: 2025-02-19
Payer: COMMERCIAL

## 2025-02-19 VITALS
OXYGEN SATURATION: 98 % | DIASTOLIC BLOOD PRESSURE: 63 MMHG | HEIGHT: 63 IN | SYSTOLIC BLOOD PRESSURE: 150 MMHG | TEMPERATURE: 98.9 F | HEART RATE: 55 BPM | RESPIRATION RATE: 18 BRPM | BODY MASS INDEX: 39.69 KG/M2 | WEIGHT: 224 LBS

## 2025-02-19 DIAGNOSIS — G20.A1 PARKINSON'S DISEASE, UNSPECIFIED WHETHER DYSKINESIA PRESENT, UNSPECIFIED WHETHER MANIFESTATIONS FLUCTUATE (H): ICD-10-CM

## 2025-02-19 DIAGNOSIS — Z95.2 S/P TAVR (TRANSCATHETER AORTIC VALVE REPLACEMENT): ICD-10-CM

## 2025-02-19 DIAGNOSIS — E66.01 OBESITY, MORBID (H): ICD-10-CM

## 2025-02-19 DIAGNOSIS — I10 ESSENTIAL HYPERTENSION: ICD-10-CM

## 2025-02-19 DIAGNOSIS — I48.19 PERSISTENT ATRIAL FIBRILLATION (H): ICD-10-CM

## 2025-02-19 DIAGNOSIS — N18.30 STAGE 3 CHRONIC KIDNEY DISEASE, UNSPECIFIED WHETHER STAGE 3A OR 3B CKD (H): ICD-10-CM

## 2025-02-19 DIAGNOSIS — R19.5 LOOSE STOOLS: ICD-10-CM

## 2025-02-19 DIAGNOSIS — S42.402A CLOSED FRACTURE OF DISTAL END OF LEFT HUMERUS, UNSPECIFIED FRACTURE MORPHOLOGY, INITIAL ENCOUNTER: Primary | ICD-10-CM

## 2025-02-19 PROCEDURE — 99310 SBSQ NF CARE HIGH MDM 45: CPT | Performed by: NURSE PRACTITIONER

## 2025-02-19 NOTE — LETTER
2/19/2025      Sharon De La Vega  [address] [place] [address]        [facility] GERIATRICS    PRIMARY CARE PROVIDER AND CLINIC:  Jaylen Young MD, [address] / [facility] [place] [address]  Chief Complaint   Patient presents with     RECHECK      [place] Medical Record Number:  [id number]  Place of Service where encounter took place:  [facility] ([facility]) [[id number]]    Sharon De La Vega  is a 79 year old  (1945), admitted to the above facility from  [facility]. Hospital stay 12/29/2024 through 1/10/2024..     Patient seen today for follow up NP visit in TCU:    1. Closed fracture of distal end of left humerus, unspecified fracture morphology, initial encounter    2. Parkinson's disease, unspecified whether dyskinesia present, unspecified whether manifestations fluctuate (H)    3. Loose stools    4. S/P TAVR (transcatheter aortic valve replacement)    5. Essential hypertension    6. Obesity, morbid (H)    7. Stage 3 chronic kidney disease, unspecified whether stage 3a or 3b CKD (H)    8. Persistent atrial fibrillation (H)          HPI:    + Norovirus, outbreak in facility, patients with similar symptoms but seem to be improving.   Denied having loose stools, offers no concerns today.   Pain overall controlled with a reported pain level of 4/10. Had follow up with Ortho, Now with cast to left arm/hand.   History of dementia. Limited recall. Recurrent falls.   Denies HA, chest pain, palpitations, dizziness. No fevers, chills. On ASA and Metop. No troubles breathing, no SOB, no concerns with urination, complained of mild constipation this morning.   Eating and drinking okay. Sleeping okay. Participating in therapies.    Hgb stable at 8.3  Lives at home with son and daughter in law, ambulates independently.      CODE STATUS/ADVANCE DIRECTIVES DISCUSSION:  Full Code  CPR/Full code   ALLERGIES:   Allergies   Allergen Reactions      Acetaminophen-Codeine GI Disturbance     Indigestion     Celecoxib Unknown     Codeine Camsylate Unknown     Conj Estrog-Medroxyprogest Ace Other (See Comments)     Bleeding     Estrogens, Conjugated Synthetic A Unknown     Nsaids Unknown     Sulfa Antibiotics Unknown      PAST MEDICAL HISTORY:   Past Medical History:   Diagnosis Date     Anxiety 02/14/2013    Formatting of this note might be different from the original.  CURRENT MEDICATION(S) celexa 40 mg;   Lorazepam prn  Only occ  Stopped no need 11-17. Use PHQ 9 = 6   11-15;       Aortic valve stenosis 04/07/2023     Essential hypertension 10/29/2012     Hypercholesterolemia 10/29/2012     Hypothyroidism 10/29/2012     Major depressive disorder, single episode, moderate (H) 11/28/2022    Formatting of this note might be different from the original.  CURRENT MEDICATION(S) citalopram 40 mg    PHQ 9 =  2  11-11;  phq 9 = 2    12-11;  = 2   5-12; = 2   11-12;  Called her  Doing better  No need to see a psychologist. 1-14  PHQ 9 = 4   8-14;  = 3   12-14; OK 11-17;       Major neurocognitive disorder due to Alzheimer's disease (H) 06/20/2022     Psoriatic arthritis (H) 10/29/2012      PAST SURGICAL HISTORY:   has a past surgical history that includes Cholecystectomy (1964); appendectomy (1964); carpal tunnel release rt/lt (Bilateral, 1982); Cataract Extraction, Bilateral (1998); EXCISE HAND/FOOT NEUROMA (2004); KNEE SCOPE,MED OR LAT MENIS REPAIR (Right, 2000); Total Knee Arthroplasty (Right, 2012); Trabeculectomy (Right, 1998); Coronary Angiogram (N/A, 12/31/2024); Transcatheter Aortic Valve Replacement-Femoral Approach (N/A, 1/3/2025); Or Transcatheter Aortic Valve Replacement, Femoral Percutaneous Approach (Standby) (N/A, 1/3/2025); Open reduction internal fixation humerus distal (Left, 1/7/2025); and Transposition ulnar nerve (elbow) (Left, 1/7/2025).  FAMILY HISTORY: family history includes Lung Cancer in her father; Multiple myeloma in her brother.  SOCIAL  HISTORY:   reports that she quit smoking about 38 years ago. Her smoking use included cigarettes. She has never used smokeless tobacco. She reports that she does not currently use alcohol.  Patient's living condition: lives with family,       Post Discharge Medication Reconciliation Status:   MED REC REQUIRED  Post Medication Reconciliation Status: discharge medications reconciled, continue medications without change       Current Outpatient Medications   Medication Sig Dispense Refill     acetaminophen (TYLENOL) 325 MG tablet Take 2 tablets (650 mg) by mouth every 4 hours as needed for mild pain or headaches. 100 tablet 0     aspirin 81 MG EC tablet Take 1 tablet (81 mg) by mouth daily.       buPROPion (WELLBUTRIN XL) 150 MG 24 hr tablet Take 150 mg by mouth every morning.       citalopram (CELEXA) 20 MG tablet Take 1 tablet by mouth every morning.       cyanocobalamin (VITAMIN B-12) 1000 MCG tablet Take 1,000 mcg by mouth every morning.       donepezil (ARICEPT) 10 MG tablet Take 1 tablet (10 mg) by mouth at bedtime 90 tablet 3     folic acid (FOLVITE) 1 MG tablet Take 1 mg by mouth every morning.       leflunomide (ARAVA) 10 MG tablet Take 1 tablet by mouth every morning.       levothyroxine (SYNTHROID/LEVOTHROID) 100 MCG tablet Take 1 tablet by mouth every morning.       lisinopril (ZESTRIL) 10 MG tablet Take 3 tablets (30 mg) by mouth daily.       memantine (NAMENDA) 10 MG tablet TAKE 1 TABLET BY MOUTH TWICE A  tablet 0     methotrexate 2.5 MG tablet Take 10 mg by mouth once a week On Thursday       metoprolol tartrate (LOPRESSOR) 25 MG tablet Take 1 tablet (25 mg) by mouth 2 times daily.       nystatin (MYCOSTATIN) 335072 UNIT/GM external powder Apply topically 2 times daily as needed.       QUEtiapine (SEROQUEL) 25 MG tablet Take 0.5 tablets (12.5 mg) by mouth every 6 hours as needed (Agitation).       senna-docusate (SENOKOT-S/PERICOLACE) 8.6-50 MG tablet Take 2 tablets by mouth 2 times daily as  "needed for constipation. 30 tablet 0     simvastatin (ZOCOR) 20 MG tablet Take 20 mg by mouth every evening.       vitamin D3 (CHOLECALCIFEROL) 50 mcg (2000 units) tablet Take 1 tablet by mouth every morning.       vitamin E (TOCOPHEROL) 1000 units (450 mg) CAPS capsule TAKE 1 CAPSULE BY MOUTH TWICE A  capsule 0     No current facility-administered medications for this visit.       ROS:  10 point ROS of systems including Constitutional, Eyes, Respiratory, Cardiovascular, Gastroenterology, Genitourinary, Integumentary, Musculoskeletal, Psychiatric were all negative except for pertinent positives noted in my HPI.    Vitals:  BP (!) 150/63   Pulse 55   Temp 98.9  F (37.2  C)   Resp 18   Ht 1.6 m (5' 3\")   Wt 101.6 kg (224 lb)   SpO2 98%   BMI 39.68 kg/m    Exam:  GENERAL APPEARANCE:  Alert, in no distress, oriented, cooperative. Laying in bed   ENT:  Mouth and posterior oropharynx normal, moist mucous membranes  EYES:  EOM, conjunctivae, lids, pupils and irises normal  NECK:  No adenopathy,masses or thyromegaly  RESP:  respiratory effort and palpation of chest normal, lungs clear to auscultation , no respiratory distress  CV:  Palpation and auscultation of heart done , regular rate and rhythm, no murmur, rub, or gallop, no edema to LE  GI/ABDOMEN:  normal bowel sounds, soft, nontender, no hepatosplenomegaly or other masses  M/S:   moves extremities spontaneously. Left arm in cast, Able to wiggle all fingers   SKIN:  no rashes to exposed skin.  NEURO:   intact   PSYCH:  oriented X 2, pleasantly confused,  affect and mood normal,       Lab/Diagnostic data:  Recent labs in [facility] reviewed by me today.       Cryptosporidium species  Negative Negative     Giardia lamblia  Negative Negative    Norovirus Gl/Gll  Negative Positive Abnormal    Comment: Per  notice, the Norovirus positive target may be a false positive result; please correlate with clinical findings. The virus may persist for many weeks " to months.    Rotavirus A  Negative Negative         Hemoglobin   Date Value Ref Range Status   01/16/2025 8.3 (L) 11.7 - 15.7 g/dL Final     Creatinine   Date Value Ref Range Status   01/16/2025 0.88 0.51 - 0.95 mg/dL Final       Last Comprehensive Metabolic Panel:  Lab Results   Component Value Date     01/10/2025    POTASSIUM 4.3 01/10/2025    CHLORIDE 105 01/10/2025    CO2 25 01/10/2025    ANIONGAP 7 01/10/2025     (H) 01/10/2025    BUN 17.3 01/10/2025    CR 0.88 01/16/2025    GFRESTIMATED 67 01/16/2025    RIZWANA 8.4 (L) 01/10/2025       Lab Results   Component Value Date    WBC 8.6 01/10/2025     Lab Results   Component Value Date    RBC 2.78 01/10/2025     Lab Results   Component Value Date    HGB 8.7 01/10/2025     Lab Results   Component Value Date    HCT 27.2 01/10/2025     Lab Results   Component Value Date    MCV 98 01/10/2025     Lab Results   Component Value Date    MCH 31.3 01/10/2025     Lab Results   Component Value Date    MCHC 32.0 01/10/2025     Lab Results   Component Value Date    RDW 16.0 01/10/2025     Lab Results   Component Value Date     01/10/2025       ASSESSMENT/PLAN:      Loose stools  +Norovirus: other patients in TCU with similar symptoms. She is improving today. No further emesis or loose stools  GI rest, fluids, tylenol PRN if fever.   Already on contact precautions.  Monitor       Acute left comminuted intra-articular mildly displaced distal humerus fracture, minimally displaced olecranon process fracture, minimally displaced radial head fracture.   - Status post splint/sling  - s/p ORIF Left elbow 1/7  - Pain controlled with PRN tylenol   - Follow up with Ortho 1/23-Had had placed.   Recheck labs stable as above  Discontinue Oxycodone.      Acute drop in Hb  - Hb dropped 10.5 -> 8.9 -> 8.6 -> 8.1, stable ~ 8.5  - post op estimated blood loss 100ml  - No evidence of bleeding. Repeat Hgb stable      HUMA - resolved  - suspect pre-renal  - s/p IV fluids  Creat stable  0.88     Atrial fibrillation with controlled ventricular response, new onset  - Metoprolol 25mg bid  - Needs anticoagulation, but has frequent falls, on asa  - Consider Watchman procedure outpatient  Currently rate controlled in TCU     Severe symptomatic aortic valvular stenosis  - TTE (12/29/24):  LVEF 60%, mean gradient 57 mmHg with peak velocity of 4.6 m/s. Calculated valve area 0.60-0.63 cmÂ .  - Coronary angiogram (12/29/24) normal coronary arteries.  - S/P TAVR (1/3/2025) with a 23mm Zelaya Darian Ultra Resilia Valve  - Follow up with Cardiology  Echo scheduled for 2/4 with another follow up 2/21     Recurrent falls  - Limited historian with dementia  - Aortic valve stenosis possibly contributory  - CT head & C spine unrevealing for acute process  - PT/OT     Intermittent hypotension  Essential hypertension   - Lisinopril was decreased inpatient.   - On Metoprolol with holding parameters  - now with elevated BP's, increase lisinopril back to 30mg every day.      Possible UTI  - Iabx completed  Reports no concerns today. Escobar was removed inpatient.      Alzheimer's dementia without behavioral disturbance  - Risk for violent behavior and/or acute delirium  - Donepezil, memantine  - As needed quetiapine, hasn't required any in last few days  Monitor, recheck labs ordered   PT/OT     Hypokalemia- resolved     Intermittent urine retention  - Escobar catheter removed 01/10, reports no concerns. Completed abx for UTI.  Monitor    Psoriatic arthritis  Noted, history. No concerns today  Defer to PCP              Electronically signed by:  Katie العلي CNP   Seen in conjunction with Jose Enrique Ramsey DNP-FNP student.      I was present with the medical student/advanced practice registered nurse, who participated in the service and in the documentation of this note and/or observation along side of this provider. I have verified the history and personally performed the physical exam and medical decision making. I agree  with the assessment and plan of care as documented in the note.       Total time greater than 45  minutes reviewing chart in EPIC and PCC, medications, labs, vitals. Discussing with social work, physical therapy, occupational therapy and nursing.                   Sincerely,        Katie العلي CNP    Electronically signed

## 2025-02-19 NOTE — PROGRESS NOTES
[facility] GERIATRICS    PRIMARY CARE PROVIDER AND CLINIC:  Jaylen Young MD, [address] / [facility] [address]  Chief Complaint   Patient presents with    MARY GRACEECK      [place] Medical Record Number:  [id number]  Place of Service where encounter took place:  [facility] ([facility]) [[id number]]    Sharon De La Vega  is a 79 year old  (1945), admitted to the above facility from  [facility]. Hospital stay 12/29/2024 through 1/10/2024..     Patient seen today for follow up NP visit in TCU:    1. Closed fracture of distal end of left humerus, unspecified fracture morphology, initial encounter    2. Parkinson's disease, unspecified whether dyskinesia present, unspecified whether manifestations fluctuate (H)    3. Loose stools    4. S/P TAVR (transcatheter aortic valve replacement)    5. Essential hypertension    6. Obesity, morbid (H)    7. Stage 3 chronic kidney disease, unspecified whether stage 3a or 3b CKD (H)    8. Persistent atrial fibrillation (H)          HPI:    + Norovirus, outbreak in facility, patients with similar symptoms but seem to be improving.   Denied having loose stools, offers no concerns today.   Pain overall controlled with a reported pain level of 4/10. Had follow up with Ortho, Now with cast to left arm/hand.   History of dementia. Limited recall. Recurrent falls.   Denies HA, chest pain, palpitations, dizziness. No fevers, chills. On ASA and Metop. No troubles breathing, no SOB, no concerns with urination, complained of mild constipation this morning.   Eating and drinking okay. Sleeping okay. Participating in therapies.    Hgb stable at 8.3  Lives at home with son and daughter in law, ambulates independently.      CODE STATUS/ADVANCE DIRECTIVES DISCUSSION:  Full Code  CPR/Full code   ALLERGIES:   Allergies   Allergen Reactions    Acetaminophen-Codeine GI Disturbance     Indigestion    Celecoxib Unknown    Codeine  Camsylate Unknown    Conj Estrog-Medroxyprogest Ace Other (See Comments)     Bleeding    Estrogens, Conjugated Synthetic A Unknown    Nsaids Unknown    Sulfa Antibiotics Unknown      PAST MEDICAL HISTORY:   Past Medical History:   Diagnosis Date    Anxiety 02/14/2013    Formatting of this note might be different from the original.  CURRENT MEDICATION(S) celexa 40 mg;   Lorazepam prn  Only occ  Stopped no need 11-17. Use PHQ 9 = 6   11-15;      Aortic valve stenosis 04/07/2023    Essential hypertension 10/29/2012    Hypercholesterolemia 10/29/2012    Hypothyroidism 10/29/2012    Major depressive disorder, single episode, moderate (H) 11/28/2022    Formatting of this note might be different from the original.  CURRENT MEDICATION(S) citalopram 40 mg    PHQ 9 =  2  11-11;  phq 9 = 2    12-11;  = 2   5-12; = 2   11-12;  Called her  Doing better  No need to see a psychologist. 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) 06/20/2022    Psoriatic arthritis (H) 10/29/2012      PAST SURGICAL HISTORY:   has a past surgical history that includes Cholecystectomy (1964); appendectomy (1964); carpal tunnel release rt/lt (Bilateral, 1982); Cataract Extraction, Bilateral (1998); EXCISE HAND/FOOT NEUROMA (2004); KNEE SCOPE,MED OR LAT MENIS REPAIR (Right, 2000); Total Knee Arthroplasty (Right, 2012); Trabeculectomy (Right, 1998); Coronary Angiogram (N/A, 12/31/2024); Transcatheter Aortic Valve Replacement-Femoral Approach (N/A, 1/3/2025); Or Transcatheter Aortic Valve Replacement, Femoral Percutaneous Approach (Standby) (N/A, 1/3/2025); Open reduction internal fixation humerus distal (Left, 1/7/2025); and Transposition ulnar nerve (elbow) (Left, 1/7/2025).  FAMILY HISTORY: family history includes Lung Cancer in her father; Multiple myeloma in her brother.  SOCIAL HISTORY:   reports that she quit smoking about 38 years ago. Her smoking use included cigarettes. She has  never used smokeless tobacco. She reports that she does not currently use alcohol.  Patient's living condition: lives with family,       Post Discharge Medication Reconciliation Status:   MED REC REQUIRED  Post Medication Reconciliation Status: discharge medications reconciled, continue medications without change       Current Outpatient Medications   Medication Sig Dispense Refill    acetaminophen (TYLENOL) 325 MG tablet Take 2 tablets (650 mg) by mouth every 4 hours as needed for mild pain or headaches. 100 tablet 0    aspirin 81 MG EC tablet Take 1 tablet (81 mg) by mouth daily.      buPROPion (WELLBUTRIN XL) 150 MG 24 hr tablet Take 150 mg by mouth every morning.      citalopram (CELEXA) 20 MG tablet Take 1 tablet by mouth every morning.      cyanocobalamin (VITAMIN B-12) 1000 MCG tablet Take 1,000 mcg by mouth every morning.      donepezil (ARICEPT) 10 MG tablet Take 1 tablet (10 mg) by mouth at bedtime 90 tablet 3    folic acid (FOLVITE) 1 MG tablet Take 1 mg by mouth every morning.      leflunomide (ARAVA) 10 MG tablet Take 1 tablet by mouth every morning.      levothyroxine (SYNTHROID/LEVOTHROID) 100 MCG tablet Take 1 tablet by mouth every morning.      lisinopril (ZESTRIL) 10 MG tablet Take 3 tablets (30 mg) by mouth daily.      memantine (NAMENDA) 10 MG tablet TAKE 1 TABLET BY MOUTH TWICE A  tablet 0    methotrexate 2.5 MG tablet Take 10 mg by mouth once a week On Thursday      metoprolol tartrate (LOPRESSOR) 25 MG tablet Take 1 tablet (25 mg) by mouth 2 times daily.      nystatin (MYCOSTATIN) 218692 UNIT/GM external powder Apply topically 2 times daily as needed.      QUEtiapine (SEROQUEL) 25 MG tablet Take 0.5 tablets (12.5 mg) by mouth every 6 hours as needed (Agitation).      senna-docusate (SENOKOT-S/PERICOLACE) 8.6-50 MG tablet Take 2 tablets by mouth 2 times daily as needed for constipation. 30 tablet 0    simvastatin (ZOCOR) 20 MG tablet Take 20 mg by mouth every evening.      vitamin D3  "(CHOLECALCIFEROL) 50 mcg (2000 units) tablet Take 1 tablet by mouth every morning.      vitamin E (TOCOPHEROL) 1000 units (450 mg) CAPS capsule TAKE 1 CAPSULE BY MOUTH TWICE A  capsule 0     No current facility-administered medications for this visit.       ROS:  10 point ROS of systems including Constitutional, Eyes, Respiratory, Cardiovascular, Gastroenterology, Genitourinary, Integumentary, Musculoskeletal, Psychiatric were all negative except for pertinent positives noted in my HPI.    Vitals:  BP (!) 150/63   Pulse 55   Temp 98.9  F (37.2  C)   Resp 18   Ht 1.6 m (5' 3\")   Wt 101.6 kg (224 lb)   SpO2 98%   BMI 39.68 kg/m    Exam:  GENERAL APPEARANCE:  Alert, in no distress, oriented, cooperative. Laying in bed   ENT:  Mouth and posterior oropharynx normal, moist mucous membranes  EYES:  EOM, conjunctivae, lids, pupils and irises normal  NECK:  No adenopathy,masses or thyromegaly  RESP:  respiratory effort and palpation of chest normal, lungs clear to auscultation , no respiratory distress  CV:  Palpation and auscultation of heart done , regular rate and rhythm, no murmur, rub, or gallop, no edema to LE  GI/ABDOMEN:  normal bowel sounds, soft, nontender, no hepatosplenomegaly or other masses  M/S:   moves extremities spontaneously. Left arm in cast, Able to wiggle all fingers   SKIN:  no rashes to exposed skin.  NEURO:   intact   PSYCH:  oriented X 2, pleasantly confused,  affect and mood normal,       Lab/Diagnostic data:  Recent labs in [facility] reviewed by me today.       Cryptosporidium species  Negative Negative     Giardia lamblia  Negative Negative    Norovirus Gl/Gll  Negative Positive Abnormal    Comment: Per  notice, the Norovirus positive target may be a false positive result; please correlate with clinical findings. The virus may persist for many weeks to months.    Rotavirus A  Negative Negative         Hemoglobin   Date Value Ref Range Status   01/16/2025 8.3 (L) 11.7 - 15.7 " g/dL Final     Creatinine   Date Value Ref Range Status   01/16/2025 0.88 0.51 - 0.95 mg/dL Final       Last Comprehensive Metabolic Panel:  Lab Results   Component Value Date     01/10/2025    POTASSIUM 4.3 01/10/2025    CHLORIDE 105 01/10/2025    CO2 25 01/10/2025    ANIONGAP 7 01/10/2025     (H) 01/10/2025    BUN 17.3 01/10/2025    CR 0.88 01/16/2025    GFRESTIMATED 67 01/16/2025    RIZWANA 8.4 (L) 01/10/2025       Lab Results   Component Value Date    WBC 8.6 01/10/2025     Lab Results   Component Value Date    RBC 2.78 01/10/2025     Lab Results   Component Value Date    HGB 8.7 01/10/2025     Lab Results   Component Value Date    HCT 27.2 01/10/2025     Lab Results   Component Value Date    MCV 98 01/10/2025     Lab Results   Component Value Date    MCH 31.3 01/10/2025     Lab Results   Component Value Date    MCHC 32.0 01/10/2025     Lab Results   Component Value Date    RDW 16.0 01/10/2025     Lab Results   Component Value Date     01/10/2025       ASSESSMENT/PLAN:      Loose stools  +Norovirus: other patients in TCU with similar symptoms. She is improving today. No further emesis or loose stools  GI rest, fluids, tylenol PRN if fever.   Already on contact precautions.  Monitor       Acute left comminuted intra-articular mildly displaced distal humerus fracture, minimally displaced olecranon process fracture, minimally displaced radial head fracture.   - Status post splint/sling  - s/p ORIF Left elbow 1/7  - Pain controlled with PRN tylenol   - Follow up with Ortho 1/23-Had had placed.   Recheck labs stable as above  Discontinue Oxycodone.      Acute drop in Hb  - Hb dropped 10.5 -> 8.9 -> 8.6 -> 8.1, stable ~ 8.5  - post op estimated blood loss 100ml  - No evidence of bleeding. Repeat Hgb stable      HUMA - resolved  - suspect pre-renal  - s/p IV fluids  Creat stable 0.88     Atrial fibrillation with controlled ventricular response, new onset  - Metoprolol 25mg bid  - Needs anticoagulation,  but has frequent falls, on asa  - Consider Watchman procedure outpatient  Currently rate controlled in TCU     Severe symptomatic aortic valvular stenosis  - TTE (12/29/24):  LVEF 60%, mean gradient 57 mmHg with peak velocity of 4.6 m/s. Calculated valve area 0.60-0.63 cmÂ .  - Coronary angiogram (12/29/24) normal coronary arteries.  - S/P TAVR (1/3/2025) with a 23mm Zelaya Darian Ultra Resilia Valve  - Follow up with Cardiology  Echo scheduled for 2/4 with another follow up 2/21     Recurrent falls  - Limited historian with dementia  - Aortic valve stenosis possibly contributory  - CT head & C spine unrevealing for acute process  - PT/OT     Intermittent hypotension  Essential hypertension   - Lisinopril was decreased inpatient.   - On Metoprolol with holding parameters  - now with elevated BP's, increase lisinopril back to 30mg every day.      Possible UTI  - Iabx completed  Reports no concerns today. Escobar was removed inpatient.      Alzheimer's dementia without behavioral disturbance  - Risk for violent behavior and/or acute delirium  - Donepezil, memantine  - As needed quetiapine, hasn't required any in last few days  Monitor, recheck labs ordered   PT/OT     Hypokalemia- resolved     Intermittent urine retention  - Escobar catheter removed 01/10, reports no concerns. Completed abx for UTI.  Monitor    Psoriatic arthritis  Noted, history. No concerns today  Defer to PCP              Electronically signed by:  Katie العلي CNP   Seen in conjunction with Jose Enrique Ramsey DNP-FNP student.      I was present with the medical student/advanced practice registered nurse, who participated in the service and in the documentation of this note and/or observation along side of this provider. I have verified the history and personally performed the physical exam and medical decision making. I agree with the assessment and plan of care as documented in the note.       Total time greater than 45  minutes reviewing chart in EPIC  and PCC, medications, labs, vitals. Discussing with social work, physical therapy, occupational therapy and nursing.

## 2025-02-21 ENCOUNTER — LAB (OUTPATIENT)
Dept: CARDIOLOGY | Facility: CLINIC | Age: 80
End: 2025-02-21
Payer: COMMERCIAL

## 2025-02-21 DIAGNOSIS — Z95.2 S/P TAVR (TRANSCATHETER AORTIC VALVE REPLACEMENT): ICD-10-CM

## 2025-02-21 LAB
ANION GAP SERPL CALCULATED.3IONS-SCNC: 10 MMOL/L (ref 7–15)
BUN SERPL-MCNC: 15.8 MG/DL (ref 8–23)
CALCIUM SERPL-MCNC: 8.9 MG/DL (ref 8.8–10.4)
CHLORIDE SERPL-SCNC: 105 MMOL/L (ref 98–107)
CREAT SERPL-MCNC: 0.88 MG/DL (ref 0.51–0.95)
EGFRCR SERPLBLD CKD-EPI 2021: 67 ML/MIN/1.73M2
GLUCOSE SERPL-MCNC: 104 MG/DL (ref 70–99)
HCO3 SERPL-SCNC: 26 MMOL/L (ref 22–29)
POTASSIUM SERPL-SCNC: 4.5 MMOL/L (ref 3.4–5.3)
SODIUM SERPL-SCNC: 141 MMOL/L (ref 135–145)

## 2025-02-21 PROCEDURE — 36415 COLL VENOUS BLD VENIPUNCTURE: CPT

## 2025-02-21 PROCEDURE — 80053 COMPREHEN METABOLIC PANEL: CPT

## 2025-02-24 ENCOUNTER — LAB REQUISITION (OUTPATIENT)
Dept: LAB | Facility: CLINIC | Age: 80
End: 2025-02-24
Payer: COMMERCIAL

## 2025-02-24 DIAGNOSIS — I35.0 NONRHEUMATIC AORTIC (VALVE) STENOSIS: ICD-10-CM

## 2025-02-24 DIAGNOSIS — I10 ESSENTIAL (PRIMARY) HYPERTENSION: ICD-10-CM

## 2025-02-25 ENCOUNTER — TRANSITIONAL CARE UNIT VISIT (OUTPATIENT)
Dept: GERIATRICS | Facility: CLINIC | Age: 80
End: 2025-02-25
Payer: COMMERCIAL

## 2025-02-25 VITALS
OXYGEN SATURATION: 98 % | SYSTOLIC BLOOD PRESSURE: 147 MMHG | RESPIRATION RATE: 16 BRPM | BODY MASS INDEX: 39.51 KG/M2 | WEIGHT: 223 LBS | HEART RATE: 62 BPM | DIASTOLIC BLOOD PRESSURE: 79 MMHG | TEMPERATURE: 98.9 F | HEIGHT: 63 IN

## 2025-02-25 DIAGNOSIS — S42.402A CLOSED FRACTURE OF DISTAL END OF LEFT HUMERUS, UNSPECIFIED FRACTURE MORPHOLOGY, INITIAL ENCOUNTER: Primary | ICD-10-CM

## 2025-02-25 DIAGNOSIS — E03.9 HYPOTHYROIDISM, UNSPECIFIED TYPE: ICD-10-CM

## 2025-02-25 DIAGNOSIS — I48.19 PERSISTENT ATRIAL FIBRILLATION (H): ICD-10-CM

## 2025-02-25 DIAGNOSIS — Z95.2 S/P TAVR (TRANSCATHETER AORTIC VALVE REPLACEMENT): ICD-10-CM

## 2025-02-25 DIAGNOSIS — I10 ESSENTIAL HYPERTENSION: ICD-10-CM

## 2025-02-25 LAB
ERYTHROCYTE [DISTWIDTH] IN BLOOD BY AUTOMATED COUNT: 16.5 % (ref 10–15)
HCT VFR BLD AUTO: 38.4 % (ref 35–47)
HGB BLD-MCNC: 11.2 G/DL (ref 11.7–15.7)
MCH RBC QN AUTO: 30.6 PG (ref 26.5–33)
MCHC RBC AUTO-ENTMCNC: 29.2 G/DL (ref 31.5–36.5)
MCV RBC AUTO: 105 FL (ref 78–100)
PLATELET # BLD AUTO: 102 10E3/UL (ref 150–450)
RBC # BLD AUTO: 3.66 10E6/UL (ref 3.8–5.2)
WBC # BLD AUTO: 6 10E3/UL (ref 4–11)

## 2025-02-25 PROCEDURE — P9603 ONE-WAY ALLOW PRORATED MILES: HCPCS | Performed by: NURSE PRACTITIONER

## 2025-02-25 PROCEDURE — 85027 COMPLETE CBC AUTOMATED: CPT | Performed by: NURSE PRACTITIONER

## 2025-02-25 PROCEDURE — 99309 SBSQ NF CARE MODERATE MDM 30: CPT | Performed by: FAMILY MEDICINE

## 2025-02-25 PROCEDURE — 36415 COLL VENOUS BLD VENIPUNCTURE: CPT | Performed by: NURSE PRACTITIONER

## 2025-02-25 NOTE — LETTER
2/25/2025      Sharon De La Vega  [address] [place] [address]        No notes on file      Sincerely,        Linda Barkley MD    Electronically signed   dementia  - Aortic valve stenosis possibly contributory  - CT head & C spine unrevealing for acute process  - Imaging findings per #1  - Fall precautions     Intermittent hypotension  Essential hypertension   - BP intermittently low, suspect if dehydration  - Pro calcitonin negative, with minimal drop in Hb  - On Metoprolol with holding parameters  - reduced dose of Lisinopril 10mg     Possible UTI  - IV CTX x 3d course completed     Alzheimer's dementia without behavioral disturbance  - Risk for violent behavior and/or acute delirium  - Donepezil, memantine  - As needed quetiapine, hasn't required any in last few days     Hypokalemia- resolved     Intermittent urine retention  - Has frederick placed on 01/04  - Frederick catheter removed 01/10, TCU updated to follow up on TOV    Overall stabilized and discharged to TCU on 1/10/2025 for PT, OT, nursing cares, medical management and monitoring.       Today:  Last seen by orthopedics on 2/20/2025, cast changed to splint. Allowed light weight bearing 0-5 pounds, elbow ROM, RTC 5-6 weeks. Seen by cardiology on 2/21/2025, no new orders, follow up in valve clinic July 2025. Appetite is good. No SOB at rest, chest pain, dizziness, palpitations. No abdominal pain, constipation, diarrhea or urinary sx.       PAST MEDICAL HISTORY:  Past Medical History:   Diagnosis Date     Anxiety 02/14/2013    Formatting of this note might be different from the original.  CURRENT MEDICATION(S) celexa 40 mg;   Lorazepam prn  Only occ  Stopped no need 11-17. Use PHQ 9 = 6   11-15;       Aortic valve stenosis 04/07/2023     Essential hypertension 10/29/2012     Hypercholesterolemia 10/29/2012     Hypothyroidism 10/29/2012     Major depressive disorder, single episode, moderate (H) 11/28/2022    Formatting of this note might be different from the original.  CURRENT MEDICATION(S) citalopram 40 mg    PHQ 9 =  2  11-11;  phq 9 = 2    12-11;  = 2   5-12; = 2   11-12;  Called her  Doing better  No need to  see a psychologist. 1-14  PHQ 9 = 4   8-14;  = 3   12-14; OK 11-17;       Major neurocognitive disorder due to Alzheimer's disease (H) 06/20/2022     Psoriatic arthritis (H) 10/29/2012       MEDICATIONS:  Current Outpatient Medications   Medication Sig Dispense Refill     acetaminophen (TYLENOL) 325 MG tablet Take 2 tablets (650 mg) by mouth every 4 hours as needed for mild pain or headaches. 100 tablet 0     aspirin 81 MG EC tablet Take 1 tablet (81 mg) by mouth daily.       buPROPion (WELLBUTRIN XL) 150 MG 24 hr tablet Take 150 mg by mouth every morning.       citalopram (CELEXA) 20 MG tablet Take 1 tablet by mouth every morning.       cyanocobalamin (VITAMIN B-12) 1000 MCG tablet Take 1,000 mcg by mouth every morning.       donepezil (ARICEPT) 10 MG tablet Take 1 tablet (10 mg) by mouth at bedtime 90 tablet 3     folic acid (FOLVITE) 1 MG tablet Take 1 mg by mouth every morning.       leflunomide (ARAVA) 10 MG tablet Take 1 tablet by mouth every morning.       levothyroxine (SYNTHROID/LEVOTHROID) 100 MCG tablet Take 1 tablet by mouth every morning.       lisinopril (ZESTRIL) 10 MG tablet Take 3 tablets (30 mg) by mouth daily.       memantine (NAMENDA) 10 MG tablet TAKE 1 TABLET BY MOUTH TWICE A  tablet 0     methotrexate 2.5 MG tablet Take 10 mg by mouth once a week On Thursday       metoprolol tartrate (LOPRESSOR) 25 MG tablet Take 1 tablet (25 mg) by mouth 2 times daily.       nystatin (MYCOSTATIN) 801405 UNIT/GM external powder Apply topically 2 times daily as needed.       QUEtiapine (SEROQUEL) 25 MG tablet Take 0.5 tablets (12.5 mg) by mouth every 6 hours as needed (Agitation).       senna-docusate (SENOKOT-S/PERICOLACE) 8.6-50 MG tablet Take 2 tablets by mouth 2 times daily as needed for constipation. 30 tablet 0     simvastatin (ZOCOR) 20 MG tablet Take 20 mg by mouth every evening.       vitamin D3 (CHOLECALCIFEROL) 50 mcg (2000 units) tablet Take 1 tablet by mouth every morning.       vitamin E  "(TOCOPHEROL) 1000 units (450 mg) CAPS capsule TAKE 1 CAPSULE BY MOUTH TWICE A  capsule 0       PHYSICAL EXAM:  General: Patient is alert female, no distress.   Vitals: BP (!) 147/79   Pulse 62   Temp 98.9  F (37.2  C)   Resp 16   Ht 1.6 m (5' 3\")   Wt 101.2 kg (223 lb)   SpO2 98%   BMI 39.50 kg/m    HEENT: Head is NCAT. Eyes show no injection or icterus. Nares negative. Oropharynx well hydrated.  Neck:  No JVD.  Lungs: Non labored respirations.  : Deferred.  Extremities: Long arm splint on left.  Musculoskeletal: Degen changes.   Skin: Warm and dry.   Psych: Mood appears good.      LABS/DIAGNOSTIC DATA:  EXAM: XR FOREARM LEFT 2 VIEWS  LOCATION: [facility]  DATE: 12/29/2024  INDICATION: left elbow pain after fall  COMPARISON: None.                                                           IMPRESSION:   Acute significantly displaced transverse fracture of the distal humerus primarily extending through the transcondylar region. Distal humerus fragments are displaced medially relative to the humeral shaft. Radiocapitellar alignment appears intact. Findings are better evaluated on dedicated elbow radiographs.     Normal alignment of the wrist. No radius or ulna fracture. Moderate first CMC degenerative change.      ASSESSMENT/PLAN:  Left distal humerus fracture. Sustained in a fall, hx of falls. S/p ORIF on 1/7/2025. Last seen by orthopedics on 2/20/2025, cast changed to splint. Allowed light weight bearing 0-5 pounds, elbow ROM, RTC 5-6 weeks.   Afib. New onset. On aspirin for AC due to hx of falls.   Aortic stenosis. S/p TAVR on 1/3/2025. Seen by cardiology on 2/21/2025, no new orders, follow up in valve clinic July 2025.   HTN. On metoprolol, lisinopril. Monitor BPs in TCU, adjust meds if needed.   Hypothyroidism. On replacement levothyroxine.   Psoriatic arthritis. On Arava, methotrexate.   Dementia. On Donepezil, Memantine.   ABLA. Hgb down to low 8 range. Trend hgb in TCU. "   Depression and anxiety. On Celexa and Wellbutrin.      Electronically signed by: Linda Barkley MD         Sincerely,        Linda Barkley MD    Electronically signed

## 2025-02-25 NOTE — TELEPHONE ENCOUNTER
Spoke to son - haven't made decision yet.  Pt is being discharged from TCU and they don't know where she will go, but she can't go home alone.  Pt not on anticoag because of her falls.    Recommended son give it some thought, and call when ready.  -stephanie

## 2025-02-26 ENCOUNTER — DOCUMENTATION ONLY (OUTPATIENT)
Dept: GERIATRICS | Facility: CLINIC | Age: 80
End: 2025-02-26

## 2025-02-27 ENCOUNTER — TELEPHONE (OUTPATIENT)
Dept: GERIATRICS | Facility: CLINIC | Age: 80
End: 2025-02-27
Payer: COMMERCIAL

## 2025-02-27 NOTE — TELEPHONE ENCOUNTER
[facility] NON-EMERGENT ENCOUNTER    Sharon De La Vega is a 79 year old  (1945), call received today regarding:   Unwitnessed fall    Disposition  Nurse called to report that patient had a fall around 2:05 pm.  When staff entered the room she was noted to be sitting straight up on the floor.  2 red marks noted to left shoulder.  Patient denies any pain.  VS: 142/79, 64, 20, 98.0, and O2 93% on RA.  Staff assisted patient off of the floor and placed her in bed.  Facility will continue to monitor per facility protocol.        Requests  FYI      Electronically signed by:   Ashlee Reyes RN

## 2025-03-02 NOTE — PROGRESS NOTES
[facility] GERIATRICS  [place] Medical Record Number:  [id number]  Place of Service where encounter took place: [facility] ([facility]) [[id number]]  CODE STATUS:   CPR/Full code     Chief Complaint/Reason for Visit:  Chief Complaint   Patient presents with    Hospital F/U       HPI:   Sharon De La Vega is a 79 year old female with hx of HTN, psoriatic arthritis, dementia, falls, admitted to the hospital on 12/29/2024 as noted below.     [facility]  Hospitalist Discharge Summary  Date of Admission:  12/29/2024  Date of Discharge:  1/10/2025    Hospital Course  Sharon De La Vega is a 79 year old female admitted on 12/29/2024. She was brought back to the ED by ambulance from home for evaluation after a second fall at home     Acute left comminuted intra-articular mildly displaced distal humerus fracture, minimally displaced olecranon process fracture, minimally displaced radial head fracture.   - Status post splint/sling  - s/p ORIF Left elbow 1/7  - Pain control  - Post op care per Orthopedics     Acute drop in Hb  - Hb dropped 10.5 -> 8.9 -> 8.6 -> 8.1, stable ~ 8.5  - post op estimated blood loss 100ml  - No evidence of bleeding. Repeat Hb in 1 week at TCU     HUMA - resolved  - suspect pre-renal  - s/p IV fluids     Atrial fibrillation with controlled ventricular response, new onset  - Start Metoprolol 25mg bid  - Needs anticoagulation, but has frequent falls, on asa  - Consider Watchman procedure outpatient     Severe symptomatic aortic valvular stenosis  - TTE (12/29/24):  LVEF 60%, mean gradient 57 mmHg with peak velocity of 4.6 m/s. Calculated valve area 0.60-0.63 cmÂ .  - Coronary angiogram (12/29/24) normal coronary arteries.  - S/P TAVR (1/3/2025) with a 23mm Zelaya Darian Ultra Resilia Valve  - Follow up with Cardiology     Recurrent falls  - Limited historian with dementia  - Aortic valve stenosis possibly contributory  - CT head & C spine unrevealing for acute  process  - Imaging findings per #1  - Fall precautions     Intermittent hypotension  Essential hypertension   - BP intermittently low, suspect if dehydration  - Pro calcitonin negative, with minimal drop in Hb  - On Metoprolol with holding parameters  - reduced dose of Lisinopril 10mg     Possible UTI  - IV CTX x 3d course completed     Alzheimer's dementia without behavioral disturbance  - Risk for violent behavior and/or acute delirium  - Donepezil, memantine  - As needed quetiapine, hasn't required any in last few days     Hypokalemia- resolved     Intermittent urine retention  - Has frederick placed on 01/04  - Frederick catheter removed 01/10, TCU updated to follow up on TOV    Overall stabilized and discharged to TCU on 1/10/2025 for PT, OT, nursing cares, medical management and monitoring.       Today:  She is NWB LUE, in a splint. Pain is controlled. Follow up with orthopedics. She is forgetful. Appetite is good. No SOB at rest, chest pain, dizziness, palpitations. No abdominal pain, constipation, diarrhea or urinary sx. Lives with her son. No new vision or hearing concerns.       REVIEW OF SYSTEMS:  All others negative other than those noted in HPI.      PAST MEDICAL HISTORY:  Past Medical History:   Diagnosis Date    Anxiety 02/14/2013    Formatting of this note might be different from the original.  CURRENT MEDICATION(S) celexa 40 mg;   Lorazepam prn  Only occ  Stopped no need 11-17. Use PHQ 9 = 6   11-15;      Aortic valve stenosis 04/07/2023    Essential hypertension 10/29/2012    Hypercholesterolemia 10/29/2012    Hypothyroidism 10/29/2012    Major depressive disorder, single episode, moderate (H) 11/28/2022    Formatting of this note might be different from the original.  CURRENT MEDICATION(S) citalopram 40 mg    PHQ 9 =  2  11-11;  phq 9 = 2    12-11;  = 2   5-12; = 2   11-12;  Called her  Doing better  No need to see a psychologist. 1-14  PHQ 9 = 4   8-14;  = 3   12-14; OK 11-17;      Major neurocognitive  disorder due to Alzheimer's disease (H) 06/20/2022    Psoriatic arthritis (H) 10/29/2012       PAST SURGICAL HISTORY:  Past Surgical History:   Procedure Laterality Date    APPENDECTOMY  1964    CARPAL TUNNEL RELEASE RT/LT Bilateral 1982    CATARACT EXTRACTION, BILATERAL  1998    CHOLECYSTECTOMY  1964    CV CORONARY ANGIOGRAM N/A 12/31/2024    Procedure: Coronary Angiogram;  Surgeon: Farhad Blue MD;  Location: [facility] CV    CV TRANSCATHETER AORTIC VALVE REPLACEMENT-FEMORAL APPROACH N/A 1/3/2025    Procedure: Transcatheter Aortic Valve Replacement-Femoral Approach;  Surgeon: Graeme Torres MD;  Location: [facility] LAB CV    HC KNEE ARTHROSCOPY, MED/LAT MENISCUS REPAIR Right 2000    OPEN REDUCTION INTERNAL FIXATION HUMERUS DISTAL Left 1/7/2025    Procedure: OPEN REDUCTION INTERNAL FIXATION, FRACTURE, HUMERUS, DISTAL, WITH;  Surgeon: Jace Tamayo MD;  Location: [facility] OR    OR TRANSCATHETER AORTIC VALVE REPLACEMENT, FEMORAL PERCUTANEOUS APPROACH (STANDBY) N/A 1/3/2025    Procedure: OR TRANSCATHETER AORTIC VALVE REPLACEMENT, [facility]  Surgeon: Chester Oreilly MD;  Location: [facility] CV    KS EXCISE HAND/FOOT NEUROMA  2004    right foot    TOTAL KNEE ARTHROPLASTY Right 2012    TRABECULECTOMY Right 1998    laser surgery right eye    TRANSPOSITION ULNAR NERVE (ELBOW) Left 1/7/2025    Procedure: ULNAR NERVE TRANSPOSITION;  Surgeon: Jace Tamayo MD;  Location: [facility]       FAMILY HISTORY:  Family History   Problem Relation Age of Onset    Lung Cancer Father     Multiple myeloma Brother        SOCIAL HISTORY:  Social History     Socioeconomic History    Marital status:      Spouse name: Not on file    Number of children: Not on file    Years of education: Not on file    Highest education level: Not on file   Occupational History    Not on file   Tobacco Use    Smoking status: Former     Current packs/day: 0.00      Types: Cigarettes     Quit date:      Years since quittin.1    Smokeless tobacco: Never   Substance and Sexual Activity    Alcohol use: Not Currently    Drug use: Not on file    Sexual activity: Not on file   Other Topics Concern    Not on file   Social History Narrative    Not on file     Social Drivers of Health     Financial Resource Strain: Low Risk  (2024)    Financial Resource Strain     Within the past 12 months, have you or your family members you live with been unable to get utilities (heat, electricity) when it was really needed?: No   Food Insecurity: Low Risk  (2024)    Food Insecurity     Within the past 12 months, did you worry that your food would run out before you got money to buy more?: No     Within the past 12 months, did the food you bought just not last and you didn t have money to get more?: No   Transportation Needs: Low Risk  (2024)    Transportation Needs     Within the past 12 months, has lack of transportation kept you from medical appointments, getting your medicines, non-medical meetings or appointments, work, or from getting things that you need?: No   Physical Activity: Not on file   Stress: Not on file   Social Connections: Unknown (2023)    [facility], [facility]    Social Connections     Frequency of Communication with Friends and Family: Not on file   Interpersonal Safety: Low Risk  (2024)    Interpersonal Safety     Do you feel physically and emotionally safe where you currently live?: Yes     Within the past 12 months, have you been hit, slapped, kicked or otherwise physically hurt by someone?: No     Within the past 12 months, have you been humiliated or emotionally abused in other ways by your partner or ex-partner?: No   Housing Stability: Low Risk  (2024)    Housing Stability     Do you have housing? : Yes     Are you worried about losing your  housing?: No       MEDICATIONS:  Current Outpatient Medications   Medication Sig Dispense Refill    acetaminophen (TYLENOL) 325 MG tablet Take 2 tablets (650 mg) by mouth every 4 hours as needed for mild pain or headaches. 100 tablet 0    aspirin 81 MG EC tablet Take 1 tablet (81 mg) by mouth daily.      buPROPion (WELLBUTRIN XL) 150 MG 24 hr tablet Take 150 mg by mouth every morning.      citalopram (CELEXA) 20 MG tablet Take 1 tablet by mouth every morning.      cyanocobalamin (VITAMIN B-12) 1000 MCG tablet Take 1,000 mcg by mouth every morning.      donepezil (ARICEPT) 10 MG tablet Take 1 tablet (10 mg) by mouth at bedtime 90 tablet 3    folic acid (FOLVITE) 1 MG tablet Take 1 mg by mouth every morning.      leflunomide (ARAVA) 10 MG tablet Take 1 tablet by mouth every morning.      levothyroxine (SYNTHROID/LEVOTHROID) 100 MCG tablet Take 1 tablet by mouth every morning.      lisinopril (ZESTRIL) 10 MG tablet Take 3 tablets (30 mg) by mouth daily.      memantine (NAMENDA) 10 MG tablet TAKE 1 TABLET BY MOUTH TWICE A  tablet 0    methotrexate 2.5 MG tablet Take 10 mg by mouth once a week On Thursday      metoprolol tartrate (LOPRESSOR) 25 MG tablet Take 1 tablet (25 mg) by mouth 2 times daily.      nystatin (MYCOSTATIN) 092706 UNIT/GM external powder Apply topically 2 times daily as needed.      QUEtiapine (SEROQUEL) 25 MG tablet Take 0.5 tablets (12.5 mg) by mouth every 6 hours as needed (Agitation).      senna-docusate (SENOKOT-S/PERICOLACE) 8.6-50 MG tablet Take 2 tablets by mouth 2 times daily as needed for constipation. 30 tablet 0    simvastatin (ZOCOR) 20 MG tablet Take 20 mg by mouth every evening.      vitamin D3 (CHOLECALCIFEROL) 50 mcg (2000 units) tablet Take 1 tablet by mouth every morning.      vitamin E (TOCOPHEROL) 1000 units (450 mg) CAPS capsule TAKE 1 CAPSULE BY MOUTH TWICE A  capsule 0       ALLERGIES:  Allergies   Allergen Reactions    Acetaminophen-Codeine GI Disturbance      "Indigestion    Celecoxib Unknown    Codeine Camsylate Unknown    Conj Estrog-Medroxyprogest Ace Other (See Comments)     Bleeding    Estrogens, Conjugated Synthetic A Unknown    Nsaids Unknown    Sulfa Antibiotics Unknown       PHYSICAL EXAM:  General: Patient is alert female, no distress.   Vitals: BP (!) 164/72   Pulse 62   Temp 98.4  F (36.9  C)   Resp 18   Ht 1.6 m (5' 3\")   Wt 104.3 kg (230 lb)   SpO2 98%   BMI 40.74 kg/m    HEENT: Head is NCAT. Eyes show no injection or icterus. Nares negative. Oropharynx well hydrated.  Neck: Supple. No tenderness or adenopathy. No JVD.  Lungs: Clear bilaterally. No wheezes.  Cardiovascular: Regular rate and rhythm, normal S1, S2.  Back: No spinal or CVA tenderness.  Abdomen: Soft, no tenderness on exam. Bowel sounds present. No guarding rebound or rigidity.  : Deferred.  Extremities: Long arm cast on left.   Musculoskeletal: Degen changes.   Skin: No rashes noted.   Psych: Mood appears good.      LABS/DIAGNOSTIC DATA:  EXAM: XR FOREARM LEFT 2 VIEWS  LOCATION: [facility]  DATE: 12/29/2024  INDICATION: left elbow pain after fall  COMPARISON: None.                                                           IMPRESSION:   Acute significantly displaced transverse fracture of the distal humerus primarily extending through the transcondylar region. Distal humerus fragments are displaced medially relative to the humeral shaft. Radiocapitellar alignment appears intact. Findings are better evaluated on dedicated elbow radiographs.     Normal alignment of the wrist. No radius or ulna fracture. Moderate first CMC degenerative change.      ASSESSMENT/PLAN:  Left distal humerus fracture. Sustained in a fall, hx of falls. S/p ORIF on 1/7/2025. In a long arm cast, NWB, follow up with orthopedics.   Afib. New onset. On aspirin for AC due to hx of falls. Follow up with cardiology, consider Watchman.  Aortic stenosis. S/p TAVR on 1/3/2025.   HTN. On metoprolol, " lisinopril. Monitor BPs in TCU, adjust meds if needed.   Hypothyroidism. On replacement levothyroxine, continue.   Psoriatic arthritis. On Arava, methotrexate. Pain appears controlled.   Dementia. On Donepezil, Memantine.   HUMA. Labs as reviewed in EPIC. Improved with IVFs in the hospital.   ABLA. Hgb down to low 8 range, noted stable. No dizziness. Trend hgb in TCU.   Depression and anxiety. On Celexa and Wellbutrin. Mood appears good.   HLD. Continue PTA statin.   Obesity.   Urinary retention. Had frederick in the hospital. Monitor for issues in TCU.   Code status is full code.       Electronically signed by: Linda Barkley MD

## 2025-03-03 NOTE — PROGRESS NOTES
[facility] GERIATRICS  Magazine Medical Record Number:  [id number]  Place of Service where encounter took place: [facility] [place] ([facility]) [[id number]]  CODE STATUS:   CPR/Full code     Chief Complaint/Reason for Visit:  Chief Complaint   Patient presents with    Hospital F/U       TCU HPI:   Sharon De La Vega is a 79 year old female with hx of HTN, psoriatic arthritis, dementia, falls, admitted to the hospital on 12/29/2024 as noted below.     [facility]  Hospitalist Discharge Summary  Date of Admission:  12/29/2024  Date of Discharge:  1/10/2025    Hospital Course  Sharon De La Vega is a 79 year old female admitted on 12/29/2024. She was brought back to the ED by ambulance from home for evaluation after a second fall at home     Acute left comminuted intra-articular mildly displaced distal humerus fracture, minimally displaced olecranon process fracture, minimally displaced radial head fracture.   - Status post splint/sling  - s/p ORIF Left elbow 1/7  - Pain control  - Post op care per Orthopedics     Acute drop in Hb  - Hb dropped 10.5 -> 8.9 -> 8.6 -> 8.1, stable ~ 8.5  - post op estimated blood loss 100ml  - No evidence of bleeding. Repeat Hb in 1 week at TCU     HUMA - resolved  - suspect pre-renal  - s/p IV fluids     Atrial fibrillation with controlled ventricular response, new onset  - Start Metoprolol 25mg bid  - Needs anticoagulation, but has frequent falls, on asa  - Consider Watchman procedure outpatient     Severe symptomatic aortic valvular stenosis  - TTE (12/29/24):  LVEF 60%, mean gradient 57 mmHg with peak velocity of 4.6 m/s. Calculated valve area 0.60-0.63 cmÂ .  - Coronary angiogram (12/29/24) normal coronary arteries.  - S/P TAVR (1/3/2025) with a 23mm Zelaya Darian Ultra Resilia Valve  - Follow up with Cardiology     Recurrent falls  - Limited historian with dementia  - Aortic valve stenosis possibly contributory  - CT head & C spine unrevealing for  acute process  - Imaging findings per #1  - Fall precautions     Intermittent hypotension  Essential hypertension   - BP intermittently low, suspect if dehydration  - Pro calcitonin negative, with minimal drop in Hb  - On Metoprolol with holding parameters  - reduced dose of Lisinopril 10mg     Possible UTI  - IV CTX x 3d course completed     Alzheimer's dementia without behavioral disturbance  - Risk for violent behavior and/or acute delirium  - Donepezil, memantine  - As needed quetiapine, hasn't required any in last few days     Hypokalemia- resolved     Intermittent urine retention  - Has frederick placed on 01/04  - Frederick catheter removed 01/10, TCU updated to follow up on TOV    Overall stabilized and discharged to TCU on 1/10/2025 for PT, OT, nursing cares, medical management and monitoring.       Today:  Last seen by orthopedics on 2/20/2025, cast changed to splint. Allowed light weight bearing 0-5 pounds, elbow ROM, RTC 5-6 weeks. Seen by cardiology on 2/21/2025, no new orders, follow up in valve clinic July 2025. Appetite is good. No SOB at rest, chest pain, dizziness, palpitations. No abdominal pain, constipation, diarrhea or urinary sx.       PAST MEDICAL HISTORY:  Past Medical History:   Diagnosis Date    Anxiety 02/14/2013    Formatting of this note might be different from the original.  CURRENT MEDICATION(S) celexa 40 mg;   Lorazepam prn  Only occ  Stopped no need 11-17. Use PHQ 9 = 6   11-15;      Aortic valve stenosis 04/07/2023    Essential hypertension 10/29/2012    Hypercholesterolemia 10/29/2012    Hypothyroidism 10/29/2012    Major depressive disorder, single episode, moderate (H) 11/28/2022    Formatting of this note might be different from the original.  CURRENT MEDICATION(S) citalopram 40 mg    PHQ 9 =  2  11-11;  phq 9 = 2    12-11;  = 2   5-12; = 2   11-12;  Called her  Doing better  No need to see a psychologist. 1-14  PHQ 9 = 4   8-14;  = 3   12-14; OK 11-17;      Major neurocognitive  disorder due to Alzheimer's disease (H) 06/20/2022    Psoriatic arthritis (H) 10/29/2012       MEDICATIONS:  Current Outpatient Medications   Medication Sig Dispense Refill    acetaminophen (TYLENOL) 325 MG tablet Take 2 tablets (650 mg) by mouth every 4 hours as needed for mild pain or headaches. 100 tablet 0    aspirin 81 MG EC tablet Take 1 tablet (81 mg) by mouth daily.      buPROPion (WELLBUTRIN XL) 150 MG 24 hr tablet Take 150 mg by mouth every morning.      citalopram (CELEXA) 20 MG tablet Take 1 tablet by mouth every morning.      cyanocobalamin (VITAMIN B-12) 1000 MCG tablet Take 1,000 mcg by mouth every morning.      donepezil (ARICEPT) 10 MG tablet Take 1 tablet (10 mg) by mouth at bedtime 90 tablet 3    folic acid (FOLVITE) 1 MG tablet Take 1 mg by mouth every morning.      leflunomide (ARAVA) 10 MG tablet Take 1 tablet by mouth every morning.      levothyroxine (SYNTHROID/LEVOTHROID) 100 MCG tablet Take 1 tablet by mouth every morning.      lisinopril (ZESTRIL) 10 MG tablet Take 3 tablets (30 mg) by mouth daily.      memantine (NAMENDA) 10 MG tablet TAKE 1 TABLET BY MOUTH TWICE A  tablet 0    methotrexate 2.5 MG tablet Take 10 mg by mouth once a week On Thursday      metoprolol tartrate (LOPRESSOR) 25 MG tablet Take 1 tablet (25 mg) by mouth 2 times daily.      nystatin (MYCOSTATIN) 018208 UNIT/GM external powder Apply topically 2 times daily as needed.      QUEtiapine (SEROQUEL) 25 MG tablet Take 0.5 tablets (12.5 mg) by mouth every 6 hours as needed (Agitation).      senna-docusate (SENOKOT-S/PERICOLACE) 8.6-50 MG tablet Take 2 tablets by mouth 2 times daily as needed for constipation. 30 tablet 0    simvastatin (ZOCOR) 20 MG tablet Take 20 mg by mouth every evening.      vitamin D3 (CHOLECALCIFEROL) 50 mcg (2000 units) tablet Take 1 tablet by mouth every morning.      vitamin E (TOCOPHEROL) 1000 units (450 mg) CAPS capsule TAKE 1 CAPSULE BY MOUTH TWICE A  capsule 0       PHYSICAL  "EXAM:  General: Patient is alert female, no distress.   Vitals: BP (!) 147/79   Pulse 62   Temp 98.9  F (37.2  C)   Resp 16   Ht 1.6 m (5' 3\")   Wt 101.2 kg (223 lb)   SpO2 98%   BMI 39.50 kg/m    HEENT: Head is NCAT. Eyes show no injection or icterus. Nares negative. Oropharynx well hydrated.  Neck:  No JVD.  Lungs: Non labored respirations.  : Deferred.  Extremities: Long arm splint on left.  Musculoskeletal: Degen changes.   Skin: Warm and dry.   Psych: Mood appears good.      LABS/DIAGNOSTIC DATA:  EXAM: XR FOREARM LEFT 2 VIEWS  LOCATION: [facility]  DATE: 12/29/2024  INDICATION: left elbow pain after fall  COMPARISON: None.                                                           IMPRESSION:   Acute significantly displaced transverse fracture of the distal humerus primarily extending through the transcondylar region. Distal humerus fragments are displaced medially relative to the humeral shaft. Radiocapitellar alignment appears intact. Findings are better evaluated on dedicated elbow radiographs.     Normal alignment of the wrist. No radius or ulna fracture. Moderate first CMC degenerative change.      ASSESSMENT/PLAN:  Left distal humerus fracture. Sustained in a fall, hx of falls. S/p ORIF on 1/7/2025. Last seen by orthopedics on 2/20/2025, cast changed to splint. Allowed light weight bearing 0-5 pounds, elbow ROM, RTC 5-6 weeks.   Afib. New onset. On aspirin for AC due to hx of falls.   Aortic stenosis. S/p TAVR on 1/3/2025. Seen by cardiology on 2/21/2025, no new orders, follow up in valve clinic July 2025.   HTN. On metoprolol, lisinopril. Monitor BPs in TCU, adjust meds if needed.   Hypothyroidism. On replacement levothyroxine.   Psoriatic arthritis. On Arava, methotrexate.   Dementia. On Donepezil, Memantine.   ABLA. Hgb down to low 8 range. Trend hgb in TCU.   Depression and anxiety. On Celexa and Wellbutrin.      Electronically signed by: Linda Barkley MD       "

## 2025-03-04 DIAGNOSIS — F02.80 MAJOR NEUROCOGNITIVE DISORDER DUE TO ALZHEIMER'S DISEASE (H): ICD-10-CM

## 2025-03-04 DIAGNOSIS — G30.9 MAJOR NEUROCOGNITIVE DISORDER DUE TO ALZHEIMER'S DISEASE (H): ICD-10-CM

## 2025-03-04 RX ORDER — DONEPEZIL HYDROCHLORIDE 10 MG/1
10 TABLET, FILM COATED ORAL AT BEDTIME
Qty: 30 TABLET | Refills: 0 | Status: SHIPPED | OUTPATIENT
Start: 2025-03-04

## 2025-03-04 NOTE — TELEPHONE ENCOUNTER
Refill request for: donepezil (ARICEPT) 10 MG tablet    Directions: Take 1 tablet (10 mg) by mouth at bedtime     LOV: 2/21/24  NOV: Not scheduled- OOTU message sent to patient    30 day supply with 0 refills Medication T'd for review and signature  Skye LOZADA CMA on 3/4/2025 at 10:12 AM  [facility]

## 2025-03-12 ENCOUNTER — NURSING HOME VISIT (OUTPATIENT)
Dept: GERIATRICS | Facility: CLINIC | Age: 80
End: 2025-03-12
Payer: COMMERCIAL

## 2025-03-12 VITALS
HEIGHT: 63 IN | HEART RATE: 55 BPM | RESPIRATION RATE: 18 BRPM | DIASTOLIC BLOOD PRESSURE: 57 MMHG | OXYGEN SATURATION: 98 % | BODY MASS INDEX: 38.98 KG/M2 | WEIGHT: 220 LBS | SYSTOLIC BLOOD PRESSURE: 135 MMHG | TEMPERATURE: 98.5 F

## 2025-03-12 DIAGNOSIS — I10 ESSENTIAL HYPERTENSION: ICD-10-CM

## 2025-03-12 DIAGNOSIS — E03.9 HYPOTHYROIDISM, UNSPECIFIED TYPE: ICD-10-CM

## 2025-03-12 DIAGNOSIS — F02.80 MAJOR NEUROCOGNITIVE DISORDER DUE TO ALZHEIMER'S DISEASE (H): ICD-10-CM

## 2025-03-12 DIAGNOSIS — G30.9 MAJOR NEUROCOGNITIVE DISORDER DUE TO ALZHEIMER'S DISEASE (H): ICD-10-CM

## 2025-03-12 DIAGNOSIS — S42.402A CLOSED FRACTURE OF DISTAL END OF LEFT HUMERUS, UNSPECIFIED FRACTURE MORPHOLOGY, INITIAL ENCOUNTER: ICD-10-CM

## 2025-03-12 DIAGNOSIS — Z95.2 S/P TAVR (TRANSCATHETER AORTIC VALVE REPLACEMENT): Primary | ICD-10-CM

## 2025-03-12 DIAGNOSIS — I48.19 PERSISTENT ATRIAL FIBRILLATION (H): ICD-10-CM

## 2025-03-12 DIAGNOSIS — G20.A1 PARKINSON'S DISEASE WITHOUT DYSKINESIA, UNSPECIFIED WHETHER MANIFESTATIONS FLUCTUATE (H): ICD-10-CM

## 2025-03-12 PROCEDURE — 99309 SBSQ NF CARE MODERATE MDM 30: CPT | Performed by: NURSE PRACTITIONER

## 2025-03-12 NOTE — LETTER
3/12/2025      Sharon De La Vega  [address] [place] [address]        [facility] GERIATRICS    PRIMARY CARE PROVIDER AND CLINIC:  Roz Elliott, CNP, [address] [facility] [id number]***  No chief complaint on file.     [place] Medical Record Number:  [id number]  Place of Service where encounter took place:  No question data found.    Sharon De La Vega  is a 79 year old  (1945), {fgsinitialoption:579736}.   HPI:    ***    CODE STATUS/ADVANCE DIRECTIVES DISCUSSION:  Full Code  {CODE STATUS:811672}  ALLERGIES:   Allergies   Allergen Reactions    Acetaminophen-Codeine GI Disturbance     Indigestion    Celecoxib Unknown    Codeine Camsylate Unknown    Conj Estrog-Medroxyprogest Ace Other (See Comments)     Bleeding    Estrogens, Conjugated Synthetic A Unknown    Nsaids Unknown    Sulfa Antibiotics Unknown      PAST MEDICAL HISTORY:   Past Medical History:   Diagnosis Date    Anxiety 02/14/2013    Formatting of this note might be different from the original.  CURRENT MEDICATION(S) celexa 40 mg;   Lorazepam prn  Only occ  Stopped no need 11-17. Use PHQ 9 = 6   11-15;      Aortic valve stenosis 04/07/2023    Essential hypertension 10/29/2012    Hypercholesterolemia 10/29/2012    Hypothyroidism 10/29/2012    Major depressive disorder, single episode, moderate (H) 11/28/2022    Formatting of this note might be different from the original.  CURRENT MEDICATION(S) citalopram 40 mg    PHQ 9 =  2  11-11;  phq 9 = 2    12-11;  = 2   5-12; = 2   11-12;  Called her  Doing better  No need to see a psychologist. 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) 06/20/2022    Psoriatic arthritis (H) 10/29/2012      PAST SURGICAL HISTORY:   has a past surgical history that includes Cholecystectomy (1964); appendectomy (1964); carpal tunnel release rt/lt (Bilateral, 1982); Cataract Extraction, Bilateral (1998); EXCISE HAND/FOOT  NEUROMA (2004); KNEE SCOPE,MED OR LAT MENIS REPAIR (Right, 2000); Total Knee Arthroplasty (Right, 2012); Trabeculectomy (Right, 1998); Coronary Angiogram (N/A, 12/31/2024); Transcatheter Aortic Valve Replacement-Femoral Approach (N/A, 1/3/2025); Or Transcatheter Aortic Valve Replacement, Femoral Percutaneous Approach (Stand[place]) (N/A, 1/3/2025); Open reduction internal fixation humerus distal (Left, 1/7/2025); and Transposition ulnar nerve (elbow) (Left, 1/7/2025).  FAMILY HISTORY: family history includes Lung Cancer in her father; Multiple myeloma in her brother.  SOCIAL HISTORY:   reports that she quit smoking about 38 years ago. Her smoking use included cigarettes. She has never used smokeless tobacco. She reports that she does not currently use alcohol.  Patient's living condition: {LIVES WITH (NURSING HOME):185869}    Post Discharge Medication Reconciliation Status:   MED REC REQUIRED{TIP  Click the link below to document or use med rec list, use list to pull in response :744666}  Post Medication Reconciliation Status: {MED REC LIST:717651}       Current Outpatient Medications   Medication Sig Dispense Refill    acetaminophen (TYLENOL) 325 MG tablet Take 2 tablets (650 mg) by mouth every 4 hours as needed for mild pain or headaches. 100 tablet 0    aspirin 81 MG EC tablet Take 1 tablet (81 mg) by mouth daily.      buPROPion (WELLBUTRIN XL) 150 MG 24 hr tablet Take 150 mg by mouth every morning.      citalopram (CELEXA) 20 MG tablet Take 1 tablet by mouth every morning.      cyanocobalamin (VITAMIN B-12) 1000 MCG tablet Take 1,000 mcg by mouth every morning.      donepezil (ARICEPT) 10 MG tablet Take 1 tablet (10 mg) by mouth at bedtime. PLEASE CALL TO SCHEDULE FOLLOW UP APPT 30 tablet 0    folic acid (FOLVITE) 1 MG tablet Take 1 mg by mouth every morning.      leflunomide (ARAVA) 10 MG tablet Take 1 tablet by mouth every morning.      levothyroxine (SYNTHROID/LEVOTHROID) 100 MCG tablet Take 1 tablet by mouth  every morning.      lisinopril (ZESTRIL) 10 MG tablet Take 3 tablets (30 mg) by mouth daily.      memantine (NAMENDA) 10 MG tablet TAKE 1 TABLET BY MOUTH TWICE A  tablet 0    methotrexate 2.5 MG tablet Take 10 mg by mouth once a week On Thursday      metoprolol tartrate (LOPRESSOR) 25 MG tablet Take 1 tablet (25 mg) by mouth 2 times daily.      nystatin (MYCOSTATIN) 358162 UNIT/GM external powder Apply topically 2 times daily as needed.      QUEtiapine (SEROQUEL) 25 MG tablet Take 0.5 tablets (12.5 mg) by mouth every 6 hours as needed (Agitation).      senna-docusate (SENOKOT-S/PERICOLACE) 8.6-50 MG tablet Take 2 tablets by mouth 2 times daily as needed for constipation. 30 tablet 0    simvastatin (ZOCOR) 20 MG tablet Take 20 mg by mouth every evening.      vitamin D3 (CHOLECALCIFEROL) 50 mcg (2000 units) tablet Take 1 tablet by mouth every morning.      vitamin E (TOCOPHEROL) 1000 units (450 mg) CAPS capsule TAKE 1 CAPSULE BY MOUTH TWICE A  capsule 0     No current facility-administered medications for this visit.       ROS:  4 point ROS including Respiratory, CV, GI and , other than that noted in the HPI,  is negative    Vitals:  There were no vitals taken for this visit.  Exam:  General appearance: alert, cooperative.   Lungs: respirations unlabored,no wheezing or rales.   Cardiovascular: Regular rate and rhythm.   ABDOMEN: Globular and soft, non tender.    Extremities: extremities normal, atraumatic, no cyanosis or edema  Skin: No rashes or lesions  Neurologic: oriented. No focal deficits.   Psych: interacts well with caregivers,  pleasant    Lab/Diagnostic data:  Recent labs in [facility] reviewed by me today.     ASSESSMENT/PLAN:    No diagnosis found.***    Electronically signed by:  Roz Elliott CNP ***                    Sincerely,        Roz Elliott CNP    Electronically signed   "mg by mouth every evening.       vitamin D3 (CHOLECALCIFEROL) 50 mcg (2000 units) tablet Take 1 tablet by mouth every morning.       vitamin E (TOCOPHEROL) 1000 units (450 mg) CAPS capsule TAKE 1 CAPSULE BY MOUTH TWICE A  capsule 0     No current facility-administered medications for this visit.       ROS:  4 point ROS including Respiratory, CV, GI and , other than that noted in the HPI,  is negative    Vitals:  /57   Pulse 55   Temp 98.5  F (36.9  C)   Resp 18   Ht 1.6 m (5' 3\")   Wt 99.8 kg (220 lb)   SpO2 98%   BMI 38.97 kg/m    Exam:  General appearance: alert, cooperative.   Lungs: respirations unlabored,no wheezing or rales.   Cardiovascular: Regular rate and rhythm.   Extremities: extremities normal, atraumatic, no cyanosis or edema  Skin: No rashes or lesions  Neurologic: oriented. No focal deficits.   Psych: interacts well with caregivers,  pleasant    Lab/Diagnostic data:  Recent labs in [facility] reviewed by me today.     ASSESSMENT/PLAN:    1. S/P TAVR (transcatheter aortic valve replacement)    2. Closed fracture of distal end of left humerus, unspecified fracture morphology, initial encounter    3. Persistent atrial fibrillation (H)    4. Essential hypertension    5. Hypothyroidism, unspecified type    6. Parkinson's disease without dyskinesia, unspecified whether manifestations fluctuate (H)    7. Major neurocognitive disorder due to Alzheimer's disease (H)      Status post TAVR  Peripheral A-fib  Essential hypertension  Not anticoagulated, needs to follow-up with cardiology which she saw on February 21.  Continue aspirin 81 daily, Lopressor 25 mg daily and lisinopril 10 mg daily.  Also on simvastatin 20 mg daily.    Hypothyroid  TSH in December 1.63.  Continue Synthroid 100 mcg daily.  Will recheck mid-May; adjust dose to keep TSH between 4-7.    Closed fracture of distal left humerus status post ORIF  Continues have some pain, using Tylenol.   Last saw Ortho in February.  Has " weightbearing restrictions to 5 pounds.    Parkinson's disease  Neurocognitive disorder due to Alzheimer's  Continue donezepil 10 mg daily.  Continue Namenda 10 mg daily.  Continue vitamin D, vitamin E.  Also on bupropion, Celexa.  Mood stable.  -Discontinue Seroquel    Electronically signed by:  Roz Elliott CNP                     Sincerely,        Roz Elliott, DEREJE    Electronically signed

## 2025-03-12 NOTE — PROGRESS NOTES
[facility] GERIATRICS    PRIMARY CARE PROVIDER AND CLINIC:  Roz Elliott, CNP, [address] [facility] [address]***  No chief complaint on file.     [place] Medical Record Number:  [id number]  Place of Service where encounter took place:  No question data found.    Sharon De La Vega  is a 79 year old  (1945), {fgsinitialoption:480888}.   HPI:    ***    CODE STATUS/ADVANCE DIRECTIVES DISCUSSION:  Full Code  {CODE STATUS:973699}  ALLERGIES:   Allergies   Allergen Reactions    Acetaminophen-Codeine GI Disturbance     Indigestion    Celecoxib Unknown    Codeine Camsylate Unknown    Conj Estrog-Medroxyprogest Ace Other (See Comments)     Bleeding    Estrogens, Conjugated Synthetic A Unknown    Nsaids Unknown    Sulfa Antibiotics Unknown      PAST MEDICAL HISTORY:   Past Medical History:   Diagnosis Date    Anxiety 02/14/2013    Formatting of this note might be different from the original.  CURRENT MEDICATION(S) celexa 40 mg;   Lorazepam prn  Only occ  Stopped no need 11-17. Use PHQ 9 = 6   11-15;      Aortic valve stenosis 04/07/2023    Essential hypertension 10/29/2012    Hypercholesterolemia 10/29/2012    Hypothyroidism 10/29/2012    Major depressive disorder, single episode, moderate (H) 11/28/2022    Formatting of this note might be different from the original.  CURRENT MEDICATION(S) citalopram 40 mg    PHQ 9 =  2  11-11;  phq 9 = 2    12-11;  = 2   5-12; = 2   11-12;  Called her  Doing better  No need to see a psychologist. 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) 06/20/2022    Psoriatic arthritis (H) 10/29/2012      PAST SURGICAL HISTORY:   has a past surgical history that includes Cholecystectomy (1964); appendectomy (1964); carpal tunnel release rt/lt (Bilateral, 1982); Cataract Extraction, Bilateral (1998); EXCISE HAND/FOOT NEUROMA (2004); KNEE SCOPE,MED OR LAT MENIS REPAIR (Right, 2000); Total Knee Arthroplasty  (Right, 2012); Trabeculectomy (Right, 1998); Coronary Angiogram (N/A, 12/31/2024); Transcatheter Aortic Valve Replacement-Femoral Approach (N/A, 1/3/2025); Or Transcatheter Aortic Valve Replacement, Femoral Percutaneous Approach (Standby) (N/A, 1/3/2025); Open reduction internal fixation humerus distal (Left, 1/7/2025); and Transposition ulnar nerve (elbow) (Left, 1/7/2025).  FAMILY HISTORY: family history includes Lung Cancer in her father; Multiple myeloma in her brother.  SOCIAL HISTORY:   reports that she quit smoking about 38 years ago. Her smoking use included cigarettes. She has never used smokeless tobacco. She reports that she does not currently use alcohol.  Patient's living condition: {LIVES WITH (NURSING HOME):730194}    Post Discharge Medication Reconciliation Status:   MED REC REQUIRED{TIP  Click the link below to document or use med rec list, use list to pull in response :084939}  Post Medication Reconciliation Status: {MED REC LIST:561157}       Current Outpatient Medications   Medication Sig Dispense Refill    acetaminophen (TYLENOL) 325 MG tablet Take 2 tablets (650 mg) by mouth every 4 hours as needed for mild pain or headaches. 100 tablet 0    aspirin 81 MG EC tablet Take 1 tablet (81 mg) by mouth daily.      buPROPion (WELLBUTRIN XL) 150 MG 24 hr tablet Take 150 mg by mouth every morning.      citalopram (CELEXA) 20 MG tablet Take 1 tablet by mouth every morning.      cyanocobalamin (VITAMIN B-12) 1000 MCG tablet Take 1,000 mcg by mouth every morning.      donepezil (ARICEPT) 10 MG tablet Take 1 tablet (10 mg) by mouth at bedtime. PLEASE CALL TO SCHEDULE FOLLOW UP APPT 30 tablet 0    folic acid (FOLVITE) 1 MG tablet Take 1 mg by mouth every morning.      leflunomide (ARAVA) 10 MG tablet Take 1 tablet by mouth every morning.      levothyroxine (SYNTHROID/LEVOTHROID) 100 MCG tablet Take 1 tablet by mouth every morning.      lisinopril (ZESTRIL) 10 MG tablet Take 3 tablets (30 mg) by mouth daily.       memantine (NAMENDA) 10 MG tablet TAKE 1 TABLET BY MOUTH TWICE A  tablet 0    methotrexate 2.5 MG tablet Take 10 mg by mouth once a week On Thursday      metoprolol tartrate (LOPRESSOR) 25 MG tablet Take 1 tablet (25 mg) by mouth 2 times daily.      nystatin (MYCOSTATIN) 793832 UNIT/GM external powder Apply topically 2 times daily as needed.      QUEtiapine (SEROQUEL) 25 MG tablet Take 0.5 tablets (12.5 mg) by mouth every 6 hours as needed (Agitation).      senna-docusate (SENOKOT-S/PERICOLACE) 8.6-50 MG tablet Take 2 tablets by mouth 2 times daily as needed for constipation. 30 tablet 0    simvastatin (ZOCOR) 20 MG tablet Take 20 mg by mouth every evening.      vitamin D3 (CHOLECALCIFEROL) 50 mcg (2000 units) tablet Take 1 tablet by mouth every morning.      vitamin E (TOCOPHEROL) 1000 units (450 mg) CAPS capsule TAKE 1 CAPSULE BY MOUTH TWICE A  capsule 0     No current facility-administered medications for this visit.       ROS:  4 point ROS including Respiratory, CV, GI and , other than that noted in the HPI,  is negative    Vitals:  There were no vitals taken for this visit.  Exam:  General appearance: alert, cooperative.   Lungs: respirations unlabored,no wheezing or rales.   Cardiovascular: Regular rate and rhythm.   ABDOMEN: Globular and soft, non tender.    Extremities: extremities normal, atraumatic, no cyanosis or edema  Skin: No rashes or lesions  Neurologic: oriented. No focal deficits.   Psych: interacts well with caregivers,  pleasant    Lab/Diagnostic data:  Recent labs in [facility] reviewed by me today.     ASSESSMENT/PLAN:    No diagnosis found.***    Electronically signed by:  Roz Elliott CNP ***                 "every morning.      vitamin E (TOCOPHEROL) 1000 units (450 mg) CAPS capsule TAKE 1 CAPSULE BY MOUTH TWICE A  capsule 0     No current facility-administered medications for this visit.       ROS:  4 point ROS including Respiratory, CV, GI and , other than that noted in the HPI,  is negative    Vitals:  /57   Pulse 55   Temp 98.5  F (36.9  C)   Resp 18   Ht 1.6 m (5' 3\")   Wt 99.8 kg (220 lb)   SpO2 98%   BMI 38.97 kg/m    Exam:  General appearance: alert, cooperative.   Lungs: respirations unlabored,no wheezing or rales.   Cardiovascular: Regular rate and rhythm.   Extremities: extremities normal, atraumatic, no cyanosis or edema  Skin: No rashes or lesions  Neurologic: oriented. No focal deficits.   Psych: interacts well with caregivers,  pleasant    Lab/Diagnostic data:  Recent labs in [facility] reviewed by me today.     ASSESSMENT/PLAN:    1. S/P TAVR (transcatheter aortic valve replacement)    2. Closed fracture of distal end of left humerus, unspecified fracture morphology, initial encounter    3. Persistent atrial fibrillation (H)    4. Essential hypertension    5. Hypothyroidism, unspecified type    6. Parkinson's disease without dyskinesia, unspecified whether manifestations fluctuate (H)    7. Major neurocognitive disorder due to Alzheimer's disease (H)      Status post TAVR  Peripheral A-fib  Essential hypertension  Not anticoagulated, needs to follow-up with cardiology which she saw on February 21.  Continue aspirin 81 daily, Lopressor 25 mg daily and lisinopril 10 mg daily.  Also on simvastatin 20 mg daily.    Hypothyroid  TSH in December 1.63.  Continue Synthroid 100 mcg daily.  Will recheck mid-May; adjust dose to keep TSH between 4-7.    Closed fracture of distal left humerus status post ORIF  Continues have some pain, using Tylenol.   Last saw Ortho in February.  Has weightbearing restrictions to 5 pounds.    Parkinson's disease  Neurocognitive disorder due to Alzheimer's  Continue " donezepil 10 mg daily.  Continue Namenda 10 mg daily.  Continue vitamin D, vitamin E.  Also on bupropion, Celexa.  Mood stable.  -Discontinue Seroquel    Electronically signed by:  Roz Elliott CNP

## 2025-03-29 ENCOUNTER — HEALTH MAINTENANCE LETTER (OUTPATIENT)
Age: 80
End: 2025-03-29

## 2025-04-01 DIAGNOSIS — G30.9 MAJOR NEUROCOGNITIVE DISORDER DUE TO ALZHEIMER'S DISEASE (H): ICD-10-CM

## 2025-04-01 DIAGNOSIS — F02.80 MAJOR NEUROCOGNITIVE DISORDER DUE TO ALZHEIMER'S DISEASE (H): ICD-10-CM

## 2025-04-01 RX ORDER — DONEPEZIL HYDROCHLORIDE 10 MG/1
10 TABLET, FILM COATED ORAL AT BEDTIME
Qty: 90 TABLET | Refills: 0 | Status: SHIPPED | OUTPATIENT
Start: 2025-04-01

## 2025-04-01 NOTE — TELEPHONE ENCOUNTER
Refill request for: donepezil 10mg   Directions: Take 1 tablet (10 mg) by mouth at bedtime     LOV: 02/21/24  NOV: 04/29/25    90 day supply with 0 refills Medication T'd for review and signature    Heather Armando LPN on 4/1/2025 at 4:27 PM

## 2025-04-08 ENCOUNTER — NURSING HOME VISIT (OUTPATIENT)
Dept: GERIATRICS | Facility: CLINIC | Age: 80
End: 2025-04-08
Payer: COMMERCIAL

## 2025-04-08 DIAGNOSIS — I48.19 PERSISTENT ATRIAL FIBRILLATION (H): ICD-10-CM

## 2025-04-08 DIAGNOSIS — E03.9 HYPOTHYROIDISM, UNSPECIFIED TYPE: ICD-10-CM

## 2025-04-08 DIAGNOSIS — S42.402A CLOSED FRACTURE OF DISTAL END OF LEFT HUMERUS, UNSPECIFIED FRACTURE MORPHOLOGY, INITIAL ENCOUNTER: Primary | ICD-10-CM

## 2025-04-08 DIAGNOSIS — Z95.2 S/P TAVR (TRANSCATHETER AORTIC VALVE REPLACEMENT): ICD-10-CM

## 2025-04-08 DIAGNOSIS — I10 ESSENTIAL HYPERTENSION: ICD-10-CM

## 2025-04-08 PROCEDURE — 99309 SBSQ NF CARE MODERATE MDM 30: CPT | Performed by: FAMILY MEDICINE

## 2025-04-08 NOTE — LETTER
4/8/2025      Sharon De La Vega  [address] [place] [address]        No notes on file      Sincerely,        Linda Barkley MD    Electronically signed   Limited historian with dementia  - Aortic valve stenosis possibly contributory  - CT head & C spine unrevealing for acute process  - Imaging findings per #1  - Fall precautions     Intermittent hypotension  Essential hypertension   - BP intermittently low, suspect if dehydration  - Pro calcitonin negative, with minimal drop in Hb  - On Metoprolol with holding parameters  - reduced dose of Lisinopril 10mg     Possible UTI  - IV CTX x 3d course completed     Alzheimer's dementia without behavioral disturbance  - Risk for violent behavior and/or acute delirium  - Donepezil, memantine  - As needed quetiapine, hasn't required any in last few days     Hypokalemia- resolved     Intermittent urine retention  - Has frederick placed on 01/04  - Frederick catheter removed 01/10, TCU updated to follow up on TOV    Overall stabilized and discharged to TCU on 1/10/2025 for PT, OT, nursing cares, medical management and monitoring.     Today:  She is seen for regulatory review. She has since moved over to LTC in the facility. Seen in her room, watching TV, no complaints. No acute needs per nursing. No longer wearing splint on left arm. Following with orthopedics for her left distal humerus Fx s/p surgical intervention. No SOB at rest, chest pain, dizziness, palpitations. No abdominal pain, constipation, diarrhea or urinary sx.       PAST MEDICAL HISTORY:  Past Medical History:   Diagnosis Date     Anxiety 02/14/2013    Formatting of this note might be different from the original.  CURRENT MEDICATION(S) celexa 40 mg;   Lorazepam prn  Only occ  Stopped no need 11-17. Use PHQ 9 = 6   11-15;       Aortic valve stenosis 04/07/2023     Essential hypertension 10/29/2012     Hypercholesterolemia 10/29/2012     Hypothyroidism 10/29/2012     Major depressive disorder, single episode, moderate (H) 11/28/2022    Formatting of this note might be different from the original.  CURRENT MEDICATION(S) citalopram 40 mg    PHQ 9 =  2  11-11;  phq 9 = 2     12-11;  = 2   5-12; = 2   11-12;  Called her  Doing better  No need to see a psychologist. 1-14  PHQ 9 = 4   8-14;  = 3   12-14; OK 11-17;       Major neurocognitive disorder due to Alzheimer's disease (H) 06/20/2022     Psoriatic arthritis (H) 10/29/2012       MEDICATIONS:  Current Outpatient Medications   Medication Sig Dispense Refill     acetaminophen (TYLENOL) 325 MG tablet Take 2 tablets (650 mg) by mouth every 4 hours as needed for mild pain or headaches. 100 tablet 0     aspirin 81 MG EC tablet Take 1 tablet (81 mg) by mouth daily.       buPROPion (WELLBUTRIN XL) 150 MG 24 hr tablet Take 150 mg by mouth every morning.       citalopram (CELEXA) 20 MG tablet Take 1 tablet by mouth every morning.       cyanocobalamin (VITAMIN B-12) 1000 MCG tablet Take 1,000 mcg by mouth every morning.       donepezil (ARICEPT) 10 MG tablet Take 1 tablet (10 mg) by mouth at bedtime. 90 tablet 0     folic acid (FOLVITE) 1 MG tablet Take 1 mg by mouth every morning.       leflunomide (ARAVA) 10 MG tablet Take 1 tablet by mouth every morning.       levothyroxine (SYNTHROID/LEVOTHROID) 100 MCG tablet Take 1 tablet by mouth every morning.       lisinopril (ZESTRIL) 10 MG tablet Take 3 tablets (30 mg) by mouth daily.       memantine (NAMENDA) 10 MG tablet TAKE 1 TABLET BY MOUTH TWICE A  tablet 0     methotrexate 2.5 MG tablet Take 10 mg by mouth once a week On Thursday       metoprolol tartrate (LOPRESSOR) 25 MG tablet Take 1 tablet (25 mg) by mouth 2 times daily.       nystatin (MYCOSTATIN) 279799 UNIT/GM external powder Apply topically 2 times daily as needed.       QUEtiapine (SEROQUEL) 25 MG tablet Take 0.5 tablets (12.5 mg) by mouth every 6 hours as needed (Agitation).       senna-docusate (SENOKOT-S/PERICOLACE) 8.6-50 MG tablet Take 2 tablets by mouth 2 times daily as needed for constipation. 30 tablet 0     simvastatin (ZOCOR) 20 MG tablet Take 20 mg by mouth every evening.       vitamin D3 (CHOLECALCIFEROL) 50 mcg  "(2000 units) tablet Take 1 tablet by mouth every morning.       vitamin E (TOCOPHEROL) 1000 units (450 mg) CAPS capsule TAKE 1 CAPSULE BY MOUTH TWICE A  capsule 0       PHYSICAL EXAM:  General: Patient is alert female, no distress.   Vitals: /61   Pulse 61   Temp 98.9  F (37.2  C)   Resp 18   Ht 1.6 m (5' 3\")   Wt 99.4 kg (219 lb 3.2 oz)   SpO2 98%   BMI 38.83 kg/m    HEENT: Head is NCAT. Eyes show no injection or icterus. Nares negative. Oropharynx well hydrated.  Neck:  No JVD.  Lungs: Non labored respirations.  : Deferred.  Extremities: Mild LE swelling.   Musculoskeletal: Degen changes.   Skin: Warm and dry.   Psych: Mood is baseline.       LABS/DIAGNOSTIC DATA:  EXAM: XR FOREARM LEFT 2 VIEWS  LOCATION: [facility]  DATE: 12/29/2024  INDICATION: left elbow pain after fall  COMPARISON: None.                                                           IMPRESSION:   Acute significantly displaced transverse fracture of the distal humerus primarily extending through the transcondylar region. Distal humerus fragments are displaced medially relative to the humeral shaft. Radiocapitellar alignment appears intact. Findings are better evaluated on dedicated elbow radiographs.     Normal alignment of the wrist. No radius or ulna fracture. Moderate first CMC degenerative change.      ASSESSMENT/PLAN:  Left distal humerus fracture. Sustained in a fall, hx of falls. S/p ORIF on 1/7/2025. Continues to follow with orthopedics. No complaints of pain.  Afib. On aspirin for AC due to hx of falls.   Aortic stenosis. S/p TAVR on 1/3/2025. Seen by cardiology on 2/21/2025, no new orders, follow up in valve clinic July 2025.   HTN. On metoprolol, lisinopril. Continue to monitor BPs, adjust meds if needed.   Hypothyroidism. On replacement levothyroxine.   Psoriatic arthritis. On Arava, methotrexate.   Dementia. On Donepezil, Memantine.   ABLA. Hgb down to low 8 range. Recheck as warranted. "   Depression and anxiety. Continue Celexa and Wellbutrin.      Electronically signed by: Linda Barkley MD         Sincerely,        Linda Barkley MD    Electronically signed

## 2025-04-09 VITALS
BODY MASS INDEX: 38.84 KG/M2 | WEIGHT: 219.2 LBS | RESPIRATION RATE: 18 BRPM | DIASTOLIC BLOOD PRESSURE: 61 MMHG | HEIGHT: 63 IN | SYSTOLIC BLOOD PRESSURE: 139 MMHG | HEART RATE: 61 BPM | TEMPERATURE: 98.9 F | OXYGEN SATURATION: 98 %

## 2025-04-19 DIAGNOSIS — G30.9 MAJOR NEUROCOGNITIVE DISORDER DUE TO ALZHEIMER'S DISEASE (H): ICD-10-CM

## 2025-04-19 DIAGNOSIS — F02.80 MAJOR NEUROCOGNITIVE DISORDER DUE TO ALZHEIMER'S DISEASE (H): ICD-10-CM

## 2025-04-21 RX ORDER — MULTIVIT WITH MINERALS/LUTEIN
1000 TABLET ORAL 2 TIMES DAILY
Qty: 180 CAPSULE | Refills: 0 | Status: SHIPPED | OUTPATIENT
Start: 2025-04-21

## 2025-04-21 NOTE — TELEPHONE ENCOUNTER
Prescription approved per [facility] Refill Protocol.    Karol Garcia RN, BSN  [facility]

## 2025-04-21 NOTE — PROGRESS NOTES
[facility] GERIATRICS  [place] Medical Record Number:  [id number]  Place of Service where encounter took place: [facility] () [[id number]]  CODE STATUS:   CPR/Full code     Chief Complaint/Reason for Visit:  Chief Complaint   Patient presents with    intermediate Regulatory     LTC 4/8/2025.        TCU HPI:   Sharon De La Vega is a 79 year old female with hx of HTN, psoriatic arthritis, dementia, falls, admitted to the hospital on 12/29/2024 as noted below.     [facility]  Hospitalist Discharge Summary  Date of Admission:  12/29/2024  Date of Discharge:  1/10/2025    Hospital Course  Sharon De La Vega is a 79 year old female admitted on 12/29/2024. She was brought back to the ED by ambulance from home for evaluation after a second fall at home     Acute left comminuted intra-articular mildly displaced distal humerus fracture, minimally displaced olecranon process fracture, minimally displaced radial head fracture.   - Status post splint/sling  - s/p ORIF Left elbow 1/7  - Pain control  - Post op care per Orthopedics     Acute drop in Hb  - Hb dropped 10.5 -> 8.9 -> 8.6 -> 8.1, stable ~ 8.5  - post op estimated blood loss 100ml  - No evidence of bleeding. Repeat Hb in 1 week at TCU     HUMA - resolved  - suspect pre-renal  - s/p IV fluids     Atrial fibrillation with controlled ventricular response, new onset  - Start Metoprolol 25mg bid  - Needs anticoagulation, but has frequent falls, on asa  - Consider Watchman procedure outpatient     Severe symptomatic aortic valvular stenosis  - TTE (12/29/24):  LVEF 60%, mean gradient 57 mmHg with peak velocity of 4.6 m/s. Calculated valve area 0.60-0.63 cmÂ .  - Coronary angiogram (12/29/24) normal coronary arteries.  - S/P TAVR (1/3/2025) with a 23mm Zelaya Darian Ultra Resilia Valve  - Follow up with Cardiology     Recurrent falls  - Limited historian with dementia  - Aortic valve stenosis possibly contributory  - CT head  & C spine unrevealing for acute process  - Imaging findings per #1  - Fall precautions     Intermittent hypotension  Essential hypertension   - BP intermittently low, suspect if dehydration  - Pro calcitonin negative, with minimal drop in Hb  - On Metoprolol with holding parameters  - reduced dose of Lisinopril 10mg     Possible UTI  - IV CTX x 3d course completed     Alzheimer's dementia without behavioral disturbance  - Risk for violent behavior and/or acute delirium  - Donepezil, memantine  - As needed quetiapine, hasn't required any in last few days     Hypokalemia- resolved     Intermittent urine retention  - Has frederick placed on 01/04  - Frederick catheter removed 01/10, TCU updated to follow up on TOV    Overall stabilized and discharged to TCU on 1/10/2025 for PT, OT, nursing cares, medical management and monitoring.     Today:  She is seen for regulatory review. She has since moved over to LTC in the facility. Seen in her room, watching TV, no complaints. No acute needs per nursing. No longer wearing splint on left arm. Following with orthopedics for her left distal humerus Fx s/p surgical intervention. No SOB at rest, chest pain, dizziness, palpitations. No abdominal pain, constipation, diarrhea or urinary sx.       PAST MEDICAL HISTORY:  Past Medical History:   Diagnosis Date    Anxiety 02/14/2013    Formatting of this note might be different from the original.  CURRENT MEDICATION(S) celexa 40 mg;   Lorazepam prn  Only occ  Stopped no need 11-17. Use PHQ 9 = 6   11-15;      Aortic valve stenosis 04/07/2023    Essential hypertension 10/29/2012    Hypercholesterolemia 10/29/2012    Hypothyroidism 10/29/2012    Major depressive disorder, single episode, moderate (H) 11/28/2022    Formatting of this note might be different from the original.  CURRENT MEDICATION(S) citalopram 40 mg    PHQ 9 =  2  11-11;  phq 9 = 2    12-11;  = 2   5-12; = 2   11-12;  Called her  Doing better  No need to see a psychologist. 1-14   PHQ 9 = 4   8-14;  = 3   12-14; OK 11-17;      Major neurocognitive disorder due to Alzheimer's disease (H) 06/20/2022    Psoriatic arthritis (H) 10/29/2012       MEDICATIONS:  Current Outpatient Medications   Medication Sig Dispense Refill    acetaminophen (TYLENOL) 325 MG tablet Take 2 tablets (650 mg) by mouth every 4 hours as needed for mild pain or headaches. 100 tablet 0    aspirin 81 MG EC tablet Take 1 tablet (81 mg) by mouth daily.      buPROPion (WELLBUTRIN XL) 150 MG 24 hr tablet Take 150 mg by mouth every morning.      citalopram (CELEXA) 20 MG tablet Take 1 tablet by mouth every morning.      cyanocobalamin (VITAMIN B-12) 1000 MCG tablet Take 1,000 mcg by mouth every morning.      donepezil (ARICEPT) 10 MG tablet Take 1 tablet (10 mg) by mouth at bedtime. 90 tablet 0    folic acid (FOLVITE) 1 MG tablet Take 1 mg by mouth every morning.      leflunomide (ARAVA) 10 MG tablet Take 1 tablet by mouth every morning.      levothyroxine (SYNTHROID/LEVOTHROID) 100 MCG tablet Take 1 tablet by mouth every morning.      lisinopril (ZESTRIL) 10 MG tablet Take 3 tablets (30 mg) by mouth daily.      memantine (NAMENDA) 10 MG tablet TAKE 1 TABLET BY MOUTH TWICE A  tablet 0    methotrexate 2.5 MG tablet Take 10 mg by mouth once a week On Thursday      metoprolol tartrate (LOPRESSOR) 25 MG tablet Take 1 tablet (25 mg) by mouth 2 times daily.      nystatin (MYCOSTATIN) 197859 UNIT/GM external powder Apply topically 2 times daily as needed.      QUEtiapine (SEROQUEL) 25 MG tablet Take 0.5 tablets (12.5 mg) by mouth every 6 hours as needed (Agitation).      senna-docusate (SENOKOT-S/PERICOLACE) 8.6-50 MG tablet Take 2 tablets by mouth 2 times daily as needed for constipation. 30 tablet 0    simvastatin (ZOCOR) 20 MG tablet Take 20 mg by mouth every evening.      vitamin D3 (CHOLECALCIFEROL) 50 mcg (2000 units) tablet Take 1 tablet by mouth every morning.      vitamin E (TOCOPHEROL) 1000 units (450 mg) CAPS capsule  "TAKE 1 CAPSULE BY MOUTH TWICE A  capsule 0       PHYSICAL EXAM:  General: Patient is alert female, no distress.   Vitals: /61   Pulse 61   Temp 98.9  F (37.2  C)   Resp 18   Ht 1.6 m (5' 3\")   Wt 99.4 kg (219 lb 3.2 oz)   SpO2 98%   BMI 38.83 kg/m    HEENT: Head is NCAT. Eyes show no injection or icterus. Nares negative. Oropharynx well hydrated.  Neck:  No JVD.  Lungs: Non labored respirations.  : Deferred.  Extremities: Mild LE swelling.   Musculoskeletal: Degen changes.   Skin: Warm and dry.   Psych: Mood is baseline.       LABS/DIAGNOSTIC DATA:  EXAM: XR FOREARM LEFT 2 VIEWS  LOCATION: [facility]  DATE: 12/29/2024  INDICATION: left elbow pain after fall  COMPARISON: None.                                                           IMPRESSION:   Acute significantly displaced transverse fracture of the distal humerus primarily extending through the transcondylar region. Distal humerus fragments are displaced medially relative to the humeral shaft. Radiocapitellar alignment appears intact. Findings are better evaluated on dedicated elbow radiographs.     Normal alignment of the wrist. No radius or ulna fracture. Moderate first CMC degenerative change.      ASSESSMENT/PLAN:  Left distal humerus fracture. Sustained in a fall, hx of falls. S/p ORIF on 1/7/2025. Continues to follow with orthopedics. No complaints of pain.  Afib. On aspirin for AC due to hx of falls.   Aortic stenosis. S/p TAVR on 1/3/2025. Seen by cardiology on 2/21/2025, no new orders, follow up in valve clinic July 2025.   HTN. On metoprolol, lisinopril. Continue to monitor BPs, adjust meds if needed.   Hypothyroidism. On replacement levothyroxine.   Psoriatic arthritis. On Arava, methotrexate.   Dementia. On Donepezil, Memantine.   ABLA. Hgb down to low 8 range. Recheck as warranted.   Depression and anxiety. Continue Celexa and Wellbutrin.      Electronically signed by: Linda Barkley MD       "

## 2025-04-23 ENCOUNTER — DISCHARGE SUMMARY NURSING HOME (OUTPATIENT)
Dept: GERIATRICS | Facility: CLINIC | Age: 80
End: 2025-04-23
Payer: COMMERCIAL

## 2025-04-23 VITALS
RESPIRATION RATE: 18 BRPM | TEMPERATURE: 98.9 F | HEART RATE: 57 BPM | HEIGHT: 63 IN | WEIGHT: 219 LBS | BODY MASS INDEX: 38.8 KG/M2 | SYSTOLIC BLOOD PRESSURE: 157 MMHG | DIASTOLIC BLOOD PRESSURE: 81 MMHG | OXYGEN SATURATION: 96 %

## 2025-04-23 DIAGNOSIS — G20.A1 PARKINSON'S DISEASE WITHOUT DYSKINESIA, UNSPECIFIED WHETHER MANIFESTATIONS FLUCTUATE (H): ICD-10-CM

## 2025-04-23 DIAGNOSIS — Z95.2 S/P TAVR (TRANSCATHETER AORTIC VALVE REPLACEMENT): ICD-10-CM

## 2025-04-23 DIAGNOSIS — I48.19 PERSISTENT ATRIAL FIBRILLATION (H): ICD-10-CM

## 2025-04-23 DIAGNOSIS — S42.402A CLOSED FRACTURE OF DISTAL END OF LEFT HUMERUS, UNSPECIFIED FRACTURE MORPHOLOGY, INITIAL ENCOUNTER: Primary | ICD-10-CM

## 2025-04-23 PROCEDURE — 99316 NF DSCHRG MGMT 30 MIN+: CPT | Performed by: NURSE PRACTITIONER

## 2025-04-23 NOTE — PROGRESS NOTES
[facility] GERIATRICS DISCHARGE SUMMARY  PATIENT'S NAME: Sharon De La Vega  YOB: 1945  MEDICAL RECORD NUMBER:  [id number]  Place of Service where encounter took place:  [facility] () [[id number]]    PRIMARY CARE PROVIDER AND CLINIC RESPONSIBLE AFTER TRANSFER:   Roz Elliott CNP, [address] [facility] [id number] ***   Nursing Home: [facility]      Transferring providers: Kacey Gabriel CMA, Linda Barkley MD  Recent Hospitalization/ED:  [facility] stay 12/29/2024 to 1/10/2025.  Date of SNF Admission:  1/10/2025  Date of SNF (anticipated) Discharge: {fgsdcdate:023029}  Discharged to: {fgsdischargeto1:082848}  Cognitive Scores: {fgscog1:175827}  Physical Function: {fgsphysicalfunction:050799}  DME: {fgsdmedc:855302}    CODE STATUS/ADVANCE DIRECTIVES DISCUSSION:  Full Code {Provider, verify code status is accurate as an order in Epic}  ALLERGIES: Acetaminophen-codeine; Celecoxib; Codeine camsylate; Conj estrog-medroxyprogest ace; Estrogens, conjugated synthetic a; Nsaids; and Sulfa antibiotics    NURSING FACILITY COURSE   Medication Changes/Rationale:   ***    Summary of nursing facility stay:   {FGS DX2:022219}    Discharge Medications:  MED REC REQUIRED{TIP  Click the link below to document or use med rec list, use list to pull in response :[id number]}  Post Medication Reconciliation Status: {MED REC LIST:899520}     {Providers Please double check the med list (in the plan section >> meds & orders tab) and Discontinue any of the meds flagged by the TC to be discontinued}  Current Outpatient Medications   Medication Sig Dispense Refill    acetaminophen (TYLENOL) 325 MG tablet Take 2 tablets (650 mg) by mouth every 4 hours as needed for mild pain or headaches. 100 tablet 0    aspirin 81 MG EC tablet Take 1 tablet (81 mg) by mouth daily.      buPROPion (WELLBUTRIN XL) 150 MG 24 hr tablet Take 150 mg by mouth  every morning.      citalopram (CELEXA) 20 MG tablet Take 1 tablet by mouth every morning.      cyanocobalamin (VITAMIN B-12) 1000 MCG tablet Take 1,000 mcg by mouth every morning.      donepezil (ARICEPT) 10 MG tablet Take 1 tablet (10 mg) by mouth at bedtime. 90 tablet 0    folic acid (FOLVITE) 1 MG tablet Take 1 mg by mouth every morning.      leflunomide (ARAVA) 10 MG tablet Take 1 tablet by mouth every morning.      levothyroxine (SYNTHROID/LEVOTHROID) 100 MCG tablet Take 1 tablet by mouth every morning.      lisinopril (ZESTRIL) 10 MG tablet Take 3 tablets (30 mg) by mouth daily.      memantine (NAMENDA) 10 MG tablet TAKE 1 TABLET BY MOUTH TWICE A  tablet 0    methotrexate 2.5 MG tablet Take 10 mg by mouth once a week On Thursday      metoprolol tartrate (LOPRESSOR) 25 MG tablet Take 1 tablet (25 mg) by mouth 2 times daily.      nystatin (MYCOSTATIN) 347466 UNIT/GM external powder Apply topically 2 times daily as needed.      QUEtiapine (SEROQUEL) 25 MG tablet Take 0.5 tablets (12.5 mg) by mouth every 6 hours as needed (Agitation).      senna-docusate (SENOKOT-S/PERICOLACE) 8.6-50 MG tablet Take 2 tablets by mouth 2 times daily as needed for constipation. 30 tablet 0    simvastatin (ZOCOR) 20 MG tablet Take 20 mg by mouth every evening.      vitamin D3 (CHOLECALCIFEROL) 50 mcg (2000 units) tablet Take 1 tablet by mouth every morning.      vitamin E (TOCOPHEROL) 1000 units (450 mg) CAPS capsule TAKE 1 CAPSULE BY MOUTH TWICE A  capsule 0      ***    Controlled medications:   {fgscontrolledmeds1:296586}     Past Medical History:   Past Medical History:   Diagnosis Date    Anxiety 02/14/2013    Formatting of this note might be different from the original.  CURRENT MEDICATION(S) celexa 40 mg;   Lorazepam prn  Only occ  Stopped no need 11-17. Use PHQ 9 = 6   11-15;      Aortic valve stenosis 04/07/2023    Essential hypertension 10/29/2012    Hypercholesterolemia 10/29/2012    Hypothyroidism 10/29/2012     Major depressive disorder, single episode, moderate (H) 11/28/2022    Formatting of this note might be different from the original.  CURRENT MEDICATION(S) citalopram 40 mg    PHQ 9 =  2  11-11;  phq 9 = 2    12-11;  = 2   5-12; = 2   11-12;  Called her  Doing better  No need to see a psychologist. 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) 06/20/2022    Psoriatic arthritis (H) 10/29/2012     Physical Exam:   Vitals: There were no vitals taken for this visit.  BMI: There is no height or weight on file to calculate BMI.  {NURSING HOME PHYSICAL EXAM:608304}     SNF labs: {fgslabdischarge:206842}    DISCHARGE PLAN:  Follow up labs: {fgsfuturelabs1:251785}  Medical Follow Up:      {fgsdischargefollowup:751230}  [facility] scheduled appointments:  Appointments in Next Year      Apr 23, 2025 7:00 AM  Discharge Summary with Roz Elliott CNP  [facility] ([facility] ) [phone]     Apr 29, 2025 1:15 PM  (Arrive by 1:00 PM)  Return Neurology with Manuel Osuna MD  [facility] [place] ([facility]) [phone]         ***  Discharge Services: {fgsdcservices1:184770}  Discharge Instructions Verbalized to Patient at Discharge:   {fgsdischargeinstructions1:328138}    TOTAL DISCHARGE TIME:   {TIME:527455}  Electronically signed by:  Kacey Gabriel CMA ***    {shomecare F2F:064238}           "PHQ 9 = 4   8-14;  = 3   12-14; OK 11-17;      Major neurocognitive disorder due to Alzheimer's disease (H) 06/20/2022    Psoriatic arthritis (H) 10/29/2012     Physical Exam:   Vitals: BP (!) 157/81   Pulse 57   Temp 98.9  F (37.2  C)   Resp 18   Ht 1.6 m (5' 3\")   Wt 99.3 kg (219 lb)   SpO2 96%   BMI 38.79 kg/m    BMI: Body mass index is 38.79 kg/m .  General appearance: alert, cooperative.   Lungs: respirations unlabored,  Extremities: extremities normal, atraumatic, no cyanosis or edema  Skin: No rashes or lesions  Neurologic: oriented. No focal deficits.   Psych: interacts well with caregivers,  pleasant     SNF labs: Labs done in SNF are in [facility] EPIC. Please refer to them using [facility] Everywhere.    DISCHARGE PLAN:  Follow up labs: Per PCP  Medical Follow Up:      Follow up with primary care provider in 1-2 weeks  Current [facility] scheduled appointments:  Appointments in Next Year      Apr 29, 2025 1:15 PM  (Arrive by 1:00 PM)  Return Neurology with Manuel Osuna MD  [facility] [place] ([facility]) [phone]           Discharge Services: Home Care:  Occupational Therapy and Physical Therapy  Discharge Instructions Verbalized to Patient at Discharge: n/a    TOTAL DISCHARGE TIME:   Greater than 30 minutes  Electronically signed by:  Roz Elliott CNP     Documentation of Face to Face and Certification for Home Health Services    I certify that patient:  is under my care and that I, or a nurse practitioner or physician's assistant working with me, had a face-to-face encounter that meets the physician face-to-face encounter requirements with this patient on: 4/23/25.    This encounter with the patient was in whole, or in part, for the following medical condition, which is the primary reason for home health care: Distal humerus fracture complicated by Parkinson's disease.    I certify that, based on my findings, the following services " are medically necessary home health services: Occupational Therapy and Physical Therapy.    My clinical findings support the need for the above services because: RN required for medication management, PT and OT required for continued functional improvment in home setting .     Further, I certify that my clinical findings support that this patient is homebound (i.e. absences from home require considerable and taxing effort and are for medical reasons or [facility] services or infrequently or of short duration when for other reasons) because: Requires assistance of another person or specialized equipment to access medical services because patient: Requires supervision of another for safe transfer...    Based on the above findings. I certify that this patient is confined to the home and needs intermittent skilled nursing care, physical therapy and/or speech therapy.  The patient is under my care, and I have initiated the establishment of the plan of care.  This patient will be followed by a physician who will periodically review the plan of care.

## 2025-04-23 NOTE — LETTER
4/23/2025      Sharon De La Vega  [address] [place] [address]        No notes on file      Sincerely,        Roz Elliott, DEREJE    Electronically signed   estrog-medroxyprogest ace; Estrogens, conjugated synthetic a; Nsaids; and Sulfa antibiotics    NURSING FACILITY COURSE   Medication Changes/Rationale:   Seroquel discontinued.    Summary of nursing facility stay:   1. Closed fracture of distal end of left humerus, unspecified fracture morphology, initial encounter    2. Persistent atrial fibrillation (H)    3. S/P TAVR (transcatheter aortic valve replacement)    4. Parkinson's disease without dyskinesia, unspecified whether manifestations fluctuate (H)       has a history of hypertension, psoriatic arthritis, dementia, falls who was hospitalized at [facility] from December 2019 to January 10, 2025.  She had multiple falls in her home and was found to have an acute left comminuted intra-articular mildly displaced distal humerus fracture and a minimally displaced olecranon process fracture he with displaced radial head fracture.  She did have an ORIF on 1/7.  She also had an acute drop in hemoglobin from 10.5-8.5 which is stable now.  She was started on metoprolol for A-fib however is not on anticoagulation due to fall risk.  She also had a TAVR on 1/3 and will be seen in valve clinic in July 2025.  She was found to have a possible UTI and received a 3-day course of IV ceftriaxone.  In the hospital she had some delirium which improved and she has not needed any Seroquel.    Discharge Medications:  MED REC REQUIRED  Post Medication Reconciliation Status:  Discharge medications reconciled and changed, see notes/orders       Current Outpatient Medications   Medication Sig Dispense Refill     acetaminophen (TYLENOL) 325 MG tablet Take 2 tablets (650 mg) by mouth every 4 hours as needed for mild pain or headaches. 100 tablet 0     aspirin 81 MG EC tablet Take 1 tablet (81 mg) by mouth daily.       buPROPion (WELLBUTRIN XL) 150 MG 24 hr tablet Take 150 mg by mouth every morning.       citalopram (CELEXA) 20 MG tablet Take 1 tablet by mouth every morning.        cyanocobalamin (VITAMIN B-12) 1000 MCG tablet Take 1,000 mcg by mouth every morning.       donepezil (ARICEPT) 10 MG tablet Take 1 tablet (10 mg) by mouth at bedtime. 90 tablet 3     folic acid (FOLVITE) 1 MG tablet Take 1 mg by mouth every morning.       leflunomide (ARAVA) 10 MG tablet Take 1 tablet by mouth every morning.       levothyroxine (SYNTHROID/LEVOTHROID) 100 MCG tablet Take 1 tablet by mouth every morning.       lisinopril (ZESTRIL) 10 MG tablet Take 3 tablets (30 mg) by mouth daily.       memantine (NAMENDA) 10 MG tablet Take 1 tablet (10 mg) by mouth 2 times daily. 180 tablet 3     methotrexate 2.5 MG tablet Take 10 mg by mouth once a week On Thursday       metoprolol tartrate (LOPRESSOR) 25 MG tablet Take 1 tablet (25 mg) by mouth 2 times daily.       nystatin (MYCOSTATIN) 343704 UNIT/GM external powder Apply topically 2 times daily as needed.       senna-docusate (SENOKOT-S/PERICOLACE) 8.6-50 MG tablet Take 2 tablets by mouth 2 times daily as needed for constipation. 30 tablet 0     simvastatin (ZOCOR) 20 MG tablet Take 20 mg by mouth every evening.       vitamin D3 (CHOLECALCIFEROL) 50 mcg (2000 units) tablet Take 1 tablet by mouth every morning.       vitamin E (TOCOPHEROL) 1000 units (450 mg) CAPS capsule Take 1 capsule (1,000 Units) by mouth 2 times daily. 180 capsule 3        Controlled medications:   not applicable/none     Past Medical History:   Past Medical History:   Diagnosis Date     Anxiety 02/14/2013    Formatting of this note might be different from the original.  CURRENT MEDICATION(S) celexa 40 mg;   Lorazepam prn  Only occ  Stopped no need 11-17. Use PHQ 9 = 6   11-15;       Aortic valve stenosis 04/07/2023     Essential hypertension 10/29/2012     Hypercholesterolemia 10/29/2012     Hypothyroidism 10/29/2012     Major depressive disorder, single episode, moderate (H) 11/28/2022    Formatting of this note might be different from the original.  CURRENT MEDICATION(S) citalopram 40 mg   "  PHQ 9 =  2  11-11;  phq 9 = 2    12-11;  = 2   5-12; = 2   11-12;  Called her  Doing better  No need to see a psychologist. 1-14  PHQ 9 = 4   8-14;  = 3   12-14; OK 11-17;       Major neurocognitive disorder due to Alzheimer's disease (H) 06/20/2022     Psoriatic arthritis (H) 10/29/2012     Physical Exam:   Vitals: BP (!) 157/81   Pulse 57   Temp 98.9  F (37.2  C)   Resp 18   Ht 1.6 m (5' 3\")   Wt 99.3 kg (219 lb)   SpO2 96%   BMI 38.79 kg/m    BMI: Body mass index is 38.79 kg/m .  General appearance: alert, cooperative.   Lungs: respirations unlabored,  Extremities: extremities normal, atraumatic, no cyanosis or edema  Skin: No rashes or lesions  Neurologic: oriented. No focal deficits.   Psych: interacts well with caregivers,  pleasant     SNF labs: Labs done in SNF are in [facility] EPIC. Please refer to them using [facility] Everywhere.    DISCHARGE PLAN:  Follow up labs: Per PCP  Medical Follow Up:      Follow up with primary care provider in 1-2 weeks  Current [facility] scheduled appointments:  Appointments in Next Year      Apr 29, 2025 1:15 PM  (Arrive by 1:00 PM)  Return Neurology with Manuel Osuna MD  [facility] [place] ([facility]) [phone]           Discharge Services: Home Care:  Occupational Therapy and Physical Therapy  Discharge Instructions Verbalized to Patient at Discharge: n/a    TOTAL DISCHARGE TIME:   Greater than 30 minutes  Electronically signed by:  Roz Elliott CNP     Documentation of Face to Face and Certification for Home Health Services    I certify that patient:  is under my care and that I, or a nurse practitioner or physician's assistant working with me, had a face-to-face encounter that meets the physician face-to-face encounter requirements with this patient on: 4/23/25.    This encounter with the patient was in whole, or in part, for the following medical condition, which is the primary reason for home " health care: Distal humerus fracture complicated by Parkinson's disease.    I certify that, based on my findings, the following services are medically necessary home health services: Occupational Therapy and Physical Therapy.    My clinical findings support the need for the above services because: RN required for medication management, PT and OT required for continued functional improvment in home setting .     Further, I certify that my clinical findings support that this patient is homebound (i.e. absences from home require considerable and taxing effort and are for medical reasons or [employer] services or infrequently or of short duration when for other reasons) because: Requires assistance of another person or specialized equipment to access medical services because patient: Requires supervision of another for safe transfer...    Based on the above findings. I certify that this patient is confined to the home and needs intermittent skilled nursing care, physical therapy and/or speech therapy.  The patient is under my care, and I have initiated the establishment of the plan of care.  This patient will be followed by a physician who will periodically review the plan of care.              Sincerely,        Roz Elliott CNP    Electronically signed

## 2025-04-24 NOTE — PROGRESS NOTES
NEUROLOGY FOLLOW UP VISIT  NOTE       [facility] [place]  [address] [place] [address]  Tel: [phone]  Fax: [phone]  [email or web address]     Sharon De La Vega,  1945, MRN [id number]  PCP: Roz Elliott  Date: 2025      ASSESSMENT & PLAN     Visit Diagnosis  Major neurocognitive disorder due to Alzheimer's disease (H)     Major neurocognitive disorder due to Alzheimer's dementia  79-year-old female with HTN, HLD, prediabetes, anxiety, hypothyroidism who is here for yearly follow-up for major neurocognitive disorder due to Alzheimer's dementia.  She has moved into nursing home and I have recommended:    1.  Continue Aricept 10 mg daily, Namenda 10 mg twice daily and vitamin E 1000 unit twice daily  2.  She recently had lab work that was normal  3.  Follow-up in 1 year    Thank you again for this referral, please feel free to contact me if you have any questions.    Manuel Osuna MD  [facility], [place]     HISTORY OF PRESENT ILLNESS     Patient is a 79-year-old female with HTN, HLD, prediabetes, anxiety, hypothyroidism who is here for yearly follow-up for her major neurocognitive disorder due to Alzheimer's dementia.  During her last visit she was continued on Aricept and Namenda was added and she was also told to take vitamin E 1000 unit twice daily.  Since her last visit she has moved to a nursing home and son reports no change.    BRIEFLY patient is a female with history of HTN, HLD, prediabetes, anxiety, hypothyroidism who is been followed in our clinic for Alzheimer's dementia.  Her symptoms started in 2019 when family noticed that patient was struggling with short-term memory. She would repeat herself and ask questions repeatedly.  She had no difficulty with long-term memory.  There is no history of any bladder incontinence, hallucinations or any myoclonic twitches.  Patient lives with her son who manages the finances.  She  also had left-sided tremors more pronounced when she was resting.  Although it raised the possibility of Parkinson disease a DaTscan was normal     PROBLEM LIST   Patient Active Problem List   Diagnosis    Hypothyroidism    Essential hypertension    Hypercholesterolemia    DJD (degenerative joint disease)- right knee    Psoriatic arthritis (H)    S/P knee replacement- right    Intertrigo    Anxiety    Major neurocognitive disorder due to Alzheimer's disease (H)    Major depressive disorder    Elevated troponin    COVID-19    Severe aortic stenosis    General weakness    Acute cystitis without hematuria    Severe sepsis (H)    Acute metabolic encephalopathy    Fever, unspecified fever cause    Generalized weakness    Multiple falls    Closed fracture of distal end of left humerus, unspecified fracture morphology, initial encounter    S/P TAVR (transcatheter aortic valve replacement)    Parkinson's disease, unspecified whether dyskinesia present, unspecified whether manifestations fluctuate (H)    Heart failure with reduced ejection fraction (H)    Major depressive disorder, single episode, moderate (H)    Obesity, morbid (H)    Stage 3 chronic kidney disease, unspecified whether stage 3a or 3b CKD (H)    Persistent atrial fibrillation (H)         PAST MEDICAL & SURGICAL HISTORY     Past Medical History:   Patient  has a past medical history of Anxiety (02/14/2013), Aortic valve stenosis (04/07/2023), Essential hypertension (10/29/2012), Hypercholesterolemia (10/29/2012), Hypothyroidism (10/29/2012), Major depressive disorder, single episode, moderate (H) (11/28/2022), Major neurocognitive disorder due to Alzheimer's disease (H) (06/20/2022), and Psoriatic arthritis (H) (10/29/2012).    Surgical History:  She  has a past surgical history that includes Cholecystectomy (1964); appendectomy (1964); carpal tunnel release rt/lt (Bilateral, 1982); Cataract Extraction, Bilateral (1998); EXCISE HAND/FOOT NEUROMA (2004); KNEE  SCOPE,MED OR LAT MENIS REPAIR (Right, 2000); Total Knee Arthroplasty (Right, 2012); Trabeculectomy (Right, 1998); Coronary Angiogram (N/A, 12/31/2024); Transcatheter Aortic Valve Replacement-Femoral Approach (N/A, 1/3/2025); Or Transcatheter Aortic Valve Replacement, Femoral Percutaneous Approach (Standby) (N/A, 1/3/2025); Open reduction internal fixation humerus distal (Left, 1/7/2025); and Transposition ulnar nerve (elbow) (Left, 1/7/2025).     SOCIAL HISTORY     Reviewed, and she  reports that she quit smoking about 38 years ago. Her smoking use included cigarettes. She has never used smokeless tobacco. She reports that she does not currently use alcohol.     FAMILY HISTORY     Reviewed, and family history includes Lung Cancer in her father; Multiple myeloma in her brother.     ALLERGIES     Allergies   Allergen Reactions    Acetaminophen-Codeine GI Disturbance     Indigestion    Celecoxib Unknown    Codeine Camsylate Unknown    Conj Estrog-Medroxyprogest Ace Other (See Comments)     Bleeding    Estrogens, Conjugated Synthetic A Unknown    Nsaids Unknown    Sulfa Antibiotics Unknown         REVIEW OF SYSTEMS     A 12 point review of system was performed and was negative except as outlined in the history of present illness.     HOME MEDICATIONS     Current Outpatient Rx   Medication Sig Dispense Refill    acetaminophen (TYLENOL) 325 MG tablet Take 2 tablets (650 mg) by mouth every 4 hours as needed for mild pain or headaches. 100 tablet 0    aspirin 81 MG EC tablet Take 1 tablet (81 mg) by mouth daily.      buPROPion (WELLBUTRIN XL) 150 MG 24 hr tablet Take 150 mg by mouth every morning.      citalopram (CELEXA) 20 MG tablet Take 1 tablet by mouth every morning.      cyanocobalamin (VITAMIN B-12) 1000 MCG tablet Take 1,000 mcg by mouth every morning.      donepezil (ARICEPT) 10 MG tablet Take 1 tablet (10 mg) by mouth at bedtime. 90 tablet 0    folic acid (FOLVITE) 1 MG tablet Take 1 mg by mouth every morning.       leflunomide (ARAVA) 10 MG tablet Take 1 tablet by mouth every morning.      levothyroxine (SYNTHROID/LEVOTHROID) 100 MCG tablet Take 1 tablet by mouth every morning.      lisinopril (ZESTRIL) 10 MG tablet Take 3 tablets (30 mg) by mouth daily.      memantine (NAMENDA) 10 MG tablet TAKE 1 TABLET BY MOUTH TWICE A  tablet 0    methotrexate 2.5 MG tablet Take 10 mg by mouth once a week On Thursday      metoprolol tartrate (LOPRESSOR) 25 MG tablet Take 1 tablet (25 mg) by mouth 2 times daily.      nystatin (MYCOSTATIN) 997332 UNIT/GM external powder Apply topically 2 times daily as needed.      QUEtiapine (SEROQUEL) 25 MG tablet Take 0.5 tablets (12.5 mg) by mouth every 6 hours as needed (Agitation).      senna-docusate (SENOKOT-S/PERICOLACE) 8.6-50 MG tablet Take 2 tablets by mouth 2 times daily as needed for constipation. 30 tablet 0    simvastatin (ZOCOR) 20 MG tablet Take 20 mg by mouth every evening.      vitamin D3 (CHOLECALCIFEROL) 50 mcg (2000 units) tablet Take 1 tablet by mouth every morning.      vitamin E (TOCOPHEROL) 1000 units (450 mg) CAPS capsule TAKE 1 CAPSULE BY MOUTH TWICE A  capsule 0         PHYSICAL EXAM     Vital signs  BP (!) 170/74 (BP Location: Right arm, Patient Position: Sitting)   Pulse 59   Wt 98.9 kg (218 lb 1.6 oz)   BMI 38.63 kg/m      Weight:   218 lbs 1.6 oz      Patient is alert and oriented vital signs were reviewed and are documented in electronic medical record.  Neck supple.  Neurologically speech normal.  Cranial nerves are intact.  She has minimal cogwheel rigidity on the left side with resting tremor.  She also has bilateral intention tremor.  Strength is 5/5 reflexes 1+ toes equivocal she has a wide-based gait and uses a cane     PERTINENT DIAGNOSTIC STUDIES     Following studies were reviewed:     MRI BRAIN 12/12/2022  1.  Brain atrophy and presumed chronic ischemic and microvascular ischemic changes as detailed above. Mild disproportionate volume loss  affecting the right mesial temporal lobe structures and anterior hippocampal complex, which is nonspecific, though can   be seen in the setting of neurocognitive disorder such as Alzheimer's dementia. Clinical correlation recommended.  2.  No superimposed acute intracranial abnormality.     DATSCAN 1/31/2023  1. A presynaptic dopaminergic deficit is not present.     PERTINENT LABS  Following labs were reviewed:  Lab Requisition on 02/25/2025   Component Date Value Ref Range Status    WBC Count 02/25/2025 6.0  4.0 - 11.0 10e3/uL Final    RBC Count 02/25/2025 3.66 (L)  3.80 - 5.20 10e6/uL Final    Hemoglobin 02/25/2025 11.2 (L)  11.7 - 15.7 g/dL Final    Hematocrit 02/25/2025 38.4  35.0 - 47.0 % Final    MCV 02/25/2025 105 (H)  78 - 100 fL Final    MCH 02/25/2025 30.6  26.5 - 33.0 pg Final    MCHC 02/25/2025 29.2 (L)  31.5 - 36.5 g/dL Final    RDW 02/25/2025 16.5 (H)  10.0 - 15.0 % Final    Platelet Count 02/25/2025 102 (L)  150 - 450 10e3/uL Final   Office Visit on 02/21/2025   Component Date Value Ref Range Status    Ventricular Rate 02/21/2025 62  BPM Incomplete    Atrial Rate 02/21/2025 62  BPM Incomplete    WY Interval 02/21/2025 148  ms Incomplete    QRS Duration 02/21/2025 80  ms Incomplete    QT 02/21/2025 426  ms Incomplete    QTc 02/21/2025 432  ms Incomplete    P Axis 02/21/2025 63  degrees Incomplete    R AXIS 02/21/2025 4  degrees Incomplete    T Axis 02/21/2025 47  degrees Incomplete    Interpretation ECG 02/21/2025    Incomplete                    Value:Sinus rhythm  Nonspecific ST and T wave abnormality  Abnormal ECG  When compared with ECG of 10-Feb-2025 10:02,  No significant change was found      Ventricular Rate 02/21/2025 59  BPM Final    Atrial Rate 02/21/2025 59  BPM Final    WY Interval 02/21/2025 160  ms Final    QRS Duration 02/21/2025 78  ms Final    QT 02/21/2025 448  ms Final    QTc 02/21/2025 443  ms Final    P Axis 02/21/2025 67  degrees Final    R AXIS 02/21/2025 25  degrees Final    T  Axis 02/21/2025 35  degrees Final    Interpretation ECG 02/21/2025    Final                    Value:Sinus bradycardia  Nonspecific T wave abnormality  Abnormal ECG  When compared with ECG of 21-Feb-2025 11:03,  T wave inversion now evident in Lateral leads  Confirmed by JESSI DUGGAN MD LOC:JN ([id number]) on 2/21/2025 4:43:34 PM      Protein Total 02/21/2025 6.3 (L)  6.4 - 8.3 g/dL Final    Albumin 02/21/2025 3.5  3.5 - 5.2 g/dL Final    Bilirubin Total 02/21/2025 0.4  <=1.2 mg/dL Final    Alkaline Phosphatase 02/21/2025 95  40 - 150 U/L Final    AST 02/21/2025 25  0 - 45 U/L Final    ALT 02/21/2025 13  0 - 50 U/L Final    Bilirubin Direct 02/21/2025 0.10  0.00 - 0.30 mg/dL Final   Lab on 02/21/2025   Component Date Value Ref Range Status    Sodium 02/21/2025 141  135 - 145 mmol/L Final    Potassium 02/21/2025 4.5  3.4 - 5.3 mmol/L Final    Chloride 02/21/2025 105  98 - 107 mmol/L Final    Carbon Dioxide (CO2) 02/21/2025 26  22 - 29 mmol/L Final    Anion Gap 02/21/2025 10  7 - 15 mmol/L Final    Urea Nitrogen 02/21/2025 15.8  8.0 - 23.0 mg/dL Final    Creatinine 02/21/2025 0.88  0.51 - 0.95 mg/dL Final    GFR Estimate 02/21/2025 67  >60 mL/min/1.73m2 Final    Calcium 02/21/2025 8.9  8.8 - 10.4 mg/dL Final    Glucose 02/21/2025 104 (H)  70 - 99 mg/dL Final   Hospital Outpatient Visit on 02/17/2025   Component Date Value Ref Range Status    LVEF  02/17/2025 60-65%   Final   Office Visit on 02/10/2025   Component Date Value Ref Range Status    Ventricular Rate 02/10/2025 54  BPM Final    Atrial Rate 02/10/2025 54  BPM Final    MI Interval 02/10/2025 148  ms Final    QRS Duration 02/10/2025 80  ms Final    QT 02/10/2025 456  ms Final    QTc 02/10/2025 432  ms Final    P Axis 02/10/2025 49  degrees Final    R AXIS 02/10/2025 -2  degrees Final    T Axis 02/10/2025 66  degrees Final    Interpretation ECG 02/10/2025    Final                    Value:Sinus bradycardia  Nonspecific ST and T wave abnormality  Abnormal ECG  When  compared with ECG of 10-Andres-2025 05:33,  Sinus rhythm has replaced Atrial fibrillation  Vent. rate has decreased by  26 bpm  Confirmed by DILCIA MINA MD LOC:[facility] ([id number]) on 2/10/2025 10:05:56 AM     Lab Requisition on 02/08/2025   Component Date Value Ref Range Status    Campylobacter species 02/08/2025 Negative  Negative Final    Salmonella species 02/08/2025 Negative  Negative Final    Vibrio species 02/08/2025 Negative  Negative Final    Vibrio cholerae 02/08/2025 Negative  Negative Final    Yersinia enterocolitica 02/08/2025 Negative  Negative Final    Enteropathogenic E. coli (EPEC) 02/08/2025 Negative  Negative, NA Final    Shiga-like toxin-producing E. coli* 02/08/2025 Negative  Negative Final    Shigella/Enteroinvasive E. coli (E* 02/08/2025 Negative  Negative Final    Cryptosporidium species 02/08/2025 Negative  Negative Final    Giardia lamblia 02/08/2025 Negative  Negative Final    Norovirus Gl/Gll 02/08/2025 Positive (A)  Negative Final    Rotavirus A 02/08/2025 Negative  Negative Final    Plesiomonas shigelloides 02/08/2025 Negative  Negative Final    Enteroaggregative E. coli (EAEC) 02/08/2025 Negative  Negative Final    Enterotoxigenic E. coli (ETEC) 02/08/2025 Negative  Negative Final    E. coli O157 02/08/2025 NA  Negative, NA Final    Cyclospora cayetanensis 02/08/2025 Negative  Negative Final    Entamoeba histolytica 02/08/2025 Negative  Negative Final    Adenovirus F40/41 02/08/2025 Negative  Negative Final    Astrovirus 02/08/2025 Negative  Negative Final    Sapovirus 02/08/2025 Negative  Negative Final   Lab Requisition on 02/07/2025   Component Date Value Ref Range Status    Influenza A PCR 02/07/2025 Negative  Negative Final    Influenza B PCR 02/07/2025 Negative  Negative Final    RSV PCR 02/07/2025 Negative  Negative Final    SARS CoV2 PCR 02/07/2025 Negative  Negative Final         Total time spent for face to face visit, reviewing labs/imaging studies, counseling and coordination of  care was: 30 Minutes spent on the date of the encounter doing chart review, review of outside records, review of test results, interpretation of tests, patient visit, and documentation     The longitudinal plan of care for the diagnosis(es)/condition(s) as documented were addressed during this visit. Due to the added complexity in care, I will continue to support  in the subsequent management and with ongoing continuity of care.    This note was dictated using voice recognition software.  Any grammatical or context distortions are unintentional and inherent to the software.    No orders of the defined types were placed in this encounter.     New Prescriptions    No medications on file     Modified Medications    No medications on file

## 2025-04-29 ENCOUNTER — OFFICE VISIT (OUTPATIENT)
Dept: NEUROLOGY | Facility: CLINIC | Age: 80
End: 2025-04-29
Payer: COMMERCIAL

## 2025-04-29 VITALS
DIASTOLIC BLOOD PRESSURE: 74 MMHG | SYSTOLIC BLOOD PRESSURE: 170 MMHG | HEART RATE: 59 BPM | WEIGHT: 218.1 LBS | BODY MASS INDEX: 38.63 KG/M2

## 2025-04-29 DIAGNOSIS — F02.80 MAJOR NEUROCOGNITIVE DISORDER DUE TO ALZHEIMER'S DISEASE (H): Primary | ICD-10-CM

## 2025-04-29 DIAGNOSIS — G30.9 MAJOR NEUROCOGNITIVE DISORDER DUE TO ALZHEIMER'S DISEASE (H): Primary | ICD-10-CM

## 2025-04-29 PROCEDURE — 99214 OFFICE O/P EST MOD 30 MIN: CPT | Performed by: PSYCHIATRY & NEUROLOGY

## 2025-04-29 PROCEDURE — G2211 COMPLEX E/M VISIT ADD ON: HCPCS | Performed by: PSYCHIATRY & NEUROLOGY

## 2025-04-29 PROCEDURE — 3078F DIAST BP <80 MM HG: CPT | Performed by: PSYCHIATRY & NEUROLOGY

## 2025-04-29 PROCEDURE — 3077F SYST BP >= 140 MM HG: CPT | Performed by: PSYCHIATRY & NEUROLOGY

## 2025-04-29 RX ORDER — MULTIVIT WITH MINERALS/LUTEIN
1000 TABLET ORAL 2 TIMES DAILY
Qty: 180 CAPSULE | Refills: 3 | Status: SHIPPED | OUTPATIENT
Start: 2025-04-29

## 2025-04-29 RX ORDER — DONEPEZIL HYDROCHLORIDE 10 MG/1
10 TABLET, FILM COATED ORAL AT BEDTIME
Qty: 90 TABLET | Refills: 3 | Status: SHIPPED | OUTPATIENT
Start: 2025-04-29

## 2025-04-29 RX ORDER — MEMANTINE HYDROCHLORIDE 10 MG/1
10 TABLET ORAL 2 TIMES DAILY
Qty: 180 TABLET | Refills: 3 | Status: SHIPPED | OUTPATIENT
Start: 2025-04-29

## 2025-04-29 NOTE — NURSING NOTE
Chief Complaint   Patient presents with    Dementia     Follow up       SHAWNA Toure on 4/29/2025 at 1:04 PM

## 2025-07-01 ENCOUNTER — TELEPHONE (OUTPATIENT)
Dept: PHARMACY | Facility: OTHER | Age: 80
End: 2025-07-01
Payer: COMMERCIAL

## 2025-07-01 NOTE — TELEPHONE ENCOUNTER
MTM Recruitment: [facility]  insurance     Referral outreach attempt #1 on July 1, 2025      Outcome: left voicemail- Call back number [phone] and MyChart message sent    Catherine Youssef CMA  [facility]

## 2025-07-21 ENCOUNTER — PATIENT OUTREACH (OUTPATIENT)
Dept: CARE COORDINATION | Facility: CLINIC | Age: 80
End: 2025-07-21
Payer: COMMERCIAL

## 2025-08-04 ENCOUNTER — PATIENT OUTREACH (OUTPATIENT)
Dept: CARE COORDINATION | Facility: CLINIC | Age: 80
End: 2025-08-04
Payer: COMMERCIAL

## 2025-09-02 ENCOUNTER — LAB REQUISITION (OUTPATIENT)
Dept: LAB | Facility: CLINIC | Age: 80
End: 2025-09-02
Payer: COMMERCIAL

## 2025-09-02 DIAGNOSIS — G30.9 ALZHEIMER'S DISEASE, UNSPECIFIED (CODE) (H): ICD-10-CM

## 2025-09-02 DIAGNOSIS — I10 ESSENTIAL (PRIMARY) HYPERTENSION: ICD-10-CM

## 2025-09-02 DIAGNOSIS — E03.9 HYPOTHYROIDISM, UNSPECIFIED: ICD-10-CM

## 2025-09-02 DIAGNOSIS — F39 UNSPECIFIED MOOD (AFFECTIVE) DISORDER: ICD-10-CM

## 2025-09-04 LAB
ANION GAP SERPL CALCULATED.3IONS-SCNC: 11 MMOL/L (ref 7–15)
BUN SERPL-MCNC: 21.5 MG/DL (ref 8–23)
CALCIUM SERPL-MCNC: 8.8 MG/DL (ref 8.8–10.4)
CHLORIDE SERPL-SCNC: 103 MMOL/L (ref 98–107)
CREAT SERPL-MCNC: 1.08 MG/DL (ref 0.51–0.95)
EGFRCR SERPLBLD CKD-EPI 2021: 52 ML/MIN/1.73M2
ERYTHROCYTE [DISTWIDTH] IN BLOOD BY AUTOMATED COUNT: 14.1 % (ref 10–15)
GLUCOSE SERPL-MCNC: 94 MG/DL (ref 70–99)
HCO3 SERPL-SCNC: 27 MMOL/L (ref 22–29)
HCT VFR BLD AUTO: 41 % (ref 35–47)
HGB BLD-MCNC: 12.7 G/DL (ref 11.7–15.7)
MCH RBC QN AUTO: 30.8 PG (ref 26.5–33)
MCHC RBC AUTO-ENTMCNC: 31 G/DL (ref 31.5–36.5)
MCV RBC AUTO: 99.5 FL (ref 78–100)
PLATELET # BLD AUTO: 101 10E3/UL (ref 150–450)
POTASSIUM SERPL-SCNC: 4 MMOL/L (ref 3.4–5.3)
RBC # BLD AUTO: 4.12 10E6/UL (ref 3.8–5.2)
SODIUM SERPL-SCNC: 141 MMOL/L (ref 135–145)
TSH SERPL DL<=0.005 MIU/L-ACNC: 20.8 UIU/ML (ref 0.3–4.2)
VALPROATE SERPL-MCNC: 21.4 UG/ML (ref 50–100)
WBC # BLD AUTO: 6.92 10E3/UL (ref 4–11)

## (undated) DEVICE — CLOSURE DEVICE 6FR VASC PROGLIDE MEDICATED SUTURE 12673-03

## (undated) DEVICE — WIRE GUIDE 0.035"X260CM AMPTLAZ XSTIFF CVD THSCF-35-260-3-A

## (undated) DEVICE — INTRO TERUMO 6FRX25CM W/MARKER RSB603

## (undated) DEVICE — SPONGE RAY-TEC 4X8" 7318

## (undated) DEVICE — TRANSDUCER TRAY ARTERIAL 42646-06

## (undated) DEVICE — GLOVE SURG PI ULTRA TOUCH M SZ 8-1/2 LF

## (undated) DEVICE — DRAPE STERI TOWEL LG 1010

## (undated) DEVICE — DRESSING XEROFORM PETROLATUM 5X9 33605

## (undated) DEVICE — CATH ANGIO JUDKINS R4 6FRX100CM INFINITI 534621T

## (undated) DEVICE — SOLIDIFIER FLD 3.2OZ  CL-FREE F/ BIOHZRD WASTE 3000ML CANIST

## (undated) DEVICE — ESU CORD BIPOLAR 12' E0512

## (undated) DEVICE — GUIDEWIRE VASC SAFARI2 0.035X275CM H74939406XS1

## (undated) DEVICE — VESSEL LOOP WHITE MAXI 30-721

## (undated) DEVICE — GLOVE BIOGEL PI SZ 6.5 40865

## (undated) DEVICE — SPONGE LAP 18X18" X8435

## (undated) DEVICE — SU VICRYL+ 3-0 27IN SH UND VCP416H

## (undated) DEVICE — DRAPE STERI U 1015

## (undated) DEVICE — BLADE KNIFE SURG 15 371115

## (undated) DEVICE — PADDING CAST 4IN WEBRIL STRL 2502

## (undated) DEVICE — CATH ANGIO INFINITI VESTAN STAINLESS STEEL 155 D 534554S

## (undated) DEVICE — SUTURE VICRYL+ 1 27IN CT-1 UND VCP261H

## (undated) DEVICE — INTRO MICRO MINI STICK 4FR STIFF NITINOL 45-753

## (undated) DEVICE — Device

## (undated) DEVICE — SLEEVE TR BAND RADIAL COMPRESSION DEVICE 24CM TRB24-REG

## (undated) DEVICE — INFLATION DEVICE ATRION QL2530

## (undated) DEVICE — GLOVE BIOGEL PI ULTRATOUCH G SZ 8.5 42185

## (undated) DEVICE — CUSTOM PACK UPPER EXTREMITY SOP5BUEHEC

## (undated) DEVICE — CATH ANGIO INFINITI PIGTAIL 155 6 SH 6FRX110CM 534654S

## (undated) DEVICE — SYR ANGIOGRAPHY MULTIUSE KIT ACIST 014612

## (undated) DEVICE — BLADE SAW OSCIL/SAG STRK MICRO 7.0X18.5X0.38MM 2296-003-114

## (undated) DEVICE — CARDIOPULMONARY SUPPORT SERVICES-PERFUSION

## (undated) DEVICE — DRAPE C-ARM 60X42" 1013

## (undated) DEVICE — KIT ELBOW TRIMANO AR-1646

## (undated) DEVICE — CATH DIAG 4FR ANG PIG 538453S

## (undated) DEVICE — INTRO SHEATH 6FRX10CM PINNACLE RSS602

## (undated) DEVICE — CUFF TOURN 18IN STRL DISP

## (undated) DEVICE — IMP SCR SYN CORTEX 3.5X28MM SELF TAP SS 204.828: Type: IMPLANTABLE DEVICE | Site: ELBOW | Status: NON-FUNCTIONAL

## (undated) DEVICE — MAT FLOOR WATERPROOF BACKSHEET FMBP30

## (undated) DEVICE — SYSTEM PANNUS RETENTION 4 PAD 2 STRAP CZ-PRS-04

## (undated) DEVICE — EXCHANGE WIRE .035 260 STAR/JFC/035/260/ M001491681

## (undated) DEVICE — GLOVE PI ULTRATCH M LF SZ 6.5 PF CUFF TEXT STRL LF 42665

## (undated) DEVICE — IMPLANTABLE DEVICE: Type: IMPLANTABLE DEVICE | Site: ELBOW | Status: NON-FUNCTIONAL

## (undated) DEVICE — SOL NACL 0.9% IRRIG 1000ML BOTTLE 2F7124

## (undated) DEVICE — DRILL BIT SYN QUICK COUPLING 2.0X140MM  323.062

## (undated) DEVICE — CARDIO VASC SHEET 9162

## (undated) DEVICE — BLADE KNIFE SURG 10 371110

## (undated) DEVICE — KIT VALVE AORTIC SAPIEN 3 ULTRA RESILIA OD23 MM S3URCM23A

## (undated) DEVICE — DRILL BIT QUICK COUPLING 2.5X110MM GOLD 310.25

## (undated) DEVICE — SOL WATER IRRIG 1000ML BOTTLE 2F7114

## (undated) DEVICE — STPL SKIN 35W 6.9MM  PXW35

## (undated) DEVICE — SUCTION CANISTER MEDIVAC LINER 3000ML W/LID 65651-530

## (undated) DEVICE — KIT HAND CONTROL ACIST 014644 AR-P54

## (undated) DEVICE — 6 FR IMPULSE ANGIO DIAG CATH AL1  5 PACK

## (undated) DEVICE — SHTH INTRO 6FR X 16CM GLIDESHEATH SLENDER SS 80-1660

## (undated) DEVICE — HOLDER LIMB VELCRO OR 0814-1533

## (undated) DEVICE — PREP CHLORAPREP 26ML TINTED HI-LITE ORANGE 930815

## (undated) DEVICE — DRILL BIT SYN 2.8MM CALIBRATED 324.214

## (undated) DEVICE — DRSG GAUZE 4X4" 3033

## (undated) DEVICE — HEMODYNAMIC SUPPORT ASSOCIATES-CELL SAVER

## (undated) DEVICE — CUSTOM PACK CORONARY SAN5BCRHEA

## (undated) DEVICE — MANIFOLD KIT ANGIO AUTOMATED 014613

## (undated) DEVICE — PITCHER STERILE 1000ML  SSK9004A

## (undated) DEVICE — ELECTRODE DEFIB CADENCE 22550R

## (undated) DEVICE — DRSG ABD TNDRSRB WET PRUF 8IN X 10IN STRL  9194A

## (undated) DEVICE — CUSTOM PACK PERIPH VASCULAR SCV5BPVHEA

## (undated) DEVICE — DRAPE STOCKINETTE IMPERVIOUS 08" LATEX 1586

## (undated) DEVICE — DRAPE IOBAN INCISE 23X17" 6650EZ

## (undated) DEVICE — SU MONOCRYL+ 4-0 18IN PS2 UND MCP496G

## (undated) DEVICE — IMP SCR SYN 2.7X34MM T8 SD LOCKING SELF-TAP VA 02.211.034: Type: IMPLANTABLE DEVICE | Site: ELBOW | Status: NON-FUNCTIONAL

## (undated) DEVICE — GUIDEWIRE STR .035IN X 145CM FXCR TSF-35-145

## (undated) DEVICE — GLOVE BIOGEL PI SZ 7.0 40870

## (undated) DEVICE — SPLINT ORTH FBGLS ORTHO-GLASS 4INX30IN OG-430PC

## (undated) DEVICE — SUCTION MANIFOLD NEPTUNE 2 SYS 1 PORT 702-025-000

## (undated) DEVICE — CATH ANGIO JUDKINS JL3.5 6FRX100CM INFINITI 534618T

## (undated) DEVICE — SHTH INTRO 0.021IN ID 6FR DIA

## (undated) DEVICE — ESU GROUND PAD ADULT REM W/15' CORD E7507DB

## (undated) RX ORDER — CEFAZOLIN SODIUM 1 G/3ML
INJECTION, POWDER, FOR SOLUTION INTRAMUSCULAR; INTRAVENOUS
Status: DISPENSED
Start: 2025-01-07

## (undated) RX ORDER — FENTANYL CITRATE 50 UG/ML
INJECTION, SOLUTION INTRAMUSCULAR; INTRAVENOUS
Status: DISPENSED
Start: 2024-12-31

## (undated) RX ORDER — PHENYLEPHRINE HYDROCHLORIDE 10 MG/ML
INJECTION INTRAVENOUS
Status: DISPENSED
Start: 2025-01-07

## (undated) RX ORDER — PROPOFOL 10 MG/ML
INJECTION, EMULSION INTRAVENOUS
Status: DISPENSED
Start: 2025-01-07

## (undated) RX ORDER — FENTANYL CITRATE 50 UG/ML
INJECTION, SOLUTION INTRAMUSCULAR; INTRAVENOUS
Status: DISPENSED
Start: 2025-01-07

## (undated) RX ORDER — CEFAZOLIN SODIUM/WATER 2 G/20 ML
SYRINGE (ML) INTRAVENOUS
Status: DISPENSED
Start: 2025-01-03

## (undated) RX ORDER — PROTAMINE SULFATE 10 MG/ML
INJECTION, SOLUTION INTRAVENOUS
Status: DISPENSED
Start: 2025-01-03

## (undated) RX ORDER — FENTANYL CITRATE 50 UG/ML
INJECTION, SOLUTION INTRAMUSCULAR; INTRAVENOUS
Status: DISPENSED
Start: 2025-01-03